# Patient Record
Sex: MALE | Race: WHITE | NOT HISPANIC OR LATINO | Employment: OTHER | URBAN - METROPOLITAN AREA
[De-identification: names, ages, dates, MRNs, and addresses within clinical notes are randomized per-mention and may not be internally consistent; named-entity substitution may affect disease eponyms.]

---

## 2017-01-29 LAB — INR PPP: 2.9

## 2017-01-30 ENCOUNTER — ANTICOAGULATION VISIT (OUTPATIENT)
Dept: CARDIOLOGY | Facility: CLINIC | Age: 67
End: 2017-01-30

## 2017-01-30 NOTE — PATIENT INSTRUCTIONS
LM on VM to continue same dose- recheck in 2 weeks- I did ask that if he is taking differently than what we have documented to please and let me know- LC

## 2017-02-22 ENCOUNTER — TELEPHONE (OUTPATIENT)
Dept: CARDIOLOGY | Facility: CLINIC | Age: 67
End: 2017-02-22

## 2017-02-22 ENCOUNTER — ANTICOAGULATION VISIT (OUTPATIENT)
Dept: CARDIOLOGY | Facility: CLINIC | Age: 67
End: 2017-02-22

## 2017-02-22 LAB — INR PPP: 3.5

## 2017-03-06 LAB — INR PPP: 2.6

## 2017-03-09 ENCOUNTER — ANTICOAGULATION VISIT (OUTPATIENT)
Dept: CARDIOLOGY | Facility: CLINIC | Age: 67
End: 2017-03-09

## 2017-03-09 LAB — INR PPP: 2.6

## 2017-03-16 ENCOUNTER — TRANSCRIBE ORDERS (OUTPATIENT)
Dept: PHYSICAL THERAPY | Facility: CLINIC | Age: 67
End: 2017-03-16

## 2017-03-16 ENCOUNTER — OFFICE VISIT (OUTPATIENT)
Dept: PHYSICAL THERAPY | Facility: CLINIC | Age: 67
End: 2017-03-16

## 2017-03-16 DIAGNOSIS — E06.1 DE QUERVAIN THYROIDITIS: Primary | ICD-10-CM

## 2017-03-16 DIAGNOSIS — M65.4 DE QUERVAIN'S TENOSYNOVITIS, LEFT: Primary | ICD-10-CM

## 2017-03-16 PROCEDURE — L3808 WHFO, RIGID W/O JOINTS: HCPCS | Performed by: PHYSICAL THERAPIST

## 2017-03-17 RX ORDER — WARFARIN SODIUM 3 MG/1
TABLET ORAL
Qty: 60 TABLET | Refills: 1 | Status: SHIPPED | OUTPATIENT
Start: 2017-03-17 | End: 2017-08-08 | Stop reason: SDUPTHER

## 2017-03-17 RX ORDER — WARFARIN SODIUM 1 MG/1
TABLET ORAL
Qty: 60 TABLET | Refills: 1 | Status: SHIPPED | OUTPATIENT
Start: 2017-03-17 | End: 2017-05-12

## 2017-03-19 ENCOUNTER — HOSPITAL ENCOUNTER (EMERGENCY)
Facility: HOSPITAL | Age: 67
Discharge: HOME | End: 2017-03-19
Attending: EMERGENCY MEDICINE | Admitting: EMERGENCY MEDICINE

## 2017-03-19 ENCOUNTER — APPOINTMENT (OUTPATIENT)
Dept: GENERAL RADIOLOGY | Facility: HOSPITAL | Age: 67
End: 2017-03-19

## 2017-03-19 VITALS
OXYGEN SATURATION: 98 % | HEIGHT: 70 IN | DIASTOLIC BLOOD PRESSURE: 90 MMHG | TEMPERATURE: 98 F | RESPIRATION RATE: 16 BRPM | HEART RATE: 88 BPM | SYSTOLIC BLOOD PRESSURE: 125 MMHG | BODY MASS INDEX: 22.9 KG/M2 | WEIGHT: 160 LBS

## 2017-03-19 DIAGNOSIS — H93.13 TINNITUS AURIUM, BILATERAL: ICD-10-CM

## 2017-03-19 DIAGNOSIS — R00.2 PALPITATIONS: Primary | ICD-10-CM

## 2017-03-19 DIAGNOSIS — G47.01 INSOMNIA DUE TO MEDICAL CONDITION: ICD-10-CM

## 2017-03-19 LAB
ALBUMIN SERPL-MCNC: 4.4 G/DL (ref 3.2–4.8)
ALBUMIN/GLOB SERPL: 1.7 G/DL (ref 1.5–2.5)
ALP SERPL-CCNC: 61 U/L (ref 25–100)
ALT SERPL W P-5'-P-CCNC: 33 U/L (ref 7–40)
ANION GAP SERPL CALCULATED.3IONS-SCNC: 1 MMOL/L (ref 3–11)
AST SERPL-CCNC: 32 U/L (ref 0–33)
BASOPHILS # BLD AUTO: 0.03 10*3/MM3 (ref 0–0.2)
BASOPHILS NFR BLD AUTO: 0.4 % (ref 0–1)
BILIRUB SERPL-MCNC: 1.1 MG/DL (ref 0.3–1.2)
BUN BLD-MCNC: 10 MG/DL (ref 9–23)
BUN/CREAT SERPL: 14.3 (ref 7–25)
CALCIUM SPEC-SCNC: 9.6 MG/DL (ref 8.7–10.4)
CHLORIDE SERPL-SCNC: 106 MMOL/L (ref 99–109)
CO2 SERPL-SCNC: 31 MMOL/L (ref 20–31)
CREAT BLD-MCNC: 0.7 MG/DL (ref 0.6–1.3)
DEPRECATED RDW RBC AUTO: 45.4 FL (ref 37–54)
EOSINOPHIL # BLD AUTO: 0.17 10*3/MM3 (ref 0.1–0.3)
EOSINOPHIL NFR BLD AUTO: 2.2 % (ref 0–3)
ERYTHROCYTE [DISTWIDTH] IN BLOOD BY AUTOMATED COUNT: 13 % (ref 11.3–14.5)
GFR SERPL CREATININE-BSD FRML MDRD: 112 ML/MIN/1.73
GLOBULIN UR ELPH-MCNC: 2.6 GM/DL
GLUCOSE BLD-MCNC: 73 MG/DL (ref 70–100)
HCT VFR BLD AUTO: 44.8 % (ref 38.9–50.9)
HGB BLD-MCNC: 15.2 G/DL (ref 13.1–17.5)
HOLD SPECIMEN: NORMAL
HOLD SPECIMEN: NORMAL
IMM GRANULOCYTES # BLD: 0.06 10*3/MM3 (ref 0–0.03)
IMM GRANULOCYTES NFR BLD: 0.8 % (ref 0–0.6)
INR PPP: 3.07
LYMPHOCYTES # BLD AUTO: 2.27 10*3/MM3 (ref 0.6–4.8)
LYMPHOCYTES NFR BLD AUTO: 29.2 % (ref 24–44)
MAGNESIUM SERPL-MCNC: 1.9 MG/DL (ref 1.3–2.7)
MCH RBC QN AUTO: 32.5 PG (ref 27–31)
MCHC RBC AUTO-ENTMCNC: 33.9 G/DL (ref 32–36)
MCV RBC AUTO: 95.9 FL (ref 80–99)
MONOCYTES # BLD AUTO: 0.77 10*3/MM3 (ref 0–1)
MONOCYTES NFR BLD AUTO: 9.9 % (ref 0–12)
NEUTROPHILS # BLD AUTO: 4.47 10*3/MM3 (ref 1.5–8.3)
NEUTROPHILS NFR BLD AUTO: 57.5 % (ref 41–71)
PLATELET # BLD AUTO: 190 10*3/MM3 (ref 150–450)
PMV BLD AUTO: 10.9 FL (ref 6–12)
POTASSIUM BLD-SCNC: 3.8 MMOL/L (ref 3.5–5.5)
PROT SERPL-MCNC: 7 G/DL (ref 5.7–8.2)
PROTHROMBIN TIME: 34.7 SECONDS (ref 9.6–11.5)
RBC # BLD AUTO: 4.67 10*6/MM3 (ref 4.2–5.76)
SODIUM BLD-SCNC: 138 MMOL/L (ref 132–146)
TROPONIN I SERPL-MCNC: <0.006 NG/ML
WBC NRBC COR # BLD: 7.77 10*3/MM3 (ref 3.5–10.8)
WHOLE BLOOD HOLD SPECIMEN: NORMAL
WHOLE BLOOD HOLD SPECIMEN: NORMAL

## 2017-03-19 PROCEDURE — 99284 EMERGENCY DEPT VISIT MOD MDM: CPT

## 2017-03-19 PROCEDURE — 84484 ASSAY OF TROPONIN QUANT: CPT | Performed by: EMERGENCY MEDICINE

## 2017-03-19 PROCEDURE — 71010 HC CHEST PA OR AP: CPT

## 2017-03-19 PROCEDURE — 83735 ASSAY OF MAGNESIUM: CPT | Performed by: EMERGENCY MEDICINE

## 2017-03-19 PROCEDURE — 93005 ELECTROCARDIOGRAM TRACING: CPT

## 2017-03-19 PROCEDURE — 85610 PROTHROMBIN TIME: CPT | Performed by: EMERGENCY MEDICINE

## 2017-03-19 PROCEDURE — 80053 COMPREHEN METABOLIC PANEL: CPT | Performed by: EMERGENCY MEDICINE

## 2017-03-19 PROCEDURE — 85025 COMPLETE CBC W/AUTO DIFF WBC: CPT | Performed by: EMERGENCY MEDICINE

## 2017-03-19 RX ORDER — SODIUM CHLORIDE 0.9 % (FLUSH) 0.9 %
10 SYRINGE (ML) INJECTION AS NEEDED
Status: DISCONTINUED | OUTPATIENT
Start: 2017-03-19 | End: 2017-03-19 | Stop reason: HOSPADM

## 2017-03-19 RX ORDER — PREGABALIN 100 MG/1
200 CAPSULE ORAL 3 TIMES DAILY
COMMUNITY
End: 2017-05-12

## 2017-03-19 RX ORDER — OXYCODONE HYDROCHLORIDE 15 MG/1
15 TABLET, FILM COATED, EXTENDED RELEASE ORAL EVERY 12 HOURS SCHEDULED
COMMUNITY
End: 2017-03-30

## 2017-03-19 RX ORDER — MORPHINE SULFATE 30 MG/1
15 TABLET ORAL 2 TIMES DAILY
COMMUNITY
End: 2017-03-30

## 2017-03-19 RX ORDER — METOPROLOL TARTRATE 50 MG/1
25 TABLET, FILM COATED ORAL 2 TIMES DAILY
COMMUNITY
End: 2017-06-20 | Stop reason: HOSPADM

## 2017-03-19 RX ORDER — LISINOPRIL 10 MG/1
10 TABLET ORAL EVERY MORNING
COMMUNITY
End: 2017-08-25 | Stop reason: SDUPTHER

## 2017-03-19 NOTE — ED PROVIDER NOTES
Subjective   HPI Comments: Doc Turner is a 66 y.o.male who presents to the ED c/o rapid palpitations for the past 2 months. Pt states he has had a constantly increased rapid heart rate with associated ringing in his ears and worsening difficulty sleeping. He has a hx of A-fib, which he takes coumadin for and is followed by Dr. Abad. He notes he has not been evaluated by Dr. Abad in 2 years and has not had an EKG for several years. Upon examination in the ED the pt denies any hearing loss, but notes he has not had a recent hearing test. 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 He reports he has chronic difficulty sleeping, but indicates it has been worsening the past 2 to 3 months and often naps during the day. He denies any hx of sleep apnea. He denies any fever, sore throat, rhinorrhea, diarrhea, urinary sx, blood in his stool or leg swelling.    Additionally, pt has De Quervain's Tenosynovitis in his left forearm and recently had steroid injections. His lower arm is currently in a splint.    Patient is a 66 y.o. male presenting with palpitations.   History provided by:  Patient  Palpitations   Palpitations quality:  Fast  Onset quality:  Unable to specify  Duration:  8 weeks  Timing:  Constant  Progression:  Unchanged  Chronicity:  New  Relieved by:  None tried  Associated symptoms: no lower extremity edema and no shortness of breath    Risk factors: hx of atrial fibrillation    Risk factors: no hypercoagulable state        Review of Systems   Constitutional: Negative for fever.   HENT: Positive for ear pain. Negative for hearing loss, rhinorrhea and sore throat.    Respiratory: Negative for shortness of breath.    Cardiovascular: Positive for palpitations.   Gastrointestinal: Negative for blood in stool and diarrhea.   Genitourinary: Negative for difficulty urinating and dysuria.   Psychiatric/Behavioral: Positive for sleep disturbance.   All other systems reviewed and are  negative.    PHYSICIAN/PAIN SPECIALIST: Kp Hobbs MD.      FORMER CARDIOLOGIST: Juan Wellington MD/Tino Allison MD, [facility].      CARDIOTHORACIC SURGEON: Tony Mcgill MD, [facility].      NEUROSURGEON: Jose Cadena MD.      NEUROLOGIST: Dwayne Rhodes MD.      SURGEON: Thierry Moore MD.      DISCHARGE DIAGNOSES:   1. Chronic valvular heart disease with mitral valve prolapse syndrome:   a) Remote progressive mitral regurgitation with porcine mitral valve  replacement ([facility], 1998).   b) Subsequent serial echocardiographic studies - data deficit - with abnormal  stress test and diagnostic coronary angiography - data deficit (07/2006).   c) Repeat sternotomy with mitral valve replacement with #[address] [employer]  mechanical mitral valve prosthesis with postoperative bleeding requiring  reexploration and associated atrial fibrillation/flutter with sotalol therapy  (11/2006).   d) Remote acceptable combination Doppler echocardiogram (09/2009) with  residual Class I symptoms.   e) Recent tachypalpitations with documented atrial fibrillation and [place]  Cardiovascular Society Class I angina pectoris/New York Heart Association Class  II exertional dyspnea and fatigue syndrome with subsequent admission following  initiation of sotalol therapy and direct current cardioversion with  echocardiogram.   2. Intermittent tachypalpitations with remote apparent acceptable 24-hour  Holter monitor - data deficit.   3. Labile hypertension, probably essential.   4. Chronic degenerative cervical spine disc disease with chronic narcotic use.  5. Chronic insomnia/depression with recent improved clinical course.   6. Remote Lyme disease with recurrence on 3 occasions, treated with periodic  antibiotic therapy and suppressive therapy.   7. Intermittent visual disturbance with hospitalization and neurology  evaluation - data deficit (11/2009).   8. Apparent symptomatic left inguinal hernia.   9. Erectile  dysfunction/Peyronie's disease.   10. Remote apparent complicated inguinal herniorrhaphy (02/2014) with  subsequent development of intermittent recurrent episodic gastric and right  upper quadrant abdominal pain and subsequent small bowel obstruction with  lactic acidosis with abdominal CT scan demonstrating extensive and progressive  right mid anterior abdominal mesenteric rotation/volvulus with emergent surgery  and postoperative wound hematoma/open packing with subsequent closure  (09/2014).   Past Medical History   Diagnosis Date   • Atrial fibrillation    • Chronic neck and back pain    • Hypertension        Allergies   Allergen Reactions   • Penicillins        Past Surgical History   Procedure Laterality Date   • Cardiac valve replacement       twice- mitral   • Back surgery     • Cardioversion     • Hernia repair         History reviewed. No pertinent family history.    Social History     Social History   • Marital status:      Spouse name: N/A   • Number of children: N/A   • Years of education: N/A     Social History Main Topics   • Smoking status: Never Smoker   • Smokeless tobacco: None   • Alcohol use Yes      Comment: 12 pack per week   • Drug use: No   • Sexual activity: Defer     Other Topics Concern   • None     Social History Narrative    Lives with his female            Objective   Physical Exam   Constitutional: He is oriented to person, place, and time. He appears well-developed and well-nourished. No distress.   No distress, TMs nml, reg with occasional irregular beat 1/6 sys murmur at base of heart, splint on left wrist consistent with recent injection  Neurovascularly intact   HENT:   Head: Normocephalic and atraumatic.   Right Ear: Tympanic membrane and external ear normal.   Left Ear: Tympanic membrane and external ear normal.   Nose: Nose normal.   Mouth/Throat: Oropharynx is clear and moist.   Eyes: Conjunctivae are normal. No scleral icterus.   Neck: Normal range of  motion. Neck supple.   Cardiovascular: Normal rate, regular rhythm and intact distal pulses.  Exam reveals no gallop and no friction rub.    Murmur (At base of heart.) heard.   Systolic murmur is present with a grade of 1/6   Regular rhythm with occasional irregular beat.   Pulmonary/Chest: Effort normal and breath sounds normal. No respiratory distress. He has no wheezes. He has no rales.   Abdominal: Soft. There is no tenderness.   Musculoskeletal: Normal range of motion.   Splint on left wrist consistent with injections.   Neurological: He is alert and oriented to person, place, and time.   LUE is neurovascularly intact.   Skin: Skin is warm and dry.   Psychiatric: He has a normal mood and affect. His behavior is normal.   Nursing note and vitals reviewed.      Procedures         ED Course  ED Course        Recent Results (from the past 24 hour(s))   Comprehensive Metabolic Panel    Collection Time: 03/19/17 11:54 AM   Result Value Ref Range    Glucose 73 70 - 100 mg/dL    BUN 10 9 - 23 mg/dL    Creatinine 0.70 0.60 - 1.30 mg/dL    Sodium 138 132 - 146 mmol/L    Potassium 3.8 3.5 - 5.5 mmol/L    Chloride 106 99 - 109 mmol/L    CO2 31.0 20.0 - 31.0 mmol/L    Calcium 9.6 8.7 - 10.4 mg/dL    Total Protein 7.0 5.7 - 8.2 g/dL    Albumin 4.40 3.20 - 4.80 g/dL    ALT (SGPT) 33 7 - 40 U/L    AST (SGOT) 32 0 - 33 U/L    Alkaline Phosphatase 61 25 - 100 U/L    Total Bilirubin 1.1 0.3 - 1.2 mg/dL    eGFR Non African Amer 112 >60 mL/min/1.73    Globulin 2.6 gm/dL    A/G Ratio 1.7 1.5 - 2.5 g/dL    BUN/Creatinine Ratio 14.3 7.0 - 25.0    Anion Gap 1.0 (L) 3.0 - 11.0 mmol/L   Magnesium    Collection Time: 03/19/17 11:54 AM   Result Value Ref Range    Magnesium 1.9 1.3 - 2.7 mg/dL   Light Blue Top    Collection Time: 03/19/17 11:54 AM   Result Value Ref Range    Extra Tube hold for add-on    Green Top (Gel)    Collection Time: 03/19/17 11:54 AM   Result Value Ref Range    Extra Tube Hold for add-ons.    Lavender Top     Collection Time: 03/19/17 11:54 AM   Result Value Ref Range    Extra Tube hold for add-on    Gold Top - SST    Collection Time: 03/19/17 11:54 AM   Result Value Ref Range    Extra Tube Hold for add-ons.    CBC Auto Differential    Collection Time: 03/19/17 11:54 AM   Result Value Ref Range    WBC 7.77 3.50 - 10.80 10*3/mm3    RBC 4.67 4.20 - 5.76 10*6/mm3    Hemoglobin 15.2 13.1 - 17.5 g/dL    Hematocrit 44.8 38.9 - 50.9 %    MCV 95.9 80.0 - 99.0 fL    MCH 32.5 (H) 27.0 - 31.0 pg    MCHC 33.9 32.0 - 36.0 g/dL    RDW 13.0 11.3 - 14.5 %    RDW-SD 45.4 37.0 - 54.0 fl    MPV 10.9 6.0 - 12.0 fL    Platelets 190 150 - 450 10*3/mm3    Neutrophil % 57.5 41.0 - 71.0 %    Lymphocyte % 29.2 24.0 - 44.0 %    Monocyte % 9.9 0.0 - 12.0 %    Eosinophil % 2.2 0.0 - 3.0 %    Basophil % 0.4 0.0 - 1.0 %    Immature Grans % 0.8 (H) 0.0 - 0.6 %    Neutrophils, Absolute 4.47 1.50 - 8.30 10*3/mm3    Lymphocytes, Absolute 2.27 0.60 - 4.80 10*3/mm3    Monocytes, Absolute 0.77 0.00 - 1.00 10*3/mm3    Eosinophils, Absolute 0.17 0.10 - 0.30 10*3/mm3    Basophils, Absolute 0.03 0.00 - 0.20 10*3/mm3    Immature Grans, Absolute 0.06 (H) 0.00 - 0.03 10*3/mm3   Troponin    Collection Time: 03/19/17 11:54 AM   Result Value Ref Range    Troponin I <0.006 <=0.039 ng/mL   Protime-INR    Collection Time: 03/19/17 11:54 AM   Result Value Ref Range    Protime 34.7 (H) 9.6 - 11.5 Seconds    INR 3.07      Note: In addition to lab results from this visit, the labs listed above may include labs taken at another facility or during a different encounter within the last 24 hours. Please correlate lab times with ED admission and discharge times for further clarification of the services performed during this visit.    XR Chest 1 View   Preliminary Result   No acute chest pathology.       DICTATED:     03/19/2017   EDITED:         03/19/2017            Vitals:    03/19/17 1145 03/19/17 1315 03/19/17 1400 03/19/17 1409   BP:   125/90 125/90   BP Location:    Left  "arm   Patient Position:    Sitting   Pulse:  86  88   Resp:    16   Temp:    98 °F (36.7 °C)   TempSrc:       SpO2:  97%  98%   Weight: 160 lb (72.6 kg)      Height: 70\" (177.8 cm)        Medications - No data to display  ECG/EMG Results (last 24 hours)     Procedure Component Value Units Date/Time    ECG 12 Lead [[id number]] Collected:  03/19/17 1145     Updated:  03/19/17 1314                  MDM  Number of Diagnoses or Management Options  Insomnia due to medical condition:   Palpitations:   Tinnitus aurium, bilateral:   Diagnosis management comments:       Reviewed all available studies at the bedside with the patient.  They are reassuring.  Unfortunately he is not kept his follow-up as previously directed and I think right now he is stable but will need early follow-up with our A. fib clinic.  He may benefit from something like a CO monitor to see if he is having frequent arrhythmia is when I rechecked the patient.  Per to be in sinus rhythm that his initial EKG showed a flutter with a slow rate variable block.  He is adequately anticoagulated.    Scars the tenderness goes his neurologic exam is normal here and his ear drums appear normal.  I'll refer him to ENT to follow-up with this and I explained how frustrating this can be treated treat sometimes.    His far as the insomnia goes he sleeping a lot during the day and I talked her about sleep hygiene issues and a trial melatonin I've encouraged him to call our sleep center for follow-up.    All are agreeable with the plan       Amount and/or Complexity of Data Reviewed  Clinical lab tests: reviewed  Tests in the radiology section of CPT®: reviewed  Tests in the medicine section of CPT®: reviewed      EMR Dragon/Transcription disclaimer:   Much of this encounter note is an electronic transcription/translation of spoken language to printed text. The electronic translation of spoken language may permit erroneous, or at times, nonsensical words or phrases to be " inadvertently transcribed; Although I have reviewed the note for such errors, some may still exist.     Final diagnoses:   Palpitations   Tinnitus aurium, bilateral   Insomnia due to medical condition            Terry Gibson  03/19/17 1408       Terry Gibson  03/19/17 1432       Atif Kraus MD  03/19/17 [id number]

## 2017-03-19 NOTE — DISCHARGE INSTRUCTIONS
Try melatonin at night 30 minutes before sleep.  Tried exposure self to a lot of sunlight during the day and avoid napping.    Continue your current dose of Coumadin your INR was 3.07    He may take an extra metoprolol once a day if you have a lot of palpitations

## 2017-03-20 NOTE — PROGRESS NOTES
Doc Turner 1950   Diagnosis/ Surgery: Left De Quervains             Date Of Injury: Chronic    Date Of Surgery:N/A    Hand Dominance: Right   History of Present Condition: ~1 year ago patient started to have pain at the base of the left thumb, eventually he started to not be able to use his left hand and thumb because of the pain  Medical/Vocational History/ Medications: Pt enjoys playing stringed Vascular [employer]     Pain: 1/10    Edema: mild  Sensibility: WNL   Wound Status:N/A  ROM/ Strength: NT    Splinting:  · Patient was measure and fit with a custom fabricated forearm based thumb spica    · Patient was instructed in wearing schedule, precautions and care of the splint during this visit.   · Patient was instructed in proper donning/doffing of splint.   Assessment:  · Patient was fitted and appropriate splint was fabricated this date.  · Patient reported that splint was comfortable and had no complications with the fit of the splint.  · Patient was instructed and patient verbalized understanding of precautions, wear and care of the splint.   · Patient demonstrated independent donning/doffing of splint during treatment today.  Goals:  · Patient was fitted properly with appropriate splint for diagnosis  · Patient was educated on precautions, wear schedule and care of splint  · Patient demonstrated independence with donning/doffing of the splint.  · Splint was provided to Protect Healing Structures, Restrict Mobility, Improve joint alignment.  Plan:  · No additional treatment is required for this patient at this time. The patient is therefore discharged from therapy.  · Patient advised to contact therapist with any additional questions or concerns regarding the fit and function of the splint.  · Patient will be seen for splint issues as needed   · Wear Instructions: Off for hygiene       PT SIGNATURE: Domo Crespo, PT   DATE TREATMENT INITIATED: 3/19/2017    Physician  Signature____________________________________ Date____________

## 2017-03-27 PROBLEM — G47.00 INSOMNIA: Status: ACTIVE | Noted: 2017-03-27

## 2017-03-27 PROBLEM — K40.90 LEFT INGUINAL HERNIA: Status: ACTIVE | Noted: 2017-03-27

## 2017-03-27 PROBLEM — N52.9 ED (ERECTILE DYSFUNCTION): Status: ACTIVE | Noted: 2017-03-27

## 2017-03-27 PROBLEM — I10 HYPERTENSION: Status: ACTIVE | Noted: 2017-03-27

## 2017-03-27 PROBLEM — R00.2 PALPITATIONS: Status: ACTIVE | Noted: 2017-03-27

## 2017-03-27 PROBLEM — I38 VHD (VALVULAR HEART DISEASE): Status: ACTIVE | Noted: 2017-03-27

## 2017-03-27 PROBLEM — M50.30 DDD (DEGENERATIVE DISC DISEASE), CERVICAL: Status: ACTIVE | Noted: 2017-03-27

## 2017-03-30 ENCOUNTER — OFFICE VISIT (OUTPATIENT)
Dept: CARDIOLOGY | Facility: HOSPITAL | Age: 67
End: 2017-03-30

## 2017-03-30 VITALS
OXYGEN SATURATION: 100 % | HEART RATE: 89 BPM | BODY MASS INDEX: 22.9 KG/M2 | TEMPERATURE: 98 F | RESPIRATION RATE: 18 BRPM | WEIGHT: 160 LBS | DIASTOLIC BLOOD PRESSURE: 72 MMHG | HEIGHT: 70 IN | SYSTOLIC BLOOD PRESSURE: 112 MMHG

## 2017-03-30 PROCEDURE — 99215 OFFICE O/P EST HI 40 MIN: CPT | Performed by: NURSE PRACTITIONER

## 2017-03-30 RX ORDER — OXYCODONE HYDROCHLORIDE 15 MG/1
15 TABLET ORAL 3 TIMES DAILY PRN
Refills: 0 | COMMUNITY
Start: 2017-03-10 | End: 2018-02-20 | Stop reason: HOSPADM

## 2017-03-30 RX ORDER — MORPHINE SULFATE 15 MG/1
15 TABLET, FILM COATED, EXTENDED RELEASE ORAL 2 TIMES DAILY
Refills: 0 | COMMUNITY
Start: 2017-03-10 | End: 2019-02-26 | Stop reason: ALTCHOICE

## 2017-04-05 ENCOUNTER — ANTICOAGULATION VISIT (OUTPATIENT)
Dept: CARDIOLOGY | Facility: CLINIC | Age: 67
End: 2017-04-05

## 2017-04-05 NOTE — PATIENT INSTRUCTIONS
Patient was apparently seen in the ER 3/19//17 and had lab work done.  I found this today in his chart today 4/5/17.  Called patient and LM for him to continue on his dose and to recheck 4/20/17.   AH

## 2017-04-10 ENCOUNTER — ANTICOAGULATION VISIT (OUTPATIENT)
Dept: CARDIOLOGY | Facility: CLINIC | Age: 67
End: 2017-04-10

## 2017-04-10 LAB — INR PPP: 2.6

## 2017-04-25 ENCOUNTER — ANTICOAGULATION VISIT (OUTPATIENT)
Dept: CARDIOLOGY | Facility: CLINIC | Age: 67
End: 2017-04-25

## 2017-04-25 LAB — INR PPP: 2.6

## 2017-05-12 ENCOUNTER — OFFICE VISIT (OUTPATIENT)
Dept: CARDIOLOGY | Facility: CLINIC | Age: 67
End: 2017-05-12

## 2017-05-12 VITALS
WEIGHT: 159.2 LBS | HEIGHT: 70 IN | SYSTOLIC BLOOD PRESSURE: 137 MMHG | HEART RATE: 86 BPM | DIASTOLIC BLOOD PRESSURE: 91 MMHG | BODY MASS INDEX: 22.79 KG/M2

## 2017-05-12 DIAGNOSIS — R00.2 PALPITATIONS: ICD-10-CM

## 2017-05-12 DIAGNOSIS — I10 ESSENTIAL HYPERTENSION: ICD-10-CM

## 2017-05-12 DIAGNOSIS — G47.00 INSOMNIA, UNSPECIFIED TYPE: ICD-10-CM

## 2017-05-12 DIAGNOSIS — I48.92 PAROXYSMAL ATRIAL FLUTTER (HCC): ICD-10-CM

## 2017-05-12 DIAGNOSIS — I38 VHD (VALVULAR HEART DISEASE): Primary | ICD-10-CM

## 2017-05-12 PROCEDURE — 99204 OFFICE O/P NEW MOD 45 MIN: CPT | Performed by: INTERNAL MEDICINE

## 2017-05-12 PROCEDURE — 93000 ELECTROCARDIOGRAM COMPLETE: CPT | Performed by: INTERNAL MEDICINE

## 2017-05-12 RX ORDER — SOTALOL HYDROCHLORIDE 80 MG/1
80 TABLET ORAL 2 TIMES DAILY
COMMUNITY
End: 2017-05-15 | Stop reason: SDUPTHER

## 2017-05-15 RX ORDER — SOTALOL HYDROCHLORIDE 80 MG/1
80 TABLET ORAL 2 TIMES DAILY
Qty: 180 TABLET | Refills: 3 | Status: SHIPPED | OUTPATIENT
Start: 2017-05-15 | End: 2017-05-16 | Stop reason: SDUPTHER

## 2017-05-16 ENCOUNTER — ANTICOAGULATION VISIT (OUTPATIENT)
Dept: CARDIOLOGY | Facility: CLINIC | Age: 67
End: 2017-05-16

## 2017-05-16 LAB — INR PPP: 2.8

## 2017-05-16 RX ORDER — SOTALOL HYDROCHLORIDE 80 MG/1
80 TABLET ORAL 2 TIMES DAILY
Qty: 180 TABLET | Refills: 3 | Status: SHIPPED | OUTPATIENT
Start: 2017-05-16 | End: 2018-02-20 | Stop reason: SDUPTHER

## 2017-06-01 ENCOUNTER — ANTICOAGULATION VISIT (OUTPATIENT)
Dept: CARDIOLOGY | Facility: CLINIC | Age: 67
End: 2017-06-01

## 2017-06-01 LAB — INR PPP: 3.7

## 2017-06-01 NOTE — PATIENT INSTRUCTIONS
LM for patient to return to his original dose of 2 mg M,W, F and 4 mg other 5 days.    To rechecks 2 weeks     AH

## 2017-06-14 ENCOUNTER — HOSPITAL ENCOUNTER (OUTPATIENT)
Dept: CARDIOLOGY | Facility: HOSPITAL | Age: 67
Discharge: HOME | End: 2017-06-14
Attending: INTERNAL MEDICINE | Admitting: INTERNAL MEDICINE

## 2017-06-14 VITALS
SYSTOLIC BLOOD PRESSURE: 105 MMHG | DIASTOLIC BLOOD PRESSURE: 73 MMHG | WEIGHT: 159 LBS | HEIGHT: 70 IN | BODY MASS INDEX: 22.76 KG/M2

## 2017-06-14 DIAGNOSIS — I10 ESSENTIAL HYPERTENSION: ICD-10-CM

## 2017-06-14 DIAGNOSIS — I38 VHD (VALVULAR HEART DISEASE): ICD-10-CM

## 2017-06-14 DIAGNOSIS — G47.00 INSOMNIA, UNSPECIFIED TYPE: ICD-10-CM

## 2017-06-14 DIAGNOSIS — R00.2 PALPITATIONS: ICD-10-CM

## 2017-06-14 DIAGNOSIS — I48.92 PAROXYSMAL ATRIAL FLUTTER (HCC): ICD-10-CM

## 2017-06-14 LAB
BH CV ECHO MEAS - AI DEC SLOPE: 152.8 CM/SEC^2
BH CV ECHO MEAS - AI MAX PG: 35 MMHG
BH CV ECHO MEAS - AI MAX VEL: 295.7 CM/SEC
BH CV ECHO MEAS - AI P1/2T: 566.8 MSEC
BH CV ECHO MEAS - AO MAX PG (FULL): 0.89 MMHG
BH CV ECHO MEAS - AO MAX PG: 2.9 MMHG
BH CV ECHO MEAS - AO MEAN PG (FULL): 0.82 MMHG
BH CV ECHO MEAS - AO MEAN PG: 1.7 MMHG
BH CV ECHO MEAS - AO ROOT AREA (BSA CORRECTED): 1.8
BH CV ECHO MEAS - AO ROOT AREA: 9.3 CM^2
BH CV ECHO MEAS - AO ROOT DIAM: 3.4 CM
BH CV ECHO MEAS - AO V2 MAX: 85.2 CM/SEC
BH CV ECHO MEAS - AO V2 MEAN: 62.5 CM/SEC
BH CV ECHO MEAS - AO V2 VTI: 17.7 CM
BH CV ECHO MEAS - AVA(I,A): 2.8 CM^2
BH CV ECHO MEAS - AVA(I,D): 2.8 CM^2
BH CV ECHO MEAS - AVA(V,A): 3 CM^2
BH CV ECHO MEAS - AVA(V,D): 3 CM^2
BH CV ECHO MEAS - BSA(HAYCOCK): 1.9 M^2
BH CV ECHO MEAS - BSA: 1.9 M^2
BH CV ECHO MEAS - BZI_BMI: 22.8 KILOGRAMS/M^2
BH CV ECHO MEAS - BZI_METRIC_HEIGHT: 177.8 CM
BH CV ECHO MEAS - BZI_METRIC_WEIGHT: 72.1 KG
BH CV ECHO MEAS - CONTRAST EF (2CH): 54.8 ML/M^2
BH CV ECHO MEAS - CONTRAST EF 4CH: 62.5 ML/M^2
BH CV ECHO MEAS - EDV(CUBED): 81.3 ML
BH CV ECHO MEAS - EDV(MOD-SP2): 84 ML
BH CV ECHO MEAS - EDV(MOD-SP4): 88 ML
BH CV ECHO MEAS - EDV(TEICH): 84.6 ML
BH CV ECHO MEAS - EF(CUBED): 50.4 %
BH CV ECHO MEAS - EF(MOD-SP2): 54.8 %
BH CV ECHO MEAS - EF(MOD-SP4): 62.5 %
BH CV ECHO MEAS - EF(TEICH): 42.8 %
BH CV ECHO MEAS - ESV(CUBED): 40.3 ML
BH CV ECHO MEAS - ESV(MOD-SP2): 38 ML
BH CV ECHO MEAS - ESV(MOD-SP4): 33 ML
BH CV ECHO MEAS - ESV(TEICH): 48.4 ML
BH CV ECHO MEAS - FS: 20.9 %
BH CV ECHO MEAS - IVS/LVPW: 1.1
BH CV ECHO MEAS - IVSD: 1.1 CM
BH CV ECHO MEAS - LA DIMENSION: 4.4 CM
BH CV ECHO MEAS - LA/AO: 0.89
BH CV ECHO MEAS - LAT PEAK E' VEL: 12.3 CM/SEC
BH CV ECHO MEAS - LV DIASTOLIC VOL/BSA (35-75): 46.5 ML/M^2
BH CV ECHO MEAS - LV MASS(C)D: 155.8 GRAMS
BH CV ECHO MEAS - LV MASS(C)DI: 82.3 GRAMS/M^2
BH CV ECHO MEAS - LV MAX PG: 2 MMHG
BH CV ECHO MEAS - LV MEAN PG: 0.92 MMHG
BH CV ECHO MEAS - LV SYSTOLIC VOL/BSA (12-30): 17.4 ML/M^2
BH CV ECHO MEAS - LV V1 MAX: 71 CM/SEC
BH CV ECHO MEAS - LV V1 MEAN: 43.4 CM/SEC
BH CV ECHO MEAS - LV V1 VTI: 13.5 CM
BH CV ECHO MEAS - LVIDD: 4.3 CM
BH CV ECHO MEAS - LVIDS: 3 CM
BH CV ECHO MEAS - LVLD AP2: 8.4 CM
BH CV ECHO MEAS - LVLD AP4: 7.5 CM
BH CV ECHO MEAS - LVLS AP2: 6.8 CM
BH CV ECHO MEAS - LVLS AP4: 6.9 CM
BH CV ECHO MEAS - LVOT AREA (M): 3.5 CM^2
BH CV ECHO MEAS - LVOT AREA: 3.6 CM^2
BH CV ECHO MEAS - LVOT DIAM: 2.1 CM
BH CV ECHO MEAS - LVPWD: 1.1 CM
BH CV ECHO MEAS - MED PEAK E' VEL: 10.1 CM/SEC
BH CV ECHO MEAS - MV A MAX VEL: 57.8 CM/SEC
BH CV ECHO MEAS - MV DEC SLOPE: 390.9 CM/SEC^2
BH CV ECHO MEAS - MV DEC TIME: 0.31 SEC
BH CV ECHO MEAS - MV E MAX VEL: 123.4 CM/SEC
BH CV ECHO MEAS - MV E/A: 2.1
BH CV ECHO MEAS - MV MAX PG: 9.3 MMHG
BH CV ECHO MEAS - MV MEAN PG: 3.6 MMHG
BH CV ECHO MEAS - MV P1/2T MAX VEL: 152 CM/SEC
BH CV ECHO MEAS - MV P1/2T: 113.9 MSEC
BH CV ECHO MEAS - MV V2 MAX: 152.7 CM/SEC
BH CV ECHO MEAS - MV V2 MEAN: 87.5 CM/SEC
BH CV ECHO MEAS - MV V2 VTI: 42.7 CM
BH CV ECHO MEAS - MVA P1/2T LCG: 1.4 CM^2
BH CV ECHO MEAS - MVA(P1/2T): 1.9 CM^2
BH CV ECHO MEAS - MVA(VTI): 1.1 CM^2
BH CV ECHO MEAS - PA ACC SLOPE: 1484 CM/SEC^2
BH CV ECHO MEAS - PA ACC TIME: 0.06 SEC
BH CV ECHO MEAS - PA PR(ACCEL): 54 MMHG
BH CV ECHO MEAS - RVDD: 3.8 CM
BH CV ECHO MEAS - SI(AO): 86.7 ML/M^2
BH CV ECHO MEAS - SI(CUBED): 21.7 ML/M^2
BH CV ECHO MEAS - SI(LVOT): 25.7 ML/M^2
BH CV ECHO MEAS - SI(MOD-SP2): 24.3 ML/M^2
BH CV ECHO MEAS - SI(MOD-SP4): 29 ML/M^2
BH CV ECHO MEAS - SI(TEICH): 19.1 ML/M^2
BH CV ECHO MEAS - SV(AO): 164.1 ML
BH CV ECHO MEAS - SV(CUBED): 41 ML
BH CV ECHO MEAS - SV(LVOT): 48.6 ML
BH CV ECHO MEAS - SV(MOD-SP2): 46 ML
BH CV ECHO MEAS - SV(MOD-SP4): 55 ML
BH CV ECHO MEAS - SV(TEICH): 36.1 ML
BH CV ECHO MEAS - TAPSE (>1.6): 1.3 CM2
BH CV ECHO MEAS - TR MAX VEL: 169 CM/SEC
BH CV VAS BP LEFT ARM: NORMAL MMHG
BH CV XLRA - RV BASE: 4.4 CM
BH CV XLRA - RV LENGTH: 7.2 CM
BH CV XLRA - RV MID: 4.7 CM
BH CV XLRA - TDI S': 8.8 CM/SEC
LEFT ATRIUM VOLUME INDEX: 40.2 ML/M2
LV EF 2D ECHO EST: 60 %

## 2017-06-14 PROCEDURE — 93306 TTE W/DOPPLER COMPLETE: CPT

## 2017-06-14 PROCEDURE — 93306 TTE W/DOPPLER COMPLETE: CPT | Performed by: INTERNAL MEDICINE

## 2017-06-15 ENCOUNTER — CLINICAL SUPPORT (OUTPATIENT)
Dept: CARDIOLOGY | Facility: CLINIC | Age: 67
End: 2017-06-15

## 2017-06-15 DIAGNOSIS — I48.0 PAROXYSMAL ATRIAL FIBRILLATION (HCC): Primary | ICD-10-CM

## 2017-06-15 PROCEDURE — 93000 ELECTROCARDIOGRAM COMPLETE: CPT | Performed by: INTERNAL MEDICINE

## 2017-06-16 DIAGNOSIS — I48.92 ATRIAL FLUTTER, UNSPECIFIED TYPE (HCC): Primary | ICD-10-CM

## 2017-06-19 ENCOUNTER — PREP FOR SURGERY (OUTPATIENT)
Dept: OTHER | Facility: HOSPITAL | Age: 67
End: 2017-06-19

## 2017-06-19 DIAGNOSIS — I48.0 PAROXYSMAL ATRIAL FIBRILLATION (HCC): Primary | ICD-10-CM

## 2017-06-19 RX ORDER — LIDOCAINE HYDROCHLORIDE 10 MG/ML
0.1 INJECTION, SOLUTION EPIDURAL; INFILTRATION; INTRACAUDAL; PERINEURAL ONCE AS NEEDED
Status: CANCELLED | OUTPATIENT
Start: 2017-06-19

## 2017-06-19 RX ORDER — SODIUM CHLORIDE 0.9 % (FLUSH) 0.9 %
1-10 SYRINGE (ML) INJECTION AS NEEDED
Status: CANCELLED | OUTPATIENT
Start: 2017-06-19

## 2017-06-19 RX ORDER — ONDANSETRON 2 MG/ML
4 INJECTION INTRAMUSCULAR; INTRAVENOUS EVERY 6 HOURS PRN
Status: CANCELLED | OUTPATIENT
Start: 2017-06-19

## 2017-06-20 ENCOUNTER — HOSPITAL ENCOUNTER (OUTPATIENT)
Dept: CARDIOLOGY | Facility: HOSPITAL | Age: 67
Discharge: HOME | End: 2017-06-20
Attending: INTERNAL MEDICINE | Admitting: INTERNAL MEDICINE

## 2017-06-20 VITALS
BODY MASS INDEX: 22.41 KG/M2 | OXYGEN SATURATION: 100 % | DIASTOLIC BLOOD PRESSURE: 71 MMHG | WEIGHT: 156.53 LBS | SYSTOLIC BLOOD PRESSURE: 113 MMHG | TEMPERATURE: 97.4 F | HEART RATE: 64 BPM | RESPIRATION RATE: 18 BRPM | HEIGHT: 70 IN

## 2017-06-20 DIAGNOSIS — I48.0 PAROXYSMAL ATRIAL FIBRILLATION (HCC): ICD-10-CM

## 2017-06-20 DIAGNOSIS — I48.92 ATRIAL FLUTTER, UNSPECIFIED TYPE (HCC): ICD-10-CM

## 2017-06-20 LAB
ANION GAP SERPL CALCULATED.3IONS-SCNC: 4 MMOL/L (ref 3–11)
BUN BLD-MCNC: 8 MG/DL (ref 9–23)
BUN/CREAT SERPL: 13.3 (ref 7–25)
CALCIUM SPEC-SCNC: 9.7 MG/DL (ref 8.7–10.4)
CHLORIDE SERPL-SCNC: 104 MMOL/L (ref 99–109)
CO2 SERPL-SCNC: 26 MMOL/L (ref 20–31)
CREAT BLD-MCNC: 0.6 MG/DL (ref 0.6–1.3)
GFR SERPL CREATININE-BSD FRML MDRD: 134 ML/MIN/1.73
GLUCOSE BLD-MCNC: 91 MG/DL (ref 70–100)
INR PPP: 2.32
POTASSIUM BLD-SCNC: 4.1 MMOL/L (ref 3.5–5.5)
PROTHROMBIN TIME: 25.9 SECONDS (ref 9.6–11.5)
SODIUM BLD-SCNC: 134 MMOL/L (ref 132–146)

## 2017-06-20 PROCEDURE — 92960 CARDIOVERSION ELECTRIC EXT: CPT

## 2017-06-20 PROCEDURE — 92960 CARDIOVERSION ELECTRIC EXT: CPT | Performed by: INTERNAL MEDICINE

## 2017-06-20 PROCEDURE — 93005 ELECTROCARDIOGRAM TRACING: CPT | Performed by: INTERNAL MEDICINE

## 2017-06-20 PROCEDURE — 36415 COLL VENOUS BLD VENIPUNCTURE: CPT

## 2017-06-20 PROCEDURE — 25010000002 MIDAZOLAM PER 1 MG: Performed by: INTERNAL MEDICINE

## 2017-06-20 PROCEDURE — S0260 H&P FOR SURGERY: HCPCS | Performed by: INTERNAL MEDICINE

## 2017-06-20 PROCEDURE — 25010000002 FENTANYL CITRATE (PF) 100 MCG/2ML SOLUTION: Performed by: INTERNAL MEDICINE

## 2017-06-20 PROCEDURE — 80048 BASIC METABOLIC PNL TOTAL CA: CPT | Performed by: NURSE PRACTITIONER

## 2017-06-20 PROCEDURE — 85610 PROTHROMBIN TIME: CPT | Performed by: NURSE PRACTITIONER

## 2017-06-20 PROCEDURE — 93010 ELECTROCARDIOGRAM REPORT: CPT | Performed by: INTERNAL MEDICINE

## 2017-06-20 RX ORDER — MIDAZOLAM HYDROCHLORIDE 1 MG/ML
INJECTION INTRAMUSCULAR; INTRAVENOUS
Status: DISCONTINUED
Start: 2017-06-20 | End: 2017-06-20 | Stop reason: HOSPADM

## 2017-06-20 RX ORDER — MIDAZOLAM HYDROCHLORIDE 1 MG/ML
INJECTION INTRAMUSCULAR; INTRAVENOUS
Status: COMPLETED | OUTPATIENT
Start: 2017-06-20 | End: 2017-06-20

## 2017-06-20 RX ORDER — LIDOCAINE HYDROCHLORIDE 10 MG/ML
0.1 INJECTION, SOLUTION EPIDURAL; INFILTRATION; INTRACAUDAL; PERINEURAL ONCE AS NEEDED
Status: DISCONTINUED | OUTPATIENT
Start: 2017-06-20 | End: 2017-06-20 | Stop reason: HOSPADM

## 2017-06-20 RX ORDER — FENTANYL CITRATE 50 UG/ML
INJECTION, SOLUTION INTRAMUSCULAR; INTRAVENOUS
Status: COMPLETED | OUTPATIENT
Start: 2017-06-20 | End: 2017-06-20

## 2017-06-20 RX ORDER — NALOXONE HYDROCHLORIDE 0.4 MG/ML
INJECTION, SOLUTION INTRAMUSCULAR; INTRAVENOUS; SUBCUTANEOUS
Status: DISCONTINUED
Start: 2017-06-20 | End: 2017-06-20 | Stop reason: WASHOUT

## 2017-06-20 RX ORDER — FLUMAZENIL 0.1 MG/ML
INJECTION INTRAVENOUS
Status: DISCONTINUED
Start: 2017-06-20 | End: 2017-06-20 | Stop reason: WASHOUT

## 2017-06-20 RX ORDER — SODIUM CHLORIDE 0.9 % (FLUSH) 0.9 %
1-10 SYRINGE (ML) INJECTION AS NEEDED
Status: DISCONTINUED | OUTPATIENT
Start: 2017-06-20 | End: 2017-06-20 | Stop reason: HOSPADM

## 2017-06-20 RX ORDER — FENTANYL CITRATE 50 UG/ML
INJECTION, SOLUTION INTRAMUSCULAR; INTRAVENOUS
Status: DISCONTINUED
Start: 2017-06-20 | End: 2017-06-20 | Stop reason: HOSPADM

## 2017-06-20 RX ORDER — ONDANSETRON 2 MG/ML
4 INJECTION INTRAMUSCULAR; INTRAVENOUS EVERY 6 HOURS PRN
Status: DISCONTINUED | OUTPATIENT
Start: 2017-06-20 | End: 2017-06-20 | Stop reason: HOSPADM

## 2017-06-20 RX ADMIN — FENTANYL CITRATE 25 MCG: 50 INJECTION, SOLUTION INTRAMUSCULAR; INTRAVENOUS at 11:02

## 2017-06-20 RX ADMIN — MIDAZOLAM HYDROCHLORIDE 2 MG: 1 INJECTION, SOLUTION INTRAMUSCULAR; INTRAVENOUS at 10:59

## 2017-06-20 RX ADMIN — METHOHEXITAL SODIUM 10 MG: 500 INJECTION, POWDER, LYOPHILIZED, FOR SOLUTION INTRAMUSCULAR; INTRAVENOUS; RECTAL at 11:05

## 2017-06-20 RX ADMIN — FENTANYL CITRATE 50 MCG: 50 INJECTION, SOLUTION INTRAMUSCULAR; INTRAVENOUS at 10:59

## 2017-06-20 RX ADMIN — METHOHEXITAL SODIUM 10 MG: 500 INJECTION, POWDER, LYOPHILIZED, FOR SOLUTION INTRAMUSCULAR; INTRAVENOUS; RECTAL at 11:04

## 2017-06-20 NOTE — H&P
Doc Turner  [id number]  1950   LOS: 0 days   Patient Care Team:    PHYSICIAN: Kp Hobbs MD  FORMER CARDIOLOGISTS: Juan Wellington MD/Tino Allison MD  CARDIOTHORACIC SURGEON: Sylwia sanchez MD/Tony Mcgill MD, [facility]  NEUROSURGEON: Jose Cadena MD  NEUROLOGIST: Dwayne Rhodes MD  GENERAL SURGEONS: Thierry Moore MD/Piero Ball MD    Doc Turner is a 66 y.o.  white male from [place], [place], a retired     Chief Complaint:  Persistent atrial flutter    Problem List:  1. Chronic valvular heart disease with mitral valve prolapse syndrome/atrial flutter   A. Remote progressive mitral regurgitation with porcine mitral valve replacement, [facility], 1998   B. Subsequent serial echocardiographic evaluations-data deficit, with abnormal stress test and diagnostic coronary angiography, data deficit, July 2006   C. Repeat sternotomy with mitral valve replacement with #[address] [employer] prosthesis with postop bleeding requiring reexploration and associated atrial fib/flutter with sotalol therapy, November 2006   D. Acceptable combination Doppler echocardiogram, September 2009, with residual class I symptoms   E. Tachypalpitations with documented atrial fibrillation, 1/12/15 LAUREEN/ECV-successful   F. LAUREEN 1/12/15; LVEF 0.55-0.60, mild concentric LVH observed, postsurgical hypokinesis of the interventricular septum observed consistent with valve replacement, LA and RV mild to moderately dilated, RVSP mildly reduced, mild AR, mechanical mitral valve appears well seated with normal function, mild TR   G. B[facility] 3/19/17 with tachycardia, atrial flutter, anticoagulated with Coumadin   H. Residual Class I symptoms, ECG demonstrating atrial flutter 5/12/17   I. ECV 6/20/17  2. Intermittent tachypalpitations with apparent acceptable 24-hour Holter monitor-data deficit  3. Labile hypertension, probably essential  4. Chronic degenerative cervical spine disc disease  with chronic narcotic use  5. Chronic insomnia/depression with recent improved clinical course  6. Remote Lyme disease with recurrence on 3 occasions, treated with periodic antibiotic therapy  7. Intermittent visual disturbance with hospitalization in neurology evaluation-data deficit, November 2009  8. Apparent symptomatic left inguinal hernia  9. Erectile dysfunction/Peyronie's disease  10. Apparent complicated inguinalhernioohaphy, February 2014 with subsequent development of intermittent recurrent episode gastric and right upper quadrant pain with subsequent small bowel obstruction and lactic acidosis with abdominal CT scan demonstrating extensive and progressive mid right anterior abdominal mesenteric rotation/volvulus emergent surgery and postop wound hematoma/(with subsequent closure, September 2014  11. Surgical history:   A. Mitral valve replacement ×2   B. Volvulus   C. Back surgery   D. Hernia repair   E. Cardioversion x 2       Allergies   Allergen Reactions   • Penicillins Angioedema     Prescriptions Prior to Admission   Medication Sig Dispense Refill Last Dose   • lisinopril (PRINIVIL,ZESTRIL) 10 MG tablet Take 10 mg by mouth Every Morning.   6/20/2017   • LYRICA 200 MG capsule Take 200 mg by mouth 3 (Three) Times a Day.  0 6/20/2017   • Morphine (MS CONTIN) 15 MG 12 hr tablet Take 15 mg by mouth 2 (Two) Times a Day.  0 6/20/2017   • oxyCODONE (ROXICODONE) 15 MG immediate release tablet Take 15 mg by mouth 3 (Three) Times a Day As Needed. For breakthrough  0 6/19/2017   • sotalol (BETAPACE) 80 MG tablet Take 1 tablet by mouth 2 (Two) Times a Day. 180 tablet 3 6/20/2017   • warfarin (COUMADIN) 3 MG tablet Take one tablet daily in addition to 1 mg tablet as directed. (Patient taking differently: Take 3 mg by mouth Every Night.) 60 tablet 1 6/19/2017   • metoprolol tartrate (LOPRESSOR) 50 MG tablet Take 25 mg by mouth 2 (Two) Times a Day.   Taking          History of Present Illness:    This 66-year-old  white male presents in clinic 17 with complaints of palpitations and insomnia. He previously was our patient but has not been seen in followup for over 2 years. He had an episode in 2017 when he came to the ER because he was having some palpitations and difficulty sleeping because he could feel that his heart was irregular. After coming to the ED, he was in and out of atrial flutter. Previous to this, he was taking metoprolol. He states that he had an  prescription of sotalol at home and stopped taking the metoprolol, and started taking 40 mg twice a day of the sotalol. He still has his palpitations and insomnia, but he does not feel the palpitations are at a rapid rate. When he takes his pulse, he notes that it is generally around 87 bpm. He denies any chest pain, shortness of breath, fatigue, nausea, vomiting, presyncope, syncope, or edema. He had a cardioversion in  for atrial flutter which was successful at that time. He is anticoagulated with Coumadin due to his mechanical mitral valve. He has been experiencing these palpitations and insomnia for the past 3 months. He denies any MI, DM, CVA, TIA, DVT, PE, thyroid problems, kidney problems, or abdominal pain. He does have a left inguinal hernia which he needs to have repaired. He has chronic valvular disease with a porcine mitral valve in  at [facility], and a repeat mechanical mitral valve replacement in 2006.  When he was seen in clinic 17, it was discovered that the patient discontinued the use of his metoprolol on his own.  He was taking sotalol 40 mg twice a day and ECG demonstrated atrial flutter in clinic with 3-1 AV conduction.  His sotalol was then increased to 80 mg twice a day.  He continues to have atrial flutter 17 and is here for a cardioversion.  He currently denies chest pain, shortness of breath, presyncope, syncope.  He has palpitations occasionally if he is  fatigued.       Cardiac risk factors: advanced age (older than 55 for men, 65 for women), hypertension and male gender.    Social History     Social History   • Marital status:      Spouse name: N/A   • Number of children: N/A   • Years of education: N/A     Occupational History   • Not on file.     Social History Main Topics   • Smoking status: Never Smoker   • Smokeless tobacco: Former User     Types: Chew     Quit date: 3/30/2012   • Alcohol use Yes      Comment: 12 pack of beer per week   • Drug use: No   • Sexual activity: Defer     Other Topics Concern   • Not on file     Social History Narrative    Patient consumes 1 cup coffee daily.     Patient lives with his female .     Family History   Problem Relation Age of Onset   • Ovarian cancer Mother    • Autoimmune disease Mother    • Stroke Father    • Kidney failure Father    • No Known Problems Sister    • Parkinsonism Brother    • No Known Problems Maternal Grandmother    • No Known Problems Maternal Grandfather    • Stomach cancer Paternal Grandmother    • Heart disease Paternal Grandfather    • Heart failure Paternal Grandfather    • Breast cancer Sister    • No Known Problems Brother        Review of Systems  Pertinent items are noted in HPI and problem list.     Objective:       Physical Exam  bp 122/81, HR 79, RR16, 97% O2 on RA    General Appearance:  Alert, cooperative, no distress, appears stated age   Head:  Normocephalic, without obvious abnormality, atraumatic   Eyes:  PERRL, conjunctiva/corneas clear, EOM's intact, fundi benign, both eyes   Throat: Lips, mucosa, and tongue normal; teeth and gums normal   Neck: Supple, symmetrical, trachea midline, no adenopathy, thyroid: not enlarged, symmetric, no tenderness/mass/nodules, no carotid bruit or JVD   Lungs:   Clear to auscultation bilaterally, respirations unlabored   Heart:  Irregular rate and rhythm, S1, S2 normal, grade 2/6 murmur, rub or gallop   Abdomen:   Soft, non-tender, no  masses, no organomegaly   Extremities: No edema   Pulses: 2+ and symmetric   Skin: Skin color, texture, turgor normal, no rashes or lesions   Neurologic: Normal       Cardiographics:    · EK17; Atrial flutter with 4:1 AV conduction, Nonspecific ST abnormality, Abnormal ECG, 59 bpm    Imaging:    · Chest x-ray:3/19/17; No acute chest pathology    Lab Review; BMP and PT/INR pending 17                          Assessment:    Patient with chronic valvular disease with mechanical mitral valve, anticoagulated with Coumadin, and paroxysmal atrial flutter for the past 3 months, with sotalol increased to 80 mg twice a day in May 2017.  He continues to be in atrial flutter today.  We will perform a cardioversion.  Procedure, risks, complications discussed with patient and he is agreeable to proceed.  If unsuccessful, we will consider an EP consult for atrial flutter ablation. Labs pending.           Plan:   1. ECV  2. ECG in 5 days  3. Continue current cardiac medications after ECV; lisinopril 10 mg daily, sotalol 80 mg twice a day, Coumadin 3 mg daily with follow up PT/INR checks at PT/INR clinic  4.  Follow-up with Dr. Abad in 3 months    Scribed for Willian Abad MD by JF Cutler. 2017  10:07 AM     IWillian MD, [facility], personally performed the services described in this documentation as scribed by the above named individual in my presence, and it is both accurate and complete.

## 2017-06-22 ENCOUNTER — ANTICOAGULATION VISIT (OUTPATIENT)
Dept: CARDIOLOGY | Facility: CLINIC | Age: 67
End: 2017-06-22

## 2017-06-22 LAB — INR PPP: 2.8

## 2017-06-23 ENCOUNTER — TELEPHONE (OUTPATIENT)
Dept: CARDIOLOGY | Facility: CLINIC | Age: 67
End: 2017-06-23

## 2017-06-23 NOTE — TELEPHONE ENCOUNTER
Left patient a message stating it is fairly common to experience headaches and muscle aches after a cardioversion. Instructed him to call back to further discuss.

## 2017-07-12 ENCOUNTER — TELEPHONE (OUTPATIENT)
Dept: CARDIOLOGY | Facility: CLINIC | Age: 67
End: 2017-07-12

## 2017-07-25 ENCOUNTER — ANTICOAGULATION VISIT (OUTPATIENT)
Dept: CARDIOLOGY | Facility: CLINIC | Age: 67
End: 2017-07-25

## 2017-07-25 LAB — INR PPP: 2.5

## 2017-08-08 ENCOUNTER — ANTICOAGULATION VISIT (OUTPATIENT)
Dept: CARDIOLOGY | Facility: CLINIC | Age: 67
End: 2017-08-08

## 2017-08-08 LAB — INR PPP: 2.4

## 2017-08-09 RX ORDER — WARFARIN SODIUM 3 MG/1
TABLET ORAL
Qty: 60 TABLET | Refills: 1 | Status: SHIPPED | OUTPATIENT
Start: 2017-08-09 | End: 2017-12-18 | Stop reason: SDUPTHER

## 2017-08-25 ENCOUNTER — ANTICOAGULATION VISIT (OUTPATIENT)
Dept: CARDIOLOGY | Facility: CLINIC | Age: 67
End: 2017-08-25

## 2017-08-25 LAB — INR PPP: 3.3

## 2017-08-25 RX ORDER — LISINOPRIL 10 MG/1
10 TABLET ORAL EVERY MORNING
Qty: 90 TABLET | Refills: 1 | Status: SHIPPED | OUTPATIENT
Start: 2017-08-25 | End: 2018-02-20 | Stop reason: SDUPTHER

## 2017-09-03 LAB — INR PPP: 2.8

## 2017-09-12 ENCOUNTER — ANTICOAGULATION VISIT (OUTPATIENT)
Dept: PHARMACY | Facility: HOSPITAL | Age: 67
End: 2017-09-12

## 2017-09-12 NOTE — PROGRESS NOTES
**Despite several attempts to call patient- (refer to contact) was unable to reach Mr. Turner to discuss his results from 9/11. Patient has since completed a new result on 9/28. Will close this encounter and pursue him per his new INR result. Therefore, no interview was completed for this encounter.     Anticoagulation Clinic - Remote Progress Note  ALERE HOME MONITOR  Frequency of Monitoring (PST, ONLY): Two weeks    Indication: Kelly, VHD  Referring Provider: Jenniffer  Initial Warfarin Start Date:   Goal INR: 2.5-3.5  Current Drug Interactions:   CHADS-VASc:   Bleed Risk: [HASBLED; HIGH/MODERATE/LOW + REASON; H/O BLEED]    Diet: [GLV INTAKE]  Alcohol:   Tobacco:   OTC Pain Medication:    INR History:  Date 8/25 9/11          Total Weekly Dose 24mg 24mg          INR 3.3 2.8          Notes              Phone Interview:  Tablet Strength: pt has 3mg tablets  Current Maintenance Dose: 2 mg on Mon, Fri; 4 mg all other days    Patient Contact Info: *Preferred* [phone]; [phone]; Sister: [phone]    Plan:  1. INR was therapeutic on 9/3 (2.8).      Leydi Pedroza, PharmD  9/12/2017  10:11 AM      IFrances, PharmD, have reviewed the note in full and agree with the assessment and plan.  09/29/17  8:51 AM

## 2017-09-15 NOTE — PROGRESS NOTES
LVM @ 9/15 1215; on [phone] (number that was listed in previous AH encounters)   Also called [phone] and [phone], unable to LVM on either of those

## 2017-09-28 ENCOUNTER — TELEPHONE (OUTPATIENT)
Dept: PHARMACY | Facility: HOSPITAL | Age: 67
End: 2017-09-28

## 2017-09-29 ENCOUNTER — ANTICOAGULATION VISIT (OUTPATIENT)
Dept: PHARMACY | Facility: HOSPITAL | Age: 67
End: 2017-09-29

## 2017-09-29 LAB — INR PPP: 2.9

## 2017-10-03 ENCOUNTER — TELEPHONE (OUTPATIENT)
Dept: PHARMACY | Facility: HOSPITAL | Age: 67
End: 2017-10-03

## 2017-10-09 NOTE — PROGRESS NOTES
PROGRESS NOTE ADDENDUM  Patient Findings      Negatives Signs/symptoms of thrombosis, Signs/symptoms of bleeding, Laboratory test error suspected, Change in health, Change in alcohol use, Change in activity, Upcoming invasive procedure, Emergency department visit, Upcoming dental procedure, Missed doses, Extra doses, Change in medications, Change in diet/appetite, Hospital admission, Bruising, Other complaints     Comments Mr Turner slipped on the bank fishing this past weekend, but he denies bleeding or bruising complications as a result.      See Anticoag Tracker -- pt's dose is quite different than that previously recorded (3mg alternating with 3.5mg daily).  Repeat INR Friday 10/13.       ICarmen, [facility], have reviewed the note in full and agree with the assessment and plan.  10/09/17  4:43 PM

## 2017-10-19 LAB — INR PPP: 3.1

## 2017-10-30 RX ORDER — WARFARIN SODIUM 1 MG/1
TABLET ORAL
Qty: 30 TABLET | Refills: 2 | Status: SHIPPED | OUTPATIENT
Start: 2017-10-30 | End: 2017-12-18 | Stop reason: SDUPTHER

## 2017-11-01 ENCOUNTER — TELEPHONE (OUTPATIENT)
Dept: PHARMACY | Facility: HOSPITAL | Age: 67
End: 2017-11-01

## 2017-11-05 LAB — INR PPP: 3.5

## 2017-11-06 ENCOUNTER — ANTICOAGULATION VISIT (OUTPATIENT)
Dept: PHARMACY | Facility: HOSPITAL | Age: 67
End: 2017-11-06

## 2017-11-06 NOTE — PROGRESS NOTES
Anticoagulation Clinic - Remote Progress Note  ALERE HOME MONITOR  Frequency of Monitoring (PST, ONLY): Two weeks    Indication: A.fib, VHD  Referring Provider: Jenniffer  Initial Warfarin Start Date:   Goal INR: 2.5-3.5  Current Drug Interactions:   CHADS-VASc:   Bleed Risk: [HASBLED; HIGH/MODERATE/LOW + REASON; H/O BLEED]    Diet: [GLV INTAKE]  Alcohol:   Tobacco:   OTC Pain Medication:    INR History:  Date 8/25 9/11 9/29 10/19 11/5       Total Weekly Dose 24mg 24mg 24mg 22.75mg? 22.75mg?       INR 3.3 2.8 2.9 3.1 3.5       Notes    LVM LVM         Phone Interview:  Tablet Strength: pt has 3mg tablets  Current Maintenance Dose: according to previous notes patient was taking 3mg alternating with 3.5mg daily  Patient Findings      Comments Patient has been extremely difficult to get in touch with. Patient has reported INR both WNL. LVM for patient to please call us back to determine appropriate management that he is comfortable with- whether it is manage by exception. If patient agrees to this, he will need to understand importance of when we call and return our phone calls. LVM on [phone].     See Anticoag Tracker -- pt's dose is quite different than that previously recorded (3mg alternating with 3.5mg daily).  Repeat INR Friday 10/13.     Patient Contact Info: *Preferred* [phone]- no VM; [phone]- disconnected; Sister: [phone]    Plan:  1. INR was therapeutic given results that were both called in on 11/5. GIven patient has been extremely difficult to get in touch with will instruct him to continue his current warfarin regimen. Emphasized importance of calling us back on voicemail.   2. Recheck INR in two weeks.  3. LVM with patient on [phone]- asking him to call back to determine future possibility of managing INR by exception, however, need a new number to ensure that we can contact patient if he falls out of range to determine appropriate steps.

## 2017-12-11 ENCOUNTER — ANTICOAGULATION VISIT (OUTPATIENT)
Dept: PHARMACY | Facility: HOSPITAL | Age: 67
End: 2017-12-11

## 2017-12-11 LAB — INR PPP: 2.7

## 2017-12-11 NOTE — PROGRESS NOTES
12/11 0902 - Unable to LVM to home or mobile phone.  Sister's phone is disconnected.    Anticoagulation Clinic - Remote Progress Note  ALERE HOME MONITOR  Frequency of Monitoring (PST, ONLY): Two weeks    Indication: [email or web address], VHD  Referring Provider: Jenniffer  Initial Warfarin Start Date:   Goal INR: 2.5-3.5  Current Drug Interactions:   CHADS-VASc:   Bleed Risk: [HASBLED; HIGH/MODERATE/LOW + REASON; H/O BLEED]    Diet: [GLV INTAKE]  Alcohol:   Tobacco:   OTC Pain Medication:    INR History:  Date 8/25 9/11 9/29 10/19 11/5 12/2 12/11 12/26    Total Weekly Dose 24mg 24mg 24mg 22.75mg? 22.75mg? ? 22.5 mg     INR 3.3 2.8 2.9 3.1 3.5 2.7 2.7     Notes    LVM LVM         Phone Interview:  Tablet Strength: pt has 3mg tablets  Current Maintenance Dose: Verified, patient takes 3mg alternating with 3.5mg daily  Patient Findings      Positives Change in medications, Other complaints     Negatives Signs/symptoms of thrombosis, Signs/symptoms of bleeding, Laboratory test error suspected, Change in health, Change in alcohol use, Change in activity, Upcoming invasive procedure, Emergency department visit, Upcoming dental procedure, Missed doses, Extra doses, Change in diet/appetite, Hospital admission, Bruising     Comments Patient has been extremely difficult to get in touch with. Got hold of patient today, he will set up voicemail box on his new cell phone. Went through home medications and updated changes in the medication section.      See Anticoag Tracker -- pt's dose is quite different than that previously recorded (3mg alternating with 3.5mg daily).  Patient Contact Info: *Preferred* [phone] (cell) will set up VM; [phone]; Sister - Jasmyne: [phone] disconnected    Plan:  1. INR therapeutic today at 2.7. Confirmed with patient home dose is 3.5mg alternating with 3mg daily. Instructed Mr. Turner to continue this home dose for now.  2. Repeat INR in 2 weeks. If INR remains therapeutic, will NOT call back next time  and please continue the same home dose. Will only call back if INR out of range. Pt agrees with plan.  3. Pt. Requests refill on 3mg tablets. Verified patient prefers to  prescription at [address] in  [place], [place].  4. Verbal information provided to Mr. Turenr over the phone. He express understanding with teach back and has no questions at this time.    Ilsa Washington, PharmD Candidate 2018  12/15/2017    INadiya, [facility], have reviewed the note in full and agree with the assessment and plan.  12/15/17  10:04 AM

## 2017-12-15 RX ORDER — GABAPENTIN 300 MG/1
600 CAPSULE ORAL 3 TIMES DAILY
COMMUNITY
End: 2020-05-29

## 2017-12-15 RX ORDER — OXYCODONE AND ACETAMINOPHEN 10; 325 MG/1; MG/1
1 TABLET ORAL EVERY 8 HOURS PRN
COMMUNITY
End: 2018-02-20 | Stop reason: SDUPTHER

## 2017-12-18 RX ORDER — WARFARIN SODIUM 2 MG/1
2 TABLET ORAL DAILY
Qty: 60 TABLET | Refills: 11 | Status: SHIPPED | OUTPATIENT
Start: 2017-12-18 | End: 2017-12-18 | Stop reason: SDUPTHER

## 2017-12-18 RX ORDER — WARFARIN SODIUM 3 MG/1
TABLET ORAL
Qty: 60 TABLET | Refills: 1 | Status: SHIPPED | OUTPATIENT
Start: 2017-12-18 | End: 2018-02-20 | Stop reason: SDUPTHER

## 2017-12-18 RX ORDER — WARFARIN SODIUM 1 MG/1
TABLET ORAL
Qty: 30 TABLET | Refills: 2 | Status: SHIPPED | OUTPATIENT
Start: 2017-12-18 | End: 2018-02-20 | Stop reason: SDUPTHER

## 2018-01-04 ENCOUNTER — ANTICOAGULATION VISIT (OUTPATIENT)
Dept: PHARMACY | Facility: HOSPITAL | Age: 68
End: 2018-01-04

## 2018-01-04 LAB — INR PPP: 2.6

## 2018-01-04 NOTE — PROGRESS NOTES
Anticoagulation Clinic - Remote Progress Note  ALERE HOME MONITOR  Frequency of Monitoring (PST, ONLY): Two weeks    Indication: A.fib, VHD  Referring Provider: Jenniffer  Initial Warfarin Start Date:   Goal INR: 2.5-3.5  Current Drug Interactions:   CHADS-VASc:   Bleed Risk: [HASBLED; HIGH/MODERATE/LOW + REASON; H/O BLEED]    Diet: [GLV INTAKE]  Alcohol:   Tobacco:   OTC Pain Medication:    INR History:  Date 8/25 9/11 9/29 10/19 11/5 12/2 12/11 1/4    Total Weekly Dose 24mg 24mg 24mg 22.75mg? 22.75mg? ? 22.5 mg 22.5  mg 22.5 mg   INR 3.3 2.8 2.9 3.1 3.5 2.7 2.7 2.6    Notes    LVM LVM   call      Phone Interview:  Tablet Strength: pt has 3mg tablets  Current Maintenance Dose: Verified, patient takes 3mg alternating with 3.5mg dailySee Anticoag Tracker -- pt's dose is quite different than that previously recorded (3mg alternating with 3.5mg daily).  Patient Contact Info: *Preferred* [phone] (cell) will set up ; [phone]; Sister - Jasmyne: [phone] disconnected    Plan:  1. INR therapeutic today at 2.6. Confirmed with patient home dose is 3.5mg alternating with 3mg daily. Instructed Mr. Turner to continue this home dose for now.  2. Repeat INR in 2 weeks. If INR remains therapeutic, will NOT call back next time and please continue the same home dose. Will only call back if INR out of range. Pt agrees with plan.  3. Doc Turner understands importance of calling [facility] Anticoagulation clinic if he notices any s/sx of bleeding, abnormal bruising, changes in medications or medication doses (Rx, OTC, herbal), any upcoming procedures are scheduled, or any other changes, questions, or concerns regarding anticoagulation therapy arise. he voices understanding of this and confirms he has the [facility] Anticoagulation clinic's contact information.    Leydi Avila  01/04/18  8:04 AM     I, Nadiya Alanis, [facility], have reviewed the note in full and agree with the assessment and plan.  01/04/18  9:24 AM

## 2018-01-30 ENCOUNTER — ANTICOAGULATION VISIT (OUTPATIENT)
Dept: PHARMACY | Facility: HOSPITAL | Age: 68
End: 2018-01-30

## 2018-01-30 LAB — INR PPP: 2.9

## 2018-01-30 NOTE — PROGRESS NOTES
Anticoagulation Clinic - Remote Progress Note  ALERE HOME MONITOR  Frequency of Monitoring (PST, ONLY): Two weeks    Indication: A.fib, VHD  Referring Provider: Jenniffer  Initial Warfarin Start Date:   Goal INR: 2.5-3.5  Current Drug Interactions:   CHADS-VASc:   Bleed Risk: [HASBLED; HIGH/MODERATE/LOW + REASON; H/O BLEED]    Diet: [GLV INTAKE]  Alcohol:   Tobacco:   OTC Pain Medication:    INR History:  Date 8/25 9/11 9/29 10/19 11/5 12/2 12/11 1/4 1/30   Total Weekly Dose 24mg 24mg 24mg 22.75mg? 22.75mg? ? 22.5 mg 22.5  mg 22.5 mg   INR 3.3 2.8 2.9 3.1 3.5 2.7 2.7 2.6 2.9   Notes    LVM LVM   call No call     Phone Interview:  Tablet Strength: pt has 3mg tablets  Current Maintenance Dose: Verified, patient takes 3mg alternating with 3.5mg daily; See Anticoag Tracker -- pt's dose is quite different than that previously recorded (3mg alternating with 3.5mg daily).    Patient Contact Info: *Preferred* [phone] (cell) will set up ; [phone]; Sister - Jasmyne: [phone] disconnected    Plan:  1. INR therapeutic today at 2.9. Confirmed with patient home dose is 3.5mg alternating with 3mg daily. Instructed Mr. Turner to continue this home dose for now.  2. Repeat INR in 2 weeks. If INR remains therapeutic, will NOT call back next time and please continue the same home dose. Will only call back if INR out of range. Pt agrees with plan.  3. Doc Turner understands importance of calling [facility] Anticoagulation clinic if he notices any s/sx of bleeding, abnormal bruising, changes in medications or medication doses (Rx, OTC, herbal), any upcoming procedures are scheduled, or any other changes, questions, or concerns regarding anticoagulation therapy arise. he voices understanding of this and confirms he has the [facility] Anticoagulation clinic's contact information.      Olegario Macdonald RPH  1/30/2018  10:42 AM

## 2018-02-19 ENCOUNTER — ANTICOAGULATION VISIT (OUTPATIENT)
Dept: PHARMACY | Facility: HOSPITAL | Age: 68
End: 2018-02-19

## 2018-02-19 LAB — INR PPP: 2.5

## 2018-02-19 NOTE — PROGRESS NOTES
Anticoagulation Clinic - Remote Progress Note  ALERE HOME MONITOR  Frequency of Monitoring (PST, ONLY): Two weeks    Indication: A.fib, VHD  Referring Provider: Jenniffer  Initial Warfarin Start Date:   Goal INR: 2.5-3.5  Current Drug Interactions:   CHADS-VASc:   Bleed Risk: [HASBLED; HIGH/MODERATE/LOW + REASON; H/O BLEED]    Diet: [GLV INTAKE]  Alcohol:   Tobacco:   OTC Pain Medication:    INR History:  Date 8/25 9/11 9/29 10/19 11/5 12/2 12/11 1/4 1/30 2/19   Total Weekly Dose 24mg 24mg 24mg 22.75mg? 22.75mg? ? 22.5 mg 22.5  mg 22.5 mg 22.5  mg   INR 3.3 2.8 2.9 3.1 3.5 2.7 2.7 2.6 2.9 2.5   Notes    LVM LVM   call No call No call     Phone Interview:  Tablet Strength: pt has 3mg tablets  Current Maintenance Dose: Verified, patient takes 3mg alternating with 3.5mg daily; See Anticoag Tracker -- pt's dose is quite different than that previously recorded (3mg alternating with 3.5mg daily).    Patient Contact Info: *Preferred* [phone] (cell) will set up ; [phone]; Sister - Jasmyne: [phone] disconnected    Plan:  1. INR therapeutic today at 2.5. Patient will continue home dose of 3.5mg alternating with 3mg daily.   2. Repeat INR in 2 weeks. Will only call back if INR out of range.   3. Doc Turner understands importance of calling [facility] Anticoagulation clinic if he notices any s/sx of bleeding, abnormal bruising, changes in medications or medication doses (Rx, OTC, herbal), any upcoming procedures are scheduled, or any other changes, questions, or concerns regarding anticoagulation therapy arise. he voices understanding of this and confirms he has the [facility] Anticoagulation clinic's contact information.    IOlegario, [facility], have reviewed the note in full and agree with the assessment and plan.  02/20/18  7:57 AM

## 2018-02-20 ENCOUNTER — OFFICE VISIT (OUTPATIENT)
Dept: CARDIOLOGY | Facility: CLINIC | Age: 68
End: 2018-02-20

## 2018-02-20 VITALS
SYSTOLIC BLOOD PRESSURE: 113 MMHG | HEIGHT: 70 IN | BODY MASS INDEX: 22.62 KG/M2 | WEIGHT: 158 LBS | DIASTOLIC BLOOD PRESSURE: 61 MMHG | HEART RATE: 72 BPM

## 2018-02-20 DIAGNOSIS — I38 VHD (VALVULAR HEART DISEASE): ICD-10-CM

## 2018-02-20 DIAGNOSIS — I10 ESSENTIAL HYPERTENSION: ICD-10-CM

## 2018-02-20 DIAGNOSIS — I48.92 PAROXYSMAL ATRIAL FLUTTER (HCC): Primary | ICD-10-CM

## 2018-02-20 PROCEDURE — 99214 OFFICE O/P EST MOD 30 MIN: CPT | Performed by: INTERNAL MEDICINE

## 2018-02-20 PROCEDURE — 93000 ELECTROCARDIOGRAM COMPLETE: CPT | Performed by: INTERNAL MEDICINE

## 2018-02-20 RX ORDER — WARFARIN SODIUM 1 MG/1
TABLET ORAL
Qty: 30 TABLET | Refills: 2 | Status: SHIPPED | OUTPATIENT
Start: 2018-02-20 | End: 2019-02-26 | Stop reason: SDUPTHER

## 2018-02-20 RX ORDER — LISINOPRIL 10 MG/1
10 TABLET ORAL EVERY MORNING
Qty: 90 TABLET | Refills: 4 | Status: SHIPPED | OUTPATIENT
Start: 2018-02-20 | End: 2019-03-04 | Stop reason: SDUPTHER

## 2018-02-20 RX ORDER — WARFARIN SODIUM 3 MG/1
3 TABLET ORAL
Qty: 60 TABLET | Refills: 1 | Status: SHIPPED | OUTPATIENT
Start: 2018-02-20 | End: 2018-07-24 | Stop reason: SDUPTHER

## 2018-02-20 RX ORDER — OXYCODONE HYDROCHLORIDE 5 MG/1
5 CAPSULE ORAL 3 TIMES DAILY
COMMUNITY
End: 2019-02-26 | Stop reason: ALTCHOICE

## 2018-02-20 RX ORDER — SOTALOL HYDROCHLORIDE 80 MG/1
80 TABLET ORAL 2 TIMES DAILY
Qty: 180 TABLET | Refills: 4 | Status: SHIPPED | OUTPATIENT
Start: 2018-02-20 | End: 2019-04-25 | Stop reason: SDUPTHER

## 2018-02-20 NOTE — PROGRESS NOTES
Subjective:     Encounter Date:02/20/2018    Patient ID: Doc Turner is a 67 y.o.  white male from [place], [place], a retired .     PHYSICIAN: Kp Hobbs MD  FORMER CARDIOLOGISTS: Juan Wellington MD/Tino Allison MD  CARDIOTHORACIC SURGEON: Sylwia Gramajo MD/Tony Mcgill MD, [facility]  NEUROSURGEON: Jose Cadnea MD  NEUROLOGIST: Dwayne Rhodes MD  GENERAL SURGEONS: Thierry Moore MD/Piero Ball MD .    Chief Complaint:   Chief Complaint   Patient presents with   • Dizziness   • valvular heart disease     Problem List:  1. Chronic valvular heart disease with mitral valve prolapse syndrome/atrial flutter:  A. Remote progressive mitral regurgitation with porcine mitral valve replacement, [facility], 1998  B. Subsequent serial echocardiographic evaluations-data deficit, with abnormal stress test and diagnostic coronary angiography, data deficit, July 2006  C. Repeat sternotomy with mitral valve replacement with #31 [employer] prosthesis with postop bleeding requiring reexploration and associated atrial fib/flutter with sotalol therapy, November 2006  D. Acceptable combination Doppler echocardiogram, September 2009, with residual class I symptoms  E. Tachypalpitations with documented atrial fibrillation, 1/12/15 LAUREEN/ECV-successful  F. LAUREEN 1/12/15; LVEF 0.55-0.60, mild concentric LVH observed, postsurgical hypokinesis of the interventricular septum observed consistent with valve replacement, LA and RV mild to moderately dilated, RVSP mildly reduced, mild AR, mechanical mitral valve appears well seated with normal function, mild TR  G. BHL 3/19/17 with tachycardia, atrial flutter, anticoagulated with Coumadin  H. Echocardiogram 6/14/17: LVEF 0.60, RV moderately dilated, LA mild to moderately dilated, mildly reduced right ventricular systolic function noted, mild AR, TR, no pericardial effusion, no pulmonary hypertension, mechanical MVR appears  nominal LV function and leaflet motion without discernible associated thrombus last mass   I. External cardioversion 6/20/17 with successful with a 100 J shock.    J. Residual Class I symptoms  2. Intermittent tachypalpitations with apparent acceptable 24-hour Holter monitor-data deficit  3. Labile hypertension, probably essential  4. Chronic degenerative cervical spine disc disease with chronic narcotic use  5. Chronic insomnia/depression with recent improved clinical course  6. Remote Lyme disease with recurrence on 3 occasions, treated with periodic antibiotic therapy  7. Intermittent visual disturbance with hospitalization in neurology evaluation-data deficit, November 2009  8. Apparent symptomatic left inguinal hernia  9. Erectile dysfunction/Peyronie's disease  10. Apparent complicated inguinal hernioohaphy, February 2014, with subsequent development of intermittent recurrent episode gastric and right upper quadrant pain with subsequent small bowel obstruction and lactic acidosis with abdominal CT scan demonstrating extensive and progressive mid right anterior abdominal mesenteric rotation/volvulus emergent surgery and postop wound hematoma/(with subsequent closure, September 2014  11. Surgical history:  A. Mitral valve replacement ×2  B. Volvulus  C. Back surgery  D. Hernia repair  E. Cardioversion    Allergies   Allergen Reactions   • Penicillins Angioedema         Current Outpatient Prescriptions:   •  gabapentin (NEURONTIN) 300 MG capsule, Take 300 mg by mouth 3 (Three) Times a Day., Disp: , Rfl:   •  lisinopril (PRINIVIL,ZESTRIL) 10 MG tablet, Take 1 tablet by mouth Every Morning., Disp: 90 tablet, Rfl: 1  •  Morphine (MS CONTIN) 15 MG 12 hr tablet, Take 15 mg by mouth 2 (Two) Times a Day., Disp: , Rfl: 0  •  oxyCODONE (OXY-IR) 5 MG capsule, Take 5 mg by mouth 3 (Three) Times a Day., Disp: , Rfl:   •  sotalol (BETAPACE) 80 MG tablet, Take 1 tablet by mouth 2 (Two) Times a Day., Disp: 180 tablet, Rfl: 3  •   warfarin (COUMADIN) 1 MG tablet, Take 1/2 pill as directed, Disp: 30 tablet, Rfl: 2  •  warfarin (COUMADIN) 3 MG tablet, TAKE 1 TABLET BY MOUTH ONCE DAILY IN ADDITION TO 1 MG TABLET AS DIRECTED, Disp: 60 tablet, Rfl: 1    HISTORY OF PRESENT ILLNESS:  Patient is here after an 8 month hiatus.  In June 2018 he had an external cardioversion.  He is currently on sotalol and is anticoagulated with Coumadin.  His last INR was 2.7; he has home monitoring of his INRs through [facility] with goal INR of 2.5-3.5.  He has not been as active during the winter months but has no difficulty with chest pressure or shortness of breath when he is doing activities around his home.  He denies any palpitations and has had no other instances of fluttering in his chest since his cardioversion in June 2017.  He has not had any recent laboratory testing.  Patient otherwise denies chest pain, shortness of breath, PND, edema, palpitations, syncope or presyncope at this time on limited activity.  He did not receive influenza immunization in the fall.      Review of Systems   Constitution: Positive for malaise/fatigue.   HENT: Positive for tinnitus.    Hematologic/Lymphatic: Bruises/bleeds easily.   Skin: Positive for dry skin.   Musculoskeletal: Positive for back pain, neck pain and stiffness.      Obtained and otherwise negative except as outlined in problem list and HPI.      ECG 12 Lead  Date/Time: 2/20/2018 11:03 AM  Performed by: WES SIDHU  Authorized by: WES SIDHU   Comparison: compared with previous ECG from 6/28/2017  Similar to previous ECG  Rhythm: sinus rhythm  Rhythm comments: Sinus rhythm with first-degree AV block, otherwise normal ECG, 69 bpm,  ms, QRS 96 ms,  ms  Clinical impression: abnormal ECG               Objective:       Vitals:    02/20/18 1045 02/20/18 1049   BP: 130/79 113/61   BP Location: Right arm Right arm   Patient Position: Sitting Standing   Pulse: 71  "72   Weight: 71.7 kg (158 lb)    Height: 177.8 cm (70\")      Body mass index is 22.67 kg/(m^2).  Last weight May 2017 was 159 pounds    Physical Exam   Constitutional: He is oriented to person, place, and time. He appears well-developed and well-nourished.   HENT:   Mouth/Throat: Oropharynx is clear and moist. He does not have dentures. No oral lesions. Normal dentition. No dental abscesses, uvula swelling, lacerations or dental caries.   Neck: No JVD present. Carotid bruit is not present. No thyromegaly present.   Cardiovascular: Regular rhythm, S1 normal and S2 normal.  Exam reveals no gallop, no S3 and no friction rub.    Murmur heard.   Medium-pitched early systolic murmur is present with a grade of 2/6  at the apex  Pulses:       Dorsalis pedis pulses are 1+ on the right side, and 1+ on the left side.        Posterior tibial pulses are 1+ on the right side, and 1+ on the left side.   Pulmonary/Chest: Effort normal and breath sounds normal. He has no wheezes. He has no rhonchi. He has no rales.   Abdominal: Soft. He exhibits no mass. There is no hepatosplenomegaly. There is no tenderness. There is no guarding.   Bowel sounds audible x4   Musculoskeletal: Normal range of motion. He exhibits no edema.   Lymphadenopathy:     He has no cervical adenopathy.   Neurological: He is alert and oriented to person, place, and time.   Skin: Skin is warm, dry and intact. No rash noted.   Vitals reviewed.        Lab Review:   Lab Results   Component Value Date    GLUCOSE 91 06/20/2017    BUN 8 (L) 06/20/2017    CREATININE 0.60 06/20/2017    EGFRIFNONA 134 06/20/2017    BCR 13.3 06/20/2017    CO2 26.0 06/20/2017    CALCIUM 9.7 06/20/2017    ALBUMIN 4.40 03/19/2017    LABIL2 1.7 03/19/2017    AST 32 03/19/2017    ALT 33 03/19/2017       Lab Results   Component Value Date    WBC 7.77 03/19/2017    HGB 15.2 03/19/2017    HCT 44.8 03/19/2017    MCV 95.9 03/19/2017     03/19/2017       Lab Results   Component Value Date    " HGBA1C 5.0 01/12/2015       Lab Results   Component Value Date    TSH 0.557 01/12/2015       Lab Results   Component Value Date    TRIG 76 09/10/2014       Lab Results   Component Value Date    BNP 37 01/12/2015           Assessment:   Overall continued acceptable course with no interim cardiopulmonary complaints with good functional status. We will defer additional diagnostic or therapeutic intervention from a cardiac perspective at this time.  His last echocardiogram in June 2017 was acceptable.  We will repeat his echocardiogram in 2019 to assess his mechanical MVR and heart structure/function. QTc acceptable (439ms) on ECG today and in NSR.  If he has recurrence of atrial fibrillation, would consider an EP consult to discuss possible Tikosyn initiation.     Diagnosis Plan   1. Paroxysmal atrial flutter  Stable, ECG acceptable   2. Essential hypertension  Controlled   3. VHD (valvular heart disease)  Stable          Plan:         1. Patient to continue current medications and close follow up with the above providers.  2. Tentative cardiology follow up in August 2018 with EKG and subsequent consideration of laboratory studies if not performed in the interim, or patient may return sooner PRN.    Scribed for Willian Abad MD by JF Briseno. 2/20/2018  10:52 AM    I, Willian Abad MD, [facility], personally performed the services described in this documentation as scribed by the above named individual in my presence, and it is both accurate and complete. At 11:15 AM on 02/20/2018

## 2018-03-07 ENCOUNTER — ANTICOAGULATION VISIT (OUTPATIENT)
Dept: PHARMACY | Facility: HOSPITAL | Age: 68
End: 2018-03-07

## 2018-03-07 DIAGNOSIS — Z95.2 HX OF MITRAL VALVE REPLACEMENT WITH MECHANICAL VALVE: ICD-10-CM

## 2018-03-07 DIAGNOSIS — I48.92 PAROXYSMAL ATRIAL FLUTTER (HCC): ICD-10-CM

## 2018-03-07 LAB — INR PPP: 3.3

## 2018-03-07 NOTE — PROGRESS NOTES
Anticoagulation Clinic - Remote Progress Note  ALERE HOME MONITOR  Frequency of Monitorin weeks    Indication: A.fib, VHD  Referring Provider: Jenniffer  Initial Warfarin Start Date:   Goal INR: 2.5-3.5  Current Drug Interactions:   CHADS-VASc: 2 (age, HTN)  Diet:   Alcohol:   Tobacco:   OTC Pain Medication:    INR History:  Date 8/25 9/11 9/29 10/19 11/5 12/2 12/11 1/4 1/30 2/19 3/7   Total Weekly Dose 24mg 24mg 24mg 22.75mg? 22.75mg? ? 22.5 mg 22.5  mg 22.5 mg 22.5  mg 22.5mg   INR 3.3 2.8 2.9 3.1 3.5 2.7 2.7 2.6 2.9 2.5 3.3   Notes    LVM LVM   call No call No call LVM     Phone Interview:  Tablet Strength: pt has 3mg tablets  Current Maintenance Dose: 3mg alternating with 3.5mg daily  Patient Contact Info: *Preferred* [phone] (cell) will set up ; [phone]; Sister - Jasmyne: [phone] disconnected    Plan:  1. INR is therapeutic today at 3.3. Left voice message instructing patient to continue warfarin 3.5mg alternating with 3mg daily.   2. Repeat INR in 2 weeks.   3. Doc Turner understands importance of calling [facility] if he notices any s/sx of bleeding, abnormal bruising, changes in medications or medication doses (Rx, OTC, herbal), any upcoming procedures are scheduled, or any other changes, questions, or concerns regarding anticoagulation therapy arise. he voices understanding of this and confirms he has the [facility]'s contact information.    Leydi Avila CPhT  3/7/2018  8:48 AM    I, Carmen Murillo, [facility], have reviewed the note in full and agree with the assessment and plan.  18  12:57 PM

## 2018-04-05 LAB — INR PPP: 3.7

## 2018-04-13 ENCOUNTER — TELEPHONE (OUTPATIENT)
Dept: PHARMACY | Facility: HOSPITAL | Age: 68
End: 2018-04-13

## 2018-04-18 ENCOUNTER — ANTICOAGULATION VISIT (OUTPATIENT)
Dept: PHARMACY | Facility: HOSPITAL | Age: 68
End: 2018-04-18

## 2018-04-18 DIAGNOSIS — Z95.2 HX OF MITRAL VALVE REPLACEMENT WITH MECHANICAL VALVE: ICD-10-CM

## 2018-04-18 DIAGNOSIS — I48.92 PAROXYSMAL ATRIAL FLUTTER (HCC): ICD-10-CM

## 2018-04-18 LAB — INR PPP: 2.6

## 2018-04-18 NOTE — PROGRESS NOTES
Anticoagulation Clinic - Remote Progress Note  ALERE HOME MONITOR  Frequency of Monitorin weeks    Indication: A.fib, VHD  Referring Provider: Jenniffer  Initial Warfarin Start Date:   Goal INR: 2.5-3.5  Current Drug Interactions:   CHADS-VASc: 2 (age, HTN)  Diet:   Alcohol:   Tobacco:   OTC Pain Medication:    INR History:  Date 8/25 9/11 9/29 10/19 11/5 12/2 12/11 1/4 1/30 2/19 3/7 4/5 4/18   Total Weekly Dose 24mg 24mg 24mg 22.75mg? 22.75mg? ? 22.5 mg 22.5  mg 22.5 mg 22.5  mg 22.5mg 22.5mg 22.5mg   INR 3.3 2.8 2.9 3.1 3.5 2.7 2.7 2.6 2.9 2.5 3.3 3.7 2.6   Notes    LVM LVM   call No call No call LVM       Phone Interview:  Tablet Strength: pt has 3mg tablets  Current Maintenance Dose: 3mg alternating with 3.5mg daily  Patient Contact Info: *Preferred* [phone] (cell) will set up ; [phone]; Sister - Jasmyne: [phone] disconnected    Plan:  1. INR is therapeutic as of  at 2.6. Unable to reach patient. Would recommend to continue warfarin 3.5mg alternating with 3mg daily.   2. Repeat INR in 2 weeks.   3. Doc Turner understands importance of calling [facility] if he notices any s/sx of bleeding, abnormal bruising, changes in medications or medication doses (Rx, OTC, herbal), any upcoming procedures are scheduled, or any other changes, questions, or concerns regarding anticoagulation therapy arise. he voices understanding of this and confirms he has the [facility]'s contact information.    Olegario Macdonald [facility]  2018  11:22 AM

## 2018-05-14 ENCOUNTER — TELEPHONE (OUTPATIENT)
Dept: PHARMACY | Facility: HOSPITAL | Age: 68
End: 2018-05-14

## 2018-05-20 LAB — INR PPP: 2.8

## 2018-05-21 ENCOUNTER — ANTICOAGULATION VISIT (OUTPATIENT)
Dept: PHARMACY | Facility: HOSPITAL | Age: 68
End: 2018-05-21

## 2018-05-21 DIAGNOSIS — Z95.2 HX OF MITRAL VALVE REPLACEMENT WITH MECHANICAL VALVE: ICD-10-CM

## 2018-05-21 DIAGNOSIS — I48.92 PAROXYSMAL ATRIAL FLUTTER (HCC): ICD-10-CM

## 2018-05-21 NOTE — PROGRESS NOTES
Anticoagulation Clinic - Remote Progress Note  ALERE HOME MONITOR  Frequency of Monitorin weeks    Indication: A.fib, VHD  Referring Provider: Jenniffer  Initial Warfarin Start Date:   Goal INR: 2.5-3.5  Current Drug Interactions:   CHADS-VASc: 2 (age, HTN)  Diet:   Alcohol:   Tobacco:   OTC Pain Medication:    INR History:  Date 8/25 9/11 9/29 10/19 11/5 12/2 12/11 1/4 1/30 2/19 3/7 4/5 4/18   Total Weekly Dose 24mg 24mg 24mg 22.75mg? 22.75mg? ? 22.5 mg 22.5  mg 22.5 mg 22.5  mg 22.5mg 22.5mg 22.5mg   INR 3.3 2.8 2.9 3.1 3.5 2.7 2.7 2.6 2.9 2.5 3.3 3.7 2.6   Notes    LVM LVM   call No call No call LVM       Date       Total Weekly Dose 23mg      INR 2.8      Notes             Phone Interview:  Tablet Strength: pt has 3mg tablets  Current Maintenance Dose: 3mg alternating with 3.5mg daily  Patient Contact Info: *Preferred* [phone] (cell) will set up ; [phone]; Sister - Jasmyne: [phone] disconnected    Plan:  1. INR was therapeutic on  at 2.8. Unable to reach patient. Would recommend to continue warfarin 3.5mg alternating with 3mg daily.   2. Repeat INR in 2 weeks. ()  3. Doc Turner understands importance of calling [facility] clinic if he notices any s/sx of bleeding, abnormal bruising, changes in medications or medication doses (Rx, OTC, herbal), any upcoming procedures are scheduled, or any other changes, questions, or concerns regarding anticoagulation therapy arise. he voices understanding of this and confirms he has the [facility]'s contact information.    Tried to call this morning , unable to leave voice messages, will try again later    Teresita Redmond, Pharmacy Technician  2018  8:16 AM

## 2018-06-05 ENCOUNTER — ANTICOAGULATION VISIT (OUTPATIENT)
Dept: PHARMACY | Facility: HOSPITAL | Age: 68
End: 2018-06-05

## 2018-06-05 DIAGNOSIS — I48.92 PAROXYSMAL ATRIAL FLUTTER (HCC): ICD-10-CM

## 2018-06-05 DIAGNOSIS — Z95.2 HX OF MITRAL VALVE REPLACEMENT WITH MECHANICAL VALVE: ICD-10-CM

## 2018-06-05 LAB — INR PPP: 2.9

## 2018-06-05 NOTE — PROGRESS NOTES
Anticoagulation Clinic - Remote Progress Note  ALERE HOME MONITOR  Frequency of Monitorin weeks    Indication: A.fib, VHD  Referring Provider: Jenniffer  Initial Warfarin Start Date:   Goal INR: 2.5-3.5  Current Drug Interactions:   CHADS-VASc: 2 (age, HTN)  Diet:   Alcohol:   Tobacco:   OTC Pain Medication:    INR History:  Date 8/25 9/11 9/29 10/19 11/5 12/2 12/11 1/4 1/30 2/19 3/7 4/5 4/18   Total Weekly Dose 24mg 24mg 24mg 22.75mg? 22.75mg? ? 22.5 mg 22.5  mg 22.5 mg 22.5  mg 22.5mg 22.5mg 22.5mg   INR 3.3 2.8 2.9 3.1 3.5 2.7 2.7 2.6 2.9 2.5 3.3 3.7 2.6   Notes    LVM LVM   call No call No call LVM       Date      Total Weekly Dose 23mg 22.5mg     INR 2.8 2.9     Notes             Phone Interview:  Tablet Strength: pt has 3mg tablets  Current Maintenance Dose: 3mg alternating with 3.5mg daily  Patient Contact Info: *Preferred* [phone] (cell) will set up ; [phone]; Sister - Jasmyne: [phone] disconnected    Plan:  1. INR was therapeutic today () at 2.9. Unable to reach patient. Left voice message instructing Mr. Turner to continue warfarin 3.5mg alternating with 3mg daily.   2. Repeat INR in two weeks. ()  3. Doc Turner understands the importance of calling the [facility] if he notices any s/sx of bleeding, stroke, or abnormal bruising, if any changes are made to his medications or medication doses (Rx, OTC, herbal), or if any upcoming procedures are scheduled. He likewise will let us know if any other changes, questions, or concerns arise regarding anticoagulation therapy. Doc Turner voices understanding of this information and confirms that he has the [facility]'s contact information.    Teresita Redmond, Pharmacy Technician  2018  8:07 AM    I, Lauren Milligan, [facility], have reviewed the note in full and agree with the assessment and plan.  18  4:44 PM

## 2018-06-27 LAB — INR PPP: 2.4

## 2018-06-28 ENCOUNTER — ANTICOAGULATION VISIT (OUTPATIENT)
Dept: PHARMACY | Facility: HOSPITAL | Age: 68
End: 2018-06-28

## 2018-06-28 DIAGNOSIS — I48.92 PAROXYSMAL ATRIAL FLUTTER (HCC): ICD-10-CM

## 2018-06-28 DIAGNOSIS — Z95.2 HX OF MITRAL VALVE REPLACEMENT WITH MECHANICAL VALVE: ICD-10-CM

## 2018-07-24 ENCOUNTER — ANTICOAGULATION VISIT (OUTPATIENT)
Dept: PHARMACY | Facility: HOSPITAL | Age: 68
End: 2018-07-24

## 2018-07-24 DIAGNOSIS — I48.92 PAROXYSMAL ATRIAL FLUTTER (HCC): ICD-10-CM

## 2018-07-24 DIAGNOSIS — Z95.2 HX OF MITRAL VALVE REPLACEMENT WITH MECHANICAL VALVE: ICD-10-CM

## 2018-07-24 LAB
INR PPP: 2.4
INR PPP: 2.4
INR PPP: 2.9

## 2018-07-24 RX ORDER — WARFARIN SODIUM 3 MG/1
3 TABLET ORAL
Qty: 60 TABLET | Refills: 1 | Status: SHIPPED | OUTPATIENT
Start: 2018-07-24 | End: 2018-12-14 | Stop reason: SDUPTHER

## 2018-07-24 NOTE — PROGRESS NOTES
Anticoagulation Clinic - Remote Progress Note  ALERE HOME MONITOR  Frequency of Monitorin weeks    Indication: A.fib, VHD  Referring Provider: Jenniffer  Initial Warfarin Start Date:   Goal INR: 2.5-3.5  Current Drug Interactions:   CHADS-VASc: 2 (age, HTN)  Diet:   Alcohol:   Tobacco:   OTC Pain Medication:    INR History:  Date 8/25 9/11 9/29 10/19 11/5 12/2 12/11 1/4 1/30 2/19 3/7 4/5 4/18   Total Weekly Dose 24mg 24mg 24mg 22.75mg? 22.75mg? ? 22.5 mg 22.5  mg 22.5 mg 22.5  mg 22.5mg 22.5mg 22.5mg   INR 3.3 2.8 2.9 3.1 3.5 2.7 2.7 2.6 2.9 2.5 3.3 3.7 2.6   Notes    LVM LVM   call No call No call LVM       Date    Total Weekly Dose 23mg 22.5mg  23mg   INR 2.8 2.9  2.4 2.9   Notes             Phone Interview:  Tablet Strength: pt has 3mg tablets  Current Maintenance Dose: 3mg alternating with 3.5mg daily  Patient Contact Info: *Preferred* [phone] (cell) will set up ; [phone]; Sister - Jasmyne: [phone] disconnected    Patient Findings:   Positives:   Extra doses, Change in diet/appetite   Negatives:   Signs/symptoms of thrombosis, Signs/symptoms of bleeding, Laboratory test error suspected, Change in health, Change in alcohol use, Change in activity, Upcoming invasive procedure, Emergency department visit, Upcoming dental procedure, Missed doses, Change in medications, Hospital admission, Bruising, Other complaints   Comments:   Mr. Turner reports that he recently started drinking ensure/boost, about once a day. He tested his INR last week and noticed it was a little low, which he attributes to the increased Vitamin K from the boost. When he noticed it was low, he took a 3.5mg dose when he was supposed to take a 3mg. He plans to continue the ensure and  reports no other changes since our last encounter.      Plan:  1. INR was therapeutic today at 2.9. Instructed Mr. Johnny to increase dose to warfarin 3.5mg daily except 3mg on Mon.   2. Repeat INR in one week ().  3. Verbal  instructions provided over the phone. Mr. Turner expressed understanding with teach back and has no questions at this time.  4. Mr. Turner requested refills of Warfarin 3 mg be sent to [facility] in [facility] ([phone]). Separate encounter opened.     Update 7/24- received updated INR of 2.9 after talking to Mr. Turner today about his 2.4 INR from 7/19 and increasing his dose. Unable to reach Mr. Turner or [facility] to discuss new results.     Cherie Alanis, PharmD  Pharmacy Resident  7/24/2018  9:59 AM

## 2018-07-31 LAB — INR PPP: 3.3

## 2018-08-06 ENCOUNTER — ANTICOAGULATION VISIT (OUTPATIENT)
Dept: PHARMACY | Facility: HOSPITAL | Age: 68
End: 2018-08-06

## 2018-08-06 DIAGNOSIS — Z95.2 HX OF MITRAL VALVE REPLACEMENT WITH MECHANICAL VALVE: ICD-10-CM

## 2018-08-06 DIAGNOSIS — I48.92 PAROXYSMAL ATRIAL FLUTTER (HCC): ICD-10-CM

## 2018-08-06 NOTE — PROGRESS NOTES
Anticoagulation Clinic - Remote Progress Note  ALERE HOME MONITOR  Frequency of Monitorin weeks    Indication: A.fib, VHD  Referring Provider: Jenniffer  Initial Warfarin Start Date:   Goal INR: 2.5-3.5  Current Drug Interactions:   CHADS-VASc: 2 (age, HTN)  Diet:   Alcohol:   Tobacco:   OTC Pain Medication:    INR History:  Date 8/25 9/11 9/29 10/19 11/5 12/2 12/11 1/4 1/30 2/19 3/7 4/5 4/18   Total Weekly Dose 24mg 24mg 24mg 22.75mg? 22.75mg? ? 22.5 mg 22.5  mg 22.5 mg 22.5  mg 22.5mg 22.5mg 22.5mg   INR 3.3 2.8 2.9 3.1 3.5 2.7 2.7 2.6 2.9 2.5 3.3 3.7 2.6   Notes    LVM LVM   call No call No call LVM       Date    Total Weekly Dose 23mg 22.5mg  23mg 24mg   INR 2.8 2.9  2.4 2.9 3.3   Notes     rec'vd      Phone Interview:  Tablet Strength: pt has 3mg tablets  Current Maintenance Dose: 3mg alternating with 3.5mg daily  Patient Contact Info: *Preferred* [phone] (cell) will set up VM; [phone]; Sister - Jasmyne: [phone] disconnected    Plan:  1. INR was therapeutic on   at 3.3, patient did not report results to [facility] until .  Have attempted to call multiple times.  Unable to LVM as not set up apparently.       Planned to instruct Mr. Turner to continue warfarin 3.5mg daily except 3mg on Mon.   2. Repeat INR .  3. Verbal instructions provided over the phone. Mr. Turner expressed understanding with teach back and has no questions at this time.    Lauren Milligan, PharmD  2018  8:58 AM

## 2018-09-09 LAB — INR PPP: 3.4

## 2018-09-10 ENCOUNTER — ANTICOAGULATION VISIT (OUTPATIENT)
Dept: PHARMACY | Facility: HOSPITAL | Age: 68
End: 2018-09-10

## 2018-09-10 DIAGNOSIS — Z95.2 HX OF MITRAL VALVE REPLACEMENT WITH MECHANICAL VALVE: ICD-10-CM

## 2018-09-10 DIAGNOSIS — I48.92 PAROXYSMAL ATRIAL FLUTTER (HCC): ICD-10-CM

## 2018-09-10 NOTE — PROGRESS NOTES
Anticoagulation Clinic - Remote Progress Note  ALERE HOME MONITOR  Frequency of Monitorin weeks    Indication: A.fib, VHD  Referring Provider: Jenniffer  Initial Warfarin Start Date:   Goal INR: 2.5-3.5  Current Drug Interactions:   CHADS-VASc: 2 (age, HTN)  Diet:   Alcohol:   Tobacco:   OTC Pain Medication:    INR History:  Date 8/25 9/11 9/29 10/19 11/5 12/2 12/11 1/4 1/30 2/19 3/7 4/5 4/18   Total Weekly Dose 24mg 24mg 24mg 22.75mg? 22.75mg? ? 22.5 mg 22.5  mg 22.5 mg 22.5  mg 22.5mg 22.5mg 22.5mg   INR 3.3 2.8 2.9 3.1 3.5 2.7 2.7 2.6 2.9 2.5 3.3 3.7 2.6   Notes    LVM LVM   call No call No call LVM       Date      Total Weekly Dose 23mg 22.5mg  23mg 24mg 24mg     INR 2.8 2.9  2.4 2.9 3.3 3.4     Notes     rec'vd         Phone Interview:  Tablet Strength: pt has 3mg tablets  Current Maintenance Dose: 3mg alternating with 3.5mg daily  Patient Contact Info: *Preferred* [phone] (cell) will set up ; [phone]; Sister - Jasmyne: [phone] disconnected        Plan:  1. INR was therapeutic on   at 3.4.  Have attempted to call multiple times.  Unable to LVM as not set up apparently. Planned to instruct Mr. Turner to continue warfarin 3.5mg daily except 3mg on Mon.   2. Repeat INR in two weeks, .  3. Verbal instructions provided over the phone. Mr. Turner expressed understanding with teach back and has no questions at this time.    Unable to LVM on either number - will try again this afternoon    Teresita Redmond CPhT  9/10/2018  11:07 AM

## 2018-09-27 ENCOUNTER — OFFICE VISIT (OUTPATIENT)
Dept: PHYSICAL THERAPY | Facility: CLINIC | Age: 68
End: 2018-09-27

## 2018-09-27 ENCOUNTER — TRANSCRIBE ORDERS (OUTPATIENT)
Dept: PHYSICAL THERAPY | Facility: CLINIC | Age: 68
End: 2018-09-27

## 2018-09-27 DIAGNOSIS — M65.4 DE QUERVAIN'S TENOSYNOVITIS, LEFT: Primary | ICD-10-CM

## 2018-09-27 DIAGNOSIS — M79.642 PAIN OF LEFT HAND: ICD-10-CM

## 2018-09-27 PROCEDURE — L3808 WHFO, RIGID W/O JOINTS: HCPCS | Performed by: PHYSICAL THERAPIST

## 2018-10-01 NOTE — PROGRESS NOTES
Doc PANCHO Turner 1950   Diagnosis/ Surgery: left DeQuervain's              Date Of Injury: chronic    Date Of Surgery:injection today    Hand Dominance: right  History of Present Condition: pt has had three injections in the left wrist due to pain from DeQuervain's. The shots have seemed to be less effective over time.   Medical/Vocational History/ Medications: previous history or pain and injections for left DeQuervain's    Pain: 0/10 up to 6/10    Edema: mild  Sensibility: WNL   Wound Status:NA  ROM/ Strength/Test: NT    Splinting:  · Patient was measure and fit with a custom fabricated forearm based thumb spica   · Patient was instructed in wearing schedule, precautions and care of the splint during this visit.   · Patient was instructed in proper donning/doffing of splint.   Assessment:  · Patient was fitted and appropriate splint was fabricated this date.  · Patient reported that splint was comfortable and had no complications with the fit of the splint.  · Patient was instructed and patient verbalized understanding of precautions, wear and care of the splint.   · Patient demonstrated independent donning/doffing of splint during treatment today.  Goals:  · Patient was fitted properly with appropriate splint for diagnosis  · Patient was educated on precautions, wear schedule and care of splint  · Patient demonstrated independence with donning/doffing of the splint.  · Splint was provided to Protect Healing Structures, Restrict Mobility, Improve joint alignment.  Plan:  · No additional treatment is required for this patient at this time. The patient is therefore discharged from therapy.  · Patient advised to contact therapist with any additional questions or concerns regarding the fit and function of the splint.  · Patient will be seen for splint issues as needed   · Wear Instructions: Off for hygiene       PT SIGNATURE: Dmoo Crespo, PT   DATE TREATMENT INITIATED: 10/1/2018    Physician  Signature____________________________________ Date____________

## 2018-10-08 LAB — INR PPP: 3

## 2018-10-15 ENCOUNTER — ANTICOAGULATION VISIT (OUTPATIENT)
Dept: PHARMACY | Facility: HOSPITAL | Age: 68
End: 2018-10-15

## 2018-10-15 DIAGNOSIS — I48.92 PAROXYSMAL ATRIAL FLUTTER (HCC): ICD-10-CM

## 2018-10-15 DIAGNOSIS — Z95.2 HX OF MITRAL VALVE REPLACEMENT WITH MECHANICAL VALVE: ICD-10-CM

## 2018-10-15 NOTE — PROGRESS NOTES
Anticoagulation Clinic - Remote Progress Note  ALERE HOME MONITOR  Frequency of Monitorin weeks    Indication: A.fib, VHD  Referring Provider: Jenniffer  Initial Warfarin Start Date:   Goal INR: 2.5-3.5  Current Drug Interactions:   CHADS-VASc: 2 (age, HTN)  Diet:   Alcohol:   Tobacco:   OTC Pain Medication:    INR History:  Date 8/25 9/11 9/29 10/19 11/5 12/2 12/11 1/4 1/30 2/19 3/7 4/5 4/18   Total Weekly Dose 24mg 24mg 24mg 22.75mg? 22.75mg? ? 22.5 mg 22.5  mg 22.5 mg 22.5  mg 22.5mg 22.5mg 22.5mg   INR 3.3 2.8 2.9 3.1 3.5 2.7 2.7 2.6 2.9 2.5 3.3 3.7 2.6   Notes    LVM LVM   call no call no call LVM       Date 5/20 6/5  6/27 7/24 7/31 9/9 10/8         Total Weekly Dose 23mg 22.5mg  23mg 24mg 24mg unable to verify         INR 2.8 2.9  2.4 2.9 3.3 3.4 3.0         Notes     rec'vd   rec'vd 10/13;  mult calls           Phone Interview:  Tablet Strength: pt has 3mg tablets  Current Maintenance Dose: 3mg alternating with 3.5mg daily  Patient Contact Info: *Preferred* [phone] (cell) will set up VM; [phone]    Plan:  1. INR was therapeutic on 10/8 at 3.0. Planned to instruct Mr. Turner to continue warfarin 3.5mg daily except 3mg on Mon.   2. Planned to request repeat INR in 1 week, 10/22 (this will be 2 weeks from last INR test).  3. Of note, on 9/10 hT attempted to call multiple times and unable to LVM as not set up apparently.    Called multiple times 10/15, 10/16, 10/18 10/19.unable to LVM on either number.   10/19 LVM on wife's phone asking to call back to anticoagulation clinic.    Eugenie Hill, PharmD  Pharmacy Resident  10/15/2018  11:35 AM     This has been a reoccurring problem -- we are unable to reach Mr. Turner by phone. Will plan to send him a letter, and contact Dr. Abad about future follow up plans.   Ann Cowden Mayer, Camila  10/24/2018  10:57 AM

## 2018-10-24 ENCOUNTER — TELEPHONE (OUTPATIENT)
Dept: PHARMACY | Facility: HOSPITAL | Age: 68
End: 2018-10-24

## 2018-10-24 NOTE — TELEPHONE ENCOUNTER
Please draft a letter to Mr. Turner requesting a call back. We have made multiple attempts to contact him but have been unable to reach him. This has been a reoccurring problem. We will be unable to continue to manage him remotely if we cannot reliably reach him.   (may consider requesting an email address).

## 2018-11-08 ENCOUNTER — ANTICOAGULATION VISIT (OUTPATIENT)
Dept: PHARMACY | Facility: HOSPITAL | Age: 68
End: 2018-11-08

## 2018-11-08 DIAGNOSIS — I48.92 PAROXYSMAL ATRIAL FLUTTER (HCC): ICD-10-CM

## 2018-11-08 DIAGNOSIS — Z95.2 HX OF MITRAL VALVE REPLACEMENT WITH MECHANICAL VALVE: ICD-10-CM

## 2018-11-08 LAB — INR PPP: 2.7

## 2018-11-08 NOTE — PROGRESS NOTES
Anticoagulation Clinic - Remote Progress Note  AL[facility] HOME MONITOR  Frequency of Monitorin weeks    Indication: A.fib, VHD  Referring Provider: Jenniffer  Initial Warfarin Start Date:   Goal INR: 2.5-3.5  Current Drug Interactions:   CHADS-VASc: 2 (age, HTN)    Diet:   Alcohol:   Tobacco:   OTC Pain Medication:    INR History:  Date 8/25 9/11 9/29 10/19 11/5 12/2 12/11 1/4/18 1/30 2/19 3/7 4/5 4/18   Total  Weekly Dose 24mg 24mg 24mg 22.75mg? 22.75mg? ? 22.5 mg 22.5  mg 22.5 mg 22.5  mg 22.5mg 22.5mg 22.5mg   INR 3.3 2.8 2.9 3.1 3.5 2.7 2.7 2.6 2.9 2.5 3.3 3.7 2.6   Notes    LVM LVM   call no call no call LVM       Date 5/20 6/5  6/27 7/24 7/31 9/9 10/8 11/8       Total Weekly Dose 23mg 22.5mg  23mg 24mg 24mg unable to verify        INR 2.8 2.9  2.4 2.9 3.3 3.4 3.0 2.7       Notes     rec'vd   rec'vd 10/13;  mult calls          Phone Interview:  Tablet Strength: 3mg and 1mg tablets  Patient Contact Info: *Preferred* [phone] (cell) will set up ; [phone]  Lab Contact Info: [facility]    Unable to LVM @1535, LVM on wife cell, @1538  unable to LVM @1214, LVM wife cell @1218  unable to LVM @0844, LVM wife cell @0846    Plan:   1. INR is therapeutic today at 2.7. Planned to instruct Mr. Turner to continue warfarin 3.5mg daily except 3mg on Monday.   2. Planned to request repeat INR in 2 weeks,   3.     Olegario Fuentes CPhT  2018  3:33 PM

## 2018-11-12 ENCOUNTER — TELEPHONE (OUTPATIENT)
Dept: PHARMACY | Facility: HOSPITAL | Age: 68
End: 2018-11-12

## 2018-11-12 NOTE — TELEPHONE ENCOUNTER
Dr. Abad,    The [facility] has made multiple unsuccessful attempts to reach Mr. Turner -- he has no voicemail set up, and he will not return our calls. We sent him a certified letter last week requesting updated contact information and advising of the importance of regular follow up regarding his labwork. Per [employer] records, he has received the letter, but he has not contacted us, and we are again unable to reach him.     Mr. Turner has an Alere home monitor to self-test his INR. His results have fortunately been therapeutic, but we cannot continue to manage him and be responsible for his therapy if we cannot reach him.     Please advise of any recommendations at this time. We will otherwise have no choice but to discharge him from the Anticoagulation Clinic.     Thank you.

## 2018-11-14 NOTE — TELEPHONE ENCOUNTER
Per KTS he said he could talk to him if he comes back to see us.  He did not show up for his last appointment and has no appointments scheduled for the future.

## 2018-11-19 NOTE — PROGRESS NOTES
Despite multiple attempts, we have been unable to reach Mr. Turner.  Sent a certified letter to remind patient of the importance of regular follow up, and request alternative contact information.  Because of this, we have also been unable to verify that Mr. Turner is using unaffected Roche test strips following the FDA recall.     Ann Cowden Mayer, ToriD  11/19/2018  8:35 AM

## 2018-12-14 RX ORDER — WARFARIN SODIUM 3 MG/1
TABLET ORAL
Qty: 60 TABLET | Refills: 0 | Status: SHIPPED | OUTPATIENT
Start: 2018-12-14 | End: 2019-02-13 | Stop reason: SDUPTHER

## 2018-12-15 LAB — INR PPP: 2.6

## 2018-12-17 ENCOUNTER — ANTICOAGULATION VISIT (OUTPATIENT)
Dept: PHARMACY | Facility: HOSPITAL | Age: 68
End: 2018-12-17

## 2018-12-17 DIAGNOSIS — Z95.2 HX OF MITRAL VALVE REPLACEMENT WITH MECHANICAL VALVE: ICD-10-CM

## 2018-12-17 DIAGNOSIS — I48.92 PAROXYSMAL ATRIAL FLUTTER (HCC): ICD-10-CM

## 2018-12-17 NOTE — PROGRESS NOTES
Anticoagulation Clinic - Remote Progress Note  ALERE HOME MONITOR  Frequency of Monitorin weeks    Indication: A.fib, VHD  Referring Provider: Jenniffer  Initial Warfarin Start Date:   Goal INR: 2.5-3.5  Current Drug Interactions:   CHADS-VASc: 2 (age, HTN)    Diet:   Alcohol:   Tobacco:   OTC Pain Medication:    INR History:  Date 8/25 9/11 9/29 10/19 11/5 12/2 12/11 1/4/18 1/30 2/19 3/7 4/5 4/18   Total  Weekly Dose 24mg 24mg 24mg 22.75mg? 22.75mg? ? 22.5 mg 22.5  mg 22.5 mg 22.5  mg 22.5mg 22.5mg 22.5mg   INR 3.3 2.8 2.9 3.1 3.5 2.7 2.7 2.6 2.9 2.5 3.3 3.7 2.6   Notes    LVM LVM   call no call no call LVM       Date 5/20 6/5  6/27 7/24 7/31 9/9 10/8 11/8 12/14      Total Weekly Dose 23mg 22.5mg  23mg 24mg 24mg unable to verify        INR 2.8 2.9  2.4 2.9 3.3 3.4 3.0 2.7 2.6      Notes     rec'vd   rec'vd 10/13;  mult calls          Phone Interview:  Tablet Strength: 3mg and 1mg tablets  Patient Contact Info: *Preferred* [phone] (cell) will set up ; [phone]  Lab Contact Info: [facility]    Unable to LVM @0948, unable to LVM  @0894    Plan:   1. INR is therapeutic today at 2.6. Planned to instruct Mr. Turner to continue warfarin 3.5mg daily except 3mg on Monday.   2. Planned to request repeat INR in 2 weeks,     Thanks,  Cherie Alanis, PharmD  Pharmacy Resident  2018  9:47 AM

## 2019-01-10 ENCOUNTER — TELEPHONE (OUTPATIENT)
Dept: PHARMACY | Facility: HOSPITAL | Age: 69
End: 2019-01-10

## 2019-01-10 NOTE — TELEPHONE ENCOUNTER
Called both phone numbers listed on patients profile. Unable to LVM on either phone. He is overdue for INR and has been non-compliant with follow up. He has not returned phone calls.

## 2019-01-15 NOTE — TELEPHONE ENCOUNTER
Unable to LVM on all numbers listed re:overdue PT/INR self test    Per Frances Pedroza, PharmD, will resend 'refill refusal letter', but not send it certified. Refill refusal letter send 12/20/18 via [employer] certified mail with return receipt was sent back to us as 'Unclaimed' ([employer] tracking reference [id number])

## 2019-02-07 ENCOUNTER — TELEPHONE (OUTPATIENT)
Dept: PHARMACY | Facility: HOSPITAL | Age: 69
End: 2019-02-07

## 2019-02-14 RX ORDER — WARFARIN SODIUM 3 MG/1
TABLET ORAL
Qty: 60 TABLET | Refills: 3 | Status: SHIPPED | OUTPATIENT
Start: 2019-02-14 | End: 2019-05-20 | Stop reason: SDUPTHER

## 2019-02-15 ENCOUNTER — ANTICOAGULATION VISIT (OUTPATIENT)
Dept: PHARMACY | Facility: HOSPITAL | Age: 69
End: 2019-02-15

## 2019-02-15 DIAGNOSIS — Z95.2 HX OF MITRAL VALVE REPLACEMENT WITH MECHANICAL VALVE: ICD-10-CM

## 2019-02-15 DIAGNOSIS — I48.92 PAROXYSMAL ATRIAL FLUTTER (HCC): ICD-10-CM

## 2019-02-15 LAB — INR PPP: 3.8

## 2019-02-15 NOTE — PROGRESS NOTES
Anticoagulation Clinic - Remote Progress Note  ALERE HOME MONITOR  Frequency of Monitorin weeks    Indication: A.fib, VHD  Referring Provider: Jenniffer  Initial Warfarin Start Date:   Goal INR: 2.5-3.5  Current Drug Interactions:   CHADS-VASc: 2 (age, HTN)    Diet:   Alcohol:   Tobacco:   OTC Pain Medication:    INR History:  Date 8/25 9/11 9/29 10/19 11/5 12/2 12/11 1/4/18 1/30 2/19 3/7 4/5 4/18   Total  Weekly Dose 24mg 24mg 24mg 22.75mg? 22.75mg? ? 22.5 mg 22.5  mg 22.5 mg 22.5  mg 22.5mg 22.5mg 22.5mg   INR 3.3 2.8 2.9 3.1 3.5 2.7 2.7 2.6 2.9 2.5 3.3 3.7 2.6   Notes    LVM LVM   call no call no call LVM       Date 5/20 6/5  6/27 7/24 7/31 9/9 10/8 11/8 12/14 2/20     Total Weekly Dose 23mg 22.5mg  23mg 24mg 24mg unable to verify unable to verify unable to verify unable to verify     INR 2.8 2.9  2.4 2.9 3.3 3.4 3.0 2.7 2.6 3.8     Notes     rec'vd   rec'vd 10/13;  mult calls          Phone Interview:  Tablet Strength: 3mg and 1mg tablets  Patient Contact Info: *Preferred* [phone] (cell) will set up ; [phone]  Lab Contact Info: [facility]      No answer 2/15;   Plan:   1. INR is supratherapeutic from  at 3.8. Have not been able to get in touch with Mr Turner since 2018. Will send letter to ask best method of communication between us and the patient.  2. Will include in letter to repeat his INR.

## 2019-02-21 NOTE — TELEPHONE ENCOUNTER
We are still unable to reach Mr. Turner in any capacity. Recommend withholding warfarin refills until he contacts us. Will call pharmacy to request that they add a note to encourage patient to contact us / CANCEL refills on this rx / NO ADDITIONAL REFILLS until contact is made.

## 2019-02-22 ENCOUNTER — TELEPHONE (OUTPATIENT)
Dept: CARDIOLOGY | Facility: CLINIC | Age: 69
End: 2019-02-22

## 2019-02-22 NOTE — TELEPHONE ENCOUNTER
Received notes from [facility]in [facility] in reference to trying to communicate with Mr. Turner.  They have been unsuccessful numerous times and recommend not refilling his Coumadin until communication is obtained and Coumadin Levels obtained.     Today I attempted several different telephone numbers and left message on Spouse's cell phone with no response.      Refill request at this time has been denied.

## 2019-02-25 ENCOUNTER — ANTICOAGULATION VISIT (OUTPATIENT)
Dept: PHARMACY | Facility: HOSPITAL | Age: 69
End: 2019-02-25

## 2019-02-25 DIAGNOSIS — I48.92 PAROXYSMAL ATRIAL FLUTTER (HCC): ICD-10-CM

## 2019-02-25 DIAGNOSIS — Z95.2 HX OF MITRAL VALVE REPLACEMENT WITH MECHANICAL VALVE: ICD-10-CM

## 2019-02-25 LAB — INR PPP: 3.4

## 2019-02-25 NOTE — PROGRESS NOTES
Anticoagulation Clinic - Remote Progress Note  ALERE HOME MONITOR  Frequency of Monitorin weeks    Indication: A.fib, VHD  Referring Provider: Jenniffer  Initial Warfarin Start Date:   Goal INR: 2.5-3.5  Current Drug Interactions:   CHADS-VASc: 2 (age, HTN)    Diet:   Alcohol:   Tobacco:   OTC Pain Medication:    INR History:  Date 8/25 9/11 9/29 10/19 11/5 12/2 12/11 1/4/18 1/30 2/19 3/7 4/5 4/18   Total  Weekly Dose 24mg 24mg 24mg 22.75mg? 22.75mg? ? 22.5 mg 22.5  mg 22.5 mg 22.5  mg 22.5mg 22.5mg 22.5mg   INR 3.3 2.8 2.9 3.1 3.5 2.7 2.7 2.6 2.9 2.5 3.3 3.7 2.6   Notes    LVM LVM   call no call no call LVM       Date 5/20 6/5  6/27 7/24 7/31 9/9 10/8 11/8 12/14 2/20 2/25   Total Weekly Dose 23mg 22.5mg  23mg 24mg 24mg unable to verify unable to verify unable to verify unable to verify unable to verify   INR 2.8 2.9  2.4 2.9 3.3 3.4 3.0 2.7 2.6 3.8 3.4   Notes     rec'vd   rec'vd 10/13;  mult calls         Phone Interview:  Tablet Strength: 3mg and 1mg tablets  Patient Contact Info: *Preferred* [phone] (cell) will set up ; [phone]  Lab Contact Info: [employer]    Plan:   Unable to reach pt 2/15; ;    INR is therapeutic. Have not been able to get in touch with Mr. Turner since 2018.   Have sent a letter to request a better method of communication with pt.  Have sent a letter explaining that we will be unable to provide further warfarin refills until he contacts us.     Ann Cowden Mayer, PharmD  2019  9:15 AM

## 2019-02-26 RX ORDER — WARFARIN SODIUM 1 MG/1
TABLET ORAL
Qty: 15 TABLET | Refills: 0 | Status: SHIPPED | OUTPATIENT
Start: 2019-02-26 | End: 2019-05-20 | Stop reason: SDUPTHER

## 2019-02-26 RX ORDER — AMITRIPTYLINE HYDROCHLORIDE 25 MG/1
25 TABLET, FILM COATED ORAL NIGHTLY PRN
COMMUNITY
End: 2020-05-29

## 2019-02-26 RX ORDER — OXYCODONE AND ACETAMINOPHEN TABLETS 10; 300 MG/1; MG/1
1 TABLET ORAL EVERY 8 HOURS PRN
COMMUNITY
End: 2023-01-11

## 2019-02-26 NOTE — PROGRESS NOTES
Anticoagulation Clinic - Remote Progress Note  ALERE HOME MONITOR  Frequency of Monitorin weeks     Indication: Kelly, VHD  Referring Provider: Jenniffer  Initial Warfarin Start Date:    Goal INR: 2.5-3.5  Current Drug Interactions: ensure (once daily); amitriptyline (PRN)  CHADS-VASc: 2 (age, HTN)     Diet: avoids typically   Alcohol: 12 pack of beer/week  Tobacco: None  OTC Pain Medication: None     INR History:  Date 8/25 9/11 9/29 10/19 11/5 12/2 12/11 1/4/18 1/30 2/19 3/7 4/5 4/18   Total  Weekly Dose 24mg 24mg 24mg 22.75mg? 22.75mg? ? 22.5 mg 22.5  mg 22.5 mg 22.5  mg 22.5mg 22.5mg 22.5mg   INR 3.3 2.8 2.9 3.1 3.5 2.7 2.7 2.6 2.9 2.5 3.3 3.7 2.6   Notes       LVM LVM     call no call no call LVM          Date 5/20 6/5  6/27 7/24 7/31 9/9 10/8 11/8 12/14 2/20 2/25   Total Weekly Dose 23mg 22.5mg   23mg 24mg 24mg unable to verify unable to verify unable to verify unable to verify 23mg   INR 2.8 2.9  2.4 2.9 3.3 3.4 3.0 2.7 2.6 3.8 3.4   Notes         rec'vd    rec'vd 10/13;  mult calls              Phone Interview:  Tablet Strength: 3mg and 1mg tablets  Patient Contact Info:[phone] (cell) will set up ; [phone]  Preferred *[phone]* anytime after noon   Lab Contact Info: [place]     Patient Findings   Negatives:  Signs/symptoms of thrombosis, Signs/symptoms of bleeding, Laboratory test error suspected, Change in health, Change in alcohol use, Change in activity, Upcoming invasive procedure, Emergency department visit, Upcoming dental procedure, Missed doses, Extra doses, Change in medications, Change in diet/appetite, Hospital admission, Bruising, Other complaints   Comments:  Mr. Turner called the clinic for the first time since 2018. He apologized for his absence but plans to be more compliant. He drinks one ensure daily. He was aware of the vit K content of ensure drinks. He also was aware of affects of herbal supplements and vitamins over the counter and interactions with  warfarin. Updated his chart to show use of Amitriptyline.*     *Summary Tricyclic Antidepressants may enhance the anticoagulant effect of Vitamin K Antagonists. Severity Moderate Reliability Rating Good      Plan:   1. INR is therapeutic on Sunday. Was not able to get in contact with Mr. Turner since September of 2018. He apologized for his absence and would like to become more compliant. He was very pleasant and appreciative of our services. Please refer to patient findings above. He reports that he has been alternating 3 and 3.5mg daily.  2. Planned to repeat INR in 2 weeks on 3/11/19 with Home monitor.   3. Needs refills for 1mg of warfarin. Will call in 15 tablets given history of non-compliance and 15 tablets should last 30 days with how he is taking them.  4. Reviewed medications and updated in Epic.    Frances Pedroza, PharmD  02/26/2019  1:22 PM

## 2019-03-04 RX ORDER — LISINOPRIL 10 MG/1
10 TABLET ORAL EVERY MORNING
Qty: 90 TABLET | Refills: 0 | Status: SHIPPED | OUTPATIENT
Start: 2019-03-04 | End: 2019-04-25 | Stop reason: SDUPTHER

## 2019-03-04 RX ORDER — LISINOPRIL 10 MG/1
10 TABLET ORAL EVERY MORNING
Qty: 90 TABLET | Refills: 1 | OUTPATIENT
Start: 2019-03-04

## 2019-03-11 ENCOUNTER — ANTICOAGULATION VISIT (OUTPATIENT)
Dept: PHARMACY | Facility: HOSPITAL | Age: 69
End: 2019-03-11

## 2019-03-11 DIAGNOSIS — Z95.2 HX OF MITRAL VALVE REPLACEMENT WITH MECHANICAL VALVE: ICD-10-CM

## 2019-03-11 DIAGNOSIS — I48.92 PAROXYSMAL ATRIAL FLUTTER (HCC): ICD-10-CM

## 2019-03-11 LAB — INR PPP: 3.6

## 2019-03-11 NOTE — PROGRESS NOTES
Anticoagulation Clinic - Remote Progress Note  ALERE HOME MONITOR  Frequency of Monitorin weeks     Indication: Kelly, VHD  Referring Provider: Jenniffer  Initial Warfarin Start Date:    Goal INR: 2.5-3.5  Current Drug Interactions: ensure (once daily); amitriptyline (PRN)  CHADS-VASc: 2 (age, HTN)     Diet: avoids typically   Alcohol: 12 pack of beer/week  Tobacco: None  OTC Pain Medication: None     INR History:  Date 8/25 9/11 9/29 10/19 11/5 12/2 12/11 1/4/18 1/30 2/19 3/7 4/5 4/18   Total  Weekly Dose 24mg 24mg 24mg 22.75mg? 22.75mg? ? 22.5 mg 22.5  mg 22.5 mg 22.5  mg 22.5mg 22.5mg 22.5mg   INR 3.3 2.8 2.9 3.1 3.5 2.7 2.7 2.6 2.9 2.5 3.3 3.7 2.6   Notes       LVM LVM     call no call no call LVM          Date 5/20 6/5  6/27 7/24 7/31 9/9 10/8 11/8 12/14 2/20 2/25   Total Weekly Dose 23mg 22.5mg   23mg 24mg 24mg unable to verify unable to verify unable to verify unable to verify 23mg   INR 2.8 2.9  2.4 2.9 3.3 3.4 3.0 2.7 2.6 3.8 3.4   Notes         rec'vd    rec'vd 10/13;  mult calls              Date 3/11             Total Weekly Dose 23mg             INR 3.6             Notes                Phone Interview:  Tablet Strength: 3mg and 1mg tablets  Patient Contact Info:[phone] (cell) will set up ; [phone]  Preferred *[phone]* anytime after noon  Lab Contact Info: [place]     Patient Findings   Positives:  Change in diet/appetite   Negatives:  Signs/symptoms of thrombosis, Signs/symptoms of bleeding, Laboratory test error suspected, Change in health, Change in alcohol use, Change in activity, Upcoming invasive procedure, Emergency department visit, Upcoming dental procedure, Missed doses, Extra doses, Change in medications, Hospital admission, Bruising, Other complaints   Comments:  Mr. Turner reports that he has not been drinking as much ensure recently but plans to return to his previous intake (~4 per week). He is still taking amitriptyline but only about 3 times a week.  Reiterated the importance of letting us know when he starts or stops any medications and INR follow-up.      Plan:   1. INR is slightly supratherapeutic today at 3.6. Mr. Turner reports that he has been alternating 3 and 3.5mg daily. Instructed him to continue this regimen and he will drink an ensure this evening. He plans to resume his normal intake.   2. Planned to repeat INR in 2 weeks on 3/25/19 with Home monitor.  3. Verbal information provided over the phone.  expressed understanding with teach back and has no questions at this time.    Of note, pt provided a new phone number to reach him. *[phone]*    Cherie Alanis, PharmD  Pharmacy Resident  3/11/2019  4:32 PM

## 2019-03-25 ENCOUNTER — ANTICOAGULATION VISIT (OUTPATIENT)
Dept: PHARMACY | Facility: HOSPITAL | Age: 69
End: 2019-03-25

## 2019-03-25 DIAGNOSIS — I48.92 PAROXYSMAL ATRIAL FLUTTER (HCC): ICD-10-CM

## 2019-03-25 DIAGNOSIS — Z95.2 HX OF MITRAL VALVE REPLACEMENT WITH MECHANICAL VALVE: ICD-10-CM

## 2019-03-25 LAB — INR PPP: 3

## 2019-03-25 NOTE — PROGRESS NOTES
Anticoagulation Clinic - Remote Progress Note  ALERE HOME MONITOR  Frequency of Monitorin weeks     Indication: [email or web address], VHD  Referring Provider: Jenniffer  Initial Warfarin Start Date:    Goal INR: 2.5-3.5  Current Drug Interactions: ensure (once daily); amitriptyline (PRN)  CHADS-VASc: 2 (age, HTN)     Diet: avoids typically   Alcohol: 12 pack of beer/week  Tobacco: None  OTC Pain Medication: None     INR History:  Date 8/25 9/11 9/29 10/19 11/5 12/2 12/11 1/4/18 1/30 2/19 3/7 4/5 4/18   Total  Weekly Dose 24mg 24mg 24mg 22.75mg? 22.75mg? ? 22.5 mg 22.5  mg 22.5 mg 22.5  mg 22.5mg 22.5mg 22.5mg   INR 3.3 2.8 2.9 3.1 3.5 2.7 2.7 2.6 2.9 2.5 3.3 3.7 2.6   Notes       LVM LVM     call no call no call LVM          Date 5/20 6/5  6/27 7/24 7/31 9/9 10/8 11/8 12/14 2/20 2/25   Total Weekly Dose 23mg 22.5mg   23mg 24mg 24mg unable to verify unable to verify unable to verify unable to verify 23mg   INR 2.8 2.9  2.4 2.9 3.3 3.4 3.0 2.7 2.6 3.8 3.4   Notes         rec'vd    rec'vd 10/13;  mult calls              Date 3/11 3/25            Total Weekly Dose 23mg 23mg            INR 3.6 3.0            Notes                Phone Interview:  Tablet Strength: 3mg and 1mg tablets  Patient Contact Info:[phone] (cell) will set up ; [phone]  Preferred *[phone]* anytime after noon  Lab Contact Info: [place]     Patient Findings   Negatives:  Signs/symptoms of thrombosis, Signs/symptoms of bleeding, Laboratory test error suspected, Change in health, Change in alcohol use, Change in activity, Upcoming invasive procedure, Emergency department visit, Upcoming dental procedure, Missed doses, Extra doses, Change in medications, Change in diet/appetite, Hospital admission, Bruising, Other complaints   Comments:  No changes since last encounter. Patient confirms he is still drinking an Ensure drink every day.     Plan:   1. INR is therapeutic today at 3.0. Mr. Turner reports that he has been alternating 3 and  3.5mg daily. Instructed him to continue this regimen.    2. Planned to repeat INR in 2 weeks on 4/8/19 with Home monitor.  3. Verbal information provided over the phone.  expressed understanding with teach back and has no questions at this time.      Varun Clark, PharmD Candidate  3/25/2019  12:23 PM      IOlegario, [facility], have reviewed the note in full and agree with the assessment and plan.  03/25/19  1:12 PM

## 2019-04-09 ENCOUNTER — ANTICOAGULATION VISIT (OUTPATIENT)
Dept: PHARMACY | Facility: HOSPITAL | Age: 69
End: 2019-04-09

## 2019-04-09 DIAGNOSIS — I48.92 PAROXYSMAL ATRIAL FLUTTER (HCC): ICD-10-CM

## 2019-04-09 DIAGNOSIS — Z95.2 HX OF MITRAL VALVE REPLACEMENT WITH MECHANICAL VALVE: ICD-10-CM

## 2019-04-09 LAB — INR PPP: 3.2

## 2019-04-09 NOTE — PROGRESS NOTES
Anticoagulation Clinic - Remote Progress Note  [facility] HOME MONITOR  Frequency of Monitorin weeks     Indication: Kelly, VHD  Referring Provider: Jenniffer  Initial Warfarin Start Date:    Goal INR: 2.5-3.5  Current Drug Interactions: ensure (once daily); amitriptyline (PRN)  CHADS-VASc: 2 (age, HTN)     Diet: avoids typically   Alcohol: 12 pack of beer/week  Tobacco: None  OTC Pain Medication: None     INR History:  Date 8/25 9/11 9/29 10/19 11/5 12/2 12/11 1/4/18 1/30 2/19 3/7 4/5 4/18   Total  Weekly Dose 24mg 24mg 24mg 22.75mg? 22.75mg? ? 22.5 mg 22.5  mg 22.5 mg 22.5  mg 22.5mg 22.5mg 22.5mg   INR 3.3 2.8 2.9 3.1 3.5 2.7 2.7 2.6 2.9 2.5 3.3 3.7 2.6   Notes       LVM LVM     call no call no call LVM          Date 5/20 6/5  6/27 7/24 7/31 9/9 10/8 11/8 12/14 2/20 2/25   Total Weekly Dose 23mg 22.5mg   23mg 24mg 24mg unable to verify unable to verify unable to verify unable to verify 23mg   INR 2.8 2.9  2.4 2.9 3.3 3.4 3.0 2.7 2.6 3.8 3.4   Notes         rec'vd    rec'vd 10/13;  mult calls              Date 3/11 3/25 4/9            Total Weekly Dose 23mg 23mg 22.5mg            INR 3.6 3.0 3.2            Notes                 Phone Interview:  Tablet Strength: 3mg and 1mg tablets  Patient Contact Info:[phone] (cell) will set up ; [phone]  Preferred *[phone]* anytime after noon  Lab Contact Info: [employer]    Patient Findings  Negatives:  Signs/symptoms of thrombosis, Signs/symptoms of bleeding, Laboratory test error suspected, Change in health, Change in alcohol use, Change in activity, Upcoming invasive procedure, Emergency department visit, Upcoming dental procedure, Missed doses, Extra doses, Change in medications, Change in diet/appetite, Hospital admission, Bruising, Other complaints       Plan:   1. INR is therapeutic today at 3.2. Mr. Turner reports that he has been alternating 3mg and 3.5mg daily. Instructed him to continue this regimen.  2. Repeat INR in 2 weeks.  3. Verbal  information provided over the phone.  expressed understanding with teach back and has no questions at this time.    Last Morgan, PharmD, BCPS  4/9/2019  4:23 PM

## 2019-04-22 NOTE — PROGRESS NOTES
Subjective:     Encounter Date:04/25/2019      Patient ID: Doc Turner is a 68 y.o.   white male from [place], [place], a retired .     PHYSICIAN: Kp Hobbs MD  FORMER CARDIOLOGISTS: Juan Wellington MD/Tino Allison MD  CARDIOTHORACIC SURGEON: Sylwia Gramajo MD/Tony Mcgill MD, [facility]  NEUROSURGEON: Jose Cadena MD  NEUROLOGIST: Dwayne Rhodes MD  GENERAL SURGEONS: Thierry Moore MD/Piero Ball MD ..    Chief Complaint:   Chief Complaint   Patient presents with   • Atrial Fibrillation   • Valvular heart disease   • Fatigue     Problem List:  1. Chronic valvular heart disease with mitral valve prolapse syndrome/atrial flutter:  A. Remote progressive mitral regurgitation with porcine mitral valve replacement, [facility], 1998  B. Subsequent serial echocardiographic evaluations-data deficit, with abnormal stress test and diagnostic coronary angiography, data deficit, July 2006  C. Repeat sternotomy with mitral valve replacement with #31 [employer] prosthesis with postop bleeding requiring reexploration and associated atrial fib/flutter with sotalol therapy, November 2006  D. Acceptable combination Doppler echocardiogram, September 2009, with residual class I symptoms  E. Tachypalpitations with documented atrial fibrillation, 1/12/15 LAUREEN/ECV-successful  F. LAUREEN 1/12/15; LVEF 0.55-0.60, mild concentric LVH observed, postsurgical hypokinesis of the interventricular septum observed consistent with valve replacement, LA and RV mild to moderately dilated, RVSP mildly reduced, mild AR, mechanical mitral valve appears well seated with normal function, mild TR  G. BHL 3/19/17 with tachycardia, atrial flutter, anticoagulated with Coumadin  H. Echocardiogram 6/14/17: LVEF 0.60, RV moderately dilated, LA mild to moderately dilated, mildly reduced right ventricular systolic function noted, mild AR, TR, no pericardial effusion, no pulmonary hypertension,  mechanical MVR appears nominal LV function and leaflet motion without discernible associated thrombus last mass   I. External cardioversion 6/20/17 with successful with a 100 J shock.    J. NSR April 2019 with acceptable QTc  K. Residual Class I symptoms  2. Intermittent tachypalpitations with apparent acceptable 24-hour Holter monitor-data deficit  3. Labile hypertension, probably essential  4. Chronic degenerative cervical spine disc disease with chronic narcotic use  5. Chronic insomnia/depression with recent improved clinical course  6. Remote Lyme disease with recurrence on 3 occasions, treated with periodic antibiotic therapy  7. Intermittent visual disturbance with hospitalization in neurology evaluation-data deficit, November 2009  8. Apparent symptomatic left inguinal hernia  9. Erectile dysfunction/Peyronie's disease  10. Apparent complicated inguinal hernioohaphy, February 2014, with subsequent development of intermittent recurrent episode gastric and right upper quadrant pain with subsequent small bowel obstruction and lactic acidosis with abdominal CT scan demonstrating extensive and progressive mid right anterior abdominal mesenteric rotation/volvulus emergent surgery and postop wound hematoma/(with subsequent closure, September 2014  11. Surgical history:  A. Mitral valve replacement ×2  B. Volvulus  C. Back surgery  D. Hernia repair  E. Cardioversion  Allergies   Allergen Reactions   • Penicillins Angioedema         Current Outpatient Medications:   •  amitriptyline (ELAVIL) 25 MG tablet, Take 25 mg by mouth At Night As Needed for Sleep., Disp: , Rfl:   •  gabapentin (NEURONTIN) 300 MG capsule, Take 600 mg by mouth 3 (Three) Times a Day., Disp: , Rfl:   •  lisinopril (PRINIVIL,ZESTRIL) 10 MG tablet, Take 1 tablet by mouth Every Morning., Disp: 90 tablet, Rfl: 0  •  oxyCODONE-acetaminophen (PERCOCET) 5-325 MG per tablet, Take 1 tablet by mouth Every 12 (Twelve) Hours As Needed., Disp: , Rfl:   •   sotalol (BETAPACE) 80 MG tablet, Take 1 tablet by mouth 2 (Two) Times a Day., Disp: 180 tablet, Rfl: 4  •  warfarin (COUMADIN) 1 MG tablet, Take 0.5 pill every other day or as directed by the anticoagulation clinic., Disp: 15 tablet, Rfl: 0  •  warfarin (COUMADIN) 3 MG tablet, take 1 tablet by mouth once daily, Disp: 60 tablet, Rfl: 3    HISTORY OF PRESENT ILLNESS:  Patient is here after a 14-month hiatus. He denies any chest pain, SOB, edema, palpitations, presyncope, or syncope.  He does not feel that he has went back into atrial fibrillation since he was cardioverted a couple years ago.  His last INR was 3.4.  He has not had recent laboratory studies but is scheduled to see his family physician 5/23/2019 and he will obtain laboratory values at that time and have them sent to us for review.  He does some walking, runs errands, and does his daily activities without any problems.  He enjoys fishing and shooting targets with his bow and arrow.  He bruises easily from being on Coumadin, but otherwise has no complaints.  He has had no new diagnoses, hospitalizations, or surgeries since he was last seen.  He saw Dr. Ball regarding an inguinal hernia but was advised to not have surgery at that time.  He is careful when he is twisting and doing any lifting.      Review of Systems   Constitution: Positive for malaise/fatigue.   HENT: Positive for tinnitus.    Hematologic/Lymphatic: Bruises/bleeds easily.   Musculoskeletal: Positive for back pain, neck pain and stiffness.      Obtained and otherwise negative except as outlined in problem list and HPI.      ECG 12 Lead  Date/Time: 4/25/2019 1:46 PM  Performed by: Tabby Meléndez APRN  Authorized by: Tabby Meléndez APRN   Rhythm comments: Sinus rhythm with first-degree AV block, voltage criteria for LVH, inferior infarct, age undetermined, abnormal ECG, 74 bpm, QRS 92 ms,  ms,  ms, no significant change from last ECG                 Objective:      "  Vitals:    04/25/19 1327   BP: 120/78   BP Location: Right arm   Patient Position: Sitting   Pulse: 74   Weight: 68.3 kg (150 lb 9.6 oz)   Height: 177.8 cm (70\")     Body mass index is 21.61 kg/m².  Last weight February 2018 was 158 pounds  Physical Exam   Constitutional: He is oriented to person, place, and time. He appears well-developed and well-nourished.   Neck: No JVD present. Carotid bruit is not present. No thyromegaly present.   Cardiovascular: Regular rhythm, S1 normal and S2 normal. Exam reveals no gallop, no S3 and no friction rub.   Murmur heard.  High-pitched blowing holosystolic murmur is present with a grade of 2/6 at the upper left sternal border, lower left sternal border and apex.  Pulses:       Dorsalis pedis pulses are 1+ on the right side, and 1+ on the left side.        Posterior tibial pulses are 1+ on the right side, and 1+ on the left side.   Mechanical valve clicks   Pulmonary/Chest: Effort normal and breath sounds normal. He has no wheezes. He has no rhonchi. He has no rales.   Abdominal: Soft. He exhibits no mass. There is no hepatosplenomegaly. There is no tenderness. There is no guarding.   Bowel sounds audible x4   Musculoskeletal: Normal range of motion. He exhibits no edema.   Lymphadenopathy:     He has no cervical adenopathy.   Neurological: He is alert and oriented to person, place, and time.   Skin: Skin is warm, dry and intact. No rash noted.   Vitals reviewed.        Lab Review:   Lab Results   Component Value Date    GLUCOSE 91 06/20/2017    BUN 8 (L) 06/20/2017    CREATININE 0.60 06/20/2017    EGFRIFNONA 134 06/20/2017    BCR 13.3 06/20/2017    CO2 26.0 06/20/2017    CALCIUM 9.7 06/20/2017    ALBUMIN 4.40 03/19/2017    AST 32 03/19/2017    ALT 33 03/19/2017       Lab Results   Component Value Date    WBC 7.77 03/19/2017    HGB 15.2 03/19/2017    HCT 44.8 03/19/2017    MCV 95.9 03/19/2017     03/19/2017       Lab Results   Component Value Date    HGBA1C 5.0 " 01/12/2015       Lab Results   Component Value Date    TSH 0.557 01/12/2015       Lab Results   Component Value Date    TRIG 76 09/10/2014       Lab Results   Component Value Date    BNP 37 01/12/2015         Assessment:     Overall continued acceptable course with no interim cardiopulmonary complaints with acceptable functional status. I will order an echocardiogram to compare to his 2017 study in view of his MVR x 2. He is NSR on sotalol therapy with QTc 450 ms.  The patient is scheduled to have routine laboratory testing next month with his physician and will request to have these results faxed to our office for review.       Diagnosis Plan   1. Paroxysmal atrial flutter (CMS/HCC)   Normal sinus rhythm on ECG in office today, continue sotalol   2. Essential hypertension   Controlled, continue cardiac medications   3. VHD (valvular heart disease)   Echocardiogram   4. Hx of mitral valve replacement with mechanical valve [Z95.2]   Echocardiogram          Plan:         1. Patient to continue current medications and close follow up with the above providers.  2. Tentative cardiology follow up in 6 months or patient may return sooner PRN.  3. Echocardiogram    Electronically signed by JF Briseno, 04/25/19, 1:48 PM.

## 2019-04-25 ENCOUNTER — OFFICE VISIT (OUTPATIENT)
Dept: CARDIOLOGY | Facility: CLINIC | Age: 69
End: 2019-04-25

## 2019-04-25 VITALS
SYSTOLIC BLOOD PRESSURE: 120 MMHG | BODY MASS INDEX: 21.56 KG/M2 | WEIGHT: 150.6 LBS | HEIGHT: 70 IN | DIASTOLIC BLOOD PRESSURE: 78 MMHG | HEART RATE: 74 BPM

## 2019-04-25 DIAGNOSIS — I38 VHD (VALVULAR HEART DISEASE): ICD-10-CM

## 2019-04-25 DIAGNOSIS — I48.92 PAROXYSMAL ATRIAL FLUTTER (HCC): Primary | ICD-10-CM

## 2019-04-25 DIAGNOSIS — I10 ESSENTIAL HYPERTENSION: ICD-10-CM

## 2019-04-25 DIAGNOSIS — Z95.2 HX OF MITRAL VALVE REPLACEMENT WITH MECHANICAL VALVE: ICD-10-CM

## 2019-04-25 PROCEDURE — 93000 ELECTROCARDIOGRAM COMPLETE: CPT | Performed by: NURSE PRACTITIONER

## 2019-04-25 PROCEDURE — 99214 OFFICE O/P EST MOD 30 MIN: CPT | Performed by: NURSE PRACTITIONER

## 2019-04-25 RX ORDER — SOTALOL HYDROCHLORIDE 80 MG/1
80 TABLET ORAL 2 TIMES DAILY
Qty: 180 TABLET | Refills: 4 | Status: SHIPPED | OUTPATIENT
Start: 2019-04-25 | End: 2020-05-14

## 2019-04-25 RX ORDER — LISINOPRIL 10 MG/1
10 TABLET ORAL EVERY MORNING
Qty: 90 TABLET | Refills: 3 | Status: SHIPPED | OUTPATIENT
Start: 2019-04-25 | End: 2020-05-14 | Stop reason: SDUPTHER

## 2019-04-30 ENCOUNTER — ANTICOAGULATION VISIT (OUTPATIENT)
Dept: PHARMACY | Facility: HOSPITAL | Age: 69
End: 2019-04-30

## 2019-04-30 DIAGNOSIS — Z95.2 HX OF MITRAL VALVE REPLACEMENT WITH MECHANICAL VALVE: ICD-10-CM

## 2019-04-30 DIAGNOSIS — I48.92 PAROXYSMAL ATRIAL FLUTTER (HCC): ICD-10-CM

## 2019-04-30 LAB — INR PPP: 3.3

## 2019-05-03 NOTE — PROGRESS NOTES
Patient Findings     Negatives:  Signs/symptoms of thrombosis, Signs/symptoms of bleeding, Laboratory test error suspected, Change in health, Change in alcohol use, Change in activity, Upcoming invasive procedure, Emergency department visit, Upcoming dental procedure, Missed doses, Extra doses, Change in medications, Change in diet/appetite, Hospital admission, Bruising, Other complaints   Comments:  He has had some issues with deer ticks recently and is traveling to [facility] quite a bit for family member   He stated that he is consistent with his intake of ensure/ boost and his intake of foods containing Vitamin K       Note appears that Patient was spoken to on 5/1. I have provided the patient findings above.

## 2019-05-15 ENCOUNTER — ANTICOAGULATION VISIT (OUTPATIENT)
Dept: PHARMACY | Facility: HOSPITAL | Age: 69
End: 2019-05-15

## 2019-05-15 DIAGNOSIS — I48.92 PAROXYSMAL ATRIAL FLUTTER (HCC): ICD-10-CM

## 2019-05-15 DIAGNOSIS — Z95.2 HX OF MITRAL VALVE REPLACEMENT WITH MECHANICAL VALVE: ICD-10-CM

## 2019-05-15 LAB — INR PPP: 2.5

## 2019-05-15 NOTE — PROGRESS NOTES
Anticoagulation Clinic - Remote Progress Note  [facility]  Frequency of Monitorin weeks     Indication: Kelly, VHD  Referring Provider: Jenniffer  Initial Warfarin Start Date:    Goal INR: 2.5-3.5  Current Drug Interactions: ensure (once daily); amitriptyline (PRN)  CHADS-VASc: 2 (age, HTN)     Diet: avoids typically   Alcohol: 12 pack of beer/week  Tobacco: None  OTC Pain Medication: None     INR History:  Date 8/25 9/11 9/29 10/19 11/5 12/2 12/11 1/4/18 1/30 2/19 3/7 4/5 4/18   Total  Weekly Dose 24mg 24mg 24mg 22.75mg? 22.75mg? ? 22.5 mg 22.5  mg 22.5 mg 22.5  mg 22.5mg 22.5mg 22.5mg   INR 3.3 2.8 2.9 3.1 3.5 2.7 2.7 2.6 2.9 2.5 3.3 3.7 2.6   Notes       LVM LVM     call no call no call LVM          Date 5/20 6/5  6/27 7/24 7/31 9/9 10/8 11/8 12/14 2/20 2/25   Total Weekly Dose 23mg 22.5mg   23mg 24mg 24mg unable to verify unable to verify unable to verify unable to verify 23mg   INR 2.8 2.9  2.4 2.9 3.3 3.4 3.0 2.7 2.6 3.8 3.4   Notes         rec'vd    rec'vd 10/13;  mult calls              Date 3/11 3/25 4/9  5/1 5/15          Total Weekly Dose 23mg 23mg 22.5mg  23 mg 22.5mg          INR 3.6 3.0 3.2  3.3 2.5          Notes                 Phone Interview:  Tablet Strength: 3mg and 1mg tablets  Patient Contact Info:[phone] (cell) will set up ; [phone]  Preferred *[phone]* anytime after noon  Lab Contact Info: [facility]    Patient Findings   Positives:  Change in diet/appetite   Negatives:  Signs/symptoms of thrombosis, Signs/symptoms of bleeding, Laboratory test error suspected, Change in health, Change in alcohol use, Change in activity, Upcoming invasive procedure, Emergency department visit, Upcoming dental procedure, Missed doses, Extra doses, Change in medications, Hospital admission, Bruising, Other complaints   Comments:  Mr. Turner reports eating more greens than usual here recently (2 days last week in large amounts). He voiced understanding of consistency and  stated he would eat less this week. He also thinks he will schedule a dental appointment soon and will notify us if there is any procedure that needs done with that.     Plan:   1. INR is therapeutic today at 2.5 (LLN). Instructed Mr. Turner to continue alternating warfarin 3mg and 3.5mg every other day.  2. Repeat INR in two weeks on 5/29.  3. Verbal information provided over the phone.  expressed understanding with teach back and has no questions at this time.  4. Patient request refills on his 3mg tablet     Srini Whalen, Pharmacy Intern  5/15/2019  2:47 PM     Frances MATHEWS, PharmD, have reviewed the note in full and agree with the assessment and plan.  05/15/19  4:44 PM

## 2019-05-20 RX ORDER — WARFARIN SODIUM 1 MG/1
TABLET ORAL
Qty: 15 TABLET | Refills: 0 | Status: SHIPPED | OUTPATIENT
Start: 2019-05-20 | End: 2019-07-12 | Stop reason: SDUPTHER

## 2019-05-20 RX ORDER — WARFARIN SODIUM 3 MG/1
TABLET ORAL
Qty: 30 TABLET | Refills: 0 | Status: SHIPPED | OUTPATIENT
Start: 2019-05-20 | End: 2019-06-20 | Stop reason: SDUPTHER

## 2019-05-20 NOTE — TELEPHONE ENCOUNTER
30 day supply of both warfarin 1mg and 3mg tablets sent electronically to [facility] in [place], [place] as requested.    Last Morgan, PharmD, BCPS  5/20/2019  4:53 PM

## 2019-05-24 ENCOUNTER — APPOINTMENT (OUTPATIENT)
Dept: CARDIOLOGY | Facility: HOSPITAL | Age: 69
End: 2019-05-24

## 2019-06-06 ENCOUNTER — TELEPHONE (OUTPATIENT)
Dept: PHARMACY | Facility: HOSPITAL | Age: 69
End: 2019-06-06

## 2019-06-11 ENCOUNTER — ANTICOAGULATION VISIT (OUTPATIENT)
Dept: PHARMACY | Facility: HOSPITAL | Age: 69
End: 2019-06-11

## 2019-06-11 DIAGNOSIS — Z95.2 HX OF MITRAL VALVE REPLACEMENT WITH MECHANICAL VALVE: ICD-10-CM

## 2019-06-11 DIAGNOSIS — I48.92 PAROXYSMAL ATRIAL FLUTTER (HCC): ICD-10-CM

## 2019-06-11 NOTE — PROGRESS NOTES
Anticoagulation Clinic - Remote Progress Note  [facility]  Frequency of Monitorin weeks     Indication: Kelly, VHD  Referring Provider: Jenniffer  Initial Warfarin Start Date:    Goal INR: 2.5-3.5  Current Drug Interactions: ensure (once daily); amitriptyline (PRN)  CHADS-VASc: 2 (age, HTN)     Diet: avoids typically   Alcohol: 12 pack of beer/week  Tobacco: None  OTC Pain Medication: None     INR History:  Date 8/25 9/11 9/29 10/19 11/5 12/2 12/11 1/4/18 1/30 2/19 3/7 4/5 4/18   Total  Weekly Dose 24mg 24mg 24mg 22.75mg? 22.75mg? ? 22.5 mg 22.5  mg 22.5 mg 22.5  mg 22.5mg 22.5mg 22.5mg   INR 3.3 2.8 2.9 3.1 3.5 2.7 2.7 2.6 2.9 2.5 3.3 3.7 2.6   Notes       LVM LVM     call no call no call LVM          Date 5/20 6/5  6/27 7/24 7/31 9/9 10/8 11/8 12/14 2/20 2/25   Total Weekly Dose 23mg 22.5mg   23mg 24mg 24mg unable to verify unable to verify unable to verify unable to verify 23mg   INR 2.8 2.9  2.4 2.9 3.3 3.4 3.0 2.7 2.6 3.8 3.4   Notes         rec'vd    rec'vd 10/13;  mult calls              Date 3/11 3/25 4/9  5/1 5/15 6/11         Total Weekly Dose 23mg 23mg 22.5mg  23 mg 22.5mg 23mg         INR 3.6 3.0 3.2  3.3 2.5 3.1         Notes                 Phone Interview:  Tablet Strength: 3mg and 1mg tablets  Patient Contact Info:[phone] (cell) will set up ; [phone]    Preferred *[phone]* anytime after noon  Lab Contact Info: [facility]    Patient Findings   Negatives:  Signs/symptoms of thrombosis, Signs/symptoms of bleeding, Laboratory test error suspected, Change in health, Change in alcohol use, Change in activity, Upcoming invasive procedure, Emergency department visit, Upcoming dental procedure, Missed doses, Extra doses, Change in medications, Change in diet/appetite, Hospital admission, Bruising, Other complaints   Comments:  Mr. Turner stated that he still needs to schedule a dental checkup. When he does this he mentioned that he would like to check his INR the day before just he  can tell the dentist the result. If he needs a tooth extracted he will notify us first and ensure the dentist knows that he is taking warfarin.       Plan:   1. INR was therapeutic on 6/11 at 3.1. Patient reached on 6/13, Instructed Mr. Turner to continue alternating warfarin 3mg and 3.5mg every other day.  2. Repeat INR in two weeks on 6/25.  3. Verbal information provided over the phone.  expressed understanding with teach back and has no questions at this time.      Srini Whalen, Pharmacy Intern  6/11/2019  4:35 PM     I, Olegario Macdonald, [facility], have reviewed the note in full and agree with the assessment and plan.  06/14/19  8:28 AM

## 2019-06-13 LAB — INR PPP: 3.1

## 2019-06-21 RX ORDER — WARFARIN SODIUM 3 MG/1
TABLET ORAL
Qty: 90 TABLET | Refills: 1 | Status: SHIPPED | OUTPATIENT
Start: 2019-06-21 | End: 2020-01-02

## 2019-06-27 ENCOUNTER — ANTICOAGULATION VISIT (OUTPATIENT)
Dept: PHARMACY | Facility: HOSPITAL | Age: 69
End: 2019-06-27

## 2019-06-27 DIAGNOSIS — I48.92 PAROXYSMAL ATRIAL FLUTTER (HCC): ICD-10-CM

## 2019-06-27 DIAGNOSIS — Z95.2 HX OF MITRAL VALVE REPLACEMENT WITH MECHANICAL VALVE: ICD-10-CM

## 2019-06-27 LAB — INR PPP: 3.6

## 2019-07-12 ENCOUNTER — ANTICOAGULATION VISIT (OUTPATIENT)
Dept: PHARMACY | Facility: HOSPITAL | Age: 69
End: 2019-07-12

## 2019-07-12 DIAGNOSIS — Z95.2 HX OF MITRAL VALVE REPLACEMENT WITH MECHANICAL VALVE: ICD-10-CM

## 2019-07-12 DIAGNOSIS — I48.92 PAROXYSMAL ATRIAL FLUTTER (HCC): ICD-10-CM

## 2019-07-12 LAB — INR PPP: 2.9

## 2019-07-12 RX ORDER — WARFARIN SODIUM 1 MG/1
TABLET ORAL
Qty: 30 TABLET | Refills: 1 | Status: SHIPPED | OUTPATIENT
Start: 2019-07-12 | End: 2020-01-02

## 2019-07-12 NOTE — TELEPHONE ENCOUNTER
Warfarin 1mg tablet refills sent electronically to patient's pharmacy as requested.    Last Morgan, PharmD, BCPS  7/12/2019  4:43 PM

## 2019-07-12 NOTE — PROGRESS NOTES
Anticoagulation Clinic - Remote Progress Note  [facility]  Frequency of Monitorin weeks     Indication: Kelly, VHD  Referring Provider: Jenniffer  Initial Warfarin Start Date:    Goal INR: 2.5-3.5  Current Drug Interactions: ensure (once daily); amitriptyline (PRN)  CHADS-VASc: 2 (age, HTN)     Diet: avoids typically 19  Alcohol: 12 pack of beer/week  Tobacco: None  OTC Pain Medication: None     INR History:  Date 8/25 9/11 9/29 10/19 11/5 12/2 12/11 1/4/18 1/30 2/19 3/7 4/5 4/18   Total  Weekly Dose 24mg 24mg 24mg 22.75mg? 22.75mg? ? 22.5 mg 22.5  mg 22.5 mg 22.5  mg 22.5mg 22.5mg 22.5mg   INR 3.3 2.8 2.9 3.1 3.5 2.7 2.7 2.6 2.9 2.5 3.3 3.7 2.6   Notes       LVM LVM     call no call no call LVM          Date 5/20 6/5  6/27 7/24 7/31 9/9 10/8 11/8 12/14 2/20 2/25   Total Weekly Dose 23mg 22.5mg   23mg 24mg 24mg unable to verify unable to verify unable to verify unable to verify 23mg   INR 2.8 2.9  2.4 2.9 3.3 3.4 3.0 2.7 2.6 3.8 3.4   Notes         rec'vd    rec'vd 10/13;  mult calls              Date 3/11 3/25 4/9  5/1 5/15 6/11 6/27 7/10       Total Weekly Dose 23mg 23mg 22.5mg  23 mg 22.5mg 23mg  22.5mg       INR 3.6 3.0 3.2  3.3 2.5 3.1 3.6 2.9       Notes        reported          Phone Interview:  Tablet Strength: 3mg and 1mg tablets  Patient Contact Info:[phone] (cell) will set up ; [phone]    Preferred *[phone]* anytime after noon  Other numbers on his profile:   [phone] (Home)  [phone] (Mobile) *Preferred*  Lab Contact Info: [place]    Patient Findings:  Negatives:  Signs/symptoms of thrombosis, Signs/symptoms of bleeding, Laboratory test error suspected, Change in health, Change in alcohol use, Change in activity, Upcoming invasive procedure, Emergency department visit, Upcoming dental procedure, Missed doses, Extra doses, Change in medications, Change in diet/appetite, Hospital admission, Bruising, Other complaints   Comments:  Says while playing with his  cats he has a couple of scratches or puncture marks but denies any excessive bleeding. Denies any other changes since last encounter.      Plan:   1. INR was back WNL on Wednesday but wasn't reported until today. Instructed Mr. Turner to continue alternating warfarin 3mg and 3.5mg every other day.  2. Repeat INR in two weeks.   3. Verbal information provided over the phone. Mr. Turner expressed understanding with teach back and has no questions at this time.  4. Patient requests a refill of warfarin 1mg tablets be called into [facility] in [place], [place]. Separate refill encounter created.    Teresita Redmond CPhT  7/12/2019  4:17 PM     I, Last Morgan, [facility], have reviewed the note in full and agree with the assessment and plan.  07/12/19  4:37 PM

## 2019-07-25 LAB — INR PPP: 2.3

## 2019-07-26 ENCOUNTER — ANTICOAGULATION VISIT (OUTPATIENT)
Dept: PHARMACY | Facility: HOSPITAL | Age: 69
End: 2019-07-26

## 2019-07-26 DIAGNOSIS — Z95.2 HX OF MITRAL VALVE REPLACEMENT WITH MECHANICAL VALVE: ICD-10-CM

## 2019-07-26 DIAGNOSIS — I48.92 PAROXYSMAL ATRIAL FLUTTER (HCC): ICD-10-CM

## 2019-08-01 ENCOUNTER — TELEPHONE (OUTPATIENT)
Dept: PHARMACY | Facility: HOSPITAL | Age: 69
End: 2019-08-01

## 2019-08-09 ENCOUNTER — ANTICOAGULATION VISIT (OUTPATIENT)
Dept: PHARMACY | Facility: HOSPITAL | Age: 69
End: 2019-08-09

## 2019-08-09 DIAGNOSIS — Z95.2 HX OF MITRAL VALVE REPLACEMENT WITH MECHANICAL VALVE: ICD-10-CM

## 2019-08-09 DIAGNOSIS — I48.92 PAROXYSMAL ATRIAL FLUTTER (HCC): ICD-10-CM

## 2019-08-09 LAB — INR PPP: 2.9

## 2019-08-09 NOTE — PROGRESS NOTES
Anticoagulation Clinic - Remote Progress Note  [facility]  Frequency of Monitorin weeks     Indication: Kelly, MACHOD  Referring Provider: Jenniffer  Initial Warfarin Start Date:    Goal INR: 2.5-3.5  Current Drug Interactions: ensure (once daily); amitriptyline (PRN)  CHADS-VASc: 2 (age, HTN)     Diet: avoids typically 19  Alcohol: 12 pack of beer/week  Tobacco: None  OTC Pain Medication: None     INR History:  Date 8/25 9/11 9/29 10/19 11/5 12/2 12/11 1/4/18 1/30 2/19 3/7 4/5 4/18   Total  Weekly Dose 24mg 24mg 24mg 22.75mg? 22.75mg? ? 22.5 mg 22.5  mg 22.5 mg 22.5  mg 22.5mg 22.5mg 22.5mg   INR 3.3 2.8 2.9 3.1 3.5 2.7 2.7 2.6 2.9 2.5 3.3 3.7 2.6   Notes       LVM LVM     call no call no call LVM          Date 5/20 6/5  6/27 7/24 7/31 9/9 10/8 11/8 12/14 2/20 2/25   Total Weekly Dose 23mg 22.5mg   23mg 24mg 24mg unable to verify unable to verify unable to verify unable to verify 23mg   INR 2.8 2.9  2.4 2.9 3.3 3.4 3.0 2.7 2.6 3.8 3.4   Notes         rec'vd    rec'vd 10/13;  mult calls              Date 3/11 3/25 4/9  5/1 5/15 6/11 6/27 7/10 7/26 8/9     Total Weekly Dose 23mg 23mg 22.5mg  23 mg 22.5mg 23mg  22.5mg 22.5 mg 22.5mg     INR 3.6 3.0 3.2  3.3 2.5 3.1 3.6 2.9 2.3 2.9     Notes        reported          Phone Interview:  Tablet Strength: 3mg and 1mg tablets  Patient Contact Info:[phone] (cell)  (disconnected ) will set up ; [phone]    Preferred *[phone]* anytime after noon  Other numbers on his profile:   [phone] (Home)  [phone] (Mobile) *Preferred*  Lab Contact Info: [place]    Patient Findings:  Positives:  Other complaints   Negatives:  Signs/symptoms of thrombosis, Signs/symptoms of bleeding, Laboratory test error suspected, Change in health, Change in alcohol use, Change in activity, Upcoming invasive procedure, Emergency department visit, Upcoming dental procedure, Missed doses, Extra doses, Change in medications, Change in diet/appetite, Hospital  admission, Bruising   Comments:  Patient was very apologetic for not being in touch with us at last encounter. He states the evening of last INR check when his reading was low (2.3) he took a 3.5mg that evening instead of the directed 3mg- then returned to his normal dose thereafter. He states he will need to make an upcoming dental appointment but will call the clinic once this is scheduled since he will have to be on antibiotics for a cleaning. He denies any abnormal bruising or bleeding.     Plan:   1. INR is therapeutic today. Instructed patient to continue alternating warfarin 3mg with 3.5mg, repeating every two days.  2. Repeat in two weeks.  3. Verbal information provided over the phone. Patient RBV dosing instructions, expresses understanding by teach back, and has no further questions at this time.    Teresita Redmond CPhT  08/09/19   4:11 PM    [facility], have reviewed the note in full and agree with the assessment and plan.  08/09/19  4:41 PM

## 2019-09-11 ENCOUNTER — ANTICOAGULATION VISIT (OUTPATIENT)
Dept: PHARMACY | Facility: HOSPITAL | Age: 69
End: 2019-09-11

## 2019-09-11 DIAGNOSIS — I48.92 PAROXYSMAL ATRIAL FLUTTER (HCC): ICD-10-CM

## 2019-09-11 DIAGNOSIS — Z95.2 HX OF MITRAL VALVE REPLACEMENT WITH MECHANICAL VALVE: ICD-10-CM

## 2019-09-11 LAB — INR PPP: 2.9

## 2019-09-11 NOTE — PROGRESS NOTES
Anticoagulation Clinic - Remote Progress Note  [facility]  Frequency of Monitorin weeks     Indication: Kelly, MACHOD  Referring Provider: Jenniffer  Initial Warfarin Start Date:    Goal INR: 2.5-3.5  Current Drug Interactions: ensure (once daily); amitriptyline (PRN)  CHADS-VASc: 2 (age, HTN)     Diet: avoids typically 19  Alcohol: 12 pack of beer/week  Tobacco: None  OTC Pain Medication: None     INR History:  Date 8/25 9/11 9/29 10/19 11/5 12/2 12/11 1/4/18 1/30 2/19 3/7 4/5 4/18   Total  Weekly Dose 24mg 24mg 24mg 22.75mg? 22.75mg? ? 22.5 mg 22.5  mg 22.5 mg 22.5  mg 22.5mg 22.5mg 22.5mg   INR 3.3 2.8 2.9 3.1 3.5 2.7 2.7 2.6 2.9 2.5 3.3 3.7 2.6   Notes       LVM LVM     call no call no call LVM          Date 5/20 6/5  6/27 7/24 7/31 9/9 10/8 11/8 12/14 2/20 2/25   Total Weekly Dose 23mg 22.5mg   23mg 24mg 24mg unable to verify unable to verify unable to verify unable to verify 23mg   INR 2.8 2.9  2.4 2.9 3.3 3.4 3.0 2.7 2.6 3.8 3.4   Notes         rec'vd    rec'vd 10/13;  mult calls              Date 3/11 3/25 4/9  5/1 5/15 6/11 6/27 7/10 7/26 8/9 9/11    Total Weekly Dose 23mg 23mg 22.5mg  23 mg 22.5mg 23mg  22.5mg 22.5 mg 22.5mg 23mg    INR 3.6 3.0 3.2  3.3 2.5 3.1 3.6 2.9 2.3 2.9 2.9    Notes        reported          Phone Interview:  Tablet Strength: 3mg and 1mg tablets  Patient Contact Info:[phone] (cell)  (disconnected ) will set up ; [phone]    Preferred *[phone]* anytime after noon  Other numbers on his profile:   [phone] (Home)   - brother in law, requested that we do NOT call this number  Lab Contact Info: [place]      Negatives:  Signs/symptoms of thrombosis, Signs/symptoms of bleeding, Laboratory test error suspected, Change in health, Change in alcohol use, Change in activity, Upcoming invasive procedure, Emergency department visit, Upcoming dental procedure, Missed doses, Extra doses, Change in medications, Change in diet/appetite, Hospital admission,  Bruising, Other complaints     Plan:   1. INR is therapeutic today. Instructed patient to continue alternating warfarin 3mg with 3.5mg, repeating every two days.  2. Repeat in two weeks. 9/25  3. Verbal information provided over the phone. Patient RBV dosing instructions, expresses understanding by teach back, and has no further questions at this time.      Kait Wills, PharmD  Pharmacy Resident   9/16/2019  4:18 PM

## 2019-10-10 ENCOUNTER — TELEPHONE (OUTPATIENT)
Dept: PHARMACY | Facility: HOSPITAL | Age: 69
End: 2019-10-10

## 2019-10-15 ENCOUNTER — ANTICOAGULATION VISIT (OUTPATIENT)
Dept: PHARMACY | Facility: HOSPITAL | Age: 69
End: 2019-10-15

## 2019-10-15 DIAGNOSIS — Z95.2 HX OF MITRAL VALVE REPLACEMENT WITH MECHANICAL VALVE: ICD-10-CM

## 2019-10-15 DIAGNOSIS — I48.92 PAROXYSMAL ATRIAL FLUTTER (HCC): ICD-10-CM

## 2019-10-15 LAB
INR PPP: 2.8
INR PPP: 3.3

## 2019-10-15 NOTE — PROGRESS NOTES
Anticoagulation Clinic - Remote Progress Note  [facility]  Frequency of Monitorin weeks     Indication: Kelly, MACHOD  Referring Provider: Jenniffer  Initial Warfarin Start Date:    Goal INR: 2.5-3.5  Current Drug Interactions: ensure (once daily); amitriptyline (PRN)  CHADS-VASc: 2 (age, HTN)     Diet: avoids typically 19  Alcohol: 12 pack of beer/week  Tobacco: None  OTC Pain Medication: None     INR History:  Date 8/25 9/11 9/29 10/19 11/5 12/2 12/11 1/4/18 1/30 2/19 3/7 4/5 4/18   Total  Weekly Dose 24mg 24mg 24mg 22.75mg? 22.75mg? ? 22.5 mg 22.5  mg 22.5 mg 22.5  mg 22.5mg 22.5mg 22.5mg   INR 3.3 2.8 2.9 3.1 3.5 2.7 2.7 2.6 2.9 2.5 3.3 3.7 2.6   Notes       LVM LVM     call no call no call LVM          Date 5/20 6/5  6/27 7/24 7/31 9/9 10/8 11/8 12/14 2/20 2/25   Total Weekly Dose 23mg 22.5mg   23mg 24mg 24mg unable to verify unable to verify unable to verify unable to verify 23mg   INR 2.8 2.9  2.4 2.9 3.3 3.4 3.0 2.7 2.6 3.8 3.4   Notes         rec'vd    rec'vd 10/13;  mult calls              Date 3/11 3/25 4/9  5/1 5/15 6/11 6/27 7/10 7/26 8/9 9/11  10/3  10/15   Total Weekly Dose 23mg 23mg 22.5mg  23 mg 22.5mg 23mg  22.5mg 22.5 mg 22.5mg 23mg  22.5 mg  23 mg   INR 3.6 3.0 3.2  3.3 2.5 3.1 3.6 2.9 2.3 2.9 2.9  2.8  3.3   Notes        reported           Phone Interview:  Tablet Strength: 3mg and 1mg tablets  Patient Contact Info:[phone] (cell)  (disconnected ) will set up ; [phone]    Preferred *[phone]* anytime after noon  Other numbers on his profile:   [phone] (Home)   - brother in law, requested that we do NOT call this number  Lab Contact Info: [place]    Patient Findings   Positives:  Change in health, Change in medications, Other complaints   Negatives:  Signs/symptoms of thrombosis, Signs/symptoms of bleeding, Laboratory test error suspected, Change in alcohol use, Change in activity, Upcoming invasive procedure, Emergency department visit, Upcoming dental  procedure, Missed doses, Extra doses, Change in diet/appetite, Hospital admission, Bruising   Comments:  He was due to recheck his INR on 9/25.  He had not reported his INR from 10/3 until today.     He has had the flu and been very ill but is feeling better now.   He been on cephalexin and has about three days left.  He thinks he started on the 10/5.  Discussed DDI, may result in an increased risk of bleeding   Encouraged him to contact the clinic with any changes.  He stated that he was not aware of DDI issues; however, he will keep in mind for the future.     Plan:   1. INR is therapeutic today at 3.3.  He also reported INR from 10/3, which was also therapeutic at 2.8.   Instructed patient to continue alternating warfarin 3mg with 3.5mg, repeating every two days.  He will resume his usual intake of GLV.  2. Repeat on Monday, 10/21  3. Verbal information provided over the phone. Patient RBV dosing instructions, expresses understanding by teach back, and has no further questions at this time.    Lauren Milligan, PharmD  10/15/2019  3:52 PM

## 2019-10-28 ENCOUNTER — ANTICOAGULATION VISIT (OUTPATIENT)
Dept: PHARMACY | Facility: HOSPITAL | Age: 69
End: 2019-10-28

## 2019-10-28 DIAGNOSIS — I48.92 PAROXYSMAL ATRIAL FLUTTER (HCC): ICD-10-CM

## 2019-10-28 DIAGNOSIS — Z95.2 HX OF MITRAL VALVE REPLACEMENT WITH MECHANICAL VALVE: ICD-10-CM

## 2019-10-28 LAB — INR PPP: 3.2

## 2019-10-28 RX ORDER — CEFDINIR 300 MG/1
300 CAPSULE ORAL 2 TIMES DAILY
COMMUNITY
Start: 2019-10-22 | End: 2019-10-31

## 2019-10-28 NOTE — PROGRESS NOTES
Anticoagulation Clinic - Remote Progress Note  [facility]  Frequency of Monitorin weeks     Indication: Kelly, VHD  Referring Provider: Jenniffer Madrid 19)  Initial Warfarin Start Date:    Goal INR: 2.5-3.5  Current Drug Interactions: ensure (once daily); amitriptyline (PRN)  CHADS-VASc: 2 (age, HTN)     Diet: avoids typically 10/28/19  Alcohol: 12 pack of beer/week  Tobacco: None  OTC Pain Medication: None     INR History:  Date 8/25 9/11 9/29 10/19 11/5 12/2 12/11 1/4/18 1/30 2/19 3/7 4/5 4/18   Total  Weekly Dose 24mg 24mg 24mg 22.75 mg? 22.75 mg? ? 22.5 mg 22.5  mg 22.5 mg 22.5  mg 22.5mg 22.5mg 22.5mg   INR 3.3 2.8 2.9 3.1 3.5 2.7 2.7 2.6 2.9 2.5 3.3 3.7 2.6   Notes       LVM LVM     call   LVM          Date 5/20 6/5  6/27 7/24 7/31 9/9 10/8 11/8 12/14 2/20 2/25   Total Weekly Dose 23mg 22.5mg   23mg 24mg 24mg unable to verify unable to verify unable to verify unable to verify 23mg   INR 2.8 2.9  2.4 2.9 3.3 3.4 3.0 2.7 2.6 3.8 3.4   Notes         rec'vd    rec'vd 10/13;  mult calls              Date 3/11 3/25 4/9  5/1 5/15 6/11 6/27 7/10 7/26 8/9 9/11  10/3  10/15   Total Weekly Dose 23mg 23mg 22.5mg  23 mg 22.5mg 23mg  22.5mg 22.5 mg 22.5mg 23mg  22.5 mg  23 mg   INR 3.6 3.0 3.2  3.3 2.5 3.1 3.6 2.9 2.3 2.9 2.9  2.8  3.3   Notes        reported           Date 10/24         Total Weekly Dose 22.5mg         INR 3.2         Notes reported 10/25; sick; cefdinir           Phone Interview:  Tablet Strength: 3mg and 1mg tablets  Patient Contact Info:[phone] (cell)  (disconnected ) will set up ; [phone]    Preferred *[phone]* anytime after noon  Other numbers on his profile:   [phone] (Home)   - brother in law, requested that we do NOT call this number  Lab Contact Info: [place]    Patient Findings:  Positives:  Change in health, Change in medications   Negatives:  Signs/symptoms of thrombosis, Signs/symptoms of bleeding, Laboratory test error suspected, Change in  "alcohol use, Change in activity, Upcoming invasive procedure, Emergency department visit, Upcoming dental procedure, Missed doses, Extra doses, Change in diet/appetite, Hospital admission, Bruising, Other complaints   Comments:  Patient states he had the flu and then developed a secondary \"lung infection\". He has been presribed Cefdinir starting 10/22 1BID x10. He has also been taking Mucinex. He complains that he has now developed diarrhea and has reported to his doctor but is waiting for a call back. He is aware if any medication changes are made that he needs to contact the clinic as he may need to test his INR sooner. He states his appetite is still normal at this time but still avoiding GLV.     Plan:   1. INR was therapeutic on 10/24. Instructed patient to continue alternating warfarin 3mg with 3.5mg, repeating every two days.   2. Repeat INR this Thursday, 10/31.   3. Verbal information provided over the phone. Patient RBV dosing instructions, expresses understanding by teach back, and has no further questions at this time.    Teresita Redmond CPhT  10/28/2019  8:04 AM    Encourage Mr. Turner to call us as soon as possible with any medication changes in the future. Cefdinir + diarrhea may contribute to an elevated INR.   Added cefdinir to his med list (ras. given PCN allergy).   ICarmen, [facility], have reviewed the note in full and agree with the assessment and plan.  10/28/19  2:49 PM  "

## 2019-10-31 ENCOUNTER — ANTICOAGULATION VISIT (OUTPATIENT)
Dept: PHARMACY | Facility: HOSPITAL | Age: 69
End: 2019-10-31

## 2019-10-31 DIAGNOSIS — I48.92 PAROXYSMAL ATRIAL FLUTTER (HCC): ICD-10-CM

## 2019-10-31 DIAGNOSIS — Z95.2 HX OF MITRAL VALVE REPLACEMENT WITH MECHANICAL VALVE: ICD-10-CM

## 2019-10-31 LAB — INR PPP: 2.7

## 2019-10-31 NOTE — PROGRESS NOTES
Anticoagulation Clinic - Remote Progress Note  [facility]  Frequency of Monitorin-4x a month    Indication: A.fib, VHD  Referring Provider: Jenniffer Madrid 19)  Initial Warfarin Start Date:    Goal INR: 2.5-3.5  Current Drug Interactions: ensure (once daily); amitriptyline (PRN)  CHADS-VASc: 2 (age, HTN)     Diet: avoids typically 10/28/19  Alcohol: 12 pack of beer/week  Tobacco: None  OTC Pain Medication: None     INR History:  Date 8/25 9/11 9/29 10/19 11/5 12/2 12/11 1/4/18 1/30 2/19   Total Weekly Dose 24mg 24mg 24mg 22.75 mg? 22.75 mg? ? 22.5mg 22.5mg 22.5mg 22.5mg   INR 3.3 2.8 2.9 3.1 3.5 2.7 2.7 2.6 2.9 2.5   Notes    LVM LVM   call       Date 3/7 4/5 4/18 5/20 6/5 6/27 7/24 7/31 9/9 10/8   Total Weekly Dose 22.5mg 22.5mg 22.5mg 23mg 22.5mg  23mg 24mg 24mg unable   to verify   INR 3.3 3.7 2.6 2.8 2.9 2.4 2.9 3.3 3.4 3.0   Notes LVM       rec'vd   rec'vd 10/13;  mult calls     Date 11/8 12/14 2/20/19 2/25 3/11 3/25 4/9 5/1 5/15 6/11   Total Weekly Dose unable to verify unable to verify unable to verify 23mg 23mg 23mg 22.5mg 23mg 22.5mg 23mg   INR 2.7 2.6 3.8 3.4 3.6 3.0 3.2 3.3 2.5 3.1   Notes               Date 6/27 7/10 7/26 8/9 9/11 10/3 10/15 10/24 10/31    Total Weekly Dose  22.5mg 22.5mg 22.5mg 23mg 22.5mg 23mg 22.5mg 23mg    INR 3.6 2.9 2.3 2.9 2.9 2.8 3.3 3.2 2.7    Notes  reported       reported 10/25; sick; cefdinir stop cefdinir 10/29        Phone Interview:  Tablet Strength: 3mg and 1mg tablets  Patient Contact Info:[phone] (cell)  (disconnected ) will set up ; [phone]    Preferred *[phone]* anytime after noon  Other numbers on his profile:   [phone] (Home)   - brother in law, requested that we do NOT call this number  Lab Contact Info: [place]    Patient Findings    Positives:  Change in medications   Negatives:  Signs/symptoms of thrombosis, Signs/symptoms of bleeding, Laboratory test error suspected, Change in health, Change in alcohol use,  Change in activity, Upcoming invasive procedure, Emergency department visit, Upcoming dental procedure, Missed doses, Extra doses, Change in diet/appetite, Hospital admission, Bruising, Other complaints   Comments:  Patient reports he self stopped cefdinir 10/29 due to diarrhea side effect. Otherwise denies all findings.     Plan:   1. INR was therapeutic early this morning at 2.7. Instructed patient to continue alternating warfarin 3mg with 3.5mg, repeating every two days.   2. Repeat INR in 1 week, 11/7  3. Verbal information provided over the phone. Patient RBV dosing instructions, expresses understanding by teach back, and has no further questions at this time.    Olegario Fuentes, [place]  10/31/2019  8:10 AM    I, Lauren Milligan, PharmD, have reviewed the note in full and agree with the assessment and plan.  10/31/19  4:36 PM

## 2019-11-08 ENCOUNTER — ANTICOAGULATION VISIT (OUTPATIENT)
Dept: PHARMACY | Facility: HOSPITAL | Age: 69
End: 2019-11-08

## 2019-11-08 DIAGNOSIS — I48.92 PAROXYSMAL ATRIAL FLUTTER (HCC): ICD-10-CM

## 2019-11-08 DIAGNOSIS — Z95.2 HX OF MITRAL VALVE REPLACEMENT WITH MECHANICAL VALVE: ICD-10-CM

## 2019-11-08 LAB — INR PPP: 2.3

## 2019-11-15 ENCOUNTER — ANTICOAGULATION VISIT (OUTPATIENT)
Dept: PHARMACY | Facility: HOSPITAL | Age: 69
End: 2019-11-15

## 2019-11-15 DIAGNOSIS — Z95.2 HX OF MITRAL VALVE REPLACEMENT WITH MECHANICAL VALVE: ICD-10-CM

## 2019-11-15 DIAGNOSIS — I48.92 PAROXYSMAL ATRIAL FLUTTER (HCC): ICD-10-CM

## 2019-11-15 LAB — INR PPP: 2.8

## 2019-11-15 NOTE — PROGRESS NOTES
Anticoagulation Clinic - Remote Progress Note  [facility]  Frequency of Monitorin-4x a month    Indication: A.fib, VHD  Referring Provider: Jenniffer Madrid 19)  Initial Warfarin Start Date:    Goal INR: 2.5-3.5  Current Drug Interactions: ensure (once daily); amitriptyline (PRN)  CHADS-VASc: 2 (age, HTN)     Diet: avoids typically 19  Alcohol: 12 pack of beer/week  Tobacco: None  OTC Pain Medication: None     INR History:  Date 8/25 9/11 9/29 10/19 11/5 12/2 12/11 1/4/18 1/30 2/19   Total Weekly Dose 24mg 24mg 24mg 22.75 mg? 22.75 mg? ? 22.5mg 22.5mg 22.5mg 22.5mg   INR 3.3 2.8 2.9 3.1 3.5 2.7 2.7 2.6 2.9 2.5   Notes    LVM LVM   call       Date 3/7 4/5 4/18 5/20 6/5 6/27 7/24 7/31 9/9 10/8   Total Weekly Dose 22.5mg 22.5mg 22.5mg 23mg 22.5mg  23mg 24mg 24mg unable   to verify   INR 3.3 3.7 2.6 2.8 2.9 2.4 2.9 3.3 3.4 3.0   Notes LVM       rec'vd   rec'vd 10/13;  mult calls     Date 11/8 12/14 2/20/19 2/25 3/11 3/25 4/9 5/1 5/15 6/11   Total Weekly Dose unable to verify unable to verify unable to verify 23mg 23mg 23mg 22.5mg 23mg 22.5mg 23mg   INR 2.7 2.6 3.8 3.4 3.6 3.0 3.2 3.3 2.5 3.1   Notes               Date 6/27 7/10 7/26 8/9 9/11 10/3 10/15 10/24 10/31 11/8   Total Weekly Dose  22.5mg 22.5mg 22.5mg 23mg 22.5mg 23mg 22.5mg 23mg 23 mg   INR 3.6 2.9 2.3 2.9 2.9 2.8 3.3 3.2 2.7 2.3   Notes  reported       reported 10/25; sick; cefdinir stop cefdinir 10/29      Date 11/15            Total Weekly Dose 23.5mg            INR 2.8            Notes               Phone Interview:  Tablet Strength: 3mg and 1mg tablets  Patient Contact Info:[phone] (cell)  (disconnected ) will set up ; [phone]    Preferred *[phone]* anytime after noon  Other numbers on his profile:   [phone] (Home)   - brother in law, requested that we do NOT call this number  Lab Contact Info: [place]    Patient Findings:  Negatives:  Signs/symptoms of thrombosis, Signs/symptoms of bleeding,  Laboratory test error suspected, Change in health, Change in alcohol use, Change in activity, Upcoming invasive procedure, Emergency department visit, Upcoming dental procedure, Missed doses, Extra doses, Change in medications, Change in diet/appetite, Hospital admission, Bruising, Other complaints   Comments:  Mr Turner requests resuming 14 day testing if he comes back therapeutic at next encounter.     Plan:   1. INR is back WNL today. Instructed Mr. Turner to resume alternating warfarin 3mg and 3.5mg every other day until recheck.  2. Repeat INR in one week on 11/22.  3. Verbal information provided over the phone. Patient RBV dosing instructions, expresses understanding by teach back, and has no further questions at this time.    Teresita Redmond CPhT  11/15/2019  3:11 PM      I, Deya [facility], have reviewed the note in full and agree with the assessment and plan.  11/18/19  3:00 PM

## 2019-11-22 ENCOUNTER — ANTICOAGULATION VISIT (OUTPATIENT)
Dept: PHARMACY | Facility: HOSPITAL | Age: 69
End: 2019-11-22

## 2019-11-22 DIAGNOSIS — I48.92 PAROXYSMAL ATRIAL FLUTTER (HCC): ICD-10-CM

## 2019-11-22 DIAGNOSIS — Z95.2 HX OF MITRAL VALVE REPLACEMENT WITH MECHANICAL VALVE: ICD-10-CM

## 2019-11-22 LAB — INR PPP: 2.7

## 2019-11-22 NOTE — PROGRESS NOTES
Anticoagulation Clinic - Remote Progress Note  [facility]  Frequency of Monitorin-4x a month    Indication: A.fib, VHD  Referring Provider: Jenniffer Madrid 19)  Initial Warfarin Start Date:    Goal INR: 2.5-3.5  Current Drug Interactions: ensure (once daily); amitriptyline (PRN)  CHADS-VASc: 2 (age, HTN)     Diet: avoids typically 19  Alcohol: 12 pack of beer/week  Tobacco: None  OTC Pain Medication: None     INR History:  Date 8/25 9/11 9/29 10/19 11/5 12/2 12/11 1/4/18 1/30 2/19   Total Weekly Dose 24mg 24mg 24mg 22.75 mg? 22.75 mg? ? 22.5mg 22.5mg 22.5mg 22.5mg   INR 3.3 2.8 2.9 3.1 3.5 2.7 2.7 2.6 2.9 2.5   Notes    LVM LVM   call       Date 3/7 4/5 4/18 5/20 6/5 6/27 7/24 7/31 9/9 10/8   Total Weekly Dose 22.5mg 22.5mg 22.5mg 23mg 22.5mg  23mg 24mg 24mg unable   to verify   INR 3.3 3.7 2.6 2.8 2.9 2.4 2.9 3.3 3.4 3.0   Notes LVM       rec'vd   rec'vd 10/13;  mult calls     Date 11/8 12/14 2/20/19 2/25 3/11 3/25 4/9 5/1 5/15 6/11   Total Weekly Dose unable to verify unable to verify unable to verify 23mg 23mg 23mg 22.5mg 23mg 22.5mg 23mg   INR 2.7 2.6 3.8 3.4 3.6 3.0 3.2 3.3 2.5 3.1   Notes               Date 6/27 7/10 7/26 8/9 9/11 10/3 10/15 10/24 10/31 11/8   Total Weekly Dose  22.5mg 22.5mg 22.5mg 23mg 22.5mg 23mg 22.5mg 23mg 23 mg   INR 3.6 2.9 2.3 2.9 2.9 2.8 3.3 3.2 2.7 2.3   Notes  reported       reported 10/25; sick; cefdinir stop cefdinir 10/29      Date 11/15 11/22           Total Weekly Dose 23.5mg 23mg           INR 2.8 2.7           Notes               Phone Interview:  Tablet Strength: 3mg and 1mg tablets  Patient Contact Info:[phone] (cell)  (disconnected ) will set up ; [phone]    Preferred *[phone]* anytime after noon  Other numbers on his profile:   [phone] (Home)   - brother in law, requested that we do NOT call this number  Lab Contact Info: [place]    Patient Findings:  Negatives:  Signs/symptoms of thrombosis, Signs/symptoms of  bleeding, Laboratory test error suspected, Change in health, Change in alcohol use, Change in activity, Upcoming invasive procedure, Emergency department visit, Upcoming dental procedure, Missed doses, Extra doses, Change in medications, Change in diet/appetite, Hospital admission, Bruising, Other complaints   Comments:  Of note, he may need to have some dental work done in the future. Nothing scheduled yet. He is aware he will need to call the clinic with any upcoming dental procedures.     Plan:   1. INR is therapeutic again today. Instructed Mr. Turner to continue alternating warfarin 3mg and 3.5mg every other day until recheck.  2. Repeat INR in two weeks.  3. Verbal information provided over the phone. Patient RBV dosing instructions, expresses understanding by teach back, and has no further questions at this time.    Teresita Redmond CPhT  11/22/2019  4:36 PM     IDeya [facility], have reviewed the note in full and agree with the assessment and plan.  11/26/19  4:04 PM

## 2019-12-11 ENCOUNTER — ANTICOAGULATION VISIT (OUTPATIENT)
Dept: PHARMACY | Facility: HOSPITAL | Age: 69
End: 2019-12-11

## 2019-12-11 DIAGNOSIS — I48.92 PAROXYSMAL ATRIAL FLUTTER (HCC): ICD-10-CM

## 2019-12-11 DIAGNOSIS — Z95.2 HX OF MITRAL VALVE REPLACEMENT WITH MECHANICAL VALVE: ICD-10-CM

## 2019-12-11 LAB — INR PPP: 3.2

## 2019-12-11 NOTE — PROGRESS NOTES
Anticoagulation Clinic - Remote Progress Note  [employer] [employer]  Frequency of Monitorin-4x a month    Indication: A.fib, VHD  Referring Provider: Jenniffer Madrid 19)  Initial Warfarin Start Date:    Goal INR: 2.5-3.5  Current Drug Interactions: ensure (once daily); amitriptyline (PRN)  CHADS-VASc: 2 (age, HTN)     Diet: avoids typically 19  Alcohol: 12 pack of beer/week  Tobacco: None  OTC Pain Medication: None     INR History:  Date 8/25 9/11 9/29 10/19 11/5 12/2 12/11 1/4/18 1/30 2/19   Total Weekly Dose 24mg 24mg 24mg 22.75 mg? 22.75 mg? ? 22.5mg 22.5mg 22.5mg 22.5mg   INR 3.3 2.8 2.9 3.1 3.5 2.7 2.7 2.6 2.9 2.5   Notes    LVM LVM   call       Date 3/7 4/5 4/18 5/20 6/5 6/27 7/24 7/31 9/9 10/8   Total Weekly Dose 22.5mg 22.5mg 22.5mg 23mg 22.5mg  23mg 24mg 24mg unable   to verify   INR 3.3 3.7 2.6 2.8 2.9 2.4 2.9 3.3 3.4 3.0   Notes LVM       rec'vd   rec'vd 10/13;  mult calls     Date 11/8 12/14 2/20/19 2/25 3/11 3/25 4/9 5/1 5/15 6/11   Total Weekly Dose unable to verify unable to verify unable to verify 23mg 23mg 23mg 22.5mg 23mg 22.5mg 23mg   INR 2.7 2.6 3.8 3.4 3.6 3.0 3.2 3.3 2.5 3.1   Notes               Date 6/27 7/10 7/26 8/9 9/11 10/3 10/15 10/24 10/31 11/8   Total Weekly Dose  22.5mg 22.5mg 22.5mg 23mg 22.5mg 23mg 22.5mg 23mg 23 mg   INR 3.6 2.9 2.3 2.9 2.9 2.8 3.3 3.2 2.7 2.3   Notes  reported       reported 10/25; sick; cefdinir stop cefdinir 10/29      Date 11/15 11/22 12/7          Total Weekly Dose 23.5mg 23mg 22.5mg          INR 2.8 2.7 3.2          Notes   Reported             Phone Interview:  Tablet Strength: 3mg and 1mg tablets  Patient Contact Info:[phone] (cell)  (disconnected ) will set up VM; [phone]    Preferred *[phone]* anytime after noon  Other numbers on his profile:   [phone] (Home)   - brother in law, requested that we do NOT call this number  Lab Contact Info: [place]    Unable to LVM  @ 1610, unable to LVM  @ 1541,  unable to LVM 12/17 @ 1519    Plan:   1. INR was therapeutic on 12/7 but not reported until today, 12/11. Clinic is unable to get in contact with patient despite multiple attempts. Would recommend Mr. Turner continue alternating warfarin 3mg and 3.5mg every other day until recheck.  2. Would prefer repeat INR in two weeks from last test date.  3. Unable to get in contact with patient regarding most recent INR. Will send communications letter at this time.    Teresita Redmond CPhT  12/11/2019  4:08 PM     IJosephine [facility], have reviewed the note in full and agree with the assessment and plan.  12/17/19  4:53 PM

## 2019-12-18 ENCOUNTER — ANTICOAGULATION VISIT (OUTPATIENT)
Dept: PHARMACY | Facility: HOSPITAL | Age: 69
End: 2019-12-18

## 2019-12-18 DIAGNOSIS — I48.92 PAROXYSMAL ATRIAL FLUTTER (HCC): ICD-10-CM

## 2019-12-18 DIAGNOSIS — Z95.2 HX OF MITRAL VALVE REPLACEMENT WITH MECHANICAL VALVE: ICD-10-CM

## 2019-12-18 LAB — INR PPP: 2.5

## 2019-12-18 NOTE — PROGRESS NOTES
Anticoagulation Clinic - Remote Progress Note  [employer]  Frequency of Monitorin-4x a month    Indication: A.fib, VHD  Referring Provider: Jenniffer Madrid 19)  Initial Warfarin Start Date:    Goal INR: 2.5-3.5  Current Drug Interactions: ensure (once daily); amitriptyline (PRN)  CHADS-VASc: 2 (age, HTN)     Diet: avoids typically 19  Alcohol: 12 pack of beer/week  Tobacco: None  OTC Pain Medication: None     INR History:  Date 8/25 9/11 9/29 10/19 11/5 12/2 12/11 1/4/18 1/30 2/19   Total Weekly Dose 24mg 24mg 24mg 22.75 mg? 22.75 mg? ? 22.5mg 22.5mg 22.5mg 22.5mg   INR 3.3 2.8 2.9 3.1 3.5 2.7 2.7 2.6 2.9 2.5   Notes    LVM LVM   call       Date 3/7 4/5 4/18 5/20 6/5 6/27 7/24 7/31 9/9 10/8   Total Weekly Dose 22.5mg 22.5mg 22.5mg 23mg 22.5mg  23mg 24mg 24mg unable   to verify   INR 3.3 3.7 2.6 2.8 2.9 2.4 2.9 3.3 3.4 3.0   Notes LVM       rec'vd   rec'vd 10/13;  mult calls     Date 11/8 12/14 2/20/19 2/25 3/11 3/25 4/9 5/1 5/15 6/11   Total Weekly Dose unable to verify unable to verify unable to verify 23mg 23mg 23mg 22.5mg 23mg 22.5mg 23mg   INR 2.7 2.6 3.8 3.4 3.6 3.0 3.2 3.3 2.5 3.1   Notes               Date 6/27 7/10 7/26 8/9 9/11 10/3 10/15 10/24 10/31 11/8   Total Weekly Dose  22.5mg 22.5mg 22.5mg 23mg 22.5mg 23mg 22.5mg 23mg 23 mg   INR 3.6 2.9 2.3 2.9 2.9 2.8 3.3 3.2 2.7 2.3   Notes  reported       reported 10/25; sick; cefdinir stop cefdinir 10/29      Date 11/15 11/22 12/7  12/18  2020        Total Weekly Dose 23.5mg 23mg 22.5mg  23 mg         INR 2.8 2.7 3.2  2.5         Notes   Reported             Phone Interview:  Tablet Strength: 3mg and 1mg tablets  Patient Contact Info:[phone] (cell)  (disconnected ) will set up VM; [phone]    Preferred *[phone]* anytime after noon  Other numbers on his profile:   [phone] (Home)   - brother in law, requested that we do NOT call this number  Lab Contact Info: [place]    Patient Findings   Positives:   Signs/symptoms of bleeding   Negatives:  Signs/symptoms of thrombosis, Laboratory test error suspected, Change in health, Change in alcohol use, Change in activity, Upcoming invasive procedure, Emergency department visit, Upcoming dental procedure, Missed doses, Extra doses, Change in medications, Change in diet/appetite, Hospital admission, Bruising, Other complaints   Comments:  He stated that he has been bleeding more than usual from his cat scratches.   He stated that his diet has been pretty consistent   Otherwise, above findings negative     Plan:   1. INR is therapeutic today at 2.5. Instructed Mr. Turner to continue alternating warfarin 3mg and 3.5mg every other day until recheck.  2. Repeat INR on 1/2/2020 to ensure wnl  3. Verbal information provided over the phone. Patient RBV dosing instructions, expresses understanding by teach back, and has no further questions at this time.    Lauren Milligan, PharmD  12/18/2019  4:16 PM

## 2019-12-27 ENCOUNTER — ANTICOAGULATION VISIT (OUTPATIENT)
Dept: PHARMACY | Facility: HOSPITAL | Age: 69
End: 2019-12-27

## 2019-12-27 DIAGNOSIS — Z95.2 HX OF MITRAL VALVE REPLACEMENT WITH MECHANICAL VALVE: ICD-10-CM

## 2019-12-27 DIAGNOSIS — I48.92 PAROXYSMAL ATRIAL FLUTTER (HCC): ICD-10-CM

## 2019-12-27 LAB — INR PPP: 2.5

## 2019-12-27 NOTE — PROGRESS NOTES
Anticoagulation Clinic - Remote Progress Note  [employer]  Frequency of Monitorin-4x a month    Indication: A.fib, VHD  Referring Provider: Jenniffer Madrid 19)  Initial Warfarin Start Date:    Goal INR: 2.5-3.5  Current Drug Interactions: ensure (once daily); amitriptyline (PRN)  CHADS-VASc: 2 (age, HTN)     Diet: avoids typically 19  Alcohol: 12 pack of beer/week  Tobacco: None  OTC Pain Medication: None     INR History:  Date 8/25 9/11 9/29 10/19 11/5 12/2 12/11 1/4/18 1/30 2/19   Total Weekly Dose 24mg 24mg 24mg 22.75 mg? 22.75 mg? ? 22.5mg 22.5mg 22.5mg 22.5mg   INR 3.3 2.8 2.9 3.1 3.5 2.7 2.7 2.6 2.9 2.5   Notes    LVM LVM   call       Date 3/7 4/5 4/18 5/20 6/5 6/27 7/24 7/31 9/9 10/8   Total Weekly Dose 22.5mg 22.5mg 22.5mg 23mg 22.5mg  23mg 24mg 24mg unable   to verify   INR 3.3 3.7 2.6 2.8 2.9 2.4 2.9 3.3 3.4 3.0   Notes LVM       rec'vd   rec'vd 10/13;  mult calls     Date 11/8 12/14 2/20/19 2/25 3/11 3/25 4/9 5/1 5/15 6/11   Total Weekly Dose unable to verify unable to verify unable to verify 23mg 23mg 23mg 22.5mg 23mg 22.5mg 23mg   INR 2.7 2.6 3.8 3.4 3.6 3.0 3.2 3.3 2.5 3.1   Notes               Date 6/27 7/10 7/26 8/9 9/11 10/3 10/15 10/24 10/31 11/8   Total Weekly Dose  22.5mg 22.5mg 22.5mg 23mg 22.5mg 23mg 22.5mg 23mg 23 mg   INR 3.6 2.9 2.3 2.9 2.9 2.8 3.3 3.2 2.7 2.3   Notes  reported       reported 10/25; sick; cefdinir stop cefdinir 10/29      Date 11/15 11/22 12/7  12/18  12/27        Total Weekly Dose 23.5mg 23mg 22.5mg  23 mg 22.5mg        INR 2.8 2.7 3.2  2.5 2.5        Notes   Reported   Inc GLV          Phone Interview:  Tablet Strength: 3mg and 1mg tablets  Patient Contact Info:[phone] (cell)  (disconnected ) will set up VM; [phone]    Preferred *[phone]* anytime after noon  Other numbers on his profile:   [phone] (Home)   - brother in law, requested that we do NOT call this number  Lab Contact Info: [employer]    Patient  Findings:  Positives:  Change in diet/appetite   Negatives:  Signs/symptoms of thrombosis, Signs/symptoms of bleeding, Laboratory test error suspected, Change in health, Change in alcohol use, Change in activity, Upcoming invasive procedure, Emergency department visit, Upcoming dental procedure, Missed doses, Extra doses, Change in medications, Hospital admission, Bruising, Other complaints   Comments:  Mr. Turner reports he ate some brussel sprouts and broccoli over the holidays. He usually avoids GLV so this is an increase for him. He plans on resuming his regular diet and he denies any other changes.     Plan:   1. INR is therapeutic today at 2.5. Instructed Mr. Turner to continue alternating warfarin 3mg and 3.5mg every other day until recheck.  2. Repeat INR on 1/2/2020 to ensure wnl  3. Verbal information provided over the phone. Patient RBV dosing instructions, expresses understanding by teach back, and has no further questions at this time.    Teresita Redmond CPhT  12/27/2019  4:13 PM       IDeya [facility], have reviewed the note in full and agree with the assessment and plan.  12/27/19  4:23 PM

## 2020-01-02 RX ORDER — WARFARIN SODIUM 3 MG/1
TABLET ORAL
Qty: 90 TABLET | Refills: 1 | Status: SHIPPED | OUTPATIENT
Start: 2020-01-02 | End: 2020-06-29

## 2020-01-02 RX ORDER — WARFARIN SODIUM 1 MG/1
TABLET ORAL
Qty: 30 TABLET | Refills: 1 | Status: SHIPPED | OUTPATIENT
Start: 2020-01-02 | End: 2020-07-27

## 2020-01-15 ENCOUNTER — ANTICOAGULATION VISIT (OUTPATIENT)
Dept: PHARMACY | Facility: HOSPITAL | Age: 70
End: 2020-01-15

## 2020-01-15 DIAGNOSIS — Z95.2 HX OF MITRAL VALVE REPLACEMENT WITH MECHANICAL VALVE: ICD-10-CM

## 2020-01-15 DIAGNOSIS — I48.92 PAROXYSMAL ATRIAL FLUTTER (HCC): ICD-10-CM

## 2020-01-15 LAB — INR PPP: 3.1

## 2020-01-15 NOTE — PROGRESS NOTES
Anticoagulation Clinic - Remote Progress Note  [facility]  Frequency of Monitorin-4x a month    Indication: A.fib, VHD  Referring Provider: Jenniffer Madrid 19)  Initial Warfarin Start Date:    Goal INR: 2.5-3.5  Current Drug Interactions: ensure (once daily); amitriptyline (PRN)  CHADS-VASc: 2 (age, HTN)     Diet: 1x per week will eat iceberg lettuce 19  Alcohol: 12 pack of beer/week  Tobacco: None  OTC Pain Medication: None     INR History:  Date 8/25 9/11 9/29 10/19 11/5 12/2 12/11 1/4/18 1/30 2/19   Total Weekly Dose 24mg 24mg 24mg 22.75 mg? 22.75 mg? ? 22.5mg 22.5mg 22.5mg 22.5mg   INR 3.3 2.8 2.9 3.1 3.5 2.7 2.7 2.6 2.9 2.5   Notes    LVM LVM   call       Date 3/7 4/5 4/18 5/20 6/5 6/27 7/24 7/31 9/9 10/8   Total Weekly Dose 22.5mg 22.5mg 22.5mg 23mg 22.5mg  23mg 24mg 24mg unable   to verify   INR 3.3 3.7 2.6 2.8 2.9 2.4 2.9 3.3 3.4 3.0   Notes LVM       rec'vd   rec'vd 10/13;  mult calls     Date 11/8 12/14 2/20/19 2/25 3/11 3/25 4/9 5/1 5/15 6/11   Total Weekly Dose unable to verify unable to verify unable to verify 23mg 23mg 23mg 22.5mg 23mg 22.5mg 23mg   INR 2.7 2.6 3.8 3.4 3.6 3.0 3.2 3.3 2.5 3.1   Notes               Date 6/27 7/10 7/26 8/9 9/11 10/3 10/15 10/24 10/31 11/8   Total Weekly Dose  22.5mg 22.5mg 22.5mg 23mg 22.5mg 23mg 22.5mg 23mg 23 mg   INR 3.6 2.9 2.3 2.9 2.9 2.8 3.3 3.2 2.7 2.3   Notes  reported       reported 10/25; sick; cefdinir stop cefdinir 10/29      Date 11/15 11/22 12/7  12/18  12/27 1/15/20       Total Weekly Dose 23.5mg 23mg 22.5mg  23 mg 22.5mg 23mg       INR 2.8 2.7 3.2  2.5 2.5 3.1       Notes   Reported   Inc GLV          Phone Interview:  Tablet Strength: 3mg and 1mg tablets  Patient Contact Info:[phone] (cell)  (disconnected ) will set up VM; [phone]    Preferred *[phone]* anytime after noon  Other numbers on his profile:   [phone] (Home)   - brother in law, requested that we do NOT call this number  Lab Contact  Info: [place]    Patient Findings     Negatives:  Signs/symptoms of thrombosis, Signs/symptoms of bleeding, Laboratory test error suspected, Change in health, Change in alcohol use, Change in activity, Upcoming invasive procedure, Emergency department visit, Upcoming dental procedure, Missed doses, Extra doses, Change in medications, Change in diet/appetite, Hospital admission, Bruising, Other complaints   Comments:  All findings negative, going to see pain doctor next week.  Confirmed dosing regimen and has not missed any doses.  Reports he eats one side salad (iceberg lettuce) about once per week.      Plan:   1. INR is therapeutic today at 3.1. Instructed Mr. Turner to continue alternating warfarin 3mg and 3.5mg every other day until recheck.  2. Repeat INR in two weeks.  3. Verbal information provided over the phone. Patient RBV dosing instructions, expresses understanding by teach back, and has no further questions at this time.    Estella Smith, PharmD  Pharmacy Resident  1/16/2020  2:45 PM

## 2020-01-30 ENCOUNTER — ANTICOAGULATION VISIT (OUTPATIENT)
Dept: PHARMACY | Facility: HOSPITAL | Age: 70
End: 2020-01-30

## 2020-01-30 DIAGNOSIS — Z95.2 HX OF MITRAL VALVE REPLACEMENT WITH MECHANICAL VALVE: ICD-10-CM

## 2020-01-30 DIAGNOSIS — I48.92 PAROXYSMAL ATRIAL FLUTTER (HCC): ICD-10-CM

## 2020-01-30 LAB — INR PPP: 2.7

## 2020-02-19 ENCOUNTER — ANTICOAGULATION VISIT (OUTPATIENT)
Dept: PHARMACY | Facility: HOSPITAL | Age: 70
End: 2020-02-19

## 2020-02-19 DIAGNOSIS — Z95.2 HX OF MITRAL VALVE REPLACEMENT WITH MECHANICAL VALVE: ICD-10-CM

## 2020-02-19 DIAGNOSIS — I48.92 PAROXYSMAL ATRIAL FLUTTER (HCC): ICD-10-CM

## 2020-02-19 LAB — INR PPP: 2.4

## 2020-02-19 NOTE — PROGRESS NOTES
Anticoagulation Clinic - Remote Progress Note  [facility]  Frequency of Monitorin-4x a month    Indication: A.fib, VHD  Referring Provider: Jenniffer Madrid 19)  Initial Warfarin Start Date:    Goal INR: 2.5-3.5  Current Drug Interactions: ensure (once daily); amitriptyline (PRN)  CHADS-VASc: 2 (age, HTN)     Diet: 1x per week will eat iceberg lettuce 19, ensure 3-4x/week  Alcohol: 12 pack of beer/week  Tobacco: None  OTC Pain Medication: None     INR History:  Date 8/25 9/11 9/29 10/19 11/5 12/2 12/11 1/4/18 1/30 2/19   Total Weekly Dose 24mg 24mg 24mg 22.75 mg? 22.75 mg? ? 22.5mg 22.5mg 22.5mg 22.5mg   INR 3.3 2.8 2.9 3.1 3.5 2.7 2.7 2.6 2.9 2.5   Notes    LVM LVM   call       Date 3/7 4/5 4/18 5/20 6/5 6/27 7/24 7/31 9/9 10/8   Total Weekly Dose 22.5mg 22.5mg 22.5mg 23mg 22.5mg  23mg 24mg 24mg unable   to verify   INR 3.3 3.7 2.6 2.8 2.9 2.4 2.9 3.3 3.4 3.0   Notes LVM       rec'vd   rec'vd 10/13;  mult calls     Date 11/8 12/14 2/20/19 2/25 3/11 3/25 4/9 5/1 5/15 6/11   Total Weekly Dose unable to verify unable to verify unable to verify 23mg 23mg 23mg 22.5mg 23mg 22.5mg 23mg   INR 2.7 2.6 3.8 3.4 3.6 3.0 3.2 3.3 2.5 3.1   Notes               Date 6/27 7/10 7/26 8/9 9/11 10/3 10/15 10/24 10/31 11/8   Total Weekly Dose  22.5mg 22.5mg 22.5mg 23mg 22.5mg 23mg 22.5mg 23mg 23mg   INR 3.6 2.9 2.3 2.9 2.9 2.8 3.3 3.2 2.7 2.3   Notes  reported       reported 10/25; sick; cefdinir stop cefdinir 10/29      Date 11/15 11/22 12/7 12/18 12/27 1/15/20 1/30 2/18     Total Weekly Dose 23.5mg 23mg 22.5mg 23mg 22.5mg 23mg ??? 23mg      INR 2.8 2.7 3.2  2.5 2.5 3.1 2.7 2.4     Notes   Reported   Inc GLV  Unable to reach pt MVI       Phone Interview:  Tablet Strength: 3mg and 1mg tablets  Patient Contact Info:[phone] (cell)  (disconnected ) will set up ; [phone]    Preferred *[phone]* anytime after noon  Other numbers on his profile:   [phone] (Home)   - brother in law,  "requested that we do NOT call this number  Lab Contact Info: [place]    Patient Findings  Positives:  Extra doses, Change in medications, Change in diet/appetite   Negatives:  Signs/symptoms of thrombosis, Signs/symptoms of bleeding, Laboratory test error suspected, Change in health, Change in alcohol use, Change in activity, Upcoming invasive procedure, Emergency department visit, Upcoming dental procedure, Missed doses, Hospital admission, Bruising, Other complaints   Comments:  Mr. Turner reports that he started a MVI ~1 month ago(50% RDV). He forgot to check it for VitK content because he is used to men's vitamins not having iron or vitamin k. He saw \"no Iron\" on the label and assumed it also didn't have VitK. He has discontinued the MVI at this time and does not plan to resume. He will find a new MVI that does not have VitK because he does not want to change his regimen. He checked his INR last night and boosted his own dose when it resulted low. He also reports that he has decreased his intake of Ensure to 3-4x/week rather than everyday.     Plan:   1. INR is slightly subtherapeutic today at 2.4. Instructed Mr. Turner to continue alternating warfarin 3mg and 3.5mg every other day until recheck.  2. Repeat INR on 3/3/20.  3. Verbal information provided over the phone. Patient RBV dosing instructions, expresses understanding by teach back, and has no further questions at this time.  4. Doc Turner understands the importance of calling the [facility] if he notices any s/sx of bleeding, stroke, or abnormal bruising, if any changes are made to his medications or medication doses (Rx, OTC, herbal), or if any upcoming procedures are scheduled. Doc Turner will likewise let us know if any other changes, questions, or concerns arise regarding anticoagulation therapy. he understands the importance of seeking medical attention immediately if he experiences any falls, vehicle accidents, or abnormal bleeding " or bruising. Doc PANCHO Turner voiced understanding of this information and confirms that he has the [facility]'s contact information. Otherwise, we will plan to contact the patient once monthly or if his INR is out of range.      Lynn Mullins, Pharmacy Intern   2/19/2020  8:10 AM     I, Olegario Macdonald, [facility], have reviewed the note in full and agree with the assessment and plan.  02/20/20  8:04 AM

## 2020-03-05 ENCOUNTER — TELEPHONE (OUTPATIENT)
Dept: PHARMACY | Facility: HOSPITAL | Age: 70
End: 2020-03-05

## 2020-03-05 ENCOUNTER — ANTICOAGULATION VISIT (OUTPATIENT)
Dept: PHARMACY | Facility: HOSPITAL | Age: 70
End: 2020-03-05

## 2020-03-05 DIAGNOSIS — I48.92 PAROXYSMAL ATRIAL FLUTTER (HCC): ICD-10-CM

## 2020-03-05 DIAGNOSIS — Z95.2 HX OF MITRAL VALVE REPLACEMENT WITH MECHANICAL VALVE: ICD-10-CM

## 2020-03-05 LAB — INR PPP: 2.8

## 2020-03-05 NOTE — PROGRESS NOTES
Anticoagulation Clinic - Remote Progress Note  [facility]  Frequency of Monitorin-4x a month    Indication: A.fib, VHD  Referring Provider: Jenniffer Madrid 19)  Initial Warfarin Start Date:    Goal INR: 2.5-3.5  Current Drug Interactions: ensure (once daily); amitriptyline (PRN)  CHADS-VASc: 2 (age, HTN)     Diet: iceberg lettuce 1x/week, ensure 3-4x/week(20)  Alcohol: 12 pack of beer/week  Tobacco: None  OTC Pain Medication: None     INR History:  Date 8/25 9/11 9/29 10/19 11/5 12/2 12/11 1/4/18 1/30 2/19   Total Weekly Dose 24mg 24mg 24mg 22.75 mg? 22.75 mg? ? 22.5mg 22.5mg 22.5mg 22.5mg   INR 3.3 2.8 2.9 3.1 3.5 2.7 2.7 2.6 2.9 2.5   Notes    LVM LVM   call       Date 3/7 4/5 4/18 5/20 6/5 6/27 7/24 7/31 9/9 10/8   Total Weekly Dose 22.5mg 22.5mg 22.5mg 23mg 22.5mg  23mg 24mg 24mg unable   to verify   INR 3.3 3.7 2.6 2.8 2.9 2.4 2.9 3.3 3.4 3.0   Notes LVM       rec'vd   rec'vd 10/13;  mult calls     Date 11/8 12/14 2/20/19 2/25 3/11 3/25 4/9 5/1 5/15 6/11   Total Weekly Dose unable to verify unable to verify unable to verify 23mg 23mg 23mg 22.5mg 23mg 22.5mg 23mg   INR 2.7 2.6 3.8 3.4 3.6 3.0 3.2 3.3 2.5 3.1   Notes               Date 6/27 7/10 7/26 8/9 9/11 10/3 10/15 10/24 10/31 11/8   Total Weekly Dose  22.5mg 22.5mg 22.5mg 23mg 22.5mg 23mg 22.5mg 23mg 23mg   INR 3.6 2.9 2.3 2.9 2.9 2.8 3.3 3.2 2.7 2.3   Notes  reported       reported 10/25; sick; cefdinir stop cefdinir 10/29      Date 11/15 11/22 12/7 12/18 12/27 1/15/20 1/30 2/18 3/5    Total Weekly Dose 23.5mg 23mg 22.5mg 23mg 22.5mg 23mg ??? 23mg  23mg    INR 2.8 2.7 3.2  2.5 2.5 3.1 2.7 2.4 2.8    Notes   Reported   Inc GLV  Unable to reach pt MVI; decr Ensure       Phone Interview:  Tablet Strength: 3mg and 1mg tablets  Patient Contact Info:  Preferred *[phone]* anytime after noon  Other numbers on his profile:   [phone] (Home)   - brother in law, requested that we do NOT call this number  Lab Contact Info:  [place]    Patient Findings  No call this encounter.     Plan:   1. INR is back therapeutic today at 2.8. Instructed Mr. Turner to continue alternating warfarin 3mg and 3.5mg every other day until recheck.  2. Repeat INR in two weeks on 3/19.  3. Verbal information provided over the phone. Patient RBV dosing instructions, expresses understanding by teach back, and has no further questions at this time.  4. Doc Turner understands the importance of calling the [facility] if he notices any s/sx of bleeding, stroke, or abnormal bruising, if any changes are made to his medications or medication doses (Rx, OTC, herbal), or if any upcoming procedures are scheduled. Doc Turner will likewise let us know if any other changes, questions, or concerns arise regarding anticoagulation therapy. he understands the importance of seeking medical attention immediately if he experiences any falls, vehicle accidents, or abnormal bleeding or bruising. Doc Turner voiced understanding of this information and confirms that he has the [facility]'s contact information. Otherwise, we will plan to contact the patient once monthly or if his INR is out of range.      Lynn Mullins, Pharmacy Intern   3/5/2020  4:03 PM     ILauren, PharmD, have reviewed the note in full and agree with the assessment and plan.  03/05/20  4:43 PM

## 2020-03-18 ENCOUNTER — ANTICOAGULATION VISIT (OUTPATIENT)
Dept: PHARMACY | Facility: HOSPITAL | Age: 70
End: 2020-03-18

## 2020-03-18 DIAGNOSIS — I48.92 PAROXYSMAL ATRIAL FLUTTER (HCC): ICD-10-CM

## 2020-03-18 DIAGNOSIS — Z95.2 HX OF MITRAL VALVE REPLACEMENT WITH MECHANICAL VALVE: ICD-10-CM

## 2020-03-18 LAB — INR PPP: 3

## 2020-03-18 NOTE — PROGRESS NOTES
Anticoagulation Clinic - Remote Progress Note  [facility]  Frequency of Monitorin-4x a month    Indication: A.fib, VHD  Referring Provider: Jenniffer Madrid 19)  Initial Warfarin Start Date:    Goal INR: 2.5-3.5  Current Drug Interactions: ensure (once daily); amitriptyline (PRN)  CHADS-VASc: 2 (age, HTN)     Diet: iceberg lettuce 1x/week, ensure 3-4x/week(20)  Alcohol: 12 pack of beer/week  Tobacco: None  OTC Pain Medication: None     INR History:  Date 8/25 9/11 9/29 10/19 11/5 12/2 12/11 1/4/18 1/30 2/19   Total Weekly Dose 24mg 24mg 24mg 22.75 mg? 22.75 mg? ? 22.5mg 22.5mg 22.5mg 22.5mg   INR 3.3 2.8 2.9 3.1 3.5 2.7 2.7 2.6 2.9 2.5   Notes    LVM LVM   call       Date 3/7 4/5 4/18 5/20 6/5 6/27 7/24 7/31 9/9 10/8   Total Weekly Dose 22.5mg 22.5mg 22.5mg 23mg 22.5mg  23mg 24mg 24mg unable   to verify   INR 3.3 3.7 2.6 2.8 2.9 2.4 2.9 3.3 3.4 3.0   Notes LVM       rec'vd   rec'vd 10/13;  mult calls     Date 11/8 12/14 2/20/19 2/25 3/11 3/25 4/9 5/1 5/15 6/11   Total Weekly Dose unable to verify unable to verify unable to verify 23mg 23mg 23mg 22.5mg 23mg 22.5mg 23mg   INR 2.7 2.6 3.8 3.4 3.6 3.0 3.2 3.3 2.5 3.1   Notes               Date 6/27 7/10 7/26 8/9 9/11 10/3 10/15 10/24 10/31 11/8   Total Weekly Dose  22.5mg 22.5mg 22.5mg 23mg 22.5mg 23mg 22.5mg 23mg 23mg   INR 3.6 2.9 2.3 2.9 2.9 2.8 3.3 3.2 2.7 2.3   Notes  reported       reported 10/25; sick; cefdinir stop cefdinir 10/29      Date 11/15 11/22 12/7 12/18 12/27 1/15/20 1/30 2/18 3/5 3/17   Total Weekly Dose 23.5mg 23mg 22.5mg 23mg 22.5mg 23mg ??? 23mg  23mg 22.5mg   INR 2.8 2.7 3.2  2.5 2.5 3.1 2.7 2.4 2.8 3.0   Notes   Reported   Inc GLV  Unable to reach pt MVI; decr Ensure  call     Phone Interview:  Tablet Strength: 3mg and 1mg tablets  Patient Contact Info:  Preferred *[phone]* anytime after noon  Other numbers on his profile:   [phone] (Home)   - brother in law, requested that we do NOT call this number  Lab  Contact Info: [place]    Patient Findings  Negatives:  Signs/symptoms of thrombosis, Signs/symptoms of bleeding, Laboratory test error suspected, Change in health, Change in alcohol use, Change in activity, Upcoming invasive procedure, Emergency department visit, Upcoming dental procedure, Missed doses, Extra doses, Change in medications, Change in diet/appetite, Hospital admission, Bruising, Other complaints       Plan:   1. INR is therapeutic today at 3.0. Instructed Mr. Turner to continue alternating warfarin 3mg and 3.5mg every other day until recheck.  2. Repeat INR in two weeks on 4/1.  3. Verbal information provided over the phone. Patient RBV dosing instructions, expresses understanding by teach back, and has no further questions at this time.  4. Doc Turner understands the importance of calling the [facility] if he notices any s/sx of bleeding, stroke, or abnormal bruising, if any changes are made to his medications or medication doses (Rx, OTC, herbal), or if any upcoming procedures are scheduled. Doc Turner will likewise let us know if any other changes, questions, or concerns arise regarding anticoagulation therapy. he understands the importance of seeking medical attention immediately if he experiences any falls, vehicle accidents, or abnormal bleeding or bruising. Doc Turner voiced understanding of this information and confirms that he has the [facility]'s contact information. Otherwise, we will plan to contact the patient once monthly or if his INR is out of range.      Josephine Pineda, PharmD.  03/18/20   15:53

## 2020-04-11 LAB — INR PPP: 3.3

## 2020-04-13 ENCOUNTER — ANTICOAGULATION VISIT (OUTPATIENT)
Dept: PHARMACY | Facility: HOSPITAL | Age: 70
End: 2020-04-13

## 2020-04-13 DIAGNOSIS — I48.92 PAROXYSMAL ATRIAL FLUTTER (HCC): ICD-10-CM

## 2020-04-13 DIAGNOSIS — Z95.2 HX OF MITRAL VALVE REPLACEMENT WITH MECHANICAL VALVE: ICD-10-CM

## 2020-04-13 NOTE — PROGRESS NOTES
Anticoagulation Clinic - Remote Progress Note  [facility]  Frequency of Monitorin-4x a month    Indication: A.fib, VHD  Referring Provider: Jenniffer Madrid 19)  Initial Warfarin Start Date:    Goal INR: 2.5-3.5  Current Drug Interactions: ensure (once daily); amitriptyline (PRN)  CHADS-VASc: 2 (age, HTN)     Diet: iceberg lettuce 1x/week, ensure 3-4x/week(20)  Alcohol: 12 pack of beer/week  Tobacco: None  OTC Pain Medication: None     INR History:  Date 8/25 9/11 9/29 10/19 11/5 12/2 12/11 1/4/18 1/30 2/19   Total Weekly Dose 24mg 24mg 24mg 22.75 mg? 22.75 mg? ? 22.5mg 22.5mg 22.5mg 22.5mg   INR 3.3 2.8 2.9 3.1 3.5 2.7 2.7 2.6 2.9 2.5   Notes    LVM LVM   call       Date 3/7 4/5 4/18 5/20 6/5 6/27 7/24 7/31 9/9 10/8   Total Weekly Dose 22.5mg 22.5mg 22.5mg 23mg 22.5mg  23mg 24mg 24mg unable   to verify   INR 3.3 3.7 2.6 2.8 2.9 2.4 2.9 3.3 3.4 3.0   Notes LVM       rec'vd   rec'vd 10/13;  mult calls     Date 11/8 12/14 2/20/19 2/25 3/11 3/25 4/9 5/1 5/15 6/11   Total Weekly Dose unable to verify unable to verify unable to verify 23mg 23mg 23mg 22.5mg 23mg 22.5mg 23mg   INR 2.7 2.6 3.8 3.4 3.6 3.0 3.2 3.3 2.5 3.1   Notes               Date 6/27 7/10 7/26 8/9 9/11 10/3 10/15 10/24 10/31 11/8   Total Weekly Dose  22.5mg 22.5mg 22.5mg 23mg 22.5mg 23mg 22.5mg 23mg 23mg   INR 3.6 2.9 2.3 2.9 2.9 2.8 3.3 3.2 2.7 2.3   Notes  reported       reported 10/25; sick; cefdinir stop cefdinir 10/29      Date 11/15 11/22 12/7 12/18 12/27 1/15/20 1/30 2/18 3/5 3/17   Total Weekly Dose 23.5mg 23mg 22.5mg 23mg 22.5mg 23mg ??? 23mg  23mg 22.5mg   INR 2.8 2.7 3.2  2.5 2.5 3.1 2.7 2.4 2.8 3.0   Notes   Reported   Inc GLV  Unable to reach pt MVI; decr Ensure  call     Date          Total Weekly Dose 22.5mg         INR 3.3         Notes recv'd            Phone Interview:  Tablet Strength: 3mg and 1mg tablets  Patient Contact Info:  Preferred *[phone]* anytime after noon  Other numbers on his  profile:   [phone] (Home)   - brother in law, requested that we do NOT call this number  Lab Contact Info: [employer]  **will call once monthly or if INR is out of range**    Plan:   1. INR is therapeutic today. Instructed Mr. Turner to continue alternating warfarin 3mg and 3.5mg every other day until recheck.  2. Repeat INR in this Friday. (two weeks from last testing date)  3. Doc Turner understands the importance of calling the [facility] if he notices any s/sx of bleeding, stroke, or abnormal bruising, if any changes are made to his medications or medication doses (Rx, OTC, herbal), or if any upcoming procedures are scheduled. Doc Turner will likewise let us know if any other changes, questions, or concerns arise regarding anticoagulation therapy. he understands the importance of seeking medical attention immediately if he experiences any falls, vehicle accidents, or abnormal bleeding or bruising. Doc Turner voiced understanding of this information and confirms that he has the [facility]'s contact information. Otherwise, we will plan to contact the patient once monthly or if his INR is out of range.    Teresita Redmond, [place]  04/13/20   14:25    I, Frances Pedroza, PharmD, have reviewed the note in full and agree with the assessment and plan.  04/13/20  14:40

## 2020-04-24 ENCOUNTER — ANTICOAGULATION VISIT (OUTPATIENT)
Dept: PHARMACY | Facility: HOSPITAL | Age: 70
End: 2020-04-24

## 2020-04-24 DIAGNOSIS — I48.92 PAROXYSMAL ATRIAL FLUTTER (HCC): ICD-10-CM

## 2020-04-24 DIAGNOSIS — Z95.2 HX OF MITRAL VALVE REPLACEMENT WITH MECHANICAL VALVE: ICD-10-CM

## 2020-04-24 LAB — INR PPP: 2.9

## 2020-04-24 NOTE — PROGRESS NOTES
Anticoagulation Clinic - Remote Progress Note  [facility]  Frequency of Monitorin-4x a month    Indication: A.fib, VHD  Referring Provider: Jenniffer Madrid 19)  Initial Warfarin Start Date:    Goal INR: 2.5-3.5  Current Drug Interactions: ensure (once daily); amitriptyline (PRN)  CHADS-VASc: 2 (age, HTN)     Diet: iceberg lettuce 1x/week, ensure 3-4x/week(20)  Alcohol: 12 pack of beer/week  Tobacco: None  OTC Pain Medication: None     INR History:  Date 8/25 9/11 9/29 10/19 11/5 12/2 12/11 1/4/18 1/30 2/19   Total Weekly Dose 24mg 24mg 24mg 22.75 mg? 22.75 mg? ? 22.5mg 22.5mg 22.5mg 22.5mg   INR 3.3 2.8 2.9 3.1 3.5 2.7 2.7 2.6 2.9 2.5   Notes    LVM LVM   call       Date 3/7 4/5 4/18 5/20 6/5 6/27 7/24 7/31 9/9 10/8   Total Weekly Dose 22.5mg 22.5mg 22.5mg 23mg 22.5mg  23mg 24mg 24mg unable   to verify   INR 3.3 3.7 2.6 2.8 2.9 2.4 2.9 3.3 3.4 3.0   Notes LVM       rec'vd   rec'vd 10/13;  mult calls     Date 11/8 12/14 2/20/19 2/25 3/11 3/25 4/9 5/1 5/15 6/11   Total Weekly Dose unable to verify unable to verify unable to verify 23mg 23mg 23mg 22.5mg 23mg 22.5mg 23mg   INR 2.7 2.6 3.8 3.4 3.6 3.0 3.2 3.3 2.5 3.1   Notes               Date 6/27 7/10 7/26 8/9 9/11 10/3 10/15 10/24 10/31 11/8   Total Weekly Dose  22.5mg 22.5mg 22.5mg 23mg 22.5mg 23mg 22.5mg 23mg 23mg   INR 3.6 2.9 2.3 2.9 2.9 2.8 3.3 3.2 2.7 2.3   Notes  reported       reported 10/25; sick; cefdinir stop cefdinir 10/29      Date 11/15 11/22 12/7 12/18 12/27 1/15/20 1/30 2/18 3/5 3/17   Total Weekly Dose 23.5mg 23mg 22.5mg 23mg 22.5mg 23mg ??? 23mg  23mg 22.5mg   INR 2.8 2.7 3.2  2.5 2.5 3.1 2.7 2.4 2.8 3.0   Notes   Reported   Inc GLV  Unable to reach pt MVI; decr Ensure  call     Date         Total Weekly Dose 22.5mg 23mg        INR 3.3 2.9        Notes recv'd  rec'vd ; call          Phone Interview:  Tablet Strength: 3mg and 1mg tablets  Patient Contact Info:  Preferred *[phone]* anytime after  noon  Other numbers on his profile:   [phone] (Home)   - brother in law, requested that we do NOT call this number  Lab Contact Info: [place]  **will call once monthly or if INR is out of range**    UNABLE TO GET IN CONTACT WITH THE PATIENT. PLEASE DISREGARD THE FOLLOWING PLAN UNTIL ABLE TO GET IN CONTACT WITH PATIENT / PATIENT REPRESENTATIVE.    Unable to LVM 04/24/20 at 13:49; 4/27 @ 1344; 4/28 @ 1444; 4/29@ 1334      Plan:   1. Unable to contact patient regarding most recent INR on 4/24/20. Unable to LVM as well. Will send communications letter at this time to home address on file.     Teresita Redmond, YOUNG  04/24/20   08:37    Need better line of communication.  IFrances, PharmD, have reviewed the note in full and agree with the assessment and plan.  04/29/20  15:05

## 2020-05-04 ENCOUNTER — ANTICOAGULATION VISIT (OUTPATIENT)
Dept: PHARMACY | Facility: HOSPITAL | Age: 70
End: 2020-05-04

## 2020-05-04 DIAGNOSIS — I48.92 PAROXYSMAL ATRIAL FLUTTER (HCC): ICD-10-CM

## 2020-05-04 DIAGNOSIS — Z95.2 HX OF MITRAL VALVE REPLACEMENT WITH MECHANICAL VALVE: ICD-10-CM

## 2020-05-04 LAB — INR PPP: 2.6

## 2020-05-04 NOTE — PROGRESS NOTES
Anticoagulation Clinic - Remote Progress Note  [facility]  Frequency of Monitorin-4x a month    Indication: A.fib, VHD  Referring Provider: Jenniffer Madrid 19)  Initial Warfarin Start Date:    Goal INR: 2.5-3.5  Current Drug Interactions: ensure (once daily); amitriptyline (PRN)  CHADS-VASc: 2 (age, HTN)     Diet: iceberg lettuce 1x/week, ensure 3-4x/week(20)  Alcohol: 12 pack of beer/week  Tobacco: None  OTC Pain Medication: None     INR History:  Date 8/25 9/11 9/29 10/19 11/5 12/2 12/11 1/4/18 1/30 2/19   Total Weekly Dose 24mg 24mg 24mg 22.75 mg? 22.75 mg? ? 22.5mg 22.5mg 22.5mg 22.5mg   INR 3.3 2.8 2.9 3.1 3.5 2.7 2.7 2.6 2.9 2.5   Notes    LVM LVM   call       Date 3/7 4/5 4/18 5/20 6/5 6/27 7/24 7/31 9/9 10/8   Total Weekly Dose 22.5mg 22.5mg 22.5mg 23mg 22.5mg  23mg 24mg 24mg unable   to verify   INR 3.3 3.7 2.6 2.8 2.9 2.4 2.9 3.3 3.4 3.0   Notes LVM       rec'vd   rec'vd 10/13;  mult calls     Date 11/8 12/14 2/20/19 2/25 3/11 3/25 4/9 5/1 5/15 6/11   Total Weekly Dose unable to verify unable to verify unable to verify 23mg 23mg 23mg 22.5mg 23mg 22.5mg 23mg   INR 2.7 2.6 3.8 3.4 3.6 3.0 3.2 3.3 2.5 3.1   Notes               Date 6/27 7/10 7/26 8/9 9/11 10/3 10/15 10/24 10/31 11/8   Total Weekly Dose  22.5mg 22.5mg 22.5mg 23mg 22.5mg 23mg 22.5mg 23mg 23mg   INR 3.6 2.9 2.3 2.9 2.9 2.8 3.3 3.2 2.7 2.3   Notes  reported       reported 10/25; sick; cefdinir stop cefdinir 10/29      Date 11/15 11/22 12/7 12/18 12/27 1/15/20 1/30 2/18 3/5 3/17   Total Weekly Dose 23.5mg 23mg 22.5mg 23mg 22.5mg 23mg ??? 23mg  23mg 22.5mg   INR 2.8 2.7 3.2  2.5 2.5 3.1 2.7 2.4 2.8 3.0   Notes   Reported   Inc GLV  Unable to reach pt MVI; decr Ensure  call     Date        Total Weekly Dose 22.5mg 23mg 23mg       INR 3.3 2.9 2.6       Notes recv'd  rec'vd ; call          Phone Interview:  Tablet Strength: 3mg and 1mg tablets  Patient Contact Info:  Preferred *[phone]*  anytime after noon  Other numbers on his profile:   [phone] (Home)   - brother in law, requested that we do NOT call this number  Lab Contact Info: [place]  **will call once monthly or if INR is out of range**    Unable to LVM 5/4 @ 1551; unable to LVM 5/5 @ 1522  5/11/2020: He apologized for not being available. He confirmed he has been alternating warfarin 3.5mg with 3mg every other day. He reports no changes. Mr. Turner will repeat INR check in two weeks, 5/18.    Plan:   1. INR is therapeutic again today. Instructed Mr. Turner to continue maintenance dose of alternating warfarin 3.5mg and 3mg every other day.  2. Repeat INR in two weeks on 5/18.  3. Verbal information provided over the phone. Patient RBV dosing instructions, expresses understanding by teach back, and has no further questions at this time.    Teresita Redmond, [place]  05/04/20   15:49    I, Frances Pedroza, PharmD, have reviewed the note in full and agree with the assessment and plan.  05/11/20  16:15

## 2020-05-06 ENCOUNTER — TELEPHONE (OUTPATIENT)
Dept: PHARMACY | Facility: HOSPITAL | Age: 70
End: 2020-05-06

## 2020-05-06 NOTE — TELEPHONE ENCOUNTER
Have been unable to get in contact with Mr. Turner the past two anticoagulation encounters regarding his INR readings. Communication letter was sent on 4/29/20. Patient called in another INR on 5/4/20 and have still been unsuccessful in contacting him.     Will send a second communications letter via certified mail attempting to establish a better line of contact with Mr. Turner.     Tracking # [id number]

## 2020-05-08 NOTE — TELEPHONE ENCOUNTER
Mr. Turner called back re: communications letter/inability to get in contact.    He apologized for not being available. He confirmed he has been alternating warfarin 3.5mg with 3mg every other day. He reports no changes. Mr. Turner will repeat INR check in two weeks, 5/18.   Regular rate and rhythm, Heart sounds S1 S2 present, no murmurs, rubs or gallops

## 2020-05-14 RX ORDER — LISINOPRIL 10 MG/1
10 TABLET ORAL EVERY MORNING
Qty: 90 TABLET | Refills: 0 | Status: SHIPPED | OUTPATIENT
Start: 2020-05-14 | End: 2020-07-27 | Stop reason: SDUPTHER

## 2020-05-14 RX ORDER — SOTALOL HYDROCHLORIDE 80 MG/1
80 TABLET ORAL 2 TIMES DAILY
Qty: 60 TABLET | Refills: 0 | Status: SHIPPED | OUTPATIENT
Start: 2020-05-14 | End: 2020-06-11

## 2020-05-19 LAB — INR PPP: 2.8

## 2020-05-20 ENCOUNTER — ANTICOAGULATION VISIT (OUTPATIENT)
Dept: PHARMACY | Facility: HOSPITAL | Age: 70
End: 2020-05-20

## 2020-05-20 DIAGNOSIS — Z95.2 HX OF MITRAL VALVE REPLACEMENT WITH MECHANICAL VALVE: ICD-10-CM

## 2020-05-20 DIAGNOSIS — I48.92 PAROXYSMAL ATRIAL FLUTTER (HCC): ICD-10-CM

## 2020-05-20 NOTE — PROGRESS NOTES
Anticoagulation Clinic - Remote Progress Note  [facility]  Frequency of Monitorin-4x a month    Indication: A.fib, VHD  Referring Provider: Jenniffer Madrid 19)  Initial Warfarin Start Date:    Goal INR: 2.5-3.5  Current Drug Interactions: ensure (once daily); amitriptyline (PRN)  CHADS-VASc: 2 (age, HTN)     Diet: iceberg lettuce 1x/week, ensure 3-4x/week(20)  Alcohol: 12 pack of beer/week  Tobacco: None  OTC Pain Medication: None     INR History:  Date 8/25 9/11 9/29 10/19 11/5 12/2 12/11 1/4/18 1/30 2/19   Total Weekly Dose 24mg 24mg 24mg 22.75 mg? 22.75 mg? ? 22.5mg 22.5mg 22.5mg 22.5mg   INR 3.3 2.8 2.9 3.1 3.5 2.7 2.7 2.6 2.9 2.5   Notes    LVM LVM   call       Date 3/7 4/5 4/18 5/20 6/5 6/27 7/24 7/31 9/9 10/8   Total Weekly Dose 22.5mg 22.5mg 22.5mg 23mg 22.5mg  23mg 24mg 24mg unable   to verify   INR 3.3 3.7 2.6 2.8 2.9 2.4 2.9 3.3 3.4 3.0   Notes LVM       rec'vd   rec'vd 10/13;  mult calls     Date 11/8 12/14 2/20/19 2/25 3/11 3/25 4/9 5/1 5/15 6/11   Total Weekly Dose unable to verify unable to verify unable to verify 23mg 23mg 23mg 22.5mg 23mg 22.5mg 23mg   INR 2.7 2.6 3.8 3.4 3.6 3.0 3.2 3.3 2.5 3.1   Notes               Date 6/27 7/10 7/26 8/9 9/11 10/3 10/15 10/24 10/31 11/8   Total Weekly Dose  22.5mg 22.5mg 22.5mg 23mg 22.5mg 23mg 22.5mg 23mg 23mg   INR 3.6 2.9 2.3 2.9 2.9 2.8 3.3 3.2 2.7 2.3   Notes  reported       reported 10/25; sick; cefdinir stop cefdinir 10/29      Date 11/15 11/22 12/7 12/18 12/27 1/15/20 1/30 2/18 3/5 3/17   Total Weekly Dose 23.5mg 23mg 22.5mg 23mg 22.5mg 23mg ??? 23mg  23mg 22.5mg   INR 2.8 2.7 3.2  2.5 2.5 3.1 2.7 2.4 2.8 3.0   Notes   Reported   Inc GLV  Unable to reach pt MVI; decr Ensure  call     Date       Total Weekly Dose 22.5mg 23mg 23mg 23mg      INR 3.3 2.9 2.6 2.8      Notes recv'd  rec'vd ; call  recv'd          Phone Interview:  Tablet Strength: 3mg and 1mg tablets  Patient Contact  Info:  Preferred *[phone]* anytime after noon  Other numbers on his profile:   [phone] (Home)   - brother in law, requested that we do NOT call this number  Lab Contact Info: [place]  **will call once monthly or if INR is out of range**    UNABLE TO GET IN CONTACT WITH THE PATIENT. PLEASE DISREGARD THE FOLLOWING PLAN UNTIL ABLE TO GET IN CONTACT WITH PATIENT/ PATIENT REPRESENTATIVE.  -Called 5/20  @1025, unable to LVM due to mailbox not being set up.    Plan:   1. INR was therapeutic on 5/19. Unable to contact patient despite daily attempts. Will close episode and send communications letter.

## 2020-05-28 ENCOUNTER — TELEPHONE (OUTPATIENT)
Dept: PHARMACY | Facility: HOSPITAL | Age: 70
End: 2020-05-28

## 2020-05-29 ENCOUNTER — OFFICE VISIT (OUTPATIENT)
Dept: CARDIOLOGY | Facility: CLINIC | Age: 70
End: 2020-05-29

## 2020-05-29 VITALS
HEART RATE: 74 BPM | SYSTOLIC BLOOD PRESSURE: 118 MMHG | OXYGEN SATURATION: 98 % | HEIGHT: 70 IN | TEMPERATURE: 97.8 F | BODY MASS INDEX: 21.53 KG/M2 | DIASTOLIC BLOOD PRESSURE: 70 MMHG | WEIGHT: 150.4 LBS

## 2020-05-29 DIAGNOSIS — I10 ESSENTIAL HYPERTENSION: Primary | ICD-10-CM

## 2020-05-29 DIAGNOSIS — I38 VHD (VALVULAR HEART DISEASE): ICD-10-CM

## 2020-05-29 DIAGNOSIS — R00.2 PALPITATIONS: ICD-10-CM

## 2020-05-29 DIAGNOSIS — Z95.2 HX OF MITRAL VALVE REPLACEMENT WITH MECHANICAL VALVE: ICD-10-CM

## 2020-05-29 DIAGNOSIS — E78.5 HYPERLIPIDEMIA LDL GOAL <100: ICD-10-CM

## 2020-05-29 DIAGNOSIS — I48.92 PAROXYSMAL ATRIAL FLUTTER (HCC): ICD-10-CM

## 2020-05-29 PROCEDURE — 99214 OFFICE O/P EST MOD 30 MIN: CPT | Performed by: INTERNAL MEDICINE

## 2020-05-29 PROCEDURE — 93000 ELECTROCARDIOGRAM COMPLETE: CPT | Performed by: INTERNAL MEDICINE

## 2020-05-29 RX ORDER — CLINDAMYCIN HYDROCHLORIDE 150 MG/1
150 CAPSULE ORAL 2 TIMES DAILY
COMMUNITY
Start: 2020-05-28 | End: 2021-09-23

## 2020-05-29 RX ORDER — NORTRIPTYLINE HYDROCHLORIDE 25 MG/1
25 CAPSULE ORAL NIGHTLY
COMMUNITY
Start: 2020-05-26

## 2020-05-29 NOTE — PROGRESS NOTES
Subjective:     Encounter Date:05/29/2020    Patient ID: Doc Turner is a 70 y.o.  white male from [place], [place], a retired .     PHYSICIAN: Kp Hobbs MD  FORMER CARDIOLOGISTS: Juan Wellington MD/Tino Allison MD  CARDIOTHORACIC SURGEON: Sylwia Gramajo MD/Tony Mcgill MD, [facility]  NEUROSURGEON: Jose Cadena MD  NEUROLOGIST: Dwayne Rhodes MD  GENERAL SURGEONS: Thierry Moore MD/Piero Ball MD    Chief Complaint:   Chief Complaint   Patient presents with   • PAF     Problem List:  1. Chronic valvular heart disease with mitral valve prolapse syndrome/atrial flutter:  a. Remote progressive mitral regurgitation with porcine mitral valve replacement, [facility], 1998  b. Subsequent serial echocardiographic evaluations-data deficit, with abnormal stress test and diagnostic coronary angiography, data deficit, July 2006  c. Repeat sternotomy with mitral valve replacement with #31 [employer] prosthesis with postop bleeding requiring reexploration and associated atrial fib/flutter with sotalol therapy, November 2006  d. Acceptable combination Doppler echocardiogram, September 2009, with residual class I symptoms  e. Tachypalpitations with documented atrial fibrillation, 1/12/15 LAUREEN/ECV-successful  f. LAUREEN, 1/12/2015; LVEF 0.55-0.60, mild concentric LVH observed, postsurgical hypokinesis of the interventricular septum observed consistent with valve replacement, LA and RV mild to moderately dilated, RVSP mildly reduced, mild AR, mechanical mitral valve appears well seated with normal function, mild TR  g. [facility], 3/19/2017, with tachycardia, atrial flutter, anticoagulated with Coumadin  h. Echocardiogram, 6/14/2017: LVEF 0.60, RV moderately dilated, LA mild to moderately dilated, mildly reduced right ventricular systolic function noted, mild AR, TR, no pericardial effusion, no pulmonary hypertension, mechanical MVR appears nominal LV function and  leaflet motion without discernible associated thrombus last mass   i. External cardioversion, 6/20/2017, successful with a 100 J shock.    j. NSR April 2019 with acceptable QTc  k. Residual Class I symptoms, May 2020  2. Intermittent tachypalpitations with apparent acceptable 24-hour Holter monitor-data deficit  3. Labile hypertension, probably essential  4. Chronic degenerative cervical spine disc disease with chronic narcotic use  5. Chronic insomnia/depression with recent improved clinical course  6. Remote Lyme disease with recurrence on 3 occasions, treated with periodic antibiotic therapy  7. Intermittent visual disturbance with hospitalization in neurology evaluation-data deficit, November 2009  8. Apparent symptomatic left inguinal hernia  9. Erectile dysfunction/Peyronie's disease  10. Apparent complicated inguinal herniorrhaphy, February 2014, with subsequent development of intermittent recurrent episode gastric and right upper quadrant pain with subsequent small bowel obstruction and lactic acidosis with abdominal CT scan demonstrating extensive and progressive mid right anterior abdominal mesenteric rotation/volvulus emergent surgery and postop wound hematoma/(with subsequent closure, September 2014  11. DeQuervain's tenosynovitis, left arm, currently wearing brace, spring 2020  12. Surgical history:  a. Mitral valve replacement ×2  b. Volvulus  c. Back surgery  d. Hernia repair  e. Cardioversion    Allergies   Allergen Reactions   • Penicillins Angioedema       Current Outpatient Medications:   •  clindamycin (CLEOCIN) 150 MG capsule, 150 mg 2 (Two) Times a Day. Finished up 5/29/2020, Disp: , Rfl:   •  lisinopril (PRINIVIL,ZESTRIL) 10 MG tablet, Take 1 tablet by mouth Every Morning., Disp: 90 tablet, Rfl: 0  •  nortriptyline (PAMELOR) 25 MG capsule, 25 mg Every Night., Disp: , Rfl:   •  oxyCODONE-acetaminophen (PERCOCET) 5-325 MG per tablet, Take 1 tablet by mouth Every 12 (Twelve) Hours As Needed.,  "Disp: , Rfl:   •  sotalol (BETAPACE) 80 MG tablet, Take 1 tablet by mouth 2 (Two) Times a Day. Need appointment for further refills. Please call office to schedule appt, Disp: 60 tablet, Rfl: 0  •  warfarin (COUMADIN) 1 MG tablet, take 1/2 tablet by mouth every other day OR AS DIRECTED BY THE ANTICOAGULATION CLINIC, Disp: 30 tablet, Rfl: 1  •  warfarin (COUMADIN) 3 MG tablet, TAKE 1 TABLET BY MOUTH ONCE A DAY AS DIRECTED, Disp: 90 tablet, Rfl: 1    History of Present Illness: Patient returns for followup after a 13-month hiatus; he was last seen by JF Cutler on 04/25/2019 and was to return in 6 months.  An echocardiogram was ordered at that time, but none has been performed as of today's date.  He says he has been \"doing okay\" since we last saw him in the office.  He is up and about on a daily basis and has no chest pain, tightness, or pressure with his activities.  He has not had labs drawn \"in such a long time.\"  He uses nortriptyline to help him sleep, and it seems to help.  Sometimes, his sleep is broken, but then he naps during the day.  He has not noticed any swelling in his lower extremities.  He has had no interim ER visits, hospitalizations, serious illnesses, or surgeries.  Patient otherwise denies chest pain, shortness of breath, PND, edema, palpitations, syncope or presyncope at this time.    ROS   Obtained and negative except as outlined in problem list and HPI.      ECG 12 Lead  Date/Time: 5/29/2020 2:40 PM  Performed by: Willian Abad MD  Authorized by: Willian Abad MD   Comparison: compared with previous ECG from 4/25/2019  Similar to previous ECG  Rhythm: sinus rhythm  BPM: 73  Conduction: 1st degree AV block    Clinical impression: normal ECG  Comments:  ms  QTc 431 ms   ms               Objective:       Vitals:    05/29/20 1422 05/29/20 1427   BP: 122/70 118/70   BP Location: Left arm Left arm   Patient Position: Sitting Standing   Pulse: 72 74   Temp: 97.8 °F (36.6 " "°C)    SpO2: 98% 98%   Weight: 68.2 kg (150 lb 6.4 oz)    Height: 177.8 cm (70\")      Body mass index is 21.58 kg/m².   Last weight:  150 lbs.    Physical Exam   Constitutional: He is oriented to person, place, and time. He appears well-developed and well-nourished.   Neck: No JVD present. Carotid bruit is not present. No thyromegaly present.   Cardiovascular: Regular rhythm, S1 normal and S2 normal. Exam reveals no gallop, no S3 and no friction rub.   Murmur heard.   Medium-pitched systolic murmur is present with a grade of 2/6 at the apex. Normal prosthetic valve clicks  Pulses:       Carotid pulses are 1+ on the right side, and 1+ on the left side.       Radial pulses are 1+ on the right side, and 1+ on the left side.        Femoral pulses are 1+ on the right side, and 1+ on the left side.       Popliteal pulses are 1+ on the right side, and 1+ on the left side.        Dorsalis pedis pulses are 1+ on the right side, and 1+ on the left side.        Posterior tibial pulses are 1+ on the right side, and 1+ on the left side.   Pulmonary/Chest: Effort normal. He has decreased breath sounds. He has no wheezes. He has no rhonchi. He has no rales.   Abdominal: Soft. He exhibits no mass. There is no hepatosplenomegaly. There is no tenderness. There is no guarding.   Bowel sounds audible x4   Musculoskeletal: Normal range of motion. He exhibits no edema.   Lymphadenopathy:     He has no cervical adenopathy.   Neurological: He is alert and oriented to person, place, and time.   Skin: Skin is warm, dry and intact. No rash noted.   Vitals reviewed.        Lab Review:   01/22/2020:  · GGT - 112  · CBC - hematocrit 46.8%, hemoglobin 15.5, WBC 9.2, platelets 257, acceptable indices and differential  · CMP - normal electrolytes, serum creatinine 0.7, BUN 13, glucose 80 mg/dL, serum calcium 9.6, albumin 4.7, alk phos 69, ALT 39, AST 37    Lab Results   Component Value Date    GLUCOSE 91 06/20/2017    BUN 8 (L) 06/20/2017    " CREATININE 0.60 06/20/2017    EGFRIFNONA 134 06/20/2017    BCR 13.3 06/20/2017    CO2 26.0 06/20/2017    CALCIUM 9.7 06/20/2017    ALBUMIN 4.40 03/19/2017    AST 32 03/19/2017    ALT 33 03/19/2017       Lab Results   Component Value Date    WBC 7.77 03/19/2017    HGB 15.2 03/19/2017    HCT 44.8 03/19/2017    MCV 95.9 03/19/2017     03/19/2017       Lab Results   Component Value Date    HGBA1C 5.0 01/12/2015       Lab Results   Component Value Date    TSH 0.557 01/12/2015     Lab Results   Component Value Date    TRIG 76 09/10/2014           Assessment:       Overall continued acceptable course with no new interim cardiopulmonary complaints with good functional status. We will defer additional diagnostic or therapeutic intervention from a cardiac perspective at this time.     Diagnosis Plan   1. Essential hypertension  CBC & Differential    Comprehensive Metabolic Panel    Lipid Panel    TSH    Magnesium   2. Hyperlipidemia LDL goal <100  CBC & Differential    Comprehensive Metabolic Panel    Lipid Panel    TSH    Magnesium   3. VHD (valvular heart disease)  Stable examination and cinical course; defer echocardiogram at this time   4. Palpitations  No recurrent symptoms; defer additional studies   5. Paroxysmal atrial flutter (CMS/HCC)  Acceptable EKG, continue sotalol   6. Hx of mitral valve replacement with mechanical valve [Z95.2]  No recurrent angina pectoris or CHF on current activity schedule; continue current treatment            Plan:         1. Patient to continue current medications and close follow up with the above providers.  2. Lab slips for the following studies are provided patient today:   A. CMP   B. CBC   C. FLP   D. TSH   E. Magnesium level  3. Tentative cardiology follow up in May 2021, or patient may return sooner PRN.    Transcribed by Betty Pang for Dr. Willian Abad at 2:39 PM on 05/29/2020    IWillian MD, [facility], personally performed the services described in this  documentation as scribed by the above named individual in my presence, and it is both accurate and complete. At 2:45 PM on 05/29/2020

## 2020-06-03 ENCOUNTER — RESULTS ENCOUNTER (OUTPATIENT)
Dept: CARDIOLOGY | Facility: CLINIC | Age: 70
End: 2020-06-03

## 2020-06-03 ENCOUNTER — ANTICOAGULATION VISIT (OUTPATIENT)
Dept: PHARMACY | Facility: HOSPITAL | Age: 70
End: 2020-06-03

## 2020-06-03 DIAGNOSIS — I48.92 PAROXYSMAL ATRIAL FLUTTER (HCC): ICD-10-CM

## 2020-06-03 DIAGNOSIS — E78.5 HYPERLIPIDEMIA LDL GOAL <100: ICD-10-CM

## 2020-06-03 DIAGNOSIS — I10 ESSENTIAL HYPERTENSION: ICD-10-CM

## 2020-06-03 DIAGNOSIS — Z95.2 HX OF MITRAL VALVE REPLACEMENT WITH MECHANICAL VALVE: ICD-10-CM

## 2020-06-03 LAB — INR PPP: 2.9

## 2020-06-03 NOTE — PROGRESS NOTES
Anticoagulation Clinic - Remote Progress Note  [facility]  Frequency of Monitorin-4x a month    Indication: A.fib, VHD  Referring Provider: Jenniffer Madrid 19)  Initial Warfarin Start Date:    Goal INR: 2.5-3.5  Current Drug Interactions: ensure (once daily); amitriptyline (PRN)  CHADS-VASc: 2 (age, HTN)     Diet: iceberg lettuce 1x/week, ensure 3-4x/week(20)  Alcohol: 12 pack of beer/week  Tobacco: None  OTC Pain Medication: None     INR History:  Date 8/25 9/11 9/29 10/19 11/5 12/2 12/11 1/4/18 1/30 2/19   Total Weekly Dose 24mg 24mg 24mg 22.75 mg? 22.75 mg? ? 22.5mg 22.5mg 22.5mg 22.5mg   INR 3.3 2.8 2.9 3.1 3.5 2.7 2.7 2.6 2.9 2.5   Notes    LVM LVM   call       Date 3/7 4/5 4/18 5/20 6/5 6/27 7/24 7/31 9/9 10/8   Total Weekly Dose 22.5mg 22.5mg 22.5mg 23mg 22.5mg  23mg 24mg 24mg unable   to verify   INR 3.3 3.7 2.6 2.8 2.9 2.4 2.9 3.3 3.4 3.0   Notes LVM       rec'vd   rec'vd 10/13;  mult calls     Date 11/8 12/14 2/20/19 2/25 3/11 3/25 4/9 5/1 5/15 6/11   Total Weekly Dose unable to verify unable to verify unable to verify 23mg 23mg 23mg 22.5mg 23mg 22.5mg 23mg   INR 2.7 2.6 3.8 3.4 3.6 3.0 3.2 3.3 2.5 3.1   Notes               Date 6/27 7/10 7/26 8/9 9/11 10/3 10/15 10/24 10/31 11/8   Total Weekly Dose  22.5mg 22.5mg 22.5mg 23mg 22.5mg 23mg 22.5mg 23mg 23mg   INR 3.6 2.9 2.3 2.9 2.9 2.8 3.3 3.2 2.7 2.3   Notes  reported       reported 10/25; sick; cefdinir stop cefdinir 10/29      Date 11/15 11/22 12/7 12/18 12/27 1/15/20 1/30 2/18 3/5 3/17   Total Weekly Dose 23.5mg 23mg 22.5mg 23mg 22.5mg 23mg ??? 23mg  23mg 22.5mg   INR 2.8 2.7 3.2  2.5 2.5 3.1 2.7 2.4 2.8 3.0   Notes   Reported   Inc GLV  Unable to reach pt MVI; decr Ensure  call     Date      Total Weekly Dose 22.5mg 23mg 23mg 23mg? 23mg     INR 3.3 2.9 2.6 2.8 2.9     Notes recv'd  rec'vd ; call call recv'd  ; call recv'd 6/3; call       Phone Interview:  Tablet Strength: 3mg and 1mg  tablets  Patient Contact Info:  Preferred *[phone]* anytime after noon  Other numbers on his profile:   [phone] (Home)   - brother in law, requested that we do NOT call this number  Lab Contact Info: [place]  **will call once monthly or if INR is out of range**    UNABLE TO GET IN CONTACT WITH THE PATIENT. PLEASE DISREGARD THE FOLLOWING PLAN UNTIL ABLE TO GET IN CONTACT WITH PATIENT/ PATIENT REPRESENTATIVE.  -Called 6/3 @ 1440; Called 6/5 @ 1242; 6/8 @ 1420    Plan:   1. INR was therapeutic on 6/2. Unable to contact patient despite daily attempts. Sending communications letter at this time.

## 2020-06-08 ENCOUNTER — TELEPHONE (OUTPATIENT)
Dept: PHARMACY | Facility: HOSPITAL | Age: 70
End: 2020-06-08

## 2020-06-08 ENCOUNTER — ANTICOAGULATION VISIT (OUTPATIENT)
Dept: PHARMACY | Facility: HOSPITAL | Age: 70
End: 2020-06-08

## 2020-06-08 DIAGNOSIS — Z95.2 HX OF MITRAL VALVE REPLACEMENT WITH MECHANICAL VALVE: ICD-10-CM

## 2020-06-08 DIAGNOSIS — I48.92 PAROXYSMAL ATRIAL FLUTTER (HCC): ICD-10-CM

## 2020-06-08 NOTE — TELEPHONE ENCOUNTER
Sending certified communications letter at this time.     Article #     Mr. Turner called today 6/8 regarding latest INR results on 6/2/20. Our clinic had been unable to reach the patient since 4/13/20. Mr. Turner is reportedly having oral surgery 6/9/20. He is contacting the surgery office in regards to his warfarin management and will update our clinic with instructions. I reinforced that Mr. Turner is to inform our clinic if he has surgery scheduled so we can assist in warfarin management.    Joseph Johnson PharmD  Pharmacy Resident   6/8/2020  16:04

## 2020-06-11 RX ORDER — SOTALOL HYDROCHLORIDE 80 MG/1
80 TABLET ORAL 2 TIMES DAILY
Qty: 180 TABLET | Refills: 1 | Status: SHIPPED | OUTPATIENT
Start: 2020-06-11 | End: 2020-12-07

## 2020-06-18 ENCOUNTER — TELEPHONE (OUTPATIENT)
Dept: CARDIOLOGY | Facility: CLINIC | Age: 70
End: 2020-06-18

## 2020-06-18 NOTE — TELEPHONE ENCOUNTER
Pt called, LVM regarding having tooth extraction and needing advice on what he needs to do.     Called pt back, no answer. Unable to leave voicemail.

## 2020-06-23 ENCOUNTER — TELEPHONE (OUTPATIENT)
Dept: PHARMACY | Facility: HOSPITAL | Age: 70
End: 2020-06-23

## 2020-06-24 ENCOUNTER — ANTICOAGULATION VISIT (OUTPATIENT)
Dept: PHARMACY | Facility: HOSPITAL | Age: 70
End: 2020-06-24

## 2020-06-24 ENCOUNTER — TELEPHONE (OUTPATIENT)
Dept: PHARMACY | Facility: HOSPITAL | Age: 70
End: 2020-06-24

## 2020-06-24 DIAGNOSIS — I48.92 PAROXYSMAL ATRIAL FLUTTER (HCC): ICD-10-CM

## 2020-06-24 DIAGNOSIS — Z95.2 HX OF MITRAL VALVE REPLACEMENT WITH MECHANICAL VALVE: ICD-10-CM

## 2020-06-24 DIAGNOSIS — Z95.2 HX OF MITRAL VALVE REPLACEMENT WITH MECHANICAL VALVE: Primary | ICD-10-CM

## 2020-06-24 LAB — INR PPP: 2.49

## 2020-06-24 NOTE — TELEPHONE ENCOUNTER
Mr Turner called today, discussed inability to reach patient. Reports his home phone number [phone]. [place]. In the future, will use email correspondence:  [email or web address]      He has c/o of tooth abcess since early June (have not been able to contact patient since early May). He has a dental extraction/procedure  for tomorrow. His HM is missing the chip that matches his strips and requests a standing order placed to check INR. Confirmed with MARY will receive results same day.    Lab: [facility] in [facility] phone: [phone]  Fax: [phone]    Dentist: Dr Bashir Olson at [facility] in [facility] ([phone]) Per Latoya would prefer INR 2-2.5 (patient goal 2.5-3.5)

## 2020-06-24 NOTE — PROGRESS NOTES
Anticoagulation Clinic - Remote Progress Note  [facility]  Frequency of Monitorin-4x a month    Indication: Hx of mitral valve replacement with mechanical valve Kelly VHLOTUS  Referring Provider: Jenniffer Madrid 19)  Initial Warfarin Start Date:    Goal INR: 2.5-3.5  Current Drug Interactions: ensure (once daily); amitriptyline (PRN)  CHADS-VASc: 2 (age, HTN)     Diet: iceberg lettuce 1x/week, ensure 3-4x/week(20)  Alcohol: 12 pack of beer/week  Tobacco: None  OTC Pain Medication: None     INR History:  Date 8/25 9/11 9/29 10/19 11/5 12/2 12/11 1/4/18 1/30 2/19   Total Weekly Dose 24mg 24mg 24mg 22.75 mg? 22.75 mg? ? 22.5mg 22.5mg 22.5mg 22.5mg   INR 3.3 2.8 2.9 3.1 3.5 2.7 2.7 2.6 2.9 2.5   Notes    LVM LVM   call       Date 3/7 4/5 4/18 5/20 6/5 6/27 7/24 7/31 9/9 10/8   Total Weekly Dose 22.5mg 22.5mg 22.5mg 23mg 22.5mg  23mg 24mg 24mg unable   to verify   INR 3.3 3.7 2.6 2.8 2.9 2.4 2.9 3.3 3.4 3.0   Notes LVM       rec'vd   rec'vd 10/13;  mult calls     Date 11/8 12/14 2/20/19 2/25 3/11 3/25 4/9 5/1 5/15 6/11   Total Weekly Dose unable to verify unable to verify unable to verify 23mg 23mg 23mg 22.5mg 23mg 22.5mg 23mg   INR 2.7 2.6 3.8 3.4 3.6 3.0 3.2 3.3 2.5 3.1   Notes               Date 6/27 7/10 7/26 8/9 9/11 10/3 10/15 10/24 10/31 11/8   Total Weekly Dose  22.5mg 22.5mg 22.5mg 23mg 22.5mg 23mg 22.5mg 23mg 23mg   INR 3.6 2.9 2.3 2.9 2.9 2.8 3.3 3.2 2.7 2.3   Notes  reported       reported 10/25; sick; cefdinir stop cefdinir 10/29      Date 11/15 11/22 12/7 12/18 12/27 1/15/20 1/30 2/18 3/5 3/17   Total Weekly Dose 23.5mg 23mg 22.5mg 23mg 22.5mg 23mg ??? 23mg  23mg 22.5mg   INR 2.8 2.7 3.2  2.5 2.5 3.1 2.7 2.4 2.8 3.0   Notes   Reported   Inc GLV  Unable to reach pt MVI; decr Ensure  call     Date     Total Weekly Dose 22.5mg 23mg 23mg 23mg? 23mg 22.75mg avg    INR 3.3 2.9 2.6 2.8 2.9 2.49    Notes recv'd  rec'vd ; call call recv'd  ; call  farhad'eliza 6/3; call       Phone Interview:  Tablet Strength: 3mg and 1mg tablets  Patient Contact Info:  Preferred *[phone]* anytime after noon  Other numbers on his profile:   [phone] (Home)    - brother in law, requested that we do NOT call this number  Lab Contact Info: [place]  **will call once monthly or if INR is out of range**  [email or web address] PLEASE USE EMAIL TO COMMUNICATE    Positives:  Upcoming dental procedure   Negatives:  Signs/symptoms of thrombosis, Signs/symptoms of bleeding, Laboratory test error suspected, Change in health, Change in alcohol use, Change in activity, Upcoming invasive procedure, Emergency department visit, Missed doses, Extra doses, Change in medications, Change in diet/appetite, Hospital admission, Bruising, Other complaints   Comments:  Mr Turner called today, discussed inability to reach patient. Reports his home phone number [phone]. [place]'t always work. In the future, will use email correspondence:  [email or web address]     He has c/o of tooth abcess since early June (have not been able to contact patient since early May). He has a dental extraction/procedure  for tomorrow. His HM is missing the chip that matches his strips and requests a standing order placed to check INR. Confirmed with MARY will receive results same day.       Lab: [facility] in [facility] phone: [phone]   Fax: [phone]       Dentist: Dr Bashir Olson at [facility] in [facility] ([phone]) Per Latoya would prefer INR 2-2.5 (patient goal 2.5-3.5)    Verbal INR from lab, relayed results to Latoya with Dr Olson         Plan:   1. INR was WNL, however at the LLN. Considering procedure tomorrow and due for 3.5mg tonight, will have Mr Turner take 3mg tonight instead then tomorrow resume 3.5mg and 3mg alternating daily.  2. Repeat INR in 1 week to ensure WNL. Sent email with dosing directions 6/24  3.  3. Doc Turner  understands the importance of calling the [facility] if he notices any s/sx of bleeding, stroke, or abnormal bruising, if any changes are made to his medications or medication doses (Rx, OTC, herbal), or if any upcoming procedures are scheduled. Doc Turner will likewise let us know if any other changes, questions, or concerns arise regarding anticoagulation therapy. he understands the importance of seeking medical attention immediately if he experiences any falls, vehicle accidents, or abnormal bleeding or bruising. Doc Turner voiced understanding of this information and confirms that he has the [facility]'s contact information. Otherwise, we will plan to contact the patient once monthly or if his INR is out of range. Will contact via email for now     Tori FultonD.  06/24/20   13:49

## 2020-06-29 RX ORDER — WARFARIN SODIUM 3 MG/1
TABLET ORAL
Qty: 90 TABLET | Refills: 1 | Status: SHIPPED | OUTPATIENT
Start: 2020-06-29 | End: 2021-01-05 | Stop reason: SDUPTHER

## 2020-07-22 LAB — INR PPP: 2.4

## 2020-07-24 LAB
INR PPP: 0.01
INR PPP: 3.1

## 2020-07-27 RX ORDER — WARFARIN SODIUM 1 MG/1
TABLET ORAL
Qty: 30 TABLET | Refills: 1 | Status: SHIPPED | OUTPATIENT
Start: 2020-07-27 | End: 2021-01-05 | Stop reason: SDUPTHER

## 2020-07-27 RX ORDER — LISINOPRIL 10 MG/1
10 TABLET ORAL EVERY MORNING
Qty: 90 TABLET | Refills: 3 | Status: SHIPPED | OUTPATIENT
Start: 2020-07-27 | End: 2021-04-27 | Stop reason: SDUPTHER

## 2020-07-30 ENCOUNTER — TELEPHONE (OUTPATIENT)
Dept: PHARMACY | Facility: HOSPITAL | Age: 70
End: 2020-07-30

## 2020-07-30 NOTE — TELEPHONE ENCOUNTER
I think you need to email him about checking INR. [place] so he doesn't have great service and email listed as preference for communitcation

## 2020-07-31 ENCOUNTER — ANTICOAGULATION VISIT (OUTPATIENT)
Dept: PHARMACY | Facility: HOSPITAL | Age: 70
End: 2020-07-31

## 2020-07-31 DIAGNOSIS — Z95.2 HX OF MITRAL VALVE REPLACEMENT WITH MECHANICAL VALVE: ICD-10-CM

## 2020-07-31 DIAGNOSIS — I48.92 PAROXYSMAL ATRIAL FLUTTER (HCC): ICD-10-CM

## 2020-07-31 LAB — INR PPP: 3.1

## 2020-07-31 NOTE — PROGRESS NOTES
Anticoagulation Clinic - Remote Progress Note  [facility]  Frequency of Monitorin-4x a month    Indication: Hx of mitral valve replacement with mechanical valve Kelly VHLOTUS  Referring Provider: Jenniffer Madrid 19)  Initial Warfarin Start Date:    Goal INR: 2.5-3.5  Current Drug Interactions: ensure (once daily); amitriptyline (PRN)  CHADS-VASc: 2 (age, HTN)     Diet: iceberg lettuce 1x/week, ensure 3-4x/week(20)  Alcohol: 12 pack of beer/week  Tobacco: None  OTC Pain Medication: None     INR History:  Date 8/25 9/11 9/29 10/19 11/5 12/2 12/11 1/4/18 1/30 2/19   Total Weekly Dose 24mg 24mg 24mg 22.75 mg? 22.75 mg? ? 22.5mg 22.5mg 22.5mg 22.5mg   INR 3.3 2.8 2.9 3.1 3.5 2.7 2.7 2.6 2.9 2.5   Notes    LVM LVM   call       Date 3/7 4/5 4/18 5/20 6/5 6/27 7/24 7/31 9/9 10/8   Total Weekly Dose 22.5mg 22.5mg 22.5mg 23mg 22.5mg  23mg 24mg 24mg unable   to verify   INR 3.3 3.7 2.6 2.8 2.9 2.4 2.9 3.3 3.4 3.0   Notes LVM       rec'vd   rec'vd 10/13;  mult calls     Date 11/8 12/14 2/20/19 2/25 3/11 3/25 4/9 5/1 5/15 6/11   Total Weekly Dose unable to verify unable to verify unable to verify 23mg 23mg 23mg 22.5mg 23mg 22.5mg 23mg   INR 2.7 2.6 3.8 3.4 3.6 3.0 3.2 3.3 2.5 3.1   Notes               Date 6/27 7/10 7/26 8/9 9/11 10/3 10/15 10/24 10/31 11/8   Total Weekly Dose  22.5mg 22.5mg 22.5mg 23mg 22.5mg 23mg 22.5mg 23mg 23mg   INR 3.6 2.9 2.3 2.9 2.9 2.8 3.3 3.2 2.7 2.3   Notes  reported       reported 10/25; sick; cefdinir stop cefdinir 10/29      Date 11/15 11/22 12/7 12/18 12/27 1/15/20 1/30 2/18 3/5 3/17   Total Weekly Dose 23.5mg 23mg 22.5mg 23mg 22.5mg 23mg ??? 23mg  23mg 22.5mg   INR 2.8 2.7 3.2  2.5 2.5 3.1 2.7 2.4 2.8 3.0   Notes   Reported   Inc GLV  Unable to reach pt MVI; decr Ensure  call     Date     Total Weekly Dose 22.5mg 23mg 23mg 23mg? 23mg 22.75mg avg    INR 3.3 2.9 2.6 2.8 2.9 2.49    Notes recv'd  rec'vd ; call call recv'd  ; call  recv'd 6/3; call       Date 7/22 7/31           Total Weekly Dose 22.75mg 23mg           INR 2.4 3.1           Notes recv'd 7/31 rec'vd 7/31             Phone Interview:  Tablet Strength: 3mg and 1mg tablets  Patient Contact Info:  Preferred *[phone]* anytime after noon  Other numbers on his profile:   [phone] (Home)    - brother in law, requested that we do NOT call this number  Lab Contact Info: [facility]  Lab: [facility] phone: [phone] , Fax: [phone]   **will call once monthly or if INR is out of range**  [email or web address] PLEASE USE EMAIL TO COMMUNICATE      Negatives:  Signs/symptoms of thrombosis, Signs/symptoms of bleeding, Laboratory test error suspected, Change in health, Change in alcohol use, Change in activity, Upcoming invasive procedure, Emergency department visit, Upcoming dental procedure, Missed doses, Extra doses, Change in medications, Change in diet/appetite, Hospital admission, Bruising, Other complaints   Comments:  Mr. Turner tested 1x on 7/22 and forgot to send it in. Sent it in on 7/31 along with his result from 7/31. Patient's INR was slightly SUBtherapeutic on 7/22 but went up at todays check. Uncertain if any changes were made as patient didn't leave any comments in [facility] and relies on email for communication. Will ask in email about such changes, if any.       Email sent: Hi Mr. Turner, thank you for sending in your 2 INR results from 7/22 and today. It looks like your INR did fall on 7/22 and was slightly lower than goal, but it came back up today. Have you made any changes, dose adjustments, or missed any doses? If not, please continue alternating warfarin 3.5mg and 3mg and test again in 2 weeks. Let us know if you have any questions or concerns. We close at 4:30pm today, but will be back at 8am on Monday. Have a great weekend.  Thank you,   Concepcion Hawley, Pharmacy Intern             Plan:   1. INR was WNL. Continue taking Warfarin 3.5mg and  3mg alternating daily.  2. Repeat INR in 2 weeks to ensure WNL. Sent email with dosing directions 7/31  3.  3. Doc Turner understands the importance of calling the [facility] if he notices any s/sx of bleeding, stroke, or abnormal bruising, if any changes are made to his medications or medication doses (Rx, OTC, herbal), or if any upcoming procedures are scheduled. Doc Turner will likewise let us know if any other changes, questions, or concerns arise regarding anticoagulation therapy. he understands the importance of seeking medical attention immediately if he experiences any falls, vehicle accidents, or abnormal bleeding or bruising. Doc Turner voiced understanding of this information and confirms that he has the [facility]'s contact information. Otherwise, we will plan to contact the patient once monthly or if his INR is out of range. Will contact via email for now     Concepcion Hawley, Pharmacy Intern   07/31/20   15:19    I, Atif Mallory, [facility], have reviewed the note in full and agree with the assessment and plan.  07/31/20  15:48

## 2020-08-26 ENCOUNTER — TELEPHONE (OUTPATIENT)
Dept: PHARMACY | Facility: HOSPITAL | Age: 70
End: 2020-08-26

## 2020-09-03 ENCOUNTER — ANTICOAGULATION VISIT (OUTPATIENT)
Dept: PHARMACY | Facility: HOSPITAL | Age: 70
End: 2020-09-03

## 2020-09-03 DIAGNOSIS — Z95.2 HX OF MITRAL VALVE REPLACEMENT WITH MECHANICAL VALVE: ICD-10-CM

## 2020-09-03 DIAGNOSIS — I48.92 PAROXYSMAL ATRIAL FLUTTER (HCC): ICD-10-CM

## 2020-09-03 LAB
INR PPP: 2.5
INR PPP: 3.4

## 2020-09-03 NOTE — PROGRESS NOTES
Anticoagulation Clinic - Remote Progress Note  [facility]  Frequency of Monitorin-4x a month    Indication: Hx of mitral valve replacement with mechanical valve Kelly VHLOTUS  Referring Provider: Jenniffer Madrid 19)  Initial Warfarin Start Date:    Goal INR: 2.5-3.5  Current Drug Interactions: ensure (once daily); amitriptyline (PRN)  CHADS-VASc: 2 (age, HTN)     Diet: iceberg lettuce 1x/week, ensure 3-4x/week(20)  Alcohol: 12 pack of beer/week  Tobacco: None  OTC Pain Medication: None     INR History:  Date 8/25 9/11 9/29 10/19 11/5 12/2 12/11 1/4/18 1/30 2/19   Total Weekly Dose 24mg 24mg 24mg 22.75 mg? 22.75 mg? ? 22.5mg 22.5mg 22.5mg 22.5mg   INR 3.3 2.8 2.9 3.1 3.5 2.7 2.7 2.6 2.9 2.5   Notes    LVM LVM   call       Date 3/7 4/5 4/18 5/20 6/5 6/27 7/24 7/31 9/9 10/8   Total Weekly Dose 22.5mg 22.5mg 22.5mg 23mg 22.5mg  23mg 24mg 24mg unable   to verify   INR 3.3 3.7 2.6 2.8 2.9 2.4 2.9 3.3 3.4 3.0   Notes LVM       rec'vd   rec'vd 10/13;  mult calls     Date 11/8 12/14 2/20/19 2/25 3/11 3/25 4/9 5/1 5/15 6/11   Total Weekly Dose unable to verify unable to verify unable to verify 23mg 23mg 23mg 22.5mg 23mg 22.5mg 23mg   INR 2.7 2.6 3.8 3.4 3.6 3.0 3.2 3.3 2.5 3.1   Notes               Date 6/27 7/10 7/26 8/9 9/11 10/3 10/15 10/24 10/31 11/8   Total Weekly Dose  22.5mg 22.5mg 22.5mg 23mg 22.5mg 23mg 22.5mg 23mg 23mg   INR 3.6 2.9 2.3 2.9 2.9 2.8 3.3 3.2 2.7 2.3   Notes  reported       reported 10/25; sick; cefdinir stop cefdinir 10/29      Date 11/15 11/22 12/7 12/18 12/27 1/15/20 1/30 2/18 3/5 3/17   Total Weekly Dose 23.5mg 23mg 22.5mg 23mg 22.5mg 23mg ??? 23mg  23mg 22.5mg   INR 2.8 2.7 3.2  2.5 2.5 3.1 2.7 2.4 2.8 3.0   Notes   Reported   Inc GLV  Unable to reach pt MVI; decr Ensure  call     Date     Total Weekly Dose 22.5mg 23mg 23mg 23mg? 23mg 22.75mg avg    INR 3.3 2.9 2.6 2.8 2.9 2.49    Notes recv'd  rec'vd ; call call recv'd  ; call  "recv'd 6/3; call       Date 7/22 7/31 8/26 9/3         Total Weekly Dose 22.75mg 23mg 22.75mg 23.5mg         INR 2.4 3.1 2.5 3.4         Notes recv'd 7/31 rec'vd 7/31 rec 9/3 Self-adj  CoQ10           Phone Interview:  Tablet Strength: 3mg and 1mg tablets  Patient Contact Info:  Preferred *[phone]* anytime after noon  Other numbers on his profile:   [phone] (Home)    - brother in law, requested that we do NOT call this number  Lab Contact Info: [facility]  Lab: [facility] in [facility] phone: [phone] , Fax: [phone]   **will call once monthly or if INR is out of range**  [email or web address] PLEASE USE EMAIL TO COMMUNICATE      Patient Findings:  Positives:  Change in medications   Negatives:  Signs/symptoms of thrombosis, Signs/symptoms of bleeding, Laboratory test error suspected, Change in health, Change in alcohol use, Change in activity, Upcoming invasive procedure, Emergency department visit, Upcoming dental procedure, Missed doses, Extra doses, Change in diet/appetite, Hospital admission, Bruising, Other complaints   Comments:  Mr. Turner tested 1x on 8/26 with INR 2.5, and tested 1x 9/3 with INR 3.4. Both results were sent in today 9/3. No comments were left in [facility].     Received a email from Mr. Turner today 9/3:   \"After last week's result, I decided, instead of alternating each day between 3.0 and 3.5, I'd try 3.0 one day and 3.5 for two, consecutive days.\" Dosing already updated in the tracker. Mr. Turner also started taking CoQ 10 for a while.     Email: Hi Mr. Turner, thank you for sending in your 2 INR results from 8/26 and 9/3. Both of the results are within your goal range. Thank you for letting us know the adjusted dosing. There is a small change that CoQ 10 may diminish the anticoagulant effect of warfarin, however, your INR readings look good so you can continue taking it. We will keep watching any INR change in the future.  Please continue your adjusted dosing with " "alternating warfarin 3mg one day and 3.5mg for two consecutive days. And test again in 1 weeks. Let us know if you have any questions or concerns. Our office hour is Monday to Friday from 8am to 4:30pm. We will be closed next Monday, 9/8, for Labor day.   Thank you!         Plan:   1. INR was WNL. After consult Josephine Pineda, Camila, let  to continue alternating  taking Warfarin 3mg for one day and 3.5mg for two consecutive day (average 23.3mg weekly, 2.6% increase).  2. Repeat INR in 1 weeks to ensure WNL. Sent email with dosing directions   3. Doc Turner understands the importance of calling the [facility] if he notices any s/sx of bleeding, stroke, or abnormal bruising, if any changes are made to his medications or medication doses (Rx, OTC, herbal), or if any upcoming procedures are scheduled. Doc Turner will likewise let us know if any other changes, questions, or concerns arise regarding anticoagulation therapy. he understands the importance of seeking medical attention immediately if he experiences any falls, vehicle accidents, or abnormal bleeding or bruising. Doc Turner voiced understanding of this information and confirms that he has the [facility]'s contact information. Otherwise, we will plan to contact the patient once monthly or if his INR is out of range. Will contact via email for now     Paris Quick, Pharmacy Intern   09/03/20   15:12      In response email patient verified he has been taking 3.5,3.5,3 since 8/27. Additionally \"I haven't made any dietary changes except the CoQ10 supplement. 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   \"  I, Josephine Pineda, PharmD, have reviewed the note in full and agree with the assessment and plan.  09/04/20  15:07    "

## 2020-09-11 ENCOUNTER — ANTICOAGULATION VISIT (OUTPATIENT)
Dept: PHARMACY | Facility: HOSPITAL | Age: 70
End: 2020-09-11

## 2020-09-11 DIAGNOSIS — Z95.2 HX OF MITRAL VALVE REPLACEMENT WITH MECHANICAL VALVE: ICD-10-CM

## 2020-09-11 DIAGNOSIS — I48.92 PAROXYSMAL ATRIAL FLUTTER (HCC): ICD-10-CM

## 2020-09-11 LAB — INR PPP: 2.8

## 2020-09-11 NOTE — PROGRESS NOTES
Anticoagulation Clinic - Remote Progress Note  [facility]  Frequency of Monitorin-4x a month    Indication: Hx of mitral valve replacement with mechanical valve Kelly VHLOTUS  Referring Provider: Jenniffer Madrid 19)  Initial Warfarin Start Date:    Goal INR: 2.5-3.5  Current Drug Interactions: ensure (once daily); amitriptyline (PRN)  CHADS-VASc: 2 (age, HTN)     Diet: iceberg lettuce 1x/week, ensure 3-4x/week(20)  Alcohol: 12 pack of beer/week  Tobacco: None  OTC Pain Medication: None     INR History:  Date 8/25 9/11 9/29 10/19 11/5 12/2 12/11 1/4/18 1/30 2/19   Total Weekly Dose 24mg 24mg 24mg 22.75 mg? 22.75 mg? ? 22.5mg 22.5mg 22.5mg 22.5mg   INR 3.3 2.8 2.9 3.1 3.5 2.7 2.7 2.6 2.9 2.5   Notes    LVM LVM   call       Date 3/7 4/5 4/18 5/20 6/5 6/27 7/24 7/31 9/9 10/8   Total Weekly Dose 22.5mg 22.5mg 22.5mg 23mg 22.5mg  23mg 24mg 24mg unable   to verify   INR 3.3 3.7 2.6 2.8 2.9 2.4 2.9 3.3 3.4 3.0   Notes LVM       rec'vd   rec'vd 10/13;  mult calls     Date 11/8 12/14 2/20/19 2/25 3/11 3/25 4/9 5/1 5/15 6/11   Total Weekly Dose unable to verify unable to verify unable to verify 23mg 23mg 23mg 22.5mg 23mg 22.5mg 23mg   INR 2.7 2.6 3.8 3.4 3.6 3.0 3.2 3.3 2.5 3.1   Notes               Date 6/27 7/10 7/26 8/9 9/11 10/3 10/15 10/24 10/31 11/8   Total Weekly Dose  22.5mg 22.5mg 22.5mg 23mg 22.5mg 23mg 22.5mg 23mg 23mg   INR 3.6 2.9 2.3 2.9 2.9 2.8 3.3 3.2 2.7 2.3   Notes  reported       reported 10/25; sick; cefdinir stop cefdinir 10/29      Date 11/15 11/22 12/7 12/18 12/27 1/15/20 1/30 2/18 3/5 3/17   Total Weekly Dose 23.5mg 23mg 22.5mg 23mg 22.5mg 23mg ??? 23mg  23mg 22.5mg   INR 2.8 2.7 3.2  2.5 2.5 3.1 2.7 2.4 2.8 3.0   Notes   Reported   Inc GLV  Unable to reach pt MVI; decr Ensure  call     Date     Total Weekly Dose 22.5mg 23mg 23mg 23mg? 23mg 22.75mg avg    INR 3.3 2.9 2.6 2.8 2.9 2.49    Notes recv'd  rec'vd ; call call recv'd  ; call  recv'd 6/3; call       Date 7/22 7/31 8/26 9/3 9/11        Total Weekly Dose 22.75mg 23mg 22.75mg 23.5mg 23 mg        INR 2.4 3.1 2.5 3.4 2.8        Notes recv'd 7/31 rec'vd 7/31 rec 9/3 Self-adj  CoQ10; email           Phone Interview:  Tablet Strength: 3mg and 1mg tablets  Patient Contact Info:  Preferred *[phone]* anytime after noon  Other numbers on his profile:   [phone] (Home)    - brother in law, requested that we do NOT call this number  Lab Contact Info: [facility]  Lab: [facility] in [facility] phone: [phone] , Fax: [phone]   **will call once monthly or if INR is out of range**  [email or web address] PLEASE USE EMAIL TO COMMUNICATE    Plan:   1. INR is therapeutic again today. Mr. Turner to continue warfarin 3 mg, 3.5 mg, 3.5 mg repeating every three days until recheck.   2. Repeat INR in two weeks.  3. Doc Turner understands the importance of calling the [facility] if he notices any s/sx of bleeding, stroke, or abnormal bruising, if any changes are made to his medications or medication doses (Rx, OTC, herbal), or if any upcoming procedures are scheduled. Doc Turner will likewise let us know if any other changes, questions, or concerns arise regarding anticoagulation therapy. he understands the importance of seeking medical attention immediately if he experiences any falls, vehicle accidents, or abnormal bleeding or bruising. Doc Turner voiced understanding of this information and confirms that he has the [facility]'s contact information. Otherwise, we will plan to contact the patient once monthly or if his INR is out of range. Will contact via email for now     Teresita Redmond CPhT  9/11/2020  08:13    I, Olegario Macdonald, PharmD, have reviewed the note in full and agree with the assessment and plan.  09/11/20  09:36

## 2020-10-02 ENCOUNTER — TELEPHONE (OUTPATIENT)
Dept: PHARMACY | Facility: HOSPITAL | Age: 70
End: 2020-10-02

## 2020-10-06 NOTE — TELEPHONE ENCOUNTER
[email or web address] reminding Mr. Turner he is overdue to check his INR.    Second email sent for reminder.

## 2020-10-08 NOTE — TELEPHONE ENCOUNTER
Mr. Turner emailed back saying he will check his INR today.    Will await results. If Mr. Turner does not check INR by the end of the day tomorrow (10/9) he will be due for 1st letter (two weeks overdue).

## 2020-10-09 ENCOUNTER — ANTICOAGULATION VISIT (OUTPATIENT)
Dept: PHARMACY | Facility: HOSPITAL | Age: 70
End: 2020-10-09

## 2020-10-09 DIAGNOSIS — Z95.2 HX OF MITRAL VALVE REPLACEMENT WITH MECHANICAL VALVE: ICD-10-CM

## 2020-10-09 DIAGNOSIS — I48.92 PAROXYSMAL ATRIAL FLUTTER (HCC): ICD-10-CM

## 2020-10-09 LAB — INR PPP: 3.4

## 2020-10-09 NOTE — PROGRESS NOTES
Anticoagulation Clinic - Remote Progress Note  [facility]  Frequency of Monitorin-4x a month    Indication: Hx of mitral valve replacement with mechanical valve Kelly VHLOTUS  Referring Provider: Jenniffer Madrid 19)  Initial Warfarin Start Date:    Goal INR: 2.5-3.5  Current Drug Interactions: ensure (once daily); amitriptyline (PRN)  CHADS-VASc: 2 (age, HTN)     Diet: iceberg lettuce 1x/week, ensure 3-4x/week(20)  Alcohol: 12 pack of beer/week  Tobacco: None  OTC Pain Medication: None     INR History:  Date 8/25 9/11 9/29 10/19 11/5 12/2 12/11 1/4/18 1/30 2/19   Total Weekly Dose 24mg 24mg 24mg 22.75 mg? 22.75 mg? ? 22.5mg 22.5mg 22.5mg 22.5mg   INR 3.3 2.8 2.9 3.1 3.5 2.7 2.7 2.6 2.9 2.5   Notes    LVM LVM   call       Date 3/7 4/5 4/18 5/20 6/5 6/27 7/24 7/31 9/9 10/8   Total Weekly Dose 22.5mg 22.5mg 22.5mg 23mg 22.5mg  23mg 24mg 24mg unable   to verify   INR 3.3 3.7 2.6 2.8 2.9 2.4 2.9 3.3 3.4 3.0   Notes LVM       rec'vd   rec'vd 10/13;  mult calls     Date 11/8 12/14 2/20/19 2/25 3/11 3/25 4/9 5/1 5/15 6/11   Total Weekly Dose unable to verify unable to verify unable to verify 23mg 23mg 23mg 22.5mg 23mg 22.5mg 23mg   INR 2.7 2.6 3.8 3.4 3.6 3.0 3.2 3.3 2.5 3.1   Notes               Date 6/27 7/10 7/26 8/9 9/11 10/3 10/15 10/24 10/31 11/8   Total Weekly Dose  22.5mg 22.5mg 22.5mg 23mg 22.5mg 23mg 22.5mg 23mg 23mg   INR 3.6 2.9 2.3 2.9 2.9 2.8 3.3 3.2 2.7 2.3   Notes  reported       reported 10/25; sick; cefdinir stop cefdinir 10/29      Date 11/15 11/22 12/7 12/18 12/27 1/15/20 1/30 2/18 3/5 3/17   Total Weekly Dose 23.5mg 23mg 22.5mg 23mg 22.5mg 23mg ??? 23mg  23mg 22.5mg   INR 2.8 2.7 3.2  2.5 2.5 3.1 2.7 2.4 2.8 3.0   Notes   Reported   Inc GLV  Unable to reach pt MVI; decr Ensure  call     Date     Total Weekly Dose 22.5mg 23mg 23mg 23mg? 23mg 22.75mg avg    INR 3.3 2.9 2.6 2.8 2.9 2.49    Notes recv'd  rec'vd ; call call recv'd  ; call  "recv'd 6/3; call       Date 7/22 7/31 8/26 9/3 9/11 10/8 10/13      Total Weekly Dose 22.75mg 23mg 22.75mg 23.5mg 23 mg 23.5 mg  avg 23.5mg avg      INR 2.4 3.1 2.5 3.4 2.8 3.4 3.3      Notes recv'd 7/31 rec'vd 7/31 rec 9/3 Self-adj  CoQ10; email  email email        Phone Interview:  Tablet Strength: 3mg and 1mg tablets  Patient Contact Info:  Preferred *[phone]* anytime after noon  Other numbers on his profile:   [phone] (Home)    - brother in law, requested that we do NOT call this number  Lab Contact Info: [place]  Lab: [facility] in [facility] phone: [phone] , Fax: [phone]   **will call once monthly or if INR is out of range**  [email or web address] PLEASE USE EMAIL TO COMMUNICATE    Patient Findings:  Positives:  Bruising   Negatives:  Signs/symptoms of thrombosis, Signs/symptoms of bleeding, Laboratory test error suspected, Change in health, Change in alcohol use, Change in activity, Upcoming invasive procedure, Emergency department visit, Upcoming dental procedure, Missed doses, Extra doses, Change in medications, Change in diet/appetite, Hospital admission, Other complaints   Comments:  Email response from Mr. Turner 10/9:     My diet remains the same and I have no surgeries scheduled, thank goodness. I seemed to have had a few more  \"old man-related\" bruises  but nothing alarming. I regularly get little superficial puncture wounds from playing with a couple of housecats who've taken up residence here. I've tried to determine whether there is any relationship between how long it takes the little circular discolorations to fade and my PT/INR but I haven't had much luck with the hypothesis! Thanks for the reminder and please let me know when to next do testing.     Emailed a second time to confirm dosing regimen. Response from Mr. Turner on 10/14:     Hi, Ms. Redmond:.                                                                                                       I am on the same " "dose. I actually checked my INR last night since I'd noticed what seemed to be more little discolorations on my arm than usual. I showed 3.3 so I guess it's just the usual old man \"splotchies.\" I need to call [facility]. I'll retest Oct. 28 unless I hear from you. Thanks, Doc     Plan:   1. INR was therapeutic again on 10/9. Unable to contact Mr. Turner until 10/9 and 10/14. Mr. Turner also rechecked on 10/13 and was therapeutic. Mr. Turner to continue warfarin 3 mg, 3.5 mg, 3.5 mg repeating every three days until recheck.   2. Repeat INR in two weeks.  3. Email with calendar and dosing instructions sent to Mr. Turner. He has no further questions at this time.  3. Doc DELEON Johnny understands the importance of calling the [facility] if he notices any s/sx of bleeding, stroke, or abnormal bruising, if any changes are made to his medications or medication doses (Rx, OTC, herbal), or if any upcoming procedures are scheduled. Doc DELEON Johnny will likewise let us know if any other changes, questions, or concerns arise regarding anticoagulation therapy. he understands the importance of seeking medical attention immediately if he experiences any falls, vehicle accidents, or abnormal bleeding or bruising. Doc DELEON Johnny voiced understanding of this information and confirms that he has the [facility]'s contact information. Otherwise, we will plan to contact the patient once monthly or if his INR is out of range. Will contact via email for now     Teresita Redmond CPhT  10/14/2020  15:09 EDT    3.3 was not reported to [facility].   I, Frances Pedroza, PharmD, have reviewed the note in full and agree with the assessment and plan.  10/15/20  10:17 EDT    "

## 2020-10-14 LAB — INR PPP: 3.3

## 2020-11-24 ENCOUNTER — ANTICOAGULATION VISIT (OUTPATIENT)
Dept: PHARMACY | Facility: HOSPITAL | Age: 70
End: 2020-11-24

## 2020-11-24 DIAGNOSIS — Z95.2 HX OF MITRAL VALVE REPLACEMENT WITH MECHANICAL VALVE: ICD-10-CM

## 2020-11-24 DIAGNOSIS — I48.92 PAROXYSMAL ATRIAL FLUTTER (HCC): ICD-10-CM

## 2020-11-25 LAB — INR PPP: 3.7

## 2020-12-03 LAB — INR PPP: 3.1 (ref 2.5–3.5)

## 2020-12-07 RX ORDER — SOTALOL HYDROCHLORIDE 80 MG/1
TABLET ORAL
Qty: 180 TABLET | Refills: 1 | Status: SHIPPED | OUTPATIENT
Start: 2020-12-07 | End: 2021-05-27

## 2020-12-18 ENCOUNTER — TELEPHONE (OUTPATIENT)
Dept: PHARMACY | Facility: HOSPITAL | Age: 70
End: 2020-12-18

## 2020-12-22 ENCOUNTER — ANTICOAGULATION VISIT (OUTPATIENT)
Dept: PHARMACY | Facility: HOSPITAL | Age: 70
End: 2020-12-22

## 2020-12-22 DIAGNOSIS — I48.92 PAROXYSMAL ATRIAL FLUTTER (HCC): ICD-10-CM

## 2020-12-22 DIAGNOSIS — Z95.2 HX OF MITRAL VALVE REPLACEMENT WITH MECHANICAL VALVE: ICD-10-CM

## 2020-12-22 LAB — INR PPP: 2.7

## 2020-12-22 NOTE — PROGRESS NOTES
Anticoagulation Clinic - Remote Progress Note  [facility]  Frequency of Monitorin-4x a month    Indication: Hx of mitral valve replacement with mechanical valve Kelly VHLOTUS  Referring Provider: Jenniffer Madrid 19)  Initial Warfarin Start Date:    Goal INR: 2.5-3.5  Current Drug Interactions: ensure (once daily); amitriptyline (PRN)  CHADS-VASc: 2 (age, HTN)     Diet: iceberg lettuce 1x/week, ensure 3-4x/week(20)  Alcohol: 12 pack of beer/week  Tobacco: None  OTC Pain Medication: None     INR History:  Date 8/25 9/11 9/29 10/19 11/5 12/2 12/11 1/4/18 1/30 2/19   Total Weekly Dose 24mg 24mg 24mg 22.75 mg? 22.75 mg? ? 22.5mg 22.5mg 22.5mg 22.5mg   INR 3.3 2.8 2.9 3.1 3.5 2.7 2.7 2.6 2.9 2.5   Notes    LVM LVM   call       Date 3/7 4/5 4/18 5/20 6/5 6/27 7/24 7/31 9/9 10/8   Total Weekly Dose 22.5mg 22.5mg 22.5mg 23mg 22.5mg  23mg 24mg 24mg unable   to verify   INR 3.3 3.7 2.6 2.8 2.9 2.4 2.9 3.3 3.4 3.0   Notes LVM       rec'vd   rec'vd 10/13;  mult calls     Date 11/8 12/14 2/20/19 2/25 3/11 3/25 4/9 5/1 5/15 6/11   Total Weekly Dose unable to verify unable to verify unable to verify 23mg 23mg 23mg 22.5mg 23mg 22.5mg 23mg   INR 2.7 2.6 3.8 3.4 3.6 3.0 3.2 3.3 2.5 3.1   Notes               Date 6/27 7/10 7/26 8/9 9/11 10/3 10/15 10/24 10/31 11/8   Total Weekly Dose  22.5mg 22.5mg 22.5mg 23mg 22.5mg 23mg 22.5mg 23mg 23mg   INR 3.6 2.9 2.3 2.9 2.9 2.8 3.3 3.2 2.7 2.3   Notes  reported       reported 10/25; sick; cefdinir stop cefdinir 10/29      Date 11/15 11/22 12/7 12/18 12/27 1/15/20 1/30 2/18 3/5 3/17   Total Weekly Dose 23.5mg 23mg 22.5mg 23mg 22.5mg 23mg ??? 23mg  23mg 22.5mg   INR 2.8 2.7 3.2  2.5 2.5 3.1 2.7 2.4 2.8 3.0   Notes   Reported   Inc GLV  Unable to reach pt MVI; decr Ensure  call     Date    Total Weekly Dose 22.5mg 23mg 23mg 23mg? 23mg 22.75mg avg   INR 3.3 2.9 2.6 2.8 2.9 2.49   Notes recv'd  rec'vd ; call call recv'd  ; call  recv'd 6/3; call      Date 7/22 7/31 8/26 9/3 9/11 10/8 10/13 11/24 12/3 12/22   Total Weekly Dose 22.75mg 23mg 22.75mg 23.5mg 23 mg 23.5 mg  avg 23.5mg avg 23.5 mg avg 23.5 mg 22.75  mg avg   INR 2.4 3.1 2.5 3.4 2.8 3.4 3.3 3.7 3.1 2.7   Notes recv'd 7/31 rec'vd 7/31 rec 9/3 Self-adj  CoQ10; email  email email Email; less Ensure Self-adjust dose dec      Phone Interview:  Tablet Strength: 3mg and 1mg tablets  Patient Contact Info:  Preferred *[phone]* anytime after noon  Other numbers on his profile:   [phone] (Home)    - brother in law, requested that we do NOT call this number  Lab Contact Info: [place]  Lab: [facility] phone: [phone] , Fax: [phone]   **will call once monthly or if INR is out of range**  [email or web address] PLEASE USE EMAIL TO COMMUNICATE    Patient Findings  Negatives:  Signs/symptoms of thrombosis, Signs/symptoms of bleeding, Laboratory test error suspected, Change in health, Change in alcohol use, Change in activity, Upcoming invasive procedure, Emergency department visit, Upcoming dental procedure, Missed doses, Extra doses, Change in medications, Change in diet/appetite, Hospital admission, Bruising, Other complaints   Comments:  Hope you enjoyed the holiday as well. For the past few weeks I've been alternating 3.0 and 3.5 mg. (3.0 one night and 3.5 the next). Except for the holiday sugar overload my diet has remained consistent with my normal fare. I've had some bruising on right hand and wrist but I think that resulted from wrestling my big, slow-witted tomcat in a pet carrier for a trip to the [employer]. I lost track of time so I guess I'm about a week till I test again. Please let me know and thanks.                  Doc     Plan:   1. INR is therapeutic today at 2.7. Instructed Mr. Turner to continue the new lower dose of 3mg and 3.5mg on alternating days until re-check.   2. Re-check INR in 2 weeks, 1/5/21  3. Email with calendar and dosing  instructions sent to Mr. Turner. He has no further questions at this time.  4. Doc Turner understands the importance of calling the [facility] if he notices any s/sx of bleeding, stroke, or abnormal bruising, if any changes are made to his medications or medication doses (Rx, OTC, herbal), or if any upcoming procedures are scheduled. Doc Turner will likewise let us know if any other changes, questions, or concerns arise regarding anticoagulation therapy. he understands the importance of seeking medical attention immediately if he experiences any falls, vehicle accidents, or abnormal bleeding or bruising. Doc Turner voiced understanding of this information and confirms that he has the [facility]'s contact information. Otherwise, we will plan to contact the patient once monthly or if his INR is out of range. Will contact via email for now     Olegario Fuentes CPhT  12/22/2020  08:54 Frances MENDEZ, PharmD, have reviewed the note in full and agree with the assessment and plan.  12/28/20  16:46 EST

## 2021-01-05 ENCOUNTER — ANTICOAGULATION VISIT (OUTPATIENT)
Dept: PHARMACY | Facility: HOSPITAL | Age: 71
End: 2021-01-05

## 2021-01-05 DIAGNOSIS — I48.92 PAROXYSMAL ATRIAL FLUTTER (HCC): ICD-10-CM

## 2021-01-05 DIAGNOSIS — Z95.2 HX OF MITRAL VALVE REPLACEMENT WITH MECHANICAL VALVE: ICD-10-CM

## 2021-01-05 LAB — INR PPP: 2.6

## 2021-01-05 RX ORDER — WARFARIN SODIUM 3 MG/1
TABLET ORAL
Qty: 90 TABLET | Refills: 1 | Status: SHIPPED | OUTPATIENT
Start: 2021-01-05 | End: 2021-01-05 | Stop reason: SDUPTHER

## 2021-01-05 RX ORDER — WARFARIN SODIUM 1 MG/1
TABLET ORAL
Qty: 30 TABLET | Refills: 1 | Status: SHIPPED | OUTPATIENT
Start: 2021-01-05 | End: 2021-04-27 | Stop reason: SDUPTHER

## 2021-01-05 RX ORDER — WARFARIN SODIUM 3 MG/1
TABLET ORAL
Qty: 90 TABLET | Refills: 1 | Status: SHIPPED | OUTPATIENT
Start: 2021-01-05 | End: 2021-10-29 | Stop reason: SDUPTHER

## 2021-01-05 RX ORDER — WARFARIN SODIUM 1 MG/1
TABLET ORAL
Qty: 30 TABLET | Refills: 1 | Status: SHIPPED | OUTPATIENT
Start: 2021-01-05 | End: 2021-01-05 | Stop reason: SDUPTHER

## 2021-01-05 NOTE — PROGRESS NOTES
Anticoagulation Clinic - Remote Progress Note  [facility]  Frequency of Monitorin-4x a month    Indication: Hx of mitral valve replacement with mechanical valve Kelly VHD  Referring Provider: Jenniffer Madrid 19)  Initial Warfarin Start Date:    Goal INR: 2.5-3.5  Current Drug Interactions: ensure (once daily); amitriptyline (PRN)  CHADS-VASc: 2 (age, HTN)     Diet: iceberg lettuce 1x/week, ensure 3-4x/week(20)  Alcohol: 12 pack of beer/week  Tobacco: None  OTC Pain Medication: None     INR History:  Date 8/25 9/11 9/29 10/19 11/5 12/2 12/11 1/4/18 1/30 2/19 3/7 4/5   Total Weekly Dose 24mg 24mg 24mg 22.75  mg? 22.75  mg? ? 22.5mg 22.5mg 22.5mg 22.5mg 22.5mg 22.5mg   INR 3.3 2.8 2.9 3.1 3.5 2.7 2.7 2.6 2.9 2.5 3.3 3.7   Notes    LVM LVM   call   LVM      Date 4/18 5/20 6/5 6/27 7/24 7/31 9/9 10/8 11/8 12/14 2/20/19 2/25   Total Weekly Dose 22.5mg 23mg 22.5mg  23mg 24mg 24mg unable   to verify unable   to verify unable   to verify unable   to verify 23mg   INR 2.6 2.8 2.9 2.4 2.9 3.3 3.4 3.0 2.7 2.6 3.8 3.4   Notes      rec'vd   recv'd 10/13;  mult calls         Date 3/11 3/25 4/9 5/1 5/15 6/11 6/27 7/10 7/26 8/9 9/11 10/3   Total Weekly Dose 23mg 23mg 22.5mg 23mg 22.5mg 23mg  22.5mg 22.5mg 22.5mg 23mg 22.5mg   INR 3.6 3.0 3.2 3.3 2.5 3.1 3.6 2.9 2.3 2.9 2.9 2.8   Notes        reported          Date 10/15 10/24 10/31 11/8 11/15 11/22 12/7 12/18 12/27 1/15/20 1/30 2/18   Total Weekly Dose 23mg 22.5mg 23mg 23mg 23.5mg 23mg 22.5mg 23mg 22.5mg 23mg ??? 23mg    INR 3.3 3.2 2.7 2.3 2.8 2.7 3.2  2.5 2.5 3.1 2.7 2.4   Notes  reported 10/25; sick; cefdinir stop cefdinir 10/29    Reported   Inc GLV  Unable to reach pt MVI; decr Ensure     Date 3/5 3/17 4/4 4/23 5/4 5/19 6/2 6/24 7/22 7/31 8/26 9/3   Total Weekly Dose 23mg 22.5mg 22.5mg 23mg 23mg 23mg? 23mg 22.75  mg avg 22.75  mg 23mg 22.75  mg 23.5mg   INR 2.8 3.0 3.3 2.9 2.6 2.8 2.9 2.49 2.4 3.1 2.5 3.4   Notes  call recv'd  rec'vd ;  call call recv'd  5/20; call recv'd 6/3; call  recv'd 7/31 recv'd 7/31 recv'd 9/3 Self-adj  CoQ10; email     Date 9/11 10/8 10/13 11/24 12/3 12/22 1/4/21        Total Weekly Dose 23mg 23.5mg  avg 23.5mg   avg 23.5mg  avg 23.5mg 22.75  mg avg 22.75  mg avg        INR 2.8 3.4 3.3 3.7 3.1 2.7 2.6        Notes  email email Email; less Ensure Self-adjust dose dec  recv'd 1/5; call          Phone Interview:  Tablet Strength: 3mg and 1mg tablets  Patient Contact Info:  Preferred *[phone]* anytime after noon  Other numbers on his profile:   [phone] (Home)    - brother in law, requested that we do NOT call this number  Lab Contact Info: [place]  Lab: [facility] in [facility] phone: [phone] , Fax: [phone]   **will call once monthly or if INR is out of range**  [email or web address] PLEASE USE EMAIL TO COMMUNICATE      Patient Findings  Negatives:  Signs/symptoms of thrombosis, Signs/symptoms of bleeding, Laboratory test error suspected, Change in health, Change in alcohol use, Change in activity, Upcoming invasive procedure, Emergency department visit, Upcoming dental procedure, Missed doses, Extra doses, Change in medications, Change in diet/appetite, Hospital admission, Bruising, Other complaints     Plan:   1. INR was therapeutic yesterday at 2.6. Patient called results into clinic today, 1/5. Instructed Mr. Turner to continue warfarin 3mg and 3.5mg on alternating days until re-check.   2. Re-check INR in 2 weeks, 1/18  3. Verbal information provided over the phone. Doc Turner RBV dosing instructions, expresses understanding by teach back, and has no further questions at this time.  4. Doc Turner understands the importance of calling the [facility] if he notices any s/sx of bleeding, stroke, or abnormal bruising, if any changes are made to his medications or medication doses (Rx, OTC, herbal), or if any upcoming procedures are scheduled. Doc Turner will likewise let us know  if any other changes, questions, or concerns arise regarding anticoagulation therapy. he understands the importance of seeking medical attention immediately if he experiences any falls, vehicle accidents, or abnormal bleeding or bruising. Doc Turner voiced understanding of this information and confirms that he has the [facility]'s contact information. Otherwise, we will plan to contact the patient once monthly or if his INR is out of range. Will contact via email for now   5. Patient requests refills on his warfarin 1mg and 3mg tablets to be called into [facility] in [place], [place]    Olegario Fuentes, YOUNG  1/5/2021  12:21 EST     REID, Atif Mallory, PharmD, have reviewed the note in full and agree with the assessment and plan.  01/05/21  16:25 EST  Refills sent

## 2021-01-21 ENCOUNTER — TELEPHONE (OUTPATIENT)
Dept: PHARMACY | Facility: HOSPITAL | Age: 71
End: 2021-01-21

## 2021-01-21 NOTE — TELEPHONE ENCOUNTER
Email sent to [email or web address] reminding Mr. Turner he is overdue to check his INR.

## 2021-01-29 NOTE — TELEPHONE ENCOUNTER
Third email sent to Penelope Johnny at [email or web address] reminding him to check his INR.    He will be due for first letter on 2/1/21.

## 2021-02-08 ENCOUNTER — ANTICOAGULATION VISIT (OUTPATIENT)
Dept: PHARMACY | Facility: HOSPITAL | Age: 71
End: 2021-02-08

## 2021-02-08 DIAGNOSIS — I48.92 PAROXYSMAL ATRIAL FLUTTER (HCC): ICD-10-CM

## 2021-02-08 DIAGNOSIS — Z95.2 HX OF MITRAL VALVE REPLACEMENT WITH MECHANICAL VALVE: ICD-10-CM

## 2021-02-08 LAB
INR PPP: 3.2
INR PPP: 3.7

## 2021-02-08 NOTE — PROGRESS NOTES
Anticoagulation Clinic - Remote Progress Note  [facility]  Frequency of Monitorin-4x a month    Indication: Hx of mitral valve replacement with mechanical valve Kelly VHD  Referring Provider: Jenniffer Madrid 19)  Initial Warfarin Start Date:    Goal INR: 2.5-3.5  Current Drug Interactions: ensure (once daily); amitriptyline (PRN)  CHADS-VASc: 2 (age, HTN)     Diet: iceberg lettuce 1x/week, ensure 3-4x/week(20)  Alcohol: 12 pack of beer/week  Tobacco: None  OTC Pain Medication: None     INR History:  Date 8/25 9/11 9/29 10/19 11/5 12/2 12/11 1/4/18 1/30 2/19 3/7 4/5   Total Weekly Dose 24mg 24mg 24mg 22.75  mg? 22.75  mg? ? 22.5mg 22.5mg 22.5mg 22.5mg 22.5mg 22.5mg   INR 3.3 2.8 2.9 3.1 3.5 2.7 2.7 2.6 2.9 2.5 3.3 3.7   Notes    LVM LVM   call   LVM      Date 4/18 5/20 6/5 6/27 7/24 7/31 9/9 10/8 11/8 12/14 2/20/19 2/25   Total Weekly Dose 22.5mg 23mg 22.5mg  23mg 24mg 24mg unable   to verify unable   to verify unable   to verify unable   to verify 23mg   INR 2.6 2.8 2.9 2.4 2.9 3.3 3.4 3.0 2.7 2.6 3.8 3.4   Notes      rec'vd   recv'd 10/13;  mult calls         Date 3/11 3/25 4/9 5/1 5/15 6/11 6/27 7/10 7/26 8/9 9/11 10/3   Total Weekly Dose 23mg 23mg 22.5mg 23mg 22.5mg 23mg  22.5mg 22.5mg 22.5mg 23mg 22.5mg   INR 3.6 3.0 3.2 3.3 2.5 3.1 3.6 2.9 2.3 2.9 2.9 2.8   Notes        reported          Date 10/15 10/24 10/31 11/8 11/15 11/22 12/7 12/18 12/27 1/15/20 1/30 2/18   Total Weekly Dose 23mg 22.5mg 23mg 23mg 23.5mg 23mg 22.5mg 23mg 22.5mg 23mg ??? 23mg    INR 3.3 3.2 2.7 2.3 2.8 2.7 3.2  2.5 2.5 3.1 2.7 2.4   Notes  reported 10/25; sick; cefdinir stop cefdinir 10/29    Reported   Inc GLV  Unable to reach pt MVI; decr Ensure     Date 3/5 3/17 4/4 4/23 5/4 5/19 6/2 6/24 7/22 7/31 8/26 9/3   Total Weekly Dose 23mg 22.5mg 22.5mg 23mg 23mg 23mg? 23mg 22.75  mg avg 22.75  mg 23mg 22.75  mg 23.5mg   INR 2.8 3.0 3.3 2.9 2.6 2.8 2.9 2.49 2.4 3.1 2.5 3.4   Notes  call recv'd  rec'vd ;  call call recv'd  5/20; call recv'd 6/3; call  recv'd 7/31 recv'd 7/31 recv'd 9/3 Self-adj  CoQ10; email     Date 9/11 10/8 10/13 11/24 12/3 12/22 1/4/21  2/6  2/8      Total Weekly Dose 23mg 23.5mg  avg 23.5mg   avg 23.5mg  avg 23.5mg 22.75  mg avg 22.75  mg avg  22.5 mg 19 mg      INR 2.8 3.4 3.3 3.7 3.1 2.7 2.6  3.7  3.2      Notes  email email Email; less Ensure Self-adjust dose dec  recv'd 1/5; call  Self heldx1        Phone Interview:  Tablet Strength: 3mg and 1mg tablets  Patient Contact Info:  Preferred *[phone]* anytime after noon  Other numbers on his profile:   [phone] (Home)    - brother in law, requested that we do NOT call this number  Lab Contact Info: [place]  Lab: [facility] phone: [phone] , Fax: [phone]   **will call once monthly or if INR is out of range**  [email or web address] PLEASE USE EMAIL TO COMMUNICATE    Patient Findings    Positives:  Change in health, Missed doses, Change in medications   Negatives:  Signs/symptoms of thrombosis, Signs/symptoms of bleeding, Laboratory test error suspected, Change in alcohol use, Change in activity, Upcoming invasive procedure, Emergency department visit, Upcoming dental procedure, Extra doses, Change in diet/appetite, Hospital admission, Bruising, Other complaints   Comments:  Mr. Turner responded via e-mail on 2/8 at 1026 pm:   I'm just getting over a long bout with Covid 19. Supplements were recommended, including zinc, a Vitamin D supplement and 800 mgs. E. I only in the last three days have checked my PT/INR. Two days ago I tested at 3.7. I skipped yesterday and wound up within my limit today. I tried to maintain a one day 3.5 mg, next two days 3.0 mg. because I was aware of the potential problems with Vitamin E.    I may not have adhered to my warfarin schedule, however. I didn't need to be hospitalized, thank the melara that be, but it was like being in an extended bad dream. The past week I've just slept,  pretty much. Feeling noticeably better now.  (potential DDI: Concurrent use of WARFARIN and VITAMIN E may result in an increased risk of bleeding).      Responded to email encouraging him to avoid self-adjustment of doses if possible and try to check INR when clinic is open.     Plan:     1. INR is slightly supra therapeutic today at 3.7 (goal 2.5 - 3.5). Patient called results into clinic today, 1/5. Instructed Mr. Turner to resume warfarin 3mg and 3.5mg on alternating days until re-check. (via e-mail)  2. Re-check INR in 1 week, on 2/17  3. Information provided via e-mail. Doc Turner RBV dosing instructions, expresses understanding by teach back, and has no further questions at this time.  4. Doc DELEON Johnny understands the importance of calling the [facility] if he notices any s/sx of bleeding, stroke, or abnormal bruising, if any changes are made to his medications or medication doses (Rx, OTC, herbal), or if any upcoming procedures are scheduled. Doc DELEON Johnny will likewise let us know if any other changes, questions, or concerns arise regarding anticoagulation therapy. he understands the importance of seeking medical attention immediately if he experiences any falls, vehicle accidents, or abnormal bleeding or bruising. Doc Turner voiced understanding of this information and confirms that he has the [facility]'s contact information. Otherwise, we will plan to contact the patient once monthly or if his INR is out of range. Will contact via email for now       Lauren Milligan PharmD  2/8/2021  16:33 EST

## 2021-02-19 ENCOUNTER — ANTICOAGULATION VISIT (OUTPATIENT)
Dept: PHARMACY | Facility: HOSPITAL | Age: 71
End: 2021-02-19

## 2021-02-19 DIAGNOSIS — I48.92 PAROXYSMAL ATRIAL FLUTTER (HCC): ICD-10-CM

## 2021-02-19 DIAGNOSIS — Z95.2 HX OF MITRAL VALVE REPLACEMENT WITH MECHANICAL VALVE: ICD-10-CM

## 2021-02-19 LAB — INR PPP: 2.8

## 2021-02-19 NOTE — PROGRESS NOTES
Anticoagulation Clinic - Remote Progress Note  [facility]  Frequency of Monitorin-4x a month    Indication: Hx of mitral valve replacement with mechanical valve Kelly VHD  Referring Provider: Jenniffer Madrid 19)  Initial Warfarin Start Date:    Goal INR: 2.5-3.5  Current Drug Interactions: ensure (once daily); amitriptyline (PRN)  CHADS-VASc: 2 (age, HTN)     Diet: iceberg lettuce 1x/week, ensure 3-4x/week, V8 juice (20)  Alcohol: 12 pack of beer/week  Tobacco: None  OTC Pain Medication: None     INR History:  Date 8/25 9/11 9/29 10/19 11/5 12/2 12/11 1/4/18 1/30 2/19 3/7 4/5   Total Weekly Dose 24mg 24mg 24mg 22.75  mg? 22.75  mg? ? 22.5mg 22.5mg 22.5mg 22.5mg 22.5mg 22.5mg   INR 3.3 2.8 2.9 3.1 3.5 2.7 2.7 2.6 2.9 2.5 3.3 3.7   Notes    LVM LVM   call   LVM      Date 4/18 5/20 6/5 6/27 7/24 7/31 9/9 10/8 11/8 12/14 2/20/19 2/25   Total Weekly Dose 22.5mg 23mg 22.5mg  23mg 24mg 24mg unable   to verify unable   to verify unable   to verify unable   to verify 23mg   INR 2.6 2.8 2.9 2.4 2.9 3.3 3.4 3.0 2.7 2.6 3.8 3.4   Notes      rec'vd   recv'd 10/13;  mult calls         Date 3/11 3/25 4/9 5/1 5/15 6/11 6/27 7/10 7/26 8/9 9/11 10/3   Total Weekly Dose 23mg 23mg 22.5mg 23mg 22.5mg 23mg  22.5mg 22.5mg 22.5mg 23mg 22.5mg   INR 3.6 3.0 3.2 3.3 2.5 3.1 3.6 2.9 2.3 2.9 2.9 2.8   Notes        reported          Date 10/15 10/24 10/31 11/8 11/15 11/22 12/7 12/18 12/27 1/15/20 1/30 2/18   Total Weekly Dose 23mg 22.5mg 23mg 23mg 23.5mg 23mg 22.5mg 23mg 22.5mg 23mg ??? 23mg    INR 3.3 3.2 2.7 2.3 2.8 2.7 3.2  2.5 2.5 3.1 2.7 2.4   Notes  reported 10/25; sick; cefdinir stop cefdinir 10/29    Reported   Inc GLV  Unable to reach pt MVI; decr Ensure     Date 3/5 3/17 4/4 4/23 5/4 5/19 6/2 6/24 7/22 7/31 8/26 9/3   Total Weekly Dose 23mg 22.5mg 22.5mg 23mg 23mg 23mg? 23mg 22.75  mg avg 22.75  mg 23mg 22.75  mg 23.5mg   INR 2.8 3.0 3.3 2.9 2.6 2.8 2.9 2.49 2.4 3.1 2.5 3.4   Notes  call recv'd   "rec'vd 4/24; call call recv'd  5/20; call recv'd 6/3; call  recv'd 7/31 recv'd 7/31 recv'd 9/3 Self-adj  CoQ10; email     Date 9/11 10/8 10/13 11/24 12/3 12/22 1/4/21  2/6  2/8 2/19     Total Weekly Dose 23mg 23.5mg  avg 23.5mg   avg 23.5mg  avg 23.5mg 22.75  mg avg 22.75  mg avg  22.5 mg 19 mg 22.75 mg avg     INR 2.8 3.4 3.3 3.7 3.1 2.7 2.6  3.7  3.2 2.8     Notes  email email Email; less Ensure Self-adjust dose dec  recv'd 1/5; call  Self held x1        Phone Interview:  Tablet Strength: 3mg and 1mg tablets  Patient Contact Info:  Preferred *[phone]* anytime after noon  Other numbers on his profile:   [phone] (Home)    - brother in law, requested that we do NOT call this number  Lab Contact Info: [employer]  Lab: [facility] phone: [phone] , Fax: [phone]   **will call once monthly or if INR is out of range**  [email or web address] PLEASE USE EMAIL TO COMMUNICATE    Patient Findings:  Positives:  Change in medications   Negatives:  Signs/symptoms of thrombosis, Signs/symptoms of bleeding, Laboratory test error suspected, Change in health, Change in alcohol use, Change in activity, Upcoming invasive procedure, Emergency department visit, Upcoming dental procedure, Missed doses, Extra doses, Change in diet/appetite, Hospital admission, Bruising, Other complaints   Comments:  Stopped vitamin E supplement. See email response from Mr. Turner below (2/23):     Ms Redmond, I'm currently alternating 3.0 and 3.5 mg day to day. The only green veggies in my diet normally consist of the occasional garden salad and one or two cans of V8 juice a week. I haven't taken the E supplement in a couple of weeks now and nothing is different as far as bruising or bleeding aside from the \"old man\" bruises I've come to expect! My prescriptions remain the same. Please let me know when to test next.       Thanks, Doc     Plan:   1. INR was therapeutic on 2/19 at 2.8 (goal 2.5-3.5). Unable to receive " response from patient via email until 2/23. Instructed Mr. Turner to continue maintenance dose of warfarin 3 mg and 3.5 mg alternating every 2 days until recheck. (22.75 mg/weekly avg)  2. Re-check INR in two weeks.  3. Information provided via e-mail. Doc Turner RBV dosing instructions, expresses understanding by teach back, and has no further questions at this time.  4. Doc Turner understands the importance of calling the [facility] if he notices any s/sx of bleeding, stroke, or abnormal bruising, if any changes are made to his medications or medication doses (Rx, OTC, herbal), or if any upcoming procedures are scheduled. Doc Turner will likewise let us know if any other changes, questions, or concerns arise regarding anticoagulation therapy. he understands the importance of seeking medical attention immediately if he experiences any falls, vehicle accidents, or abnormal bleeding or bruising. Doc Turner voiced understanding of this information and confirms that he has the [facility]'s contact information. Otherwise, we will plan to contact the patient once monthly or if his INR is out of range. Will contact via email for now     Teresita Redmond, [facility]  2/19/2021  15:00 EST      Frances MATHEWS, PharmD, have reviewed the note in full and agree with the assessment and plan.  02/23/21  16:55 EST

## 2021-03-10 ENCOUNTER — ANTICOAGULATION VISIT (OUTPATIENT)
Dept: PHARMACY | Facility: HOSPITAL | Age: 71
End: 2021-03-10

## 2021-03-10 DIAGNOSIS — Z95.2 HX OF MITRAL VALVE REPLACEMENT WITH MECHANICAL VALVE: ICD-10-CM

## 2021-03-10 DIAGNOSIS — I48.92 PAROXYSMAL ATRIAL FLUTTER (HCC): ICD-10-CM

## 2021-03-10 LAB — INR PPP: 3.2

## 2021-03-10 NOTE — PROGRESS NOTES
Anticoagulation Clinic - Remote Progress Note  [facility]  Frequency of Monitorin-4x a month    Indication: Hx of mitral valve replacement with mechanical valve Kelly VHD  Referring Provider: Jenniffer Madrid 19)  Initial Warfarin Start Date:    Goal INR: 2.5-3.5  Current Drug Interactions: ensure (once daily); amitriptyline (PRN)  CHADS-VASc: 2 (age, HTN)     Diet: iceberg lettuce 1x/week, ensure 3-4x/week, V8 juice (20)  Alcohol: 12 pack of beer/week  Tobacco: None  OTC Pain Medication: None     INR History:  Date 8/25 9/11 9/29 10/19 11/5 12/2 12/11 1/4/18 1/30 2/19 3/7 4/5   Total Weekly Dose 24mg 24mg 24mg 22.75  mg? 22.75  mg? ? 22.5mg 22.5mg 22.5mg 22.5mg 22.5mg 22.5mg   INR 3.3 2.8 2.9 3.1 3.5 2.7 2.7 2.6 2.9 2.5 3.3 3.7   Notes    LVM LVM   call   LVM      Date 4/18 5/20 6/5 6/27 7/24 7/31 9/9 10/8 11/8 12/14 2/20/19 2/25   Total Weekly Dose 22.5mg 23mg 22.5mg  23mg 24mg 24mg unable   to verify unable   to verify unable   to verify unable   to verify 23mg   INR 2.6 2.8 2.9 2.4 2.9 3.3 3.4 3.0 2.7 2.6 3.8 3.4   Notes      rec'vd   recv'd 10/13;  mult calls         Date 3/11 3/25 4/9 5/1 5/15 6/11 6/27 7/10 7/26 8/9 9/11 10/3   Total Weekly Dose 23mg 23mg 22.5mg 23mg 22.5mg 23mg  22.5mg 22.5mg 22.5mg 23mg 22.5mg   INR 3.6 3.0 3.2 3.3 2.5 3.1 3.6 2.9 2.3 2.9 2.9 2.8   Notes        reported          Date 10/15 10/24 10/31 11/8 11/15 11/22 12/7 12/18 12/27 1/15/20 1/30 2/18   Total Weekly Dose 23mg 22.5mg 23mg 23mg 23.5mg 23mg 22.5mg 23mg 22.5mg 23mg ??? 23mg    INR 3.3 3.2 2.7 2.3 2.8 2.7 3.2  2.5 2.5 3.1 2.7 2.4   Notes  reported 10/25; sick; cefdinir stop cefdinir 10/29    Reported   Inc GLV  Unable to reach pt MVI; decr Ensure     Date 3/5 3/17 4/4 4/23 5/4 5/19 6/2 6/24 7/22 7/31 8/26 9/3   Total Weekly Dose 23mg 22.5mg 22.5mg 23mg 23mg 23mg? 23mg 22.75  mg avg 22.75  mg 23mg 22.75  mg 23.5mg   INR 2.8 3.0 3.3 2.9 2.6 2.8 2.9 2.49 2.4 3.1 2.5 3.4   Notes  call recv'd   rec'vd 4/24; call call recv'd  5/20; call recv'd 6/3; call  recv'd 7/31 recv'd 7/31 recv'd 9/3 Self-adj  CoQ10; email     Date 9/11 10/8 10/13 11/24 12/3 12/22 1/4/21  2/6  2/8 2/19 3/9    Total Weekly Dose 23mg 23.5mg  avg 23.5mg   avg 23.5mg  avg 23.5mg 22.75  mg avg 22.75  mg avg  22.5 mg 19 mg 22.75 mg avg 22.75 mg  avg    INR 2.8 3.4 3.3 3.7 3.1 2.7 2.6  3.7  3.2 2.8 3.2    Notes  email email Email; less Ensure Self-adjust dose dec  recv'd 1/5; call email Email; Self held x1 email email      Phone Interview:  Tablet Strength: 3mg and 1mg tablets  Patient Contact Info:  Preferred *[phone]* anytime after noon  Other numbers on his profile:   [phone] (Home)    - brother in law, requested that we do NOT call this number  Lab Contact Info: [place]  Lab: [facility] phone: [phone] , Fax: [phone]   **will call once monthly or if INR is out of range**  [email or web address] PLEASE USE EMAIL TO COMMUNICATE    Plan:   1. INR was therapeutic yesterday at 3.2 (goal 2.5-3.5). Instructed Mr. Turner via email to continue maintenance dose of warfarin 3 mg and 3.5 mg alternating every 2 days until recheck. (22.75 mg/weekly avg)  2. Repeat INR in two weeks. May resume once monthly emails beginning today.  3. Email sent reminding Mr. Turner to continue current dosing and to continue to check every two weeks.  4. Doc Turner understands the importance of calling the [facility] if he notices any s/sx of bleeding, stroke, or abnormal bruising, if any changes are made to his medications or medication doses (Rx, OTC, herbal), or if any upcoming procedures are scheduled. Doc Turner will likewise let us know if any other changes, questions, or concerns arise regarding anticoagulation therapy. he understands the importance of seeking medical attention immediately if he experiences any falls, vehicle accidents, or abnormal bleeding or bruising. Doc Turner voiced understanding of  this information and confirms that he has the [facility]'s contact information. Otherwise, we will plan to contact the patient once monthly or if his INR is out of range. Will contact via email for now     Teresita Redmond CPhT  3/10/2021  08:07 Frances MENDEZ, PharmD, have reviewed the note in full and agree with the assessment and plan.  03/10/21  10:12 EST

## 2021-04-15 ENCOUNTER — ANTICOAGULATION VISIT (OUTPATIENT)
Dept: PHARMACY | Facility: HOSPITAL | Age: 71
End: 2021-04-15

## 2021-04-15 DIAGNOSIS — I48.92 PAROXYSMAL ATRIAL FLUTTER (HCC): Primary | ICD-10-CM

## 2021-04-15 DIAGNOSIS — Z95.2 HX OF MITRAL VALVE REPLACEMENT WITH MECHANICAL VALVE: ICD-10-CM

## 2021-04-15 LAB — INR PPP: 2.6

## 2021-04-15 NOTE — PROGRESS NOTES
Anticoagulation Clinic - Remote Progress Note  [facility]  Frequency of Monitorin-4x a month    Indication: Hx of mitral valve replacement with mechanical valve Kelly VHD  Referring Provider: Jenniffer Madrid 19)  Initial Warfarin Start Date:    Goal INR: 2.5-3.5  Current Drug Interactions: ensure (once daily); amitriptyline (PRN)  CHADS-VASc: 2 (age, HTN)     Diet: iceberg lettuce 1x/week, ensure 3-4x/week, V8 juice (20)  Alcohol: 12 pack of beer/week  Tobacco: None  OTC Pain Medication: None     INR History:  Date 8/25 9/11 9/29 10/19 11/5 12/2 12/11 1/4/18 1/30 2/19 3/7 4/5   Total Weekly Dose 24mg 24mg 24mg 22.75  mg? 22.75  mg? ? 22.5mg 22.5mg 22.5mg 22.5mg 22.5mg 22.5mg   INR 3.3 2.8 2.9 3.1 3.5 2.7 2.7 2.6 2.9 2.5 3.3 3.7   Notes    LVM LVM   call   LVM      Date 4/18 5/20 6/5 6/27 7/24 7/31 9/9 10/8 11/8 12/14 2/20/19 2/25   Total Weekly Dose 22.5mg 23mg 22.5mg  23mg 24mg 24mg unable   to verify unable   to verify unable   to verify unable   to verify 23mg   INR 2.6 2.8 2.9 2.4 2.9 3.3 3.4 3.0 2.7 2.6 3.8 3.4   Notes      rec'vd   recv'd 10/13;  mult calls         Date 3/11 3/25 4/9 5/1 5/15 6/11 6/27 7/10 7/26 8/9 9/11 10/3   Total Weekly Dose 23mg 23mg 22.5mg 23mg 22.5mg 23mg  22.5mg 22.5mg 22.5mg 23mg 22.5mg   INR 3.6 3.0 3.2 3.3 2.5 3.1 3.6 2.9 2.3 2.9 2.9 2.8   Notes        reported          Date 10/15 10/24 10/31 11/8 11/15 11/22 12/7 12/18 12/27 1/15/20 1/30 2/18   Total Weekly Dose 23mg 22.5mg 23mg 23mg 23.5mg 23mg 22.5mg 23mg 22.5mg 23mg ??? 23mg    INR 3.3 3.2 2.7 2.3 2.8 2.7 3.2  2.5 2.5 3.1 2.7 2.4   Notes  reported 10/25; sick; cefdinir stop cefdinir 10/29    Reported   Inc GLV  Unable to reach pt MVI; decr Ensure     Date 3/5 3/17 4/4 4/23 5/4 5/19 6/2 6/24 7/22 7/31 8/26 9/3   Total Weekly Dose 23mg 22.5mg 22.5mg 23mg 23mg 23mg? 23mg 22.75  mg avg 22.75  mg 23mg 22.75  mg 23.5mg   INR 2.8 3.0 3.3 2.9 2.6 2.8 2.9 2.49 2.4 3.1 2.5 3.4   Notes  call recv'd   rec'vd 4/24; call call recv'd  5/20; call recv'd 6/3; call  recv'd 7/31 recv'd 7/31 recv'd 9/3 Self-adj  CoQ10; email     Date 9/11 10/8 10/13 11/24 12/3 12/22 1/4/21 2/6 2/8 2/19 3/9 4/15   Total Weekly Dose 23mg 23.5mg  avg 23.5mg   avg 23.5mg  avg 23.5mg 22.75  mg avg 22.75  mg avg 22.5mg 19mg 22.75  mg avg 22.75   mg avg 22.75   mg avg   INR 2.8 3.4 3.3 3.7 3.1 2.7 2.6 3.7 3.2 2.8 3.2 2.6   Notes  email email Email; less Ensure Self-adjust dose dec  recv'd 1/5; call email Email; Self held x1 email email      Date               Total WeeklyDose               INR               Notes                   Phone Interview:  Tablet Strength: 3mg and 1mg tablets  Patient Contact Info:  Preferred *[phone]* anytime after noon  Other numbers on his profile:   [phone] (Home)    - brother in law, requested that we do NOT call this number  Lab Contact Info: [place]  Lab: [facility] in [facility] phone: [phone] , Fax: [phone]   **will call once monthly or if INR is out of range**  [email or web address] PLEASE USE EMAIL TO COMMUNICATE    Patient Findings  Negatives:  Signs/symptoms of thrombosis, Signs/symptoms of bleeding, Laboratory test error suspected, Change in health, Change in alcohol use, Change in activity, Upcoming invasive procedure, Emergency department visit, Upcoming dental procedure, Missed doses, Extra doses, Change in medications, Change in diet/appetite, Hospital admission, Bruising, Other complaints   Comments:  I've continued to alternate 3 mg and 3.5 mg day to day. I embarrassed to admit it but I have no more than one serving of green vegetables per week. I do try to down one of those little cans of V-8 juice about three times a week. I have been taking 100 mg a day of COq10 for some time (at least a year) along with 500 mg. of l-carnitine. I know the COq10 can work against blood thinners but my intake has been consistent. The carnitine doesn't appear to affect INR, as far as I can  find out in the literature. I continue to take the supplements you mentioned. And, I have an appointment with my dentist next Monday. Do you think maybe I should test Friday morning? I figure you could make any suggestions as to adjustments with the dental appointment in mind.                 Thanks,. Doc       Have requested patient let us know if procedure is scheduled. Otherwise if routine cleaning/check-up he will continue with current dosing.     Plan:   1. INR is therapeutic today at 2.6 (goal 2.5-3.5). Instructed Mr. Turner via email to continue maintenance dose of warfarin 3mg and 3.5mg alternating every 2 days until recheck. (22.75mg/weekly avg)  2. Repeat INR in two weeks. May resume once monthly emails beginning today  3. Doc Turner understands the importance of calling the [facility] if he notices any s/sx of bleeding, stroke, or abnormal bruising, if any changes are made to his medications or medication doses (Rx, OTC, herbal), or if any upcoming procedures are scheduled. Doc Turner will likewise let us know if any other changes, questions, or concerns arise regarding anticoagulation therapy. he understands the importance of seeking medical attention immediately if he experiences any falls, vehicle accidents, or abnormal bleeding or bruising. Doc Turner voiced understanding of this information and confirms that he has the [facility]'s contact information. Otherwise, we will plan to contact the patient once monthly or if his INR is out of range. Will contact via email for now     Olegario Fuentes CPhT  4/15/2021  16:12 EDT      IJosephine, PharmD, have reviewed the note in full and agree with the assessment and plan.  04/20/21  16:17 EDT

## 2021-04-27 RX ORDER — WARFARIN SODIUM 1 MG/1
TABLET ORAL
Qty: 30 TABLET | Refills: 1 | Status: SHIPPED | OUTPATIENT
Start: 2021-04-27 | End: 2021-10-29 | Stop reason: SDUPTHER

## 2021-04-27 RX ORDER — LISINOPRIL 10 MG/1
10 TABLET ORAL EVERY MORNING
Qty: 90 TABLET | Refills: 3 | Status: SHIPPED | OUTPATIENT
Start: 2021-04-27 | End: 2022-02-22

## 2021-05-05 ENCOUNTER — ANTICOAGULATION VISIT (OUTPATIENT)
Dept: PHARMACY | Facility: HOSPITAL | Age: 71
End: 2021-05-05

## 2021-05-05 DIAGNOSIS — I48.92 PAROXYSMAL ATRIAL FLUTTER (HCC): Primary | ICD-10-CM

## 2021-05-05 DIAGNOSIS — Z95.2 HX OF MITRAL VALVE REPLACEMENT WITH MECHANICAL VALVE: ICD-10-CM

## 2021-05-05 LAB — INR PPP: 2.9

## 2021-05-05 NOTE — PROGRESS NOTES
Anticoagulation Clinic - Remote Progress Note  [facility]  Frequency of Monitorin-4x a month    Indication: Hx of mitral valve replacement with mechanical valve Kelly VHD  Referring Provider: Jenniffer Madrid 19)  Initial Warfarin Start Date:    Goal INR: 2.5-3.5  Current Drug Interactions: ensure (once daily); amitriptyline (PRN)  CHADS-VASc: 2 (age, HTN)     Diet: iceberg lettuce 1x/week, ensure 3-4x/week, V8 juice (20)  Alcohol: 12 pack of beer/week  Tobacco: None  OTC Pain Medication: None     INR History:  Date 8/25 9/11 9/29 10/19 11/5 12/2 12/11 1/4/18 1/30 2/19 3/7 4/5   Total Weekly Dose 24mg 24mg 24mg 22.75  mg? 22.75  mg? ? 22.5mg 22.5mg 22.5mg 22.5mg 22.5mg 22.5mg   INR 3.3 2.8 2.9 3.1 3.5 2.7 2.7 2.6 2.9 2.5 3.3 3.7   Notes    LVM LVM   call   LVM      Date 4/18 5/20 6/5 6/27 7/24 7/31 9/9 10/8 11/8 12/14 2/20/19 2/25   Total Weekly Dose 22.5mg 23mg 22.5mg  23mg 24mg 24mg unable   to verify unable   to verify unable   to verify unable   to verify 23mg   INR 2.6 2.8 2.9 2.4 2.9 3.3 3.4 3.0 2.7 2.6 3.8 3.4   Notes      rec'vd   recv'd 10/13;  mult calls         Date 3/11 3/25 4/9 5/1 5/15 6/11 6/27 7/10 7/26 8/9 9/11 10/3   Total Weekly Dose 23mg 23mg 22.5mg 23mg 22.5mg 23mg  22.5mg 22.5mg 22.5mg 23mg 22.5mg   INR 3.6 3.0 3.2 3.3 2.5 3.1 3.6 2.9 2.3 2.9 2.9 2.8   Notes        reported          Date 10/15 10/24 10/31 11/8 11/15 11/22 12/7 12/18 12/27 1/15/20 1/30 2/18   Total Weekly Dose 23mg 22.5mg 23mg 23mg 23.5mg 23mg 22.5mg 23mg 22.5mg 23mg ??? 23mg    INR 3.3 3.2 2.7 2.3 2.8 2.7 3.2  2.5 2.5 3.1 2.7 2.4   Notes  reported 10/25; sick; cefdinir stop cefdinir 10/29    Reported   Inc GLV  Unable to reach pt MVI; decr Ensure     Date 3/5 3/17 4/4 4/23 5/4 5/19 6/2 6/24 7/22 7/31 8/26 9/3   Total Weekly Dose 23mg 22.5mg 22.5mg 23mg 23mg 23mg? 23mg 22.75  mg avg 22.75  mg 23mg 22.75  mg 23.5mg   INR 2.8 3.0 3.3 2.9 2.6 2.8 2.9 2.49 2.4 3.1 2.5 3.4   Notes  call recv'd   rec'vd 4/24; call call recv'd  5/20; call recv'd 6/3; call  recv'd 7/31 recv'd 7/31 recv'd 9/3 Self-adj  CoQ10; email     Date 9/11 10/8 10/13 11/24 12/3 12/22 1/4/21 2/6 2/8 2/19 3/9 4/15   Total Weekly Dose 23mg 23.5mg  avg 23.5mg   avg 23.5mg  avg 23.5mg 22.75  mg avg 22.75  mg avg 22.5mg 19mg 22.75  mg avg 22.75   mg avg 22.75   mg avg   INR 2.8 3.4 3.3 3.7 3.1 2.7 2.6 3.7 3.2 2.8 3.2 2.6   Notes  email email Email; less Ensure Self-adjust dose dec  recv'd 1/5; call email Email; Self held x1 email email      Date 5/5              Total WeeklyDose 22.75 mg avg              INR 2.9              Notes email; fall                Phone Interview:  Tablet Strength: 3mg and 1mg tablets  Patient Contact Info:  Preferred *[phone]* anytime after noon  Other numbers on his profile:   [phone] (Home)    - brother in law, requested that we do NOT call this number  Lab Contact Info: [place]  Lab: [facility] in [facility] phone: [phone] , Fax: [phone]   **will call once monthly or if INR is out of range**  [email or web address] PLEASE USE EMAIL TO COMMUNICATE    Patient Findings:  Positives:  Bruising, Other complaints   Negatives:  Signs/symptoms of thrombosis, Signs/symptoms of bleeding, Laboratory test error suspected, Change in health, Change in alcohol use, Change in activity, Upcoming invasive procedure, Emergency department visit, Upcoming dental procedure, Missed doses, Extra doses, Change in medications, Change in diet/appetite, Hospital admission   Comments:  Mr. Turner's response email on 5/6/21:     My diet is unchanged and I continue to alternate the daily dose from 3 mg to 3.5. I had several fillings at my dentist's two weeks ago and he said my bleeding during the marathon session was well within what he recognizes as normal. My meds and supplement use hasn't changed. I had something of a spill on the back, outdoor steps last week when, as I tried to feed our two outside cats, two fat  xochitl rushed in to steal as many crunchies as they could. We're used to these antics but this time I went down! I have a giant hematoma on the side of my hip but that occurred the same day or the day after I had tested. I've always bruised like this, even as a child. Living in [facility] at [address] [place] is a wacky adventure at best and a hellish nightmare at worst! Possums are also well represented at the [place] home place. I feed them although my Dad, as a little boy, would eat them with sweet potatoes as part of his parents' regular fare. Weird, I know and he said he was never a fan of 'possum-related fare--excessively greasy according to him. Sorry, you all. Thanks for checking on me.  Doc     Responded to email and encouraged Mr. Turner to have his hematoma evaluated and to always be seen after a fall. Instructed him to continue current maintenance dosing and repeat INR in two weeks.     Plan:   1. INR is therapeutic today at 2.9 (goal 2.5-3.5). Instructed Mr. Turner via email to continue maintenance dose of warfarin 3mg and 3.5mg alternating every 2 days until recheck. (22.75mg/weekly avg)  2. Repeat INR in two weeks.  3. Doc Turner understands the importance of calling the [facility] if he notices any s/sx of bleeding, stroke, or abnormal bruising, if any changes are made to his medications or medication doses (Rx, OTC, herbal), or if any upcoming procedures are scheduled. Doc PANCHO Turner will likewise let us know if any other changes, questions, or concerns arise regarding anticoagulation therapy. he understands the importance of seeking medical attention immediately if he experiences any falls, vehicle accidents, or abnormal bleeding or bruising. Doc Turner voiced understanding of this information and confirms that he has the [facility]'s contact information. Otherwise, we will plan to contact the patient once monthly or if his INR is out of range. Will contact  via email for now     Teresita Redmond, YOUNG  5/5/2021  16:28 EDT     I, Olegario Macdonald, PharmD, have reviewed the note in full and agree with the assessment and plan.  05/07/21  14:52 EDT

## 2021-05-27 ENCOUNTER — ANTICOAGULATION VISIT (OUTPATIENT)
Dept: PHARMACY | Facility: HOSPITAL | Age: 71
End: 2021-05-27

## 2021-05-27 DIAGNOSIS — I48.92 PAROXYSMAL ATRIAL FLUTTER (HCC): Primary | ICD-10-CM

## 2021-05-27 DIAGNOSIS — Z95.2 HX OF MITRAL VALVE REPLACEMENT WITH MECHANICAL VALVE: ICD-10-CM

## 2021-05-27 LAB — INR PPP: 2.8

## 2021-05-27 RX ORDER — SOTALOL HYDROCHLORIDE 80 MG/1
TABLET ORAL
Qty: 180 TABLET | Refills: 1 | Status: SHIPPED | OUTPATIENT
Start: 2021-05-27 | End: 2021-11-23

## 2021-05-27 NOTE — PROGRESS NOTES
Duplicate encounter entered in error. Please disregard    Karime Hannah, PharmD  Pharmacy Resident  5/27/2021  08:52 EDT

## 2021-06-23 ENCOUNTER — TELEPHONE (OUTPATIENT)
Dept: PHARMACY | Facility: HOSPITAL | Age: 71
End: 2021-06-23

## 2021-06-23 NOTE — TELEPHONE ENCOUNTER
Emailed patient at [email or web address] re: overdue INR and to reschedule appt with cardiology

## 2021-06-30 ENCOUNTER — ANTICOAGULATION VISIT (OUTPATIENT)
Dept: PHARMACY | Facility: HOSPITAL | Age: 71
End: 2021-06-30

## 2021-06-30 DIAGNOSIS — I48.92 PAROXYSMAL ATRIAL FLUTTER (HCC): Primary | ICD-10-CM

## 2021-06-30 DIAGNOSIS — Z95.2 HX OF MITRAL VALVE REPLACEMENT WITH MECHANICAL VALVE: ICD-10-CM

## 2021-06-30 LAB — INR PPP: 3.5

## 2021-07-12 ENCOUNTER — ANTICOAGULATION VISIT (OUTPATIENT)
Dept: PHARMACY | Facility: HOSPITAL | Age: 71
End: 2021-07-12

## 2021-07-12 DIAGNOSIS — I48.92 PAROXYSMAL ATRIAL FLUTTER (HCC): Primary | ICD-10-CM

## 2021-07-12 DIAGNOSIS — Z95.2 HX OF MITRAL VALVE REPLACEMENT WITH MECHANICAL VALVE: ICD-10-CM

## 2021-07-12 LAB — INR PPP: 2.8

## 2021-07-12 NOTE — PROGRESS NOTES
Anticoagulation Clinic - Remote Progress Note  [facility]  Frequency of Monitorin-4x a month    Indication: Hx of mitral valve replacement with mechanical valve Kelly VHD  Referring Provider: Jenniffer Madrid 19)  Initial Warfarin Start Date:    Goal INR: 2.5-3.5  Current Drug Interactions: ensure (once daily); amitriptyline (PRN)  CHADS-VASc: 2 (age, HTN)     Diet: iceberg lettuce 1x/week, ensure 3-4x/week, V8 juice (20)  Alcohol: 12 pack of beer/week  Tobacco: None  OTC Pain Medication: None     INR History:  Date 8/25 9/11 9/29 10/19 11/5 12/2 12/11 1/4/18 1/30 2/19 3/7 4/5   Total Weekly Dose 24mg 24mg 24mg 22.75  mg? 22.75  mg? ? 22.5mg 22.5mg 22.5mg 22.5mg 22.5mg 22.5mg   INR 3.3 2.8 2.9 3.1 3.5 2.7 2.7 2.6 2.9 2.5 3.3 3.7   Notes    LVM LVM   call   LVM      Date 4/18 5/20 6/5 6/27 7/24 7/31 9/9 10/8 11/8 12/14 2/20/19 2/25   Total Weekly Dose 22.5mg 23mg 22.5mg  23mg 24mg 24mg unable   to verify unable   to verify unable   to verify unable   to verify 23mg   INR 2.6 2.8 2.9 2.4 2.9 3.3 3.4 3.0 2.7 2.6 3.8 3.4   Notes      rec'vd   recv'd 10/13;  mult calls         Date 3/11 3/25 4/9 5/1 5/15 6/11 6/27 7/10 7/26 8/9 9/11 10/3   Total Weekly Dose 23mg 23mg 22.5mg 23mg 22.5mg 23mg  22.5mg 22.5mg 22.5mg 23mg 22.5mg   INR 3.6 3.0 3.2 3.3 2.5 3.1 3.6 2.9 2.3 2.9 2.9 2.8   Notes        reported          Date 10/15 10/24 10/31 11/8 11/15 11/22 12/7 12/18 12/27 1/15/20 1/30 2/18   Total Weekly Dose 23mg 22.5mg 23mg 23mg 23.5mg 23mg 22.5mg 23mg 22.5mg 23mg ??? 23mg    INR 3.3 3.2 2.7 2.3 2.8 2.7 3.2  2.5 2.5 3.1 2.7 2.4   Notes  reported 10/25; sick; cefdinir stop cefdinir 10/29    Reported   Inc GLV  Unable to reach pt MVI; decr Ensure     Date 3/5 3/17 4/4 4/23 5/4 5/19 6/2 6/24 7/22 7/31 8/26 9/3   Total Weekly Dose 23mg 22.5mg 22.5mg 23mg 23mg 23mg? 23mg 22.75  mg avg 22.75  mg 23mg 22.75  mg 23.5mg   INR 2.8 3.0 3.3 2.9 2.6 2.8 2.9 2.49 2.4 3.1 2.5 3.4   Notes  call recv'd   "rec'vd 4/24; call call recv'd  5/20; call recv'd 6/3; call  recv'd 7/31 recv'd 7/31 recv'd 9/3 Self-adj  CoQ10; email     Date 9/11 10/8 10/13 11/24 12/3 12/22 1/4/21 2/6 2/8 2/19 3/9 4/15   Total Weekly Dose 23mg 23.5mg  avg 23.5mg   avg 23.5mg  avg 23.5mg 22.75  mg avg 22.75  mg avg 22.5mg 19mg 22.75  mg avg 22.75   mg avg 22.75   mg avg   INR 2.8 3.4 3.3 3.7 3.1 2.7 2.6 3.7 3.2 2.8 3.2 2.6   Notes  email email Email; less Ensure Self-adjust dose dec  recv'd 1/5; call email Email; Self held x1 email email      Date 5/5 5/26 6/30 7/9           Total WeeklyDose 22.75 mg avg 22.75  22.75 mg avg           INR 2.9 2.8 3.5 2.8           Notes email; fall rcv'd 5/27  rec'd 7/12             Phone Interview:  Tablet Strength: 3mg and 1mg tablets  Patient Contact Info:  Preferred *[phone]* anytime after noon  Other numbers on his profile:   [phone] (Home)    - brother in law, requested that we do NOT call this number  Lab Contact Info: [place]  Lab: [facility] phone: [phone] , Fax: [phone]   **will call once monthly or if INR is out of range**  [email or web address] PLEASE USE EMAIL TO COMMUNICATE    Patient Findings:  Positives:  Change in diet/appetite   Negatives:  Signs/symptoms of thrombosis, Signs/symptoms of bleeding, Laboratory test error suspected, Change in health, Change in alcohol use, Change in activity, Upcoming invasive procedure, Emergency department visit, Upcoming dental procedure, Missed doses, Extra doses, Change in medications, Hospital admission, Bruising, Other complaints   Comments:  Mr. Turner's response to email today (7/12): Hi, Ms Redmond: I continue to alternate 3.0-3.5 mg as before. I'm sorry I didn't get back to you all at the time of my last test. It was at the upper limit of my normal range as I recall. I had not \"indulged\" my Ensure habit for several days before that test. I've had no unusual bruising or bleeding and my diet and nutritional supplements " "have remained the same. I went \"all in\" on the stock market with a $450 investment in some no doubt loser tech stocks. In a matter of days my inbox was overwhelmed by stock market promotions. I only saw your last note to me a couple of days ago as a result. I'll do better next time. Thank you, Doc Zhang:   1. INR is therapeutic today at [facility] (goal 2.5-3.5). Instructed Mr. Turner via email to continue maintenance dose of warfarin 3 mg and 3.5 mg alternating every 2 days until recheck. (22.75mg/weekly avg)  2. Repeat INR in two weeks.  3. Doc DELEON Johnny understands the importance of calling the [facility] if he notices any s/sx of bleeding, stroke, or abnormal bruising, if any changes are made to his medications or medication doses (Rx, OTC, herbal), or if any upcoming procedures are scheduled. Doc DELEON Johnny will likewise let us know if any other changes, questions, or concerns arise regarding anticoagulation therapy. he understands the importance of seeking medical attention immediately if he experiences any falls, vehicle accidents, or abnormal bleeding or bruising. Doc PANCHO Turner voiced understanding of this information and confirms that he has the [facility]'s contact information. Otherwise, we will plan to contact the patient once monthly or if his INR is out of range. Will contact via email for now     Teresita Redmond CPhT  7/12/2021  08:22 EDT       I, Olegario Macdonald, PharmD, have reviewed the note in full and agree with the assessment and plan.  07/12/21  16:15 EDT    "

## 2021-08-02 LAB — INR PPP: 2.5

## 2021-08-03 ENCOUNTER — ANTICOAGULATION VISIT (OUTPATIENT)
Dept: PHARMACY | Facility: HOSPITAL | Age: 71
End: 2021-08-03

## 2021-08-03 DIAGNOSIS — I48.92 PAROXYSMAL ATRIAL FLUTTER (HCC): Primary | ICD-10-CM

## 2021-08-03 DIAGNOSIS — Z95.2 HX OF MITRAL VALVE REPLACEMENT WITH MECHANICAL VALVE: ICD-10-CM

## 2021-08-03 NOTE — PROGRESS NOTES
Anticoagulation Clinic - Remote Progress Note  [facility]  Frequency of Monitorin-4x a month    Indication: Hx of mitral valve replacement with mechanical valve Aizzy, VHD  Referring Provider: JF Ayala) -- (last visit 20  no show for 21 visit)  Initial Warfarin Start Date:    Goal INR: 2.5-3.5  Current Drug Interactions: ensure (once daily); amitriptyline (PRN)  CHADS-VASc: 2 (age, HTN)     Diet: iceberg lettuce 1x/week, ensure 3-4x/week, V8 juice (20)  Alcohol: 12 pack of beer/week  Tobacco: None  OTC Pain Medication: None     INR History:  Date 8/25 9/11 9/29 10/19 11/5 12/2 12/11 1/4/18 1/30 2/19 3/7 4/5   Total Weekly Dose 24mg 24mg 24mg 22.75  mg? 22.75  mg? ? 22.5mg 22.5mg 22.5mg 22.5mg 22.5mg 22.5mg   INR 3.3 2.8 2.9 3.1 3.5 2.7 2.7 2.6 2.9 2.5 3.3 3.7   Notes    LVM LVM   call   LVM      Date 4/18 5/20 6/5 6/27 7/24 7/31 9/9 10/8 11/8 12/14 2/20/19 2/25   Total Weekly Dose 22.5mg 23mg 22.5mg  23mg 24mg 24mg unable   to verify unable   to verify unable   to verify unable   to verify 23mg   INR 2.6 2.8 2.9 2.4 2.9 3.3 3.4 3.0 2.7 2.6 3.8 3.4   Notes      rec'vd   recv'd 10/13;  mult calls         Date 3/11 3/25 4/9 5/1 5/15 6/11 6/27 7/10 7/26 8/9 9/11 10/3   Total Weekly Dose 23mg 23mg 22.5mg 23mg 22.5mg 23mg  22.5mg 22.5mg 22.5mg 23mg 22.5mg   INR 3.6 3.0 3.2 3.3 2.5 3.1 3.6 2.9 2.3 2.9 2.9 2.8   Notes        reported          Date 10/15 10/24 10/31 11/8 11/15 11/22 12/7 12/18 12/27 1/15/20 1/30 2/18   Total Weekly Dose 23mg 22.5mg 23mg 23mg 23.5mg 23mg 22.5mg 23mg 22.5mg 23mg ??? 23mg    INR 3.3 3.2 2.7 2.3 2.8 2.7 3.2  2.5 2.5 3.1 2.7 2.4   Notes  reported 10/25; sick; cefdinir stop cefdinir 10/29    Reported   Inc GLV  Unable to reach pt MVI; decr Ensure     Date 3/5 3/17 4/4 4/23 5/4 5/19 6/2 6/24 7/22 7/31 8/26 9/3   Total Weekly Dose 23mg 22.5mg 22.5mg 23mg 23mg 23mg? 23mg 22.75  mg avg 22.75  mg 23mg 22.75  mg 23.5mg   INR 2.8 3.0 3.3 2.9 2.6 2.8 2.9  2.49 2.4 3.1 2.5 3.4   Notes  call recv'd 4/13 rec'vd 4/24; call call recv'd  5/20; call recv'd 6/3; call  recv'd 7/31 recv'd 7/31 recv'd 9/3 Self-adj  CoQ10; email     Date 9/11 10/8 10/13 11/24 12/3 12/22 1/4/21 2/6 2/8 2/19 3/9 4/15   Total Weekly Dose 23mg 23.5mg  avg 23.5mg   avg 23.5mg  avg 23.5mg 22.75  mg avg 22.75  mg avg 22.5mg 19mg 22.75  mg avg 22.75   mg avg 22.75   mg avg   INR 2.8 3.4 3.3 3.7 3.1 2.7 2.6 3.7 3.2 2.8 3.2 2.6   Notes  email email Email; less Ensure Self-adjust dose dec  recv'd 1/5; call email Email; Self held x1 email email      Date 5/5 5/26 6/30 7/9 7/28          Total WeeklyDose 22.75 mg avg 22.75  mg avg  22.75 mg avg 22.75  mg avg          INR 2.9 2.8 3.5 2.8 2.5          Notes email; fall rcv'd 5/27  rec'vd 7/12 rec'vd 8/3            Phone Interview:  Tablet Strength: 3mg and 1mg tablets  Patient Contact Info:  Preferred *[phone]* anytime after noon  Other numbers on his profile:   [phone] (Home)    - brother in law, requested that we do NOT call this number  Lab Contact Info: [facility]  Lab: [facility] phone: [phone] , Fax: [phone]   **will call once monthly or if INR is out of range**  [email or web address] PLEASE USE EMAIL TO COMMUNICATE      Patient Findings  Comments:  Patient was not contacted at this encounter       Plan:   1. INR is therapeutic today at 2.5, though this is LLN (goal 2.5-3.5). Mr. Turner will continue maintenance dose of warfarin 3mg and 3.5mg alternating every 2 days until recheck. (22.75mg/weekly avg)  2. Repeat INR in two weeks, 8/11 (since test date was 7/28)  3. Doc Turner understands the importance of calling the [facility] if he notices any s/sx of bleeding, stroke, or abnormal bruising, if any changes are made to his medications or medication doses (Rx, OTC, herbal), or if any upcoming procedures are scheduled. Doc Turner will likewise let us know if any other changes, questions, or concerns  arise regarding anticoagulation therapy. he understands the importance of seeking medical attention immediately if he experiences any falls, vehicle accidents, or abnormal bleeding or bruising. Doc Turner voiced understanding of this information and confirms that he has the [facility]'s contact information. Otherwise, we will plan to contact the patient once monthly or if his INR is out of range. Will contact via email for now     Olegario Fuentes CPhT  8/3/2021  08:20 EDT    I, Taylor Riedel, [facility], have reviewed the note in full and agree with the assessment and plan.  08/03/21  09:08 EDT

## 2021-08-23 ENCOUNTER — ANTICOAGULATION VISIT (OUTPATIENT)
Dept: PHARMACY | Facility: HOSPITAL | Age: 71
End: 2021-08-23

## 2021-08-23 DIAGNOSIS — I48.92 PAROXYSMAL ATRIAL FLUTTER (HCC): Primary | ICD-10-CM

## 2021-08-23 DIAGNOSIS — Z95.2 HX OF MITRAL VALVE REPLACEMENT WITH MECHANICAL VALVE: ICD-10-CM

## 2021-08-23 LAB — INR PPP: 3

## 2021-08-23 NOTE — PROGRESS NOTES
Anticoagulation Clinic - Remote Progress Note  [facility]  Frequency of Monitorin-4x a month    Indication: Hx of mitral valve replacement with mechanical valve Aizzy, VHD  Referring Provider: JF Ayala) -- (last visit 20  no show for 21 visit)  Initial Warfarin Start Date:    Goal INR: 2.5-3.5  Current Drug Interactions: ensure (once daily); amitriptyline (PRN)  CHADS-VASc: 2 (age, HTN)     Diet: iceberg lettuce 1x/week, ensure 3-4x/week, V8 juice (20)  Alcohol: 12 pack of beer/week  Tobacco: None  OTC Pain Medication: None     INR History:  Date 8/25 9/11 9/29 10/19 11/5 12/2 12/11 1/4/18 1/30 2/19 3/7 4/5   Total Weekly Dose 24mg 24mg 24mg 22.75  mg? 22.75  mg? ? 22.5mg 22.5mg 22.5mg 22.5mg 22.5mg 22.5mg   INR 3.3 2.8 2.9 3.1 3.5 2.7 2.7 2.6 2.9 2.5 3.3 3.7   Notes    LVM LVM   call   LVM      Date 4/18 5/20 6/5 6/27 7/24 7/31 9/9 10/8 11/8 12/14 2/20/19 2/25   Total Weekly Dose 22.5mg 23mg 22.5mg  23mg 24mg 24mg unable   to verify unable   to verify unable   to verify unable   to verify 23mg   INR 2.6 2.8 2.9 2.4 2.9 3.3 3.4 3.0 2.7 2.6 3.8 3.4   Notes      rec'vd   recv'd 10/13;  mult calls         Date 3/11 3/25 4/9 5/1 5/15 6/11 6/27 7/10 7/26 8/9 9/11 10/3   Total Weekly Dose 23mg 23mg 22.5mg 23mg 22.5mg 23mg  22.5mg 22.5mg 22.5mg 23mg 22.5mg   INR 3.6 3.0 3.2 3.3 2.5 3.1 3.6 2.9 2.3 2.9 2.9 2.8   Notes        reported          Date 10/15 10/24 10/31 11/8 11/15 11/22 12/7 12/18 12/27 1/15/20 1/30 2/18   Total Weekly Dose 23mg 22.5mg 23mg 23mg 23.5mg 23mg 22.5mg 23mg 22.5mg 23mg ??? 23mg    INR 3.3 3.2 2.7 2.3 2.8 2.7 3.2  2.5 2.5 3.1 2.7 2.4   Notes  reported 10/25; sick; cefdinir stop cefdinir 10/29    Reported   Inc GLV  Unable to reach pt MVI; decr Ensure     Date 3/5 3/17 4/4 4/23 5/4 5/19 6/2 6/24 7/22 7/31 8/26 9/3   Total Weekly Dose 23mg 22.5mg 22.5mg 23mg 23mg 23mg? 23mg 22.75  mg avg 22.75  mg 23mg 22.75  mg 23.5mg   INR 2.8 3.0 3.3 2.9 2.6 2.8 2.9  "2.49 2.4 3.1 2.5 3.4   Notes  call recv'd 4/13 rec'vd 4/24; call call recv'd  5/20; call recv'd 6/3; call  recv'd 7/31 recv'd 7/31 recv'd 9/3 Self-adj  CoQ10; email     Date 9/11 10/8 10/13 11/24 12/3 12/22 1/4/21 2/6 2/8 2/19 3/9 4/15   Total Weekly Dose 23mg 23.5mg  avg 23.5mg   avg 23.5mg  avg 23.5mg 22.75  mg avg 22.75  mg avg 22.5mg 19mg 22.75  mg avg 22.75   mg avg 22.75   mg avg   INR 2.8 3.4 3.3 3.7 3.1 2.7 2.6 3.7 3.2 2.8 3.2 2.6   Notes  email email Email; less Ensure Self-adjust dose dec  recv'd 1/5; call email Email; Self held x1 email email      Date 5/5 5/26 6/30 7/9 7/28 8/23         Total WeeklyDose 22.75 mg avg 22.75  mg avg  22.75 mg avg 22.75  mg avg 22.75 mg avg         INR 2.9 2.8 3.5 2.8 2.5 3.0         Notes email; fall rcv'd 5/27  rec'vd 7/12; email rec'vd 8/3            Phone Interview:  Tablet Strength: 3mg and 1mg tablets  Patient Contact Info: Preferred *[phone]* anytime after noon  Other numbers on his profile:   [phone] (Home)    - brother in law, requested that we do NOT call this number  Lab Contact Info: [facility]   Lab: [facility] in [facility] phone: [phone] , Fax: [phone]   **will call once monthly or if INR is out of range**  [email or web address] PLEASE USE EMAIL TO COMMUNICATE    Patient Findings:  Negatives:  Signs/symptoms of thrombosis, Signs/symptoms of bleeding, Laboratory test error suspected, Change in health, Change in alcohol use, Change in activity, Upcoming invasive procedure, Emergency department visit, Upcoming dental procedure, Missed doses, Extra doses, Change in medications, Change in diet/appetite, Hospital admission, Bruising, Other complaints   Comments:  Per email response from Mr. Turner (8/23):     I have stayed on the same schedule as before, 3.0 alternated with 3.5 day to day. When I tested on Aug. 8 I was a little concerned by some weird bruises and figured my results might exceed the upper limits. Instead, I barely \"passed\" at " "2.5. Before I ever began the warfarin routine I always bruised easily. Every surgery I've had, either pre or post warfarin, has been marked by really bad post-surgical bleeding requiring the dreaded \"re-op\"! Now, any medical facility I show up in routinely posts a quick response[employer] crew just outside my room! My diet has remained much the same as well as any supplements or meds. I need to return for some dental work and I'll be sure to let you know when that appointment is made. Thanks for your patience and I hope things are going okay down your way. PA     Plan:   1. INR is therapeutic today at 3.0. Mr. Turner will continue maintenance dose of warfarin 3 mg and 3.5 mg alternating every 2 days until recheck (22.75mg/weekly avg).  2. Repeat INR in ~ two weeks, 9/7.  3. Doc Turner understands the importance of calling the [facility] Anticoagulation Clinic if he notices any s/sx of bleeding, stroke, or abnormal bruising, if any changes are made to his medications or medication doses (Rx, OTC, herbal), or if any upcoming procedures are scheduled. Doc DELEON Johnny will likewise let us know if any other changes, questions, or concerns arise regarding anticoagulation therapy. he understands the importance of seeking medical attention immediately if he experiences any falls, vehicle accidents, or abnormal bleeding or bruising. Doc Turner voiced understanding of this information and confirms that he has the [facility] Anticoagulation Clinic's contact information. Otherwise, we will plan to contact the patient once monthly or if his INR is out of range. Will contact via email for now     Teresita Redmond CPhT  8/23/2021  08:27 EDT     I, Atif Mallory, PharmD, have reviewed the note in full and agree with the assessment and plan.  08/23/21  16:24 EDT      "

## 2021-09-16 NOTE — PROGRESS NOTES
Subjective:     Encounter Date:09/23/2021      Patient ID: Doc Turner is a 71 y.o.  white male from [place], [place], a retired .     PHYSICIAN: Kp Hobbs MD  FORMER CARDIOLOGISTS: Juan Wellington MD/Tino Allison MD  CARDIOTHORACIC SURGEON: Sylwia Gramajo MD/Tony Mcgill MD, [facility]  NEUROSURGEON: Jose Cadena MD  NEUROLOGIST: Dwayne Rhodes MD  GENERAL SURGEONS: Thierry Moore MD/Piero Ball MD .    Chief Complaint:   Chief Complaint   Patient presents with   • Essential hypertension     Problem List:  1. Chronic valvular heart disease with mitral valve prolapse syndrome/atrial flutter:  a. Remote progressive mitral regurgitation with porcine mitral valve replacement, [facility], 1998  b. Subsequent serial echocardiographic evaluations-data deficit, with abnormal stress test and diagnostic coronary angiography, data deficit, July 2006  c. Repeat sternotomy with mitral valve replacement with #31 [employer] S[employer]h prosthesis with postop bleeding requiring reexploration and associated atrial fib/flutter with sotalol therapy, November 2006  d. Acceptable combination Doppler echocardiogram, September 2009, with residual class I symptoms  e. Tachypalpitations with documented atrial fibrillation, 1/12/15 LAUREEN/ECV-successful  f. LAUREEN, 1/12/2015; LVEF 0.55-0.60, mild concentric LVH observed, postsurgical hypokinesis of the interventricular septum observed consistent with valve replacement, LA and RV mild to moderately dilated, RVSP mildly reduced, mild AR, mechanical mitral valve appears well seated with normal function, mild TR  g. [facility], 3/19/2017, with tachycardia, atrial flutter, anticoagulated with Coumadin  h. Echocardiogram, 6/14/2017: LVEF 0.60, RV moderately dilated, LA mild to moderately dilated, mildly reduced right ventricular systolic function noted, mild AR, TR, no pericardial effusion, no pulmonary hypertension, mechanical MVR appears  nominal LV function and leaflet motion without discernible associated thrombus last mass   i. External cardioversion, 6/20/2017, successful with a 100 J shock.    j. NSR April 2019 with acceptable QTc  k. Residual Class I symptoms, May 2020, September 2021  2. Intermittent tachypalpitations with apparent acceptable 24-hour Holter monitor-data deficit  3. Labile hypertension, probably essential  4. Chronic degenerative cervical spine disc disease with chronic narcotic use  5. Chronic insomnia/depression with recent improved clinical course  6. Remote Lyme disease with recurrence on 3 occasions, treated with periodic antibiotic therapy  7. Intermittent visual disturbance with hospitalization in neurology evaluation-data deficit, November 2009  8. Apparent symptomatic left inguinal hernia  9. Erectile dysfunction/Peyronie's disease  10. Apparent complicated inguinal herniorrhaphy, February 2014, with subsequent development of intermittent recurrent episode gastric and right upper quadrant pain with subsequent small bowel obstruction and lactic acidosis with abdominal CT scan demonstrating extensive and progressive mid right anterior abdominal mesenteric rotation/volvulus emergent surgery and postop wound hematoma/(with subsequent closure, September 2014  11. DeQuervain's tenosynovitis, left arm, currently wearing brace, spring 2020  12.  Covid January 2021 with fever and unusual facial pains for about 10 days  13. Surgical history:  a. Mitral valve replacement ×2  b. Volvulus  c. Back surgery  d. Hernia repair  e. Cardioversion    Allergies   Allergen Reactions   • Penicillins Angioedema         Current Outpatient Medications:   •  lisinopril (PRINIVIL,ZESTRIL) 10 MG tablet, Take 1 tablet by mouth Every Morning., Disp: 90 tablet, Rfl: 3  •  nortriptyline (PAMELOR) 25 MG capsule, 25 mg Every Night., Disp: , Rfl:   •  oxyCODONE-acetaminophen (LYNOX)  MG per tablet, Take 1 tablet by mouth Every 8 (Eight) Hours As  "Needed., Disp: , Rfl:   •  sotalol (BETAPACE) 80 MG tablet, TAKE 1 TABLET BY MOUTH TWICE DAILY, Disp: 180 tablet, Rfl: 1  •  warfarin (COUMADIN) 1 MG tablet, TAKE 1/2 TABLET BY MOUTH EVERY OTHER DAY OR AS DIRECTED BY THE ANTICOAGULATION CLINIC, Disp: 30 tablet, Rfl: 1  •  warfarin (COUMADIN) 3 MG tablet, TAKE ONE TABLET BY MOUTH DAILY AS DIRECTED, Disp: 90 tablet, Rfl: 1    HISTORY OF PRESENT ILLNESS:  The patient is here after a 16-month hiatus.  He is a no-show to his 6/4/2021 appointment.His INRs are managed by the anticoagulation clinic and have been therapeutic.  He denies any chest pain, palpitations, shortness of breath, edema, presyncope, or syncope.  He has not done much physical activity because he has a hernia that apparently is the size of a grapefruit.  He has had multiple hernia surgeries in the past and has a lot of scar tissue.  He had laboratory testing at [facility] 3 days ago but is unsure of the results.  He had Covid in January 2021 and had a fever and unusual aching in his face for about 10 days.  He has since had one Pfizer Covid vaccination.  He is planning on getting his teeth cleaned soon and always takes clindamycin before he has his cleanings.  The patient has had no other hospitalizations, surgeries, or new diagnoses.      Review of Systems   All other systems reviewed and are negative.     All other systems reviewed and otherwise negative.      ECG 12 Lead    Date/Time: 9/23/2021 4:20 PM  Performed by: Tabby Meléndez APRN  Authorized by: Tabby Meléndez APRN   Rhythm comments: Sinus rhythm with first-degree AV block, cannot rule out inferior infarct, age undetermined, abnormal ECG, 69 bpm,  ms,  ms, no significant changes from last ECG May 2020                 Objective:       Vitals:    09/23/21 1532   BP: 126/70   BP Location: Right arm   Patient Position: Sitting   Cuff Size: Adult   Pulse: 69   SpO2: 98%   Weight: 68 kg (150 lb)   Height: 177.8 cm (70\")   Recheck blood " pressure right arm sitting was 118/60  Body mass index is 21.52 kg/m².  Last weight May 2020 was 150 pounds  Constitutional:       Appearance: Healthy appearance. Not in distress.   HENT:    Mouth/Throat:      Mouth: No oral lesions.      Dentition: No gum lesions.      Comments: Crowns present  Neck:      Vascular: No JVR. JVD normal.   Pulmonary:      Effort: Pulmonary effort is normal.      Breath sounds: Normal breath sounds. No wheezing. No rhonchi. No rales.   Chest:      Chest wall: Not tender to palpatation.   Cardiovascular:      PMI at left midclavicular line. Normal rate. Regular rhythm. Normal S1. Normal S2.      Murmurs: There is a grade 2/6 high frequency blowing, machinery holosystolic murmur at the apex. Mechanical valve clicks present      No gallop. No click. No rub.   Pulses:     Dorsalis pedis: 1+ bilaterally.     Posterior tibial: 1+ bilaterally.  Edema:     Peripheral edema absent.   Abdominal:      General: Bowel sounds are normal.      Palpations: Abdomen is soft.      Tenderness: There is no abdominal tenderness.   Musculoskeletal: Normal range of motion.         General: No tenderness. Skin:     General: Skin is warm and dry.   Neurological:      General: No focal deficit present.      Mental Status: Alert and oriented to person, place and time.           Lab Review:   Lab Results   Component Value Date    GLUCOSE 91 06/20/2017    BUN 8 (L) 06/20/2017    CREATININE 0.60 06/20/2017    EGFRIFNONA 134 06/20/2017    BCR 13.3 06/20/2017    CO2 26.0 06/20/2017    CALCIUM 9.7 06/20/2017    ALBUMIN 4.40 03/19/2017    AST 32 03/19/2017    ALT 33 03/19/2017       Lab Results   Component Value Date    WBC 7.77 03/19/2017    HGB 15.2 03/19/2017    HCT 44.8 03/19/2017    MCV 95.9 03/19/2017     03/19/2017       Lab Results   Component Value Date    HGBA1C 5.0 01/12/2015       Lab Results   Component Value Date    TSH 0.557 01/12/2015         Lab Results   Component Value Date    TRIG 76  09/10/2014     Lab Results   Component Value Date    BNP 37 01/12/2015 01/22/2020:  · GGT - 112  · CBC - hematocrit 46.8%, hemoglobin 15.5, WBC 9.2, platelets 257, acceptable indices and differential  · CMP - normal electrolytes, serum creatinine 0.7, BUN 13, glucose 80 mg/dL, serum calcium 9.6, albumin 4.7, alk phos 69, ALT 39, AST 37      Assessment:     Overall continued acceptable course with no new interim cardiopulmonary complaints with acceptable functional status. We will defer additional diagnostic or therapeutic intervention from a cardiac perspective at this time.  I will order an echocardiogram in view of the patient's history of mitral valve replacement.  Hopefully I will get to review the patient's last laboratory testing results.  Lab results were requested [facility].  He is in SR with 1st degree AV block with acceptable QTC on sotalol therapy on ECG in office today.       Diagnosis Plan   1. Palpitations   Controlled with sotalol, He is in SR with 1st degree AV block with  acceptable QTC on sotalol therapy on ECG in office today   2. Paroxysmal atrial flutter (CMS/HCC)   Controlled with sotalol   3. VHD (valvular heart disease)   Echocardiogram same day as next appointment   4. Hx of mitral valve replacement with mechanical valve [Z95.2]  Adult Transthoracic Echo Complete w/ Color, Spectral and Contrast if necessary per protocol   5. Essential hypertension   Controlled, continue current cardiac medications          Plan:         1. Patient to continue current medications and close follow up with the above providers.  2. Tentative cardiology follow up in March 2022 or patient may return sooner PRN.  3. Echocardiogram same day as next appointment      Electronically signed by JF Briseno, 09/23/21, 4:21 PM EDT.

## 2021-09-23 ENCOUNTER — OFFICE VISIT (OUTPATIENT)
Dept: CARDIOLOGY | Facility: CLINIC | Age: 71
End: 2021-09-23

## 2021-09-23 VITALS
SYSTOLIC BLOOD PRESSURE: 126 MMHG | HEIGHT: 70 IN | OXYGEN SATURATION: 98 % | DIASTOLIC BLOOD PRESSURE: 70 MMHG | BODY MASS INDEX: 21.47 KG/M2 | WEIGHT: 150 LBS | HEART RATE: 69 BPM

## 2021-09-23 DIAGNOSIS — I48.92 PAROXYSMAL ATRIAL FLUTTER (HCC): ICD-10-CM

## 2021-09-23 DIAGNOSIS — Z95.2 HX OF MITRAL VALVE REPLACEMENT WITH MECHANICAL VALVE: ICD-10-CM

## 2021-09-23 DIAGNOSIS — I38 VHD (VALVULAR HEART DISEASE): ICD-10-CM

## 2021-09-23 DIAGNOSIS — R00.2 PALPITATIONS: Primary | ICD-10-CM

## 2021-09-23 DIAGNOSIS — I10 ESSENTIAL HYPERTENSION: ICD-10-CM

## 2021-09-23 PROCEDURE — 99214 OFFICE O/P EST MOD 30 MIN: CPT | Performed by: NURSE PRACTITIONER

## 2021-09-23 PROCEDURE — 93000 ELECTROCARDIOGRAM COMPLETE: CPT | Performed by: NURSE PRACTITIONER

## 2021-09-24 ENCOUNTER — DOCUMENTATION (OUTPATIENT)
Dept: CARDIOLOGY | Facility: CLINIC | Age: 71
End: 2021-09-24

## 2021-09-24 DIAGNOSIS — E78.5 HYPERLIPIDEMIA LDL GOAL <100: Primary | ICD-10-CM

## 2021-09-24 RX ORDER — ROSUVASTATIN CALCIUM 5 MG/1
5 TABLET, COATED ORAL NIGHTLY
Qty: 30 TABLET | Refills: 11 | Status: SHIPPED | OUTPATIENT
Start: 2021-09-24 | End: 2022-09-16

## 2021-09-24 NOTE — PROGRESS NOTES
I called and spoke with the patient regarding his recent laboratory testing results which were acceptable except for his lipid panel and sodium.  His ASCVD 10-year risk is 22.6%, 16.2% if treated so I will start rosuvastatin 5 mg nightly.  I will mail him a FLP and BMP lab slips to be obtained in 3 months when he is fasting and he verbalized understanding.      9/21/2021:  · CBC: WBC 7.14, RBC 4.67, hemoglobin 14.8, hematocrit 43.2, MCV 92.5, MCH 31.7, MCHC 34.3, platelets 251, RDW 13.1, neutrophils 57.7, lymphocytes 24.8, monocytes 11.1, eos 6, basos 0.4  · CMP: Sodium 128, potassium 3.8, chloride 92, carbon dioxide 28, BUN 9, creatinine 0.5, GFR greater than 60, glucose 97, calcium 9, magnesium 2, bilirubin 0.74, AST 29, ALT 30, protein 6.8, albumin 4.3, alkaline phosphatase 80  · Lipid panel: Cholesterol 219, triglycerides 155, HDL 70,   · TSH 2.65    Electronically signed by JF Briseno, 09/24/21, 9:51 AM EDT.

## 2021-09-29 ENCOUNTER — ANTICOAGULATION VISIT (OUTPATIENT)
Dept: PHARMACY | Facility: HOSPITAL | Age: 71
End: 2021-09-29

## 2021-09-29 DIAGNOSIS — Z95.2 HX OF MITRAL VALVE REPLACEMENT WITH MECHANICAL VALVE: ICD-10-CM

## 2021-09-29 DIAGNOSIS — I48.92 PAROXYSMAL ATRIAL FLUTTER (HCC): Primary | ICD-10-CM

## 2021-09-29 LAB — INR PPP: 2.6

## 2021-10-18 ENCOUNTER — ANTICOAGULATION VISIT (OUTPATIENT)
Dept: PHARMACY | Facility: HOSPITAL | Age: 71
End: 2021-10-18

## 2021-10-18 DIAGNOSIS — Z95.2 HX OF MITRAL VALVE REPLACEMENT WITH MECHANICAL VALVE: ICD-10-CM

## 2021-10-18 DIAGNOSIS — I48.92 PAROXYSMAL ATRIAL FLUTTER (HCC): Primary | ICD-10-CM

## 2021-10-18 LAB — INR PPP: 2.9

## 2021-10-18 NOTE — PROGRESS NOTES
Anticoagulation Clinic - Remote Progress Note  [facility]  Frequency of Monitorin-4x a month    Indication: Hx of mitral valve replacement with mechanical valve Aizzy, VHD  Referring Provider: JF Ayala) -- (last visit 20  no show for 21 visit)  Initial Warfarin Start Date:    Goal INR: 2.5-3.5  Current Drug Interactions: ensure (once daily); amitriptyline (PRN)  CHADS-VASc: 2 (age, HTN)     Diet: iceberg lettuce 1x/week, ensure 3-4x/week, V8 juice (20)  Alcohol: 12 pack of beer/week  Tobacco: None  OTC Pain Medication: None     INR History:  Date 8/25 9/11 9/29 10/19 11/5 12/2 12/11 1/4/18 1/30 2/19 3/7 4/5   Total Weekly Dose 24mg 24mg 24mg 22.75  mg? 22.75  mg? ? 22.5mg 22.5mg 22.5mg 22.5mg 22.5mg 22.5mg   INR 3.3 2.8 2.9 3.1 3.5 2.7 2.7 2.6 2.9 2.5 3.3 3.7   Notes    LVM LVM   call   LVM      Date 4/18 5/20 6/5 6/27 7/24 7/31 9/9 10/8 11/8 12/14 2/20/19 2/25   Total Weekly Dose 22.5mg 23mg 22.5mg  23mg 24mg 24mg unable   to verify unable   to verify unable   to verify unable   to verify 23mg   INR 2.6 2.8 2.9 2.4 2.9 3.3 3.4 3.0 2.7 2.6 3.8 3.4   Notes      rec'vd   recv'd 10/13;  mult calls         Date 3/11 3/25 4/9 5/1 5/15 6/11 6/27 7/10 7/26 8/9 9/11 10/3   Total Weekly Dose 23mg 23mg 22.5mg 23mg 22.5mg 23mg  22.5mg 22.5mg 22.5mg 23mg 22.5mg   INR 3.6 3.0 3.2 3.3 2.5 3.1 3.6 2.9 2.3 2.9 2.9 2.8   Notes        reported          Date 10/15 10/24 10/31 11/8 11/15 11/22 12/7 12/18 12/27 1/15/20 1/30 2/18   Total Weekly Dose 23mg 22.5mg 23mg 23mg 23.5mg 23mg 22.5mg 23mg 22.5mg 23mg ??? 23mg    INR 3.3 3.2 2.7 2.3 2.8 2.7 3.2  2.5 2.5 3.1 2.7 2.4   Notes  reported 10/25; sick; cefdinir stop cefdinir 10/29    Reported   Inc GLV  Unable to reach pt MVI; decr Ensure     Date 3/5 3/17 4/4 4/23 5/4 5/19 6/2 6/24 7/22 7/31 8/26 9/3   Total Weekly Dose 23mg 22.5mg 22.5mg 23mg 23mg 23mg? 23mg 22.75  mg avg 22.75  mg 23mg 22.75  mg 23.5mg   INR 2.8 3.0 3.3 2.9 2.6 2.8 2.9  2.49 2.4 3.1 2.5 3.4   Notes  call recv'd 4/13 rec'vd 4/24; call call recv'd  5/20; call recv'd 6/3; call  recv'd 7/31 recv'd 7/31 recv'd 9/3 Self-adj  CoQ10; email     Date 9/11 10/8 10/13 11/24 12/3 12/22 1/4/21 2/6 2/8 2/19 3/9 4/15   Total Weekly Dose 23mg 23.5mg  avg 23.5mg   avg 23.5mg  avg 23.5mg 22.75  mg avg 22.75  mg avg 22.5mg 19mg 22.75  mg avg 22.75   mg avg 22.75   mg avg   INR 2.8 3.4 3.3 3.7 3.1 2.7 2.6 3.7 3.2 2.8 3.2 2.6   Notes  email email Email; less Ensure Self-adjust dose dec  recv'd 1/5; call email Email; Self held x1 email email      Date 5/5 5/26 6/30 7/9 7/28 8/23 9/29 10/18       Total WeeklyDose 22.75 mg avg 22.75  mg avg  22.75 mg avg 22.75  mg avg 22.75 mg avg 22.75 mg avg 22.75 mg avg       INR 2.9 2.8 3.5 2.8 2.5 3.0 2.6 2.9       Notes email; fall rcv'd 5/27  rec'vd 7/12; email rec'vd 8/3  email          Phone Interview:  Tablet Strength: 3mg and 1mg tablets  Patient Contact Info: Preferred *[phone]* anytime after noon  Other numbers on his profile:   [phone] (Home)    - brother in law, requested that we do NOT call this number  Lab Contact Info: [place]   Lab: [facility] in [facility] phone: [phone] , Fax: [phone]   **will email once monthly or if INR is out of range**  [email or web address] PLEASE USE EMAIL TO COMMUNICATE    Plan:   1. INR is therapeutic today at 2.9 (goal 2.5-3.5). Mr. Turner will continue maintenance dose of warfarin 3 mg and 3.5 mg alternating every 2 days until recheck (22.75 mg/weekly avg).  2. Repeat INR in two weeks, 11/1.  3. Doc Turner understands the importance of calling the [facility] if he notices any s/sx of bleeding, stroke, or abnormal bruising, if any changes are made to his medications or medication doses (Rx, OTC, herbal), or if any upcoming procedures are scheduled. Doc DELEON Johnny will likewise let us know if any other changes, questions, or concerns arise regarding anticoagulation therapy. he  understands the importance of seeking medical attention immediately if he experiences any falls, vehicle accidents, or abnormal bleeding or bruising. Doc Turner voiced understanding of this information and confirms that he has the [facility]'s contact information. Otherwise, we will plan to contact the patient once monthly or if his INR is out of range. Will contact via email for now     Teresita Redmond CPhT  10/18/2021  15:51 EDT     I, Josephine Pineda, PharmD, have reviewed the note in full and agree with the assessment and plan.  10/19/21  08:37 EDT

## 2021-10-29 RX ORDER — WARFARIN SODIUM 1 MG/1
TABLET ORAL
Qty: 90 TABLET | Refills: 1 | Status: SHIPPED | OUTPATIENT
Start: 2021-10-29 | End: 2022-10-18

## 2021-10-29 RX ORDER — WARFARIN SODIUM 3 MG/1
TABLET ORAL
Qty: 90 TABLET | Refills: 1 | Status: SHIPPED | OUTPATIENT
Start: 2021-10-29 | End: 2022-04-22

## 2021-11-05 ENCOUNTER — TELEPHONE (OUTPATIENT)
Dept: PHARMACY | Facility: HOSPITAL | Age: 71
End: 2021-11-05

## 2021-11-19 ENCOUNTER — ANTICOAGULATION VISIT (OUTPATIENT)
Dept: PHARMACY | Facility: HOSPITAL | Age: 71
End: 2021-11-19

## 2021-11-19 DIAGNOSIS — Z95.2 HX OF MITRAL VALVE REPLACEMENT WITH MECHANICAL VALVE: ICD-10-CM

## 2021-11-19 DIAGNOSIS — I48.92 PAROXYSMAL ATRIAL FLUTTER (HCC): Primary | ICD-10-CM

## 2021-11-19 LAB — INR PPP: 2.7

## 2021-11-19 NOTE — PROGRESS NOTES
Anticoagulation Clinic - Remote Progress Note  [facility]  Frequency of Monitorin-4x a month    Indication: Hx of mitral valve replacement with mechanical valve Aizzy, VHD  Referring Provider: JF Ayala) -- (last visit 20  no show for 21 visit)  Initial Warfarin Start Date:    Goal INR: 2.5-3.5  Current Drug Interactions: ensure (once daily); amitriptyline (PRN)  CHADS-VASc: 2 (age, HTN)     Diet: iceberg lettuce 1x/week, ensure 3-4x/week, V8 juice (20)  Alcohol: 12 pack of beer/week  Tobacco: None  OTC Pain Medication: None     INR History:  Date 8/25 9/11 9/29 10/19 11/5 12/2 12/11 1/4/18 1/30 2/19 3/7 4/5   Total Weekly Dose 24mg 24mg 24mg 22.75  mg? 22.75  mg? ? 22.5mg 22.5mg 22.5mg 22.5mg 22.5mg 22.5mg   INR 3.3 2.8 2.9 3.1 3.5 2.7 2.7 2.6 2.9 2.5 3.3 3.7   Notes    LVM LVM   call   LVM      Date 4/18 5/20 6/5 6/27 7/24 7/31 9/9 10/8 11/8 12/14 2/20/19 2/25   Total Weekly Dose 22.5mg 23mg 22.5mg  23mg 24mg 24mg unable   to verify unable   to verify unable   to verify unable   to verify 23mg   INR 2.6 2.8 2.9 2.4 2.9 3.3 3.4 3.0 2.7 2.6 3.8 3.4   Notes      rec'vd   recv'd 10/13;  mult calls         Date 3/11 3/25 4/9 5/1 5/15 6/11 6/27 7/10 7/26 8/9 9/11 10/3   Total Weekly Dose 23mg 23mg 22.5mg 23mg 22.5mg 23mg  22.5mg 22.5mg 22.5mg 23mg 22.5mg   INR 3.6 3.0 3.2 3.3 2.5 3.1 3.6 2.9 2.3 2.9 2.9 2.8   Notes        reported          Date 10/15 10/24 10/31 11/8 11/15 11/22 12/7 12/18 12/27 1/15/20 1/30 2/18   Total Weekly Dose 23mg 22.5mg 23mg 23mg 23.5mg 23mg 22.5mg 23mg 22.5mg 23mg ??? 23mg    INR 3.3 3.2 2.7 2.3 2.8 2.7 3.2  2.5 2.5 3.1 2.7 2.4   Notes  reported 10/25; sick; cefdinir stop cefdinir 10/29    Reported   Inc GLV  Unable to reach pt MVI; decr Ensure     Date 3/5 3/17 4/4 4/23 5/4 5/19 6/2 6/24 7/22 7/31 8/26 9/3   Total Weekly Dose 23mg 22.5mg 22.5mg 23mg 23mg 23mg? 23mg 22.75  mg avg 22.75  mg 23mg 22.75  mg 23.5mg   INR 2.8 3.0 3.3 2.9 2.6 2.8 2.9  2.49 2.4 3.1 2.5 3.4   Notes  call recv'd 4/13 rec'vd 4/24; call call recv'd  5/20; call recv'd 6/3; call  recv'd 7/31 recv'd 7/31 recv'd 9/3 Self-adj  CoQ10; email     Date 9/11 10/8 10/13 11/24 12/3 12/22 1/4/21 2/6 2/8 2/19 3/9 4/15   Total Weekly Dose 23mg 23.5mg  avg 23.5mg   avg 23.5mg  avg 23.5mg 22.75  mg avg 22.75  mg avg 22.5mg 19mg 22.75  mg avg 22.75   mg avg 22.75   mg avg   INR 2.8 3.4 3.3 3.7 3.1 2.7 2.6 3.7 3.2 2.8 3.2 2.6   Notes  email email Email; less Ensure Self-adjust dose dec  recv'd 1/5; call email Email; Self held x1 email email      Date 5/5 5/26 6/30 7/9 7/28 8/23 9/29 10/18 11/11      Total WeeklyDose 22.75 mg avg 22.75  mg avg  22.75 mg avg 22.75  mg avg 22.75 mg avg 22.75 mg avg 22.75 mg avg 22.75 mg avg      INR 2.9 2.8 3.5 2.8 2.5 3.0 2.6 2.9 2.7      Notes email; fall rcv'd 5/27  rec'vd 7/12; email rec'vd 8/3  email  email        Phone Interview:  Tablet Strength: 3mg and 1mg tablets  Patient Contact Info: Preferred *[phone]* anytime after noon  Other numbers on his profile:   [phone] (Home)    - brother in law, requested that we do NOT call this number  Lab Contact Info: [place]   Lab: [facility] in [facility] phone: [phone] , Fax: [phone]   **will email once monthly or if INR is out of range**  [email or web address] PLEASE USE EMAIL TO COMMUNICATE    Patient Findings:  Positives:  Change in medications   Negatives:  Signs/symptoms of thrombosis, Signs/symptoms of bleeding, Laboratory test error suspected, Change in health, Change in alcohol use, Change in activity, Upcoming invasive procedure, Emergency department visit, Upcoming dental procedure, Missed doses, Extra doses, Change in diet/appetite, Hospital admission, Bruising, Other complaints   Comments:  Email response from Mr. Turner on 11/23: Hi Ms. Redmond. I'm sorry I've gotten so messed up. I thought at first I'd sent that result to [facility] on the 11th. Anyhow, my routine is the same as before as far  as med dosage and my diet. I am taking a supplement, Bergamot, but it hasn't seemed to have made a difference. I had a cursory tooth cleanin' done today and didn't have excessive bleeding. I took the preventive clump of Clindamycin and all went well. Let me know when to test again and Happy Thanksgiving! Doc     Plan:   1. INR was therapeutic on 11/11 at 2.7 (goal 2.5-3.5) however not reported by patient until 11/19. Mr. Turner will continue maintenance dose of warfarin 3 mg and 3.5 mg alternating every 2 days until recheck (22.75 mg/weekly avg).  2. Repeat INR in ~ three weeks, 12/1.  3. Verbal information provided via email. Patient RBV dosing instructions, expresses understanding by teach back, and has no further questions at this time.  3. Doc Turner understands the importance of calling the [facility] if he notices any s/sx of bleeding, stroke, or abnormal bruising, if any changes are made to his medications or medication doses (Rx, OTC, herbal), or if any upcoming procedures are scheduled. Doc Turner will likewise let us know if any other changes, questions, or concerns arise regarding anticoagulation therapy. he understands the importance of seeking medical attention immediately if he experiences any falls, vehicle accidents, or abnormal bleeding or bruising. Doc Turner voiced understanding of this information and confirms that he has the [facility]'s contact information. Otherwise, we will plan to contact the patient once monthly or if his INR is out of range. Will contact via email for now     Teresita Redmond CPhT  11/19/2021  08:23 EST    I, Olegario Macdonald, PharmD, have reviewed the note in full and agree with the assessment and plan.  11/24/21  11:53 EST

## 2021-11-23 RX ORDER — SOTALOL HYDROCHLORIDE 80 MG/1
TABLET ORAL
Qty: 180 TABLET | Refills: 1 | Status: SHIPPED | OUTPATIENT
Start: 2021-11-23 | End: 2022-05-23

## 2021-12-17 ENCOUNTER — ANTICOAGULATION VISIT (OUTPATIENT)
Dept: PHARMACY | Facility: HOSPITAL | Age: 71
End: 2021-12-17

## 2021-12-17 DIAGNOSIS — I48.92 PAROXYSMAL ATRIAL FLUTTER: Primary | ICD-10-CM

## 2021-12-17 DIAGNOSIS — Z95.2 HX OF MITRAL VALVE REPLACEMENT WITH MECHANICAL VALVE: ICD-10-CM

## 2021-12-17 LAB — INR PPP: 3.7

## 2022-01-03 ENCOUNTER — ANTICOAGULATION VISIT (OUTPATIENT)
Dept: PHARMACY | Facility: HOSPITAL | Age: 72
End: 2022-01-03

## 2022-01-03 DIAGNOSIS — Z95.2 HX OF MITRAL VALVE REPLACEMENT WITH MECHANICAL VALVE: ICD-10-CM

## 2022-01-03 DIAGNOSIS — I48.92 PAROXYSMAL ATRIAL FLUTTER: Primary | ICD-10-CM

## 2022-01-03 LAB — INR PPP: 3

## 2022-01-03 NOTE — PROGRESS NOTES
Anticoagulation Clinic - Remote Progress Note  [facility]  Frequency of Monitorin-4x a month    Indication: Hx of mitral valve replacement with mechanical valve Aizzy, VHD  Referring Provider: JF Ayala) -- (last visit 20  no show for 21 visit)  Initial Warfarin Start Date:    Goal INR: 2.5-3.5  Current Drug Interactions: ensure (once daily); amitriptyline (PRN)  CHADS-VASc: 2 (age, HTN)     Diet: iceberg lettuce 1x/week, ensure 3-4x/week, V8 juice (20)  Alcohol: 12 pack of beer/week  Tobacco: None  OTC Pain Medication: None     INR History:  Date 8/25 9/11 9/29 10/19 11/5 12/2 12/11 1/4/18 1/30 2/19 3/7 4/5   Total Weekly Dose 24mg 24mg 24mg 22.75  mg? 22.75  mg? ? 22.5mg 22.5mg 22.5mg 22.5mg 22.5mg 22.5mg   INR 3.3 2.8 2.9 3.1 3.5 2.7 2.7 2.6 2.9 2.5 3.3 3.7   Notes    LVM LVM   call   LVM      Date 4/18 5/20 6/5 6/27 7/24 7/31 9/9 10/8 11/8 12/14 2/20/19 2/25   Total Weekly Dose 22.5mg 23mg 22.5mg  23mg 24mg 24mg unable   to verify unable   to verify unable   to verify unable   to verify 23mg   INR 2.6 2.8 2.9 2.4 2.9 3.3 3.4 3.0 2.7 2.6 3.8 3.4   Notes      rec'vd   recv'd 10/13;  mult calls         Date 3/11 3/25 4/9 5/1 5/15 6/11 6/27 7/10 7/26 8/9 9/11 10/3   Total Weekly Dose 23mg 23mg 22.5mg 23mg 22.5mg 23mg  22.5mg 22.5mg 22.5mg 23mg 22.5mg   INR 3.6 3.0 3.2 3.3 2.5 3.1 3.6 2.9 2.3 2.9 2.9 2.8   Notes        reported          Date 10/15 10/24 10/31 11/8 11/15 11/22 12/7 12/18 12/27 1/15/20 1/30 2/18   Total Weekly Dose 23mg 22.5mg 23mg 23mg 23.5mg 23mg 22.5mg 23mg 22.5mg 23mg ??? 23mg    INR 3.3 3.2 2.7 2.3 2.8 2.7 3.2  2.5 2.5 3.1 2.7 2.4   Notes  reported 10/25; sick; cefdinir stop cefdinir 10/29    Reported   Inc GLV  Unable to reach pt MVI; decr Ensure     Date 3/5 3/17 4/4 4/23 5/4 5/19 6/2 6/24 7/22 7/31 8/26 9/3   Total Weekly Dose 23mg 22.5mg 22.5mg 23mg 23mg 23mg? 23mg 22.75  mg avg 22.75  mg 23mg 22.75  mg 23.5mg   INR 2.8 3.0 3.3 2.9 2.6 2.8 2.9  2.49 2.4 3.1 2.5 3.4   Notes  call recv'd 4/13 rec'vd 4/24; call call recv'd  5/20; call recv'd 6/3; call  recv'd 7/31 recv'd 7/31 recv'd 9/3 Self-adj  CoQ10; email     Date 9/11 10/8 10/13 11/24 12/3 12/22 1/4/21 2/6 2/8 2/19 3/9 4/15   Total Weekly Dose 23mg 23.5mg  avg 23.5mg   avg 23.5mg  avg 23.5mg 22.75  mg avg 22.75  mg avg 22.5mg 19mg 22.75  mg avg 22.75   mg avg 22.75   mg avg   INR 2.8 3.4 3.3 3.7 3.1 2.7 2.6 3.7 3.2 2.8 3.2 2.6   Notes  email email Email; less Ensure Self-adjust dose dec  recv'd 1/5; call email Email; Self held x1 email email      Date 5/5 5/26 6/30 7/9 7/28 8/23 9/29 10/18 11/11 12/17 1/3/22    Total WeeklyDose 22.75 mg avg 22.75  mg avg  22.75 mg avg 22.75  mg avg 22.75 mg avg 22.75 mg avg 22.75 mg avg 22.75 mg avg 22.75 mg avg 22.75 mg avg    INR 2.9 2.8 3.5 2.8 2.5 3.0 2.6 2.9 2.7 3.7 3.0    Notes email; fall rcv'd 5/27  rec'vd 7/12; email rec'vd 8/3  email  email Unable to contact Self-heldx1      Phone Interview:  Tablet Strength: 3mg and 1mg tablets  Patient Contact Info: Preferred *[phone]* anytime after noon  Other numbers on his profile:   [phone] (Home)    - brother in law, requested that we do NOT call this number  Lab Contact Info: [place]   Lab: [facility] in [facility] phone: [phone] , Fax: [phone]   **will email once monthly or if INR is out of range**  [email or web address] PLEASE USE EMAIL TO COMMUNICATE    Patient Findings  Positives:  Missed doses   Negatives:  Signs/symptoms of thrombosis, Signs/symptoms of bleeding, Laboratory test error suspected, Change in health, Change in alcohol use, Change in activity, Upcoming invasive procedure, Emergency department visit, Upcoming dental procedure, Extra doses, Change in medications, Change in diet/appetite, Hospital admission, Bruising, Other complaints   Comments:  Response from Mr. Turner on 1/5/22: My last results on 12/17 were 3.7. I can't figure out why that was but I skipped taking my  warfarin that evening and then resumed my same dosage schedule. Nothing has changed in my routine regarding meds and supplements. I have no scheduled medical or dental appointments. Thanks and best wishes for the new year! Doc         Plan:   1. INR was WNL on 1/3/22 at 3.0 (goal 2.5-3.5). Response received via email from patient on 1/5/22. Mr. Turner will continue maintenance dose of warfarin 3 mg and 3.5 mg alternating every 2 days until recheck (22.75 mg/weekly avg).  2. Repeat INR in ~ two weeks, 1/17/22. Have emailed patient back to confirm this.  3. Verbal information provided via email. Patient RBV dosing instructions, expresses understanding by teach back, and has no further questions at this time.  3. Doc Turner understands the importance of calling the [facility] if he notices any s/sx of bleeding, stroke, or abnormal bruising, if any changes are made to his medications or medication doses (Rx, OTC, herbal), or if any upcoming procedures are scheduled. Doc Turner will likewise let us know if any other changes, questions, or concerns arise regarding anticoagulation therapy. he understands the importance of seeking medical attention immediately if he experiences any falls, vehicle accidents, or abnormal bleeding or bruising. Doc Turner voiced understanding of this information and confirms that he has the [facility]'s contact information. Otherwise, we will plan to contact the patient once monthly or if his INR is out of range. Will contact via email for now     Teresita Redmond CPhT  1/5/2022  09:47 EST     I, Lauren Milligan, PharmD, have reviewed the note in full and agree with the assessment and plan.  01/06/22  09:14 EST

## 2022-02-22 RX ORDER — LISINOPRIL 10 MG/1
10 TABLET ORAL EVERY MORNING
Qty: 90 TABLET | Refills: 3 | Status: SHIPPED | OUTPATIENT
Start: 2022-02-22 | End: 2022-12-15 | Stop reason: SDUPTHER

## 2022-02-23 ENCOUNTER — ANTICOAGULATION VISIT (OUTPATIENT)
Dept: PHARMACY | Facility: HOSPITAL | Age: 72
End: 2022-02-23

## 2022-02-23 DIAGNOSIS — Z95.2 HX OF MITRAL VALVE REPLACEMENT WITH MECHANICAL VALVE: ICD-10-CM

## 2022-02-23 DIAGNOSIS — I48.92 PAROXYSMAL ATRIAL FLUTTER: Primary | ICD-10-CM

## 2022-02-23 LAB
INR PPP: 2.9
INR PPP: 3.2

## 2022-03-24 ENCOUNTER — APPOINTMENT (OUTPATIENT)
Dept: CARDIOLOGY | Facility: HOSPITAL | Age: 72
End: 2022-03-24

## 2022-03-31 ENCOUNTER — ANTICOAGULATION VISIT (OUTPATIENT)
Dept: PHARMACY | Facility: HOSPITAL | Age: 72
End: 2022-03-31

## 2022-03-31 ENCOUNTER — TELEPHONE (OUTPATIENT)
Dept: PHARMACY | Facility: HOSPITAL | Age: 72
End: 2022-03-31

## 2022-03-31 DIAGNOSIS — Z95.2 HX OF MITRAL VALVE REPLACEMENT WITH MECHANICAL VALVE: ICD-10-CM

## 2022-03-31 DIAGNOSIS — I48.92 PAROXYSMAL ATRIAL FLUTTER: Primary | ICD-10-CM

## 2022-03-31 LAB
INR PPP: 3.2
INR PPP: 4.1

## 2022-04-22 RX ORDER — WARFARIN SODIUM 3 MG/1
TABLET ORAL
Qty: 90 TABLET | Refills: 1 | Status: SHIPPED | OUTPATIENT
Start: 2022-04-22 | End: 2022-10-18

## 2022-05-19 ENCOUNTER — TELEPHONE (OUTPATIENT)
Dept: PHARMACY | Facility: HOSPITAL | Age: 72
End: 2022-05-19

## 2022-05-19 NOTE — TELEPHONE ENCOUNTER
Have sent email to [email or web address] reminding Mr. Turner he is overdue for INR check.

## 2022-05-23 RX ORDER — SOTALOL HYDROCHLORIDE 80 MG/1
TABLET ORAL
Qty: 180 TABLET | Refills: 1 | Status: SHIPPED | OUTPATIENT
Start: 2022-05-23 | End: 2022-09-16

## 2022-05-24 ENCOUNTER — ANTICOAGULATION VISIT (OUTPATIENT)
Dept: PHARMACY | Facility: HOSPITAL | Age: 72
End: 2022-05-24

## 2022-05-24 DIAGNOSIS — I48.92 PAROXYSMAL ATRIAL FLUTTER: Primary | ICD-10-CM

## 2022-05-24 DIAGNOSIS — Z95.2 HX OF MITRAL VALVE REPLACEMENT WITH MECHANICAL VALVE: ICD-10-CM

## 2022-05-24 LAB
INR PPP: 3
INR PPP: 3.5

## 2022-06-03 ENCOUNTER — ANTICOAGULATION VISIT (OUTPATIENT)
Dept: PHARMACY | Facility: HOSPITAL | Age: 72
End: 2022-06-03

## 2022-06-03 DIAGNOSIS — Z95.2 HX OF MITRAL VALVE REPLACEMENT WITH MECHANICAL VALVE: ICD-10-CM

## 2022-06-03 DIAGNOSIS — I48.92 PAROXYSMAL ATRIAL FLUTTER: Primary | ICD-10-CM

## 2022-06-03 NOTE — PROGRESS NOTES
Anticoagulation Clinic - Remote Progress Note  [facility]  Frequency of Monitorin-4x a month    Indication: Hx of mitral valve replacement with mechanical valve Aizzy, VHD  Referring Provider: JF Ayala) -- (last visit 20  no show for 21 visit)  Initial Warfarin Start Date:    Goal INR: 2.5-3.5  Current Drug Interactions: ensure (once daily); amitriptyline (PRN)  CHADS-VASc: 2 (age, HTN)     Diet: iceberg lettuce 1x/week, ensure 3-4x/week, V8 juice (20)  Alcohol: 12 pack of beer/week  Tobacco: None  OTC Pain Medication: None     INR History:  Date 8/25 9/11 9/29 10/19 11/5 12/2 12/11 1/4/18 1/30 2/19 3/7 4/5   Total Weekly Dose 24mg 24mg 24mg 22.75  mg? 22.75  mg? ? 22.5mg 22.5mg 22.5mg 22.5mg 22.5mg 22.5mg   INR 3.3 2.8 2.9 3.1 3.5 2.7 2.7 2.6 2.9 2.5 3.3 3.7   Notes    LVM LVM   call   LVM      Date 4/18 5/20 6/5 6/27 7/24 7/31 9/9 10/8 11/8 12/14 2/20/19 2/25   Total Weekly Dose 22.5mg 23mg 22.5mg  23mg 24mg 24mg unable   to verify unable   to verify unable   to verify unable   to verify 23mg   INR 2.6 2.8 2.9 2.4 2.9 3.3 3.4 3.0 2.7 2.6 3.8 3.4   Notes      rec'vd   recv'd 10/13;  mult calls         Date 3/11 3/25 4/9 5/1 5/15 6/11 6/27 7/10 7/26 8/9 9/11 10/3   Total Weekly Dose 23mg 23mg 22.5mg 23mg 22.5mg 23mg  22.5mg 22.5mg 22.5mg 23mg 22.5mg   INR 3.6 3.0 3.2 3.3 2.5 3.1 3.6 2.9 2.3 2.9 2.9 2.8   Notes        reported          Date 10/15 10/24 10/31 11/8 11/15 11/22 12/7 12/18 12/27 1/15/20 1/30 2/18   Total Weekly Dose 23mg 22.5mg 23mg 23mg 23.5mg 23mg 22.5mg 23mg 22.5mg 23mg ??? 23mg    INR 3.3 3.2 2.7 2.3 2.8 2.7 3.2  2.5 2.5 3.1 2.7 2.4   Notes  reported 10/25; sick; cefdinir stop cefdinir 10/29    Reported   Inc GLV  Unable to reach pt MVI; decr Ensure     Date 3/5 3/17 4/4 4/23 5/4 5/19 6/2 6/24 7/22 7/31 8/26 9/3   Total Weekly Dose 23mg 22.5mg 22.5mg 23mg 23mg 23mg? 23mg 22.75  mg avg 22.75  mg 23mg 22.75  mg 23.5mg   INR 2.8 3.0 3.3 2.9 2.6 2.8 2.9  2.49 2.4 3.1 2.5 3.4   Notes  call recv'd 4/13 rec'vd 4/24; call call recv'd  5/20; call recv'd 6/3; call  recv'd 7/31 recv'd 7/31 recv'd 9/3 Self-adj  CoQ10; email     Date 9/11 10/8 10/13 11/24 12/3 12/22 1/4/21 2/6 2/8 2/19 3/9 4/15   Total Weekly Dose 23mg 23.5mg  avg 23.5mg   avg 23.5mg  avg 23.5mg 22.75  mg avg 22.75  mg avg 22.5mg 19mg 22.75  mg avg 22.75   mg avg 22.75   mg avg   INR 2.8 3.4 3.3 3.7 3.1 2.7 2.6 3.7 3.2 2.8 3.2 2.6   Notes  email email Email; less Ensure Self-adjust dose dec  recv'd 1/5; call email Email; Self held x1 email email      Date 5/5 5/26 6/30 7/9 7/28 8/23 9/29 10/18 11/11 12/17 1/3/22 1/25   Total WeeklyDose 22.75 mg avg 22.75  mg avg  22.75 mg avg 22.75  mg avg 22.75 mg avg 22.75 mg avg 22.75 mg avg 22.75 mg avg 22.75 mg avg 22.75 mg avg 22.75 mg avg   INR 2.9 2.8 3.5 2.8 2.5 3.0 2.6 2.9 2.7 3.7 3.0 2.9   Notes email; fall rcv'd 5/27  rec'vd 7/12; email rec'vd 8/3  email  email Unable to contact email; Self-heldx1 rec'd 2/23     Date 2/23 3/26 3/28  5/23 5/24 5/24   Total Weekly Dose 22.75 mg avg 22.75mg 19.5mg Non compliant  22.75 mg avg 22.75 mg avg   INR 3.2 4.1 3.2   3.5 3.0   Notes Unable to contact garlic 1x hold d/c garlic  rec'd 5/24 ?? ??     Phone Interview:  Tablet Strength: 3mg and 1mg tablets  Patient Contact Info: Preferred *[phone]* anytime after noon  Other numbers on his profile:   [phone] (Home)    - brother in law, requested that we do NOT call this number  Lab Contact Info: [employer]   Lab: [facility] in [facility] phone: [phone] , Fax: [phone]   **will email once monthly or if INR is out of range**  [email or web address] PLEASE USE EMAIL TO COMMUNICATE      Sending communications letter and communications email today 5/31/22    Have been unable to contact patient regarding recent INR results    Plan:   1. Received two INR results on 5/24/22. First result was 3.5 and second result was 3.0. Have attempted to clarify with patient if he  truly tested twice on 5/24 or if these results were two different days.  2. Repeat INR   3. Verbal information provided via email. Patient RBV dosing instructions, expresses understanding by teach back, and has no further questions at this time.  4. Doc Turner understands the importance of calling the [facility] if he notices any s/sx of bleeding, stroke, or abnormal bruising, if any changes are made to his medications or medication doses (Rx, OTC, herbal), or if any upcoming procedures are scheduled. Doc Turner will likewise let us know if any other changes, questions, or concerns arise regarding anticoagulation therapy. he understands the importance of seeking medical attention immediately if he experiences any falls, vehicle accidents, or abnormal bleeding or bruising. Doc Turner voiced understanding of this information and confirms that he has the [facility]'s contact information. Otherwise, we will plan to contact the patient once monthly or if his INR is out of range. Will contact via email for now     Teresita Redmond CPhT  6/3/2022  08:08 EDT      Thank you,    Atif Mallory PharmD, [place], BCPS  6/3/2022  11:30 EDT

## 2022-06-15 ENCOUNTER — ANTICOAGULATION VISIT (OUTPATIENT)
Dept: PHARMACY | Facility: HOSPITAL | Age: 72
End: 2022-06-15

## 2022-06-15 DIAGNOSIS — Z95.2 HX OF MITRAL VALVE REPLACEMENT WITH MECHANICAL VALVE: ICD-10-CM

## 2022-06-15 DIAGNOSIS — I48.92 PAROXYSMAL ATRIAL FLUTTER: Primary | ICD-10-CM

## 2022-06-15 LAB
INR PPP: 3
INR PPP: 3.4

## 2022-06-15 NOTE — PROGRESS NOTES
Anticoagulation Clinic - Remote Progress Note  [facility]  Frequency of Monitorin-4x a month    Indication: Hx of mitral valve replacement with mechanical valve Aizzy, VHD  Referring Provider: JF Ayala) -- (last visit 20  no show for 21 visit)  Initial Warfarin Start Date:    Goal INR: 2.5-3.5  Current Drug Interactions: ensure (once daily); amitriptyline (PRN)  CHADS-VASc: 2 (age, HTN)     Diet: iceberg lettuce 1x/week, ensure 3-4x/week, V8 juice (20)  Alcohol: 12 pack of beer/week  Tobacco: None  OTC Pain Medication: None     INR History:  Date 8/25 9/11 9/29 10/19 11/5 12/2 12/11 1/4/18 1/30 2/19 3/7 4/5   Total Weekly Dose 24mg 24mg 24mg 22.75  mg? 22.75  mg? ? 22.5mg 22.5mg 22.5mg 22.5mg 22.5mg 22.5mg   INR 3.3 2.8 2.9 3.1 3.5 2.7 2.7 2.6 2.9 2.5 3.3 3.7   Notes    LVM LVM   call   LVM      Date 4/18 5/20 6/5 6/27 7/24 7/31 9/9 10/8 11/8 12/14 2/20/19 2/25   Total Weekly Dose 22.5mg 23mg 22.5mg  23mg 24mg 24mg unable   to verify unable   to verify unable   to verify unable   to verify 23mg   INR 2.6 2.8 2.9 2.4 2.9 3.3 3.4 3.0 2.7 2.6 3.8 3.4   Notes      rec'vd   recv'd 10/13;  mult calls         Date 3/11 3/25 4/9 5/1 5/15 6/11 6/27 7/10 7/26 8/9 9/11 10/3   Total Weekly Dose 23mg 23mg 22.5mg 23mg 22.5mg 23mg  22.5mg 22.5mg 22.5mg 23mg 22.5mg   INR 3.6 3.0 3.2 3.3 2.5 3.1 3.6 2.9 2.3 2.9 2.9 2.8   Notes        reported          Date 10/15 10/24 10/31 11/8 11/15 11/22 12/7 12/18 12/27 1/15/20 1/30 2/18   Total Weekly Dose 23mg 22.5mg 23mg 23mg 23.5mg 23mg 22.5mg 23mg 22.5mg 23mg ??? 23mg    INR 3.3 3.2 2.7 2.3 2.8 2.7 3.2  2.5 2.5 3.1 2.7 2.4   Notes  reported 10/25; sick; cefdinir stop cefdinir 10/29    Reported   Inc GLV  Unable to reach pt MVI; decr Ensure     Date 3/5 3/17 4/4 4/23 5/4 5/19 6/2 6/24 7/22 7/31 8/26 9/3   Total Weekly Dose 23mg 22.5mg 22.5mg 23mg 23mg 23mg? 23mg 22.75  mg avg 22.75  mg 23mg 22.75  mg 23.5mg   INR 2.8 3.0 3.3 2.9 2.6 2.8 2.9  2.49 2.4 3.1 2.5 3.4   Notes  call recv'd 4/13 rec'vd 4/24; call call recv'd  5/20; call recv'd 6/3; call  recv'd 7/31 recv'd 7/31 recv'd 9/3 Self-adj  CoQ10; email     Date 9/11 10/8 10/13 11/24 12/3 12/22 1/4/21 2/6 2/8 2/19 3/9 4/15   Total Weekly Dose 23mg 23.5mg  avg 23.5mg   avg 23.5mg  avg 23.5mg 22.75  mg avg 22.75  mg avg 22.5mg 19mg 22.75  mg avg 22.75   mg avg 22.75   mg avg   INR 2.8 3.4 3.3 3.7 3.1 2.7 2.6 3.7 3.2 2.8 3.2 2.6   Notes  email email Email; less Ensure Self-adjust dose dec  recv'd 1/5; call email Email; Self held x1 email email      Date 5/5 5/26 6/30 7/9 7/28 8/23 9/29 10/18 11/11 12/17 1/3/22 1/25   Total WeeklyDose 22.75 mg avg 22.75  mg avg  22.75 mg avg 22.75  mg avg 22.75 mg avg 22.75 mg avg 22.75 mg avg 22.75 mg avg 22.75 mg avg 22.75 mg avg 22.75 mg avg   INR 2.9 2.8 3.5 2.8 2.5 3.0 2.6 2.9 2.7 3.7 3.0 2.9   Notes email; fall rcv'd 5/27  rec'vd 7/12; email rec'vd 8/3  email  email Unable to contact email; Self-heldx1 rec'd 2/23     Date 2/23 3/26 3/28  4/17 5/24 5/24 5/26   Total Weekly Dose 22.75 mg avg 22.75mg 19.5mg Non compliant 22.75 mg avg? 22.75 mg avg 22.75 mg avg 22.75 mg avg   INR 3.2 4.1 3.2  3.0 3.5 3.0 3.4   Notes Unable to contact garlic 1x hold d/c garlic  ?? ?? ?? ??     Phone Interview:  Tablet Strength: 3mg and 1mg tablets  Patient Contact Info: Preferred *[phone]* anytime after noon  Other numbers on his profile:   [phone] (Home)    - brother in law, requested that we do NOT call this number  Lab Contact Info: [place]   Lab: [facility] phone: [phone] , Fax: [phone]   **will email once monthly or if INR is out of range**  [email or web address] PLEASE USE EMAIL TO COMMUNICATE    Patient Findings:  Comments:  Via email - patient reports on 4/17 INR was 3.0 and on 5/26 INR was 3.4. Have responded to get further clarification and to ask patient to repeat INR, however, have been unsuccessful in contacting.     Sent first overdue  INR email today. Will close encounter at this time.     Plan:   1. .  2. Repeat INR ASAP.  3. .  4. Doc Turner understands the importance of calling the [facility] if he notices any s/sx of bleeding, stroke, or abnormal bruising, if any changes are made to his medications or medication doses (Rx, OTC, herbal), or if any upcoming procedures are scheduled. Doc Turner will likewise let us know if any other changes, questions, or concerns arise regarding anticoagulation therapy. he understands the importance of seeking medical attention immediately if he experiences any falls, vehicle accidents, or abnormal bleeding or bruising. Doc Turner voiced understanding of this information and confirms that he has the [facility]'s contact information. Otherwise, we will plan to contact the patient once monthly or if his INR is out of range. Will contact via email for now     Teresita Redmond CPhT  6/15/2022  12:14 EDT      Letter sent due to unsuccessful attempts to contact patient.  Close encounter.  Lauren Milligan, PharmD

## 2022-06-15 NOTE — PROGRESS NOTES
Anticoagulation Clinic - Remote Progress Note  [facility]  Frequency of Monitorin-4x a month    Indication: Hx of mitral valve replacement with mechanical valve Aizzy, VHD  Referring Provider: JF Ayala) -- (last visit 20  no show for 21 visit)  Initial Warfarin Start Date:    Goal INR: 2.5-3.5  Current Drug Interactions: ensure (once daily); amitriptyline (PRN)  CHADS-VASc: 2 (age, HTN)     Diet: iceberg lettuce 1x/week, ensure 3-4x/week, V8 juice (20)  Alcohol: 12 pack of beer/week  Tobacco: None  OTC Pain Medication: None     INR History:  Date 8/25 9/11 9/29 10/19 11/5 12/2 12/11 1/4/18 1/30 2/19 3/7 4/5   Total Weekly Dose 24mg 24mg 24mg 22.75  mg? 22.75  mg? ? 22.5mg 22.5mg 22.5mg 22.5mg 22.5mg 22.5mg   INR 3.3 2.8 2.9 3.1 3.5 2.7 2.7 2.6 2.9 2.5 3.3 3.7   Notes    LVM LVM   call   LVM      Date 4/18 5/20 6/5 6/27 7/24 7/31 9/9 10/8 11/8 12/14 2/20/19 2/25   Total Weekly Dose 22.5mg 23mg 22.5mg  23mg 24mg 24mg unable   to verify unable   to verify unable   to verify unable   to verify 23mg   INR 2.6 2.8 2.9 2.4 2.9 3.3 3.4 3.0 2.7 2.6 3.8 3.4   Notes      rec'vd   recv'd 10/13;  mult calls         Date 3/11 3/25 4/9 5/1 5/15 6/11 6/27 7/10 7/26 8/9 9/11 10/3   Total Weekly Dose 23mg 23mg 22.5mg 23mg 22.5mg 23mg  22.5mg 22.5mg 22.5mg 23mg 22.5mg   INR 3.6 3.0 3.2 3.3 2.5 3.1 3.6 2.9 2.3 2.9 2.9 2.8   Notes        reported          Date 10/15 10/24 10/31 11/8 11/15 11/22 12/7 12/18 12/27 1/15/20 1/30 2/18   Total Weekly Dose 23mg 22.5mg 23mg 23mg 23.5mg 23mg 22.5mg 23mg 22.5mg 23mg ??? 23mg    INR 3.3 3.2 2.7 2.3 2.8 2.7 3.2  2.5 2.5 3.1 2.7 2.4   Notes  reported 10/25; sick; cefdinir stop cefdinir 10/29    Reported   Inc GLV  Unable to reach pt MVI; decr Ensure     Date 3/5 3/17 4/4 4/23 5/4 5/19 6/2 6/24 7/22 7/31 8/26 9/3   Total Weekly Dose 23mg 22.5mg 22.5mg 23mg 23mg 23mg? 23mg 22.75  mg avg 22.75  mg 23mg 22.75  mg 23.5mg   INR 2.8 3.0 3.3 2.9 2.6 2.8 2.9  2.49 2.4 3.1 2.5 3.4   Notes  call recv'd 4/13 rec'vd 4/24; call call recv'd  5/20; call recv'd 6/3; call  recv'd 7/31 recv'd 7/31 recv'd 9/3 Self-adj  CoQ10; email     Date 9/11 10/8 10/13 11/24 12/3 12/22 1/4/21 2/6 2/8 2/19 3/9 4/15   Total Weekly Dose 23mg 23.5mg  avg 23.5mg   avg 23.5mg  avg 23.5mg 22.75  mg avg 22.75  mg avg 22.5mg 19mg 22.75  mg avg 22.75   mg avg 22.75   mg avg   INR 2.8 3.4 3.3 3.7 3.1 2.7 2.6 3.7 3.2 2.8 3.2 2.6   Notes  email email Email; less Ensure Self-adjust dose dec  recv'd 1/5; call email Email; Self held x1 email email      Date 5/5 5/26 6/30 7/9 7/28 8/23 9/29 10/18 11/11 12/17 1/3/22 1/25   Total WeeklyDose 22.75 mg avg 22.75  mg avg  22.75 mg avg 22.75  mg avg 22.75 mg avg 22.75 mg avg 22.75 mg avg 22.75 mg avg 22.75 mg avg 22.75 mg avg 22.75 mg avg   INR 2.9 2.8 3.5 2.8 2.5 3.0 2.6 2.9 2.7 3.7 3.0 2.9   Notes email; fall rcv'd 5/27  rec'vd 7/12; email rec'vd 8/3  email  email Unable to contact email; Self-heldx1 rec'd 2/23     Date 2/23 3/26 3/28  5/23 5/24 5/24   Total Weekly Dose 22.75 mg avg 22.75mg 19.5mg Non compliant  22.75 mg avg 22.75 mg avg   INR 3.2 4.1 3.2   3.5 3.0   Notes Unable to contact garlic 1x hold d/c garlic  rec'd 5/24 ?? ??     Phone Interview:  Tablet Strength: 3mg and 1mg tablets  Patient Contact Info: Preferred *[phone]* anytime after noon  Other numbers on his profile:   [phone] (Home)    - brother in law, requested that we do NOT call this number  Lab Contact Info: [employer]   Lab: [facility] phone: [phone] , Fax: [phone]   **will email once monthly or if INR is out of range**  [email or web address] PLEASE USE EMAIL TO COMMUNICATE      Sending communications letter and communications email today 5/31/22    Have been unable to contact patient regarding recent INR results    Plan:   1. Received two INR results on 5/24/22. First result was 3.5 and second result was 3.0. Have attempted to clarify with patient if he  truly tested twice on 5/24 or if these results were two different days.  2. Repeat INR   3. Verbal information provided via email. Patient RBV dosing instructions, expresses understanding by teach back, and has no further questions at this time.  4. Doc Turner understands the importance of calling the [facility] if he notices any s/sx of bleeding, stroke, or abnormal bruising, if any changes are made to his medications or medication doses (Rx, OTC, herbal), or if any upcoming procedures are scheduled. Doc Turner will likewise let us know if any other changes, questions, or concerns arise regarding anticoagulation therapy. he understands the importance of seeking medical attention immediately if he experiences any falls, vehicle accidents, or abnormal bleeding or bruising. Doc Turner voiced understanding of this information and confirms that he has the [facility]'s contact information. Otherwise, we will plan to contact the patient once monthly or if his INR is out of range. Will contact via email for now     Teresita Redmond CPhT  6/15/2022  12:13 EDT      I, Teresita Redmond, Pharmacy Technician, have reviewed the note in full and agree with the assessment and plan.  06/15/22  12:13 EDT

## 2022-06-22 ENCOUNTER — ANTICOAGULATION VISIT (OUTPATIENT)
Dept: PHARMACY | Facility: HOSPITAL | Age: 72
End: 2022-06-22

## 2022-06-22 DIAGNOSIS — I48.92 PAROXYSMAL ATRIAL FLUTTER: Primary | ICD-10-CM

## 2022-06-22 DIAGNOSIS — Z95.2 HX OF MITRAL VALVE REPLACEMENT WITH MECHANICAL VALVE: ICD-10-CM

## 2022-06-22 LAB — INR PPP: 3

## 2022-07-05 NOTE — PROGRESS NOTES
Will send overdue email and close encounter at this time    Josephine Pineda, ToriD.  07/05/22   15:54 EDT

## 2022-07-11 ENCOUNTER — TELEPHONE (OUTPATIENT)
Dept: PHARMACY | Facility: HOSPITAL | Age: 72
End: 2022-07-11

## 2022-07-14 ENCOUNTER — ANTICOAGULATION VISIT (OUTPATIENT)
Dept: PHARMACY | Facility: HOSPITAL | Age: 72
End: 2022-07-14

## 2022-07-14 DIAGNOSIS — Z95.2 HX OF MITRAL VALVE REPLACEMENT WITH MECHANICAL VALVE: ICD-10-CM

## 2022-07-14 DIAGNOSIS — I48.92 PAROXYSMAL ATRIAL FLUTTER: Primary | ICD-10-CM

## 2022-07-14 LAB — INR PPP: 2.7 (ref 2.5–3.5)

## 2022-07-14 NOTE — PROGRESS NOTES
Anticoagulation Clinic - Remote Progress Note  [facility]  Frequency of Monitorin-4x a month    Indication: Hx of mitral valve replacement with mechanical valve Aizzy, VHD  Referring Provider: JF Ayala) -- (last visit 20  no show for 21 visit)  Initial Warfarin Start Date:    Goal INR: 2.5-3.5  Current Drug Interactions: ensure (once daily); amitriptyline (PRN)  CHADS-VASc: 2 (age, HTN)     Diet: iceberg lettuce 1x/week, ensure 3-4x/week, V8 juice (20)  Alcohol: 12 pack of beer/week  Tobacco: None  OTC Pain Medication: None     INR History:  Date 8/25 9/11 9/29 10/19 11/5 12/2 12/11 1/4/18 1/30 2/19 3/7 4/5   Total Weekly Dose 24mg 24mg 24mg 22.75  mg? 22.75  mg? ? 22.5mg 22.5mg 22.5mg 22.5mg 22.5mg 22.5mg   INR 3.3 2.8 2.9 3.1 3.5 2.7 2.7 2.6 2.9 2.5 3.3 3.7   Notes    LVM LVM   call   LVM      Date 4/18 5/20 6/5 6/27 7/24 7/31 9/9 10/8 11/8 12/14 2/20/19 2/25   Total Weekly Dose 22.5mg 23mg 22.5mg  23mg 24mg 24mg unable   to verify unable   to verify unable   to verify unable   to verify 23mg   INR 2.6 2.8 2.9 2.4 2.9 3.3 3.4 3.0 2.7 2.6 3.8 3.4   Notes      rec'vd   recv'd 10/13;  mult calls         Date 3/11 3/25 4/9 5/1 5/15 6/11 6/27 7/10 7/26 8/9 9/11 10/3   Total Weekly Dose 23mg 23mg 22.5mg 23mg 22.5mg 23mg  22.5mg 22.5mg 22.5mg 23mg 22.5mg   INR 3.6 3.0 3.2 3.3 2.5 3.1 3.6 2.9 2.3 2.9 2.9 2.8   Notes        reported          Date 10/15 10/24 10/31 11/8 11/15 11/22 12/7 12/18 12/27 1/15/20 1/30 2/18   Total Weekly Dose 23mg 22.5mg 23mg 23mg 23.5mg 23mg 22.5mg 23mg 22.5mg 23mg ??? 23mg    INR 3.3 3.2 2.7 2.3 2.8 2.7 3.2  2.5 2.5 3.1 2.7 2.4   Notes  reported 10/25; sick; cefdinir stop cefdinir 10/29    Reported   Inc GLV  Unable to reach pt MVI; decr Ensure     Date 3/5 3/17 4/4 4/23 5/4 5/19 6/2 6/24 7/22 7/31 8/26 9/3   Total Weekly Dose 23mg 22.5mg 22.5mg 23mg 23mg 23mg? 23mg 22.75  mg avg 22.75  mg 23mg 22.75  mg 23.5mg   INR 2.8 3.0 3.3 2.9 2.6 2.8 2.9  2.49 2.4 3.1 2.5 3.4   Notes  call recv'd 4/13 rec'vd 4/24; call call recv'd  5/20; call recv'd 6/3; call  recv'd 7/31 recv'd 7/31 recv'd 9/3 Self-adj  CoQ10; email     Date 9/11 10/8 10/13 11/24 12/3 12/22 1/4/21 2/6 2/8 2/19 3/9 4/15   Total Weekly Dose 23mg 23.5mg  avg 23.5mg   avg 23.5mg  avg 23.5mg 22.75  mg avg 22.75  mg avg 22.5mg 19mg 22.75  mg avg 22.75   mg avg 22.75   mg avg   INR 2.8 3.4 3.3 3.7 3.1 2.7 2.6 3.7 3.2 2.8 3.2 2.6   Notes  email email Email; less Ensure Self-adjust dose dec  recv'd 1/5; call email Email; Self held x1 email email      Date 5/5 5/26 6/30 7/9 7/28 8/23 9/29 10/18 11/11 12/17 1/3/22 1/25   Total WeeklyDose 22.75 mg avg 22.75  mg avg  22.75 mg avg 22.75  mg avg 22.75 mg avg 22.75 mg avg 22.75 mg avg 22.75 mg avg 22.75 mg avg 22.75 mg avg 22.75 mg avg   INR 2.9 2.8 3.5 2.8 2.5 3.0 2.6 2.9 2.7 3.7 3.0 2.9   Notes email; fall rcv'd 5/27  rec'vd 7/12; email rec'vd 8/3  email  email Unable to contact email; Self-heldx1 rec'd 2/23     Date 2/23 3/26 3/28  4/17 5/24 5/24 5/26 6/17 7/14   Total Weekly Dose 22.75 mg avg 22.75mg 19.5mg Non compliant 22.75 mg avg? 22.75 mg avg 22.75 mg avg 22.75 mg avg  23mg   INR 3.2 4.1 3.2  3.0 3.5 3.0 3.4 3.0 2.7   Notes Unable to contact garlic 1x hold d/c garlic  ?? ?? ?? ?? Rec/d 6/22      Phone Interview:  Tablet Strength: 3mg and 1mg tablets  Patient Contact Info: Preferred *[phone]* anytime after noon  Other numbers on his profile:   [phone] (Home)    - brother in law, requested that we do NOT call this number  Lab Contact Info: [employer]   Lab: [facility] in [facility] phone: [phone] , Fax: [phone]   **will email once monthly or if INR is out of range**  [email or web address] PLEASE USE EMAIL TO COMMUNICATE    Patient Findings    Comments:  Patient prefers not to be contacted by phone.        Plan:   1. Mr. Turner's INR was therapeutic at 2.7. Instructed Mr. Turner to continue current regimen of alternating 3mg and 3.5mg  daily.   2. Repeat INR 7/28.   3. Verbal information provided via email. Patient RBV dosing instructions, expresses understanding by teach back, and has no further questions at this time.  4. Doc Turner understands the importance of calling the [facility] if he notices any s/sx of bleeding, stroke, or abnormal bruising, if any changes are made to his medications or medication doses (Rx, OTC, herbal), or if any upcoming procedures are scheduled. Doc Turner will likewise let us know if any other changes, questions, or concerns arise regarding anticoagulation therapy. he understands the importance of seeking medical attention immediately if he experiences any falls, vehicle accidents, or abnormal bleeding or bruising. Doc Turner voiced understanding of this information and confirms that he has the [facility]'s contact information. Otherwise, we will plan to contact the patient once monthly or if his INR is out of range. Will contact via email for now     Tori SolorzanoD Candidate 2023 07/14/22  14:28 EDT    I, Tori VenturaD, have reviewed the note in full and agree with the assessment and plan.  07/14/22  14:56 EDT

## 2022-08-19 ENCOUNTER — ANTICOAGULATION VISIT (OUTPATIENT)
Dept: PHARMACY | Facility: HOSPITAL | Age: 72
End: 2022-08-19

## 2022-08-19 DIAGNOSIS — Z95.2 HX OF MITRAL VALVE REPLACEMENT WITH MECHANICAL VALVE: ICD-10-CM

## 2022-08-19 DIAGNOSIS — I48.92 PAROXYSMAL ATRIAL FLUTTER: Primary | ICD-10-CM

## 2022-08-19 LAB — INR PPP: 3.4

## 2022-08-19 NOTE — PROGRESS NOTES
Anticoagulation Clinic - Remote Progress Note  [facility]  Frequency of Monitorin-4x a month    Indication: Hx of mitral valve replacement with mechanical valve Aizzy, VHD  Referring Provider: JF Ayala) -- (last visit 20  no show for 21 visit)  Initial Warfarin Start Date:    Goal INR: 2.5-3.5  Current Drug Interactions: ensure (once daily); amitriptyline (PRN)  CHADS-VASc: 2 (age, HTN)     Diet: iceberg lettuce 1x/week, ensure 3-4x/week, V8 juice (20)  Alcohol: 12 pack of beer/week  Tobacco: None  OTC Pain Medication: None     INR History:  Date 8/25 9/11 9/29 10/19 11/5 12/2 12/11 1/4/18 1/30 2/19 3/7 4/5   Total Weekly Dose 24mg 24mg 24mg 22.75  mg? 22.75  mg? ? 22.5mg 22.5mg 22.5mg 22.5mg 22.5mg 22.5mg   INR 3.3 2.8 2.9 3.1 3.5 2.7 2.7 2.6 2.9 2.5 3.3 3.7   Notes    LVM LVM   call   LVM      Date 4/18 5/20 6/5 6/27 7/24 7/31 9/9 10/8 11/8 12/14 2/20/19 2/25   Total Weekly Dose 22.5mg 23mg 22.5mg  23mg 24mg 24mg unable   to verify unable   to verify unable   to verify unable   to verify 23mg   INR 2.6 2.8 2.9 2.4 2.9 3.3 3.4 3.0 2.7 2.6 3.8 3.4   Notes      rec'vd   recv'd 10/13;  mult calls         Date 3/11 3/25 4/9 5/1 5/15 6/11 6/27 7/10 7/26 8/9 9/11 10/3   Total Weekly Dose 23mg 23mg 22.5mg 23mg 22.5mg 23mg  22.5mg 22.5mg 22.5mg 23mg 22.5mg   INR 3.6 3.0 3.2 3.3 2.5 3.1 3.6 2.9 2.3 2.9 2.9 2.8   Notes        reported          Date 10/15 10/24 10/31 11/8 11/15 11/22 12/7 12/18 12/27 1/15/20 1/30 2/18   Total Weekly Dose 23mg 22.5mg 23mg 23mg 23.5mg 23mg 22.5mg 23mg 22.5mg 23mg ??? 23mg    INR 3.3 3.2 2.7 2.3 2.8 2.7 3.2  2.5 2.5 3.1 2.7 2.4   Notes  reported 10/25; sick; cefdinir stop cefdinir 10/29    Reported   Inc GLV  Unable to reach pt MVI; decr Ensure     Date 3/5 3/17 4/4 4/23 5/4 5/19 6/2 6/24 7/22 7/31 8/26 9/3   Total Weekly Dose 23mg 22.5mg 22.5mg 23mg 23mg 23mg? 23mg 22.75  mg avg 22.75  mg 23mg 22.75  mg 23.5mg   INR 2.8 3.0 3.3 2.9 2.6 2.8 2.9  2.49 2.4 3.1 2.5 3.4   Notes  call recv'd 4/13 rec'vd 4/24; call call recv'd  5/20; call recv'd 6/3; call  recv'd 7/31 recv'd 7/31 recv'd 9/3 Self-adj  CoQ10; email     Date 9/11 10/8 10/13 11/24 12/3 12/22 1/4/21 2/6 2/8 2/19 3/9 4/15   Total Weekly Dose 23mg 23.5mg  avg 23.5mg   avg 23.5mg  avg 23.5mg 22.75  mg avg 22.75  mg avg 22.5mg 19mg 22.75  mg avg 22.75   mg avg 22.75   mg avg   INR 2.8 3.4 3.3 3.7 3.1 2.7 2.6 3.7 3.2 2.8 3.2 2.6   Notes  email email Email; less Ensure Self-adjust dose dec  recv'd 1/5; call email Email; Self held x1 email email      Date 5/5 5/26 6/30 7/9 7/28 8/23 9/29 10/18 11/11 12/17 1/3/22 1/25   Total WeeklyDose 22.75 mg avg 22.75  mg avg  22.75 mg avg 22.75  mg avg 22.75 mg avg 22.75 mg avg 22.75 mg avg 22.75 mg avg 22.75 mg avg 22.75 mg avg 22.75 mg avg   INR 2.9 2.8 3.5 2.8 2.5 3.0 2.6 2.9 2.7 3.7 3.0 2.9   Notes email; fall rcv'd 5/27  rec'vd 7/12; email rec'vd 8/3  email  email Unable to contact email; Self-heldx1 rec'd 2/23     Date 2/23 3/26 3/28  4/17 5/24 5/24 5/26 6/17 7/14  8/19   Total Weekly Dose 22.75 mg avg 22.75mg 19.5mg Non compliant 22.75 mg avg? 22.75 mg avg 22.75 mg avg 22.75 mg avg  23mg Non-  Compliant 22.75 mg   avg   INR 3.2 4.1 3.2  3.0 3.5 3.0 3.4 3.0 2.7  3.4   Notes Unable to contact garlic 1x hold d/c garlic  ?? ?? ?? ?? Rec/d 6/22   email     Phone Interview:  Tablet Strength: 3mg and 1mg tablets  Patient Contact Info: Preferred *[phone]* anytime after noon  Other numbers on his profile:   [phone] (Home)    - brother in law, requested that we do NOT call this number  Lab Contact Info: [employer]   Lab: [facility] phone: [phone] , Fax: [phone]   **will email once monthly or if INR is out of range**  [email or web address] PLEASE USE EMAIL TO COMMUNICATE    Patient Findings  Negatives:  Signs/symptoms of thrombosis, Signs/symptoms of bleeding, Laboratory test error suspected, Change in health, Change in alcohol use,  Change in activity, Upcoming invasive procedure, Emergency department visit, Upcoming dental procedure, Missed doses, Extra doses, Change in medications, Change in diet/appetite, Hospital admission, Bruising, Other complaints   Comments:  Mr. Turner prefers not to be contacted by phone. Sent patient an e-mail with instructions to continue current warfarin dose and to let the clinic know if he has been taking his warfarin differently than instructed.          Plan:   1. Mr. Turner's INR was therapeutic at 3.4. Instructed Mr. Turner by e-mail to continue current regimen of alternating 3mg and 3.5mg daily.   2. Repeat INR 9/2 .   3. Verbal information provided via email. Patient RBV dosing instructions, expresses understanding by teach back, and has no further questions at this time.  4. Doc PANCHO Turner understands the importance of calling the [facility] if he notices any s/sx of bleeding, stroke, or abnormal bruising, if any changes are made to his medications or medication doses (Rx, OTC, herbal), or if any upcoming procedures are scheduled. Doc Turner will likewise let us know if any other changes, questions, or concerns arise regarding anticoagulation therapy. he understands the importance of seeking medical attention immediately if he experiences any falls, vehicle accidents, or abnormal bleeding or bruising. Doc Turner voiced understanding of this information and confirms that he has the [facility]'s contact information. Otherwise, we will plan to contact the patient once monthly or if his INR is out of range. Will contact via email for now     Elaina Winter PharmD  8/19/2022   13:18 EDT

## 2022-09-04 LAB — INR PPP: 2.8

## 2022-09-12 LAB — INR PPP: 3.8

## 2022-09-13 ENCOUNTER — ANTICOAGULATION VISIT (OUTPATIENT)
Dept: PHARMACY | Facility: HOSPITAL | Age: 72
End: 2022-09-13

## 2022-09-13 DIAGNOSIS — Z95.2 HX OF MITRAL VALVE REPLACEMENT WITH MECHANICAL VALVE: ICD-10-CM

## 2022-09-13 DIAGNOSIS — I48.92 PAROXYSMAL ATRIAL FLUTTER: Primary | ICD-10-CM

## 2022-09-13 LAB — INR PPP: 2.8

## 2022-09-13 NOTE — PROGRESS NOTES
Anticoagulation Clinic - Remote Progress Note  [facility]  Frequency of Monitorin-4x a month    Indication: Hx of mitral valve replacement with mechanical valve Aizzy, VHD  Referring Provider: JF Ayala) -- (last visit 20  no show for 21 visit)  Initial Warfarin Start Date:    Goal INR: 2.5-3.5  Current Drug Interactions: ensure (once daily); amitriptyline (PRN)  CHADS-VASc: 2 (age, HTN)     Diet: iceberg lettuce 1x/week, ensure 3-4x/week, V8 juice (20)  Alcohol: 12 pack of beer/week  Tobacco: None  OTC Pain Medication: None     INR History:  Date 8/25 9/11 9/29 10/19 11/5 12/2 12/11 1/4/18 1/30 2/19 3/7 4/5   Total Weekly Dose 24mg 24mg 24mg 22.75  mg? 22.75  mg? ? 22.5mg 22.5mg 22.5mg 22.5mg 22.5mg 22.5mg   INR 3.3 2.8 2.9 3.1 3.5 2.7 2.7 2.6 2.9 2.5 3.3 3.7   Notes    LVM LVM   call   LVM      Date 4/18 5/20 6/5 6/27 7/24 7/31 9/9 10/8 11/8 12/14 2/20/19 2/25   Total Weekly Dose 22.5mg 23mg 22.5mg  23mg 24mg 24mg unable   to verify unable   to verify unable   to verify unable   to verify 23mg   INR 2.6 2.8 2.9 2.4 2.9 3.3 3.4 3.0 2.7 2.6 3.8 3.4   Notes      rec'vd   recv'd 10/13;  mult calls         Date 3/11 3/25 4/9 5/1 5/15 6/11 6/27 7/10 7/26 8/9 9/11 10/3   Total Weekly Dose 23mg 23mg 22.5mg 23mg 22.5mg 23mg  22.5mg 22.5mg 22.5mg 23mg 22.5mg   INR 3.6 3.0 3.2 3.3 2.5 3.1 3.6 2.9 2.3 2.9 2.9 2.8   Notes        reported          Date 10/15 10/24 10/31 11/8 11/15 11/22 12/7 12/18 12/27 1/15/20 1/30 2/18   Total Weekly Dose 23mg 22.5mg 23mg 23mg 23.5mg 23mg 22.5mg 23mg 22.5mg 23mg ??? 23mg    INR 3.3 3.2 2.7 2.3 2.8 2.7 3.2  2.5 2.5 3.1 2.7 2.4   Notes  reported 10/25; sick; cefdinir stop cefdinir 10/29    Reported   Inc GLV  Unable to reach pt MVI; decr Ensure     Date 3/5 3/17 4/4 4/23 5/4 5/19 6/2 6/24 7/22 7/31 8/26 9/3   Total Weekly Dose 23mg 22.5mg 22.5mg 23mg 23mg 23mg? 23mg 22.75  mg avg 22.75  mg 23mg 22.75  mg 23.5mg   INR 2.8 3.0 3.3 2.9 2.6 2.8 2.9  2.49 2.4 3.1 2.5 3.4   Notes  call recv'd 4/13 rec'vd 4/24; call call recv'd  5/20; call recv'd 6/3; call  recv'd 7/31 recv'd 7/31 recv'd 9/3 Self-adj  CoQ10; email     Date 9/11 10/8 10/13 11/24 12/3 12/22 1/4/21 2/6 2/8 2/19 3/9 4/15   Total Weekly Dose 23mg 23.5mg  avg 23.5mg   avg 23.5mg  avg 23.5mg 22.75  mg avg 22.75  mg avg 22.5mg 19mg 22.75  mg avg 22.75   mg avg 22.75   mg avg   INR 2.8 3.4 3.3 3.7 3.1 2.7 2.6 3.7 3.2 2.8 3.2 2.6   Notes  email email Email; less Ensure Self-adjust dose dec  recv'd 1/5; call email Email; Self held x1 email email      Date 5/5 5/26 6/30 7/9 7/28 8/23 9/29 10/18 11/11 12/17 1/3/22 1/25   Total WeeklyDose 22.75 mg avg 22.75  mg avg  22.75 mg avg 22.75  mg avg 22.75 mg avg 22.75 mg avg 22.75 mg avg 22.75 mg avg 22.75 mg avg 22.75 mg avg 22.75 mg avg   INR 2.9 2.8 3.5 2.8 2.5 3.0 2.6 2.9 2.7 3.7 3.0 2.9   Notes email; fall rcv'd 5/27  rec'vd 7/12; email rec'vd 8/3  email  email Unable to contact email; Self-heldx1 rec'd 2/23     Date 2/23 3/26 3/28  4/17 5/24 5/24 5/26 6/17 7/14  8/19   Total Weekly Dose 22.75 mg avg 22.75mg 19.5mg Non compliant 22.75 mg avg? 22.75 mg avg 22.75 mg avg 22.75 mg avg  23mg Non-  Compliant 22.75 mg   avg   INR 3.2 4.1 3.2  3.0 3.5 3.0 3.4 3.0 2.7  3.4   Notes Unable to contact garlic 1x hold d/c garlic  ?? ?? ?? ?? Rec/d 6/22   email     Date 9/2 9/4             Total WeeklyDose 22.5 19.5mg             INR 3.8 2.8             Notes rec 9/13 1x hold rec 9/13                   Phone Interview:  Tablet Strength: 3mg and 1mg tablets  Patient Contact Info: Preferred *[phone]* anytime after noon  Other numbers on his profile:   [phone] (Home)    - brother in law, requested that we do NOT call this number  Lab Contact Info: [place]   Lab: [facility] phone: [phone] , Fax: [phone]   **will email once monthly or if INR is out of range**  [email or web address] PLEASE USE EMAIL TO COMMUNICATE    Patient  Findings  Negatives:  Signs/symptoms of thrombosis, Signs/symptoms of bleeding, Laboratory test error suspected, Change in health, Change in alcohol use, Change in activity, Upcoming invasive procedure, Emergency department visit, Upcoming dental procedure, Missed doses, Extra doses, Change in medications, Change in diet/appetite, Hospital admission, Bruising, Other complaints   Comments:  Mr. Turner prefers not to be contacted by phone. Sent patient an e-mail with instructions to continue current warfarin dose and to let the clinic know if he has been taking his warfarin differently than instructed.      Plan:   1. Mr. Turner's INR was therapeutic at 2.8. Instructed Mr. Turner by e-mail to continue current regimen of alternating 3mg and 3.5mg daily.   2. Repeat INR 9/19 .   3. Verbal information provided via email. Patient RBV dosing instructions, expresses understanding by teach back, and has no further questions at this time.  4. Doc Turner understands the importance of calling the [facility] if he notices any s/sx of bleeding, stroke, or abnormal bruising, if any changes are made to his medications or medication doses (Rx, OTC, herbal), or if any upcoming procedures are scheduled. Doc Turner will likewise let us know if any other changes, questions, or concerns arise regarding anticoagulation therapy. he understands the importance of seeking medical attention immediately if he experiences any falls, vehicle accidents, or abnormal bleeding or bruising. Doc Turner voiced understanding of this information and confirms that he has the [facility]'s contact information. Otherwise, we will plan to contact the patient once monthly or if his INR is out of range. Will contact via email for now     Brock Bishop, Pharmacy Technician  9/13/2022  10:16 EDT      At this time MR Turner has not responded to email  Kait MATHEWS, PharmD, have reviewed the note in full and agree with the  assessment and plan.  09/13/22  19:14 EDT      Per email:   Guille Burton, My Sept. 2 test was too high so I skipped my dose of 3 mg. the following day. The next day I tested and the numbers looked okay. I think my problem may have resulted from the two or three days I took a tablespoon of olive oil, first thing in the morning. I'd read something about it being a healthy habits to take up. I've dropped the oil routine and am now back to alternating 3.0 and 3.5 mg. per day. All else is as before. Please tell me when to test again. Thanks, Doc      Asked pt to recheck 9/19  I, Josephine Pineda, PharmD, have reviewed the note in full and agree with the assessment and plan.  09/14/22  09:40 EDT

## 2022-09-16 RX ORDER — ROSUVASTATIN CALCIUM 5 MG/1
5 TABLET, COATED ORAL NIGHTLY
Qty: 30 TABLET | Refills: 0 | Status: SHIPPED | OUTPATIENT
Start: 2022-09-16 | End: 2022-10-17

## 2022-09-16 RX ORDER — SOTALOL HYDROCHLORIDE 80 MG/1
80 TABLET ORAL 2 TIMES DAILY
Qty: 60 TABLET | Refills: 0 | Status: SHIPPED | OUTPATIENT
Start: 2022-09-16 | End: 2023-01-04

## 2022-09-20 ENCOUNTER — ANTICOAGULATION VISIT (OUTPATIENT)
Dept: PHARMACY | Facility: HOSPITAL | Age: 72
End: 2022-09-20

## 2022-09-20 DIAGNOSIS — I48.92 PAROXYSMAL ATRIAL FLUTTER: Primary | ICD-10-CM

## 2022-09-20 DIAGNOSIS — Z95.2 HX OF MITRAL VALVE REPLACEMENT WITH MECHANICAL VALVE: ICD-10-CM

## 2022-09-20 LAB — INR PPP: 2.9

## 2022-09-20 NOTE — PROGRESS NOTES
Anticoagulation Clinic - Remote Progress Note  [facility]ITOR  Frequency of Monitorin-4x a month    Indication: Hx of mitral valve replacement with mechanical valve Aizzy, VHD  Referring Provider: JF Ayala) -- (last visit 20  no show for 21 visit)  Initial Warfarin Start Date:    Goal INR: 2.5-3.5  Current Drug Interactions: ensure (once daily); amitriptyline (PRN)  CHADS-VASc: 2 (age, HTN)     Diet: iceberg lettuce 1x/week, ensure 3-4x/week, V8 juice (20)  Alcohol: 12 pack of beer/week  Tobacco: None  OTC Pain Medication: None     INR History:  Date 8/25 9/11 9/29 10/19 11/5 12/2 12/11 1/4/18 1/30 2/19 3/7 4/5   Total Weekly Dose 24mg 24mg 24mg 22.75  mg? 22.75  mg? ? 22.5mg 22.5mg 22.5mg 22.5mg 22.5mg 22.5mg   INR 3.3 2.8 2.9 3.1 3.5 2.7 2.7 2.6 2.9 2.5 3.3 3.7   Notes    LVM LVM   call   LVM      Date 4/18 5/20 6/5 6/27 7/24 7/31 9/9 10/8 11/8 12/14 2/20/19 2/25   Total Weekly Dose 22.5mg 23mg 22.5mg  23mg 24mg 24mg unable   to verify unable   to verify unable   to verify unable   to verify 23mg   INR 2.6 2.8 2.9 2.4 2.9 3.3 3.4 3.0 2.7 2.6 3.8 3.4   Notes      rec'vd   recv'd 10/13;  mult calls         Date 3/11 3/25 4/9 5/1 5/15 6/11 6/27 7/10 7/26 8/9 9/11 10/3   Total Weekly Dose 23mg 23mg 22.5mg 23mg 22.5mg 23mg  22.5mg 22.5mg 22.5mg 23mg 22.5mg   INR 3.6 3.0 3.2 3.3 2.5 3.1 3.6 2.9 2.3 2.9 2.9 2.8   Notes        reported          Date 10/15 10/24 10/31 11/8 11/15 11/22 12/7 12/18 12/27 1/15/20 1/30 2/18   Total Weekly Dose 23mg 22.5mg 23mg 23mg 23.5mg 23mg 22.5mg 23mg 22.5mg 23mg ??? 23mg    INR 3.3 3.2 2.7 2.3 2.8 2.7 3.2  2.5 2.5 3.1 2.7 2.4   Notes  reported 10/25; sick; cefdinir stop cefdinir 10/29    Reported   Inc GLV  Unable to reach pt MVI; decr Ensure     Date 3/5 3/17 4/4 4/23 5/4 5/19 6/2 6/24 7/22 7/31 8/26 9/3   Total Weekly Dose 23mg 22.5mg 22.5mg 23mg 23mg 23mg? 23mg 22.75  mg avg 22.75  mg 23mg 22.75  mg 23.5mg   INR 2.8 3.0 3.3 2.9 2.6 2.8 2.9  2.49 2.4 3.1 2.5 3.4   Notes  call recv'd 4/13 rec'vd 4/24; call call recv'd  5/20; call recv'd 6/3; call  recv'd 7/31 recv'd 7/31 recv'd 9/3 Self-adj  CoQ10; email     Date 9/11 10/8 10/13 11/24 12/3 12/22 1/4/21 2/6 2/8 2/19 3/9 4/15   Total Weekly Dose 23mg 23.5mg  avg 23.5mg   avg 23.5mg  avg 23.5mg 22.75  mg avg 22.75  mg avg 22.5mg 19mg 22.75  mg avg 22.75   mg avg 22.75   mg avg   INR 2.8 3.4 3.3 3.7 3.1 2.7 2.6 3.7 3.2 2.8 3.2 2.6   Notes  email email Email; less Ensure Self-adjust dose dec  recv'd 1/5; call email Email; Self held x1 email email      Date 5/5 5/26 6/30 7/9 7/28 8/23 9/29 10/18 11/11 12/17 1/3/22 1/25   Total WeeklyDose 22.75 mg avg 22.75  mg avg  22.75 mg avg 22.75  mg avg 22.75 mg avg 22.75 mg avg 22.75 mg avg 22.75 mg avg 22.75 mg avg 22.75 mg avg 22.75 mg avg   INR 2.9 2.8 3.5 2.8 2.5 3.0 2.6 2.9 2.7 3.7 3.0 2.9   Notes email; fall rcv'd 5/27  rec'vd 7/12; email rec'vd 8/3  email  email Unable to contact email; Self-heldx1 rec'd 2/23     Date 2/23 3/26 3/28  4/17 5/24 5/24 5/26 6/17 7/14  8/19   Total Weekly Dose 22.75 mg avg 22.75mg 19.5mg Non compliant 22.75 mg avg? 22.75 mg avg 22.75 mg avg 22.75 mg avg  23mg Non-  Compliant 22.75 mg   avg   INR 3.2 4.1 3.2  3.0 3.5 3.0 3.4 3.0 2.7  3.4   Notes Unable to contact garlic 1x hold d/c garlic  ?? ?? ?? ?? Rec/d 6/22   email     Date 9/2 9/4 9/19            Total WeeklyDose 22.5 19.5mg 21 mg            INR 3.8 2.8 2.9            Notes rec 9/13 1x hold rec 9/13 rec 9/20  Self-adj              Phone Interview:  Tablet Strength: 3mg and 1mg tablets  Patient Contact Info: Preferred *[phone]* anytime after noon  Other numbers on his profile:   [phone] (Home)    - brother in law, requested that we do NOT call this number  Lab Contact Info: [place]   Lab: [facility] phone: [phone] , Fax: [phone]   **will email once monthly or if INR is out of range**  [email or web address] PLEASE USE EMAIL TO  "COMMUNICATE    Patient Findings  Positives:  Missed doses, Change in medications   Negatives:  Signs/symptoms of thrombosis, Signs/symptoms of bleeding, Laboratory test error suspected, Change in health, Change in alcohol use, Change in activity, Upcoming invasive procedure, Emergency department visit, Upcoming dental procedure, Extra doses, Change in diet/appetite, Hospital admission, Bruising, Other complaints   Comments:  EMAIL CORRESPONDENCE:     We received your INR from 9/19/22, result was 2.9. Can you confirm what dose of warfarin you've been taking? any signs or symptoms of bleeding? Any changes in your diet or medications? Any upcoming surgeries or procedures?     \"Mr. Patrick, Since my last test I've taken a 3.0 mg each night. I have begun taking a krill oil gel tab on a daily basis and I was aware such supplements can make platelets less \"sticky.\" Otherwise, nothing more has changed in my routine. Please give me a suggestion for my next date for testing.\"     Just to verify, are you no longer alternating 3.0 mg and 3.5 mg warfarin tablets nightly?     \"Yes, that is correct.\"     We advise you to continue taking warfarin 3 mg every day until your next INR re-check in two weeks, 10/3/22.   If you have any questions or concerns, please feel free to contact the [facility] anytime Monday through Friday, 8 AM - 4:30 PM.     \"I understand. Thank you.\"     Plan:   1. Mr. Turner's INR was therapeutic at 2.9 on Monday, 9/19/22. Per Olegario Macdonald, PharmD, instructed Mr. Turner by e-mail to continue current self-adjusted regimen of warfarin 3 mg oral daily until recheck.   2. Repeat INR in two weeks, 10/3/22.  3. Written information provided via email. Patient responded that he understood dosing instructions, expresses understanding via email and has no further questions at this time.  4. Doc Turner understands the importance of calling the [facility] if he notices any " s/sx of bleeding, stroke, or abnormal bruising, if any changes are made to his medications or medication doses (Rx, OTC, herbal), or if any upcoming procedures are scheduled. Doc DELEON Johnny will likewise let us know if any other changes, questions, or concerns arise regarding anticoagulation therapy. he understands the importance of seeking medical attention immediately if he experiences any falls, vehicle accidents, or abnormal bleeding or bruising. Doc Turner voiced understanding of this information and confirms that he has the [facility]'s contact information. Otherwise, we will plan to contact the patient once monthly or if his INR is out of range. Will contact via email for now     Giles Patrick CPhT  9/21/2022  14:28 EDT     I, Lauren Milligan, PharmD, have reviewed the note in full and agree with the assessment and plan.  09/21/22  14:40 EDT

## 2022-10-15 DIAGNOSIS — I48.92 PAROXYSMAL ATRIAL FLUTTER: Primary | ICD-10-CM

## 2022-10-17 RX ORDER — ROSUVASTATIN CALCIUM 5 MG/1
5 TABLET, COATED ORAL NIGHTLY
Qty: 30 TABLET | Refills: 0 | Status: SHIPPED | OUTPATIENT
Start: 2022-10-17 | End: 2023-01-11 | Stop reason: SDUPTHER

## 2022-10-17 RX ORDER — SOTALOL HYDROCHLORIDE 80 MG/1
TABLET ORAL
Qty: 180 TABLET | OUTPATIENT
Start: 2022-10-17

## 2022-10-18 RX ORDER — WARFARIN SODIUM 3 MG/1
TABLET ORAL
Qty: 30 TABLET | Refills: 0 | Status: SHIPPED | OUTPATIENT
Start: 2022-10-18 | End: 2023-01-11 | Stop reason: SDUPTHER

## 2022-10-18 RX ORDER — WARFARIN SODIUM 1 MG/1
TABLET ORAL
Qty: 90 TABLET | Refills: 1 | Status: SHIPPED | OUTPATIENT
Start: 2022-10-18 | End: 2022-10-18 | Stop reason: SDUPTHER

## 2022-10-19 ENCOUNTER — ANTICOAGULATION VISIT (OUTPATIENT)
Dept: PHARMACY | Facility: HOSPITAL | Age: 72
End: 2022-10-19

## 2022-10-19 DIAGNOSIS — Z95.2 HX OF MITRAL VALVE REPLACEMENT WITH MECHANICAL VALVE: ICD-10-CM

## 2022-10-19 DIAGNOSIS — I48.92 PAROXYSMAL ATRIAL FLUTTER: Primary | ICD-10-CM

## 2022-10-19 LAB
INR PPP: 2.9
INR PPP: 3.1

## 2022-10-20 ENCOUNTER — ANTICOAGULATION VISIT (OUTPATIENT)
Dept: PHARMACY | Facility: HOSPITAL | Age: 72
End: 2022-10-20

## 2022-10-20 DIAGNOSIS — Z95.2 HX OF MITRAL VALVE REPLACEMENT WITH MECHANICAL VALVE: ICD-10-CM

## 2022-10-20 DIAGNOSIS — I48.92 PAROXYSMAL ATRIAL FLUTTER: Primary | ICD-10-CM

## 2022-11-10 ENCOUNTER — TELEPHONE (OUTPATIENT)
Dept: PHARMACY | Facility: HOSPITAL | Age: 72
End: 2022-11-10

## 2022-11-14 RX ORDER — ROSUVASTATIN CALCIUM 5 MG/1
TABLET, COATED ORAL
Qty: 90 TABLET | OUTPATIENT
Start: 2022-11-14

## 2022-11-15 ENCOUNTER — ANTICOAGULATION VISIT (OUTPATIENT)
Dept: PHARMACY | Facility: HOSPITAL | Age: 72
End: 2022-11-15

## 2022-11-15 DIAGNOSIS — I48.92 PAROXYSMAL ATRIAL FLUTTER: Primary | ICD-10-CM

## 2022-11-15 DIAGNOSIS — Z95.2 HX OF MITRAL VALVE REPLACEMENT WITH MECHANICAL VALVE: ICD-10-CM

## 2022-11-15 LAB — INR PPP: 3.3

## 2022-12-15 RX ORDER — LISINOPRIL 10 MG/1
10 TABLET ORAL EVERY MORNING
Qty: 90 TABLET | Refills: 3 | Status: SHIPPED | OUTPATIENT
Start: 2022-12-15

## 2022-12-26 LAB — INR PPP: 2.7

## 2022-12-29 ENCOUNTER — ANTICOAGULATION VISIT (OUTPATIENT)
Dept: PHARMACY | Facility: HOSPITAL | Age: 72
End: 2022-12-29
Payer: MEDICARE

## 2022-12-29 DIAGNOSIS — Z95.2 HX OF MITRAL VALVE REPLACEMENT WITH MECHANICAL VALVE: ICD-10-CM

## 2022-12-29 DIAGNOSIS — I48.92 PAROXYSMAL ATRIAL FLUTTER: Primary | ICD-10-CM

## 2022-12-29 NOTE — PROGRESS NOTES
Anticoagulation Clinic - Remote Progress Note  [facility] MONITOR  Frequency of Monitorin-4x a month    Indication: Hx of mitral valve replacement with mechanical valve Aizzy, VHD  Referring Provider: JF Ayala) -- (last visit 20  no show for 21 visit)  Initial Warfarin Start Date:    Goal INR: 2.5-3.5  Current Drug Interactions: ensure (once daily); amitriptyline (PRN)  CHADS-VASc: 2 (age, HTN)     Diet: iceberg lettuce 1x/week, ensure 3-4x/week, V8 juice (20)  Alcohol: 12 pack of beer/week  Tobacco: None  OTC Pain Medication: None     INR History:  Date 8/25 9/11 9/29 10/19 11/5 12/2 12/11 1/4/18 1/30 2/19 3/7 4/5   Total Weekly Dose 24mg 24mg 24mg 22.75  mg? 22.75  mg? ? 22.5mg 22.5mg 22.5mg 22.5mg 22.5mg 22.5mg   INR 3.3 2.8 2.9 3.1 3.5 2.7 2.7 2.6 2.9 2.5 3.3 3.7   Notes    LVM LVM   call   LVM      Date 4/18 5/20 6/5 6/27 7/24 7/31 9/9 10/8 11/8 12/14 2/20/19 2/25   Total Weekly Dose 22.5mg 23mg 22.5mg  23mg 24mg 24mg unable   to verify unable   to verify unable   to verify unable   to verify 23mg   INR 2.6 2.8 2.9 2.4 2.9 3.3 3.4 3.0 2.7 2.6 3.8 3.4   Notes      rec'vd   recv'd 10/13;  mult calls         Date 3/11 3/25 4/9 5/1 5/15 6/11 6/27 7/10 7/26 8/9 9/11 10/3   Total Weekly Dose 23mg 23mg 22.5mg 23mg 22.5mg 23mg  22.5mg 22.5mg 22.5mg 23mg 22.5mg   INR 3.6 3.0 3.2 3.3 2.5 3.1 3.6 2.9 2.3 2.9 2.9 2.8   Notes        reported          Date 10/15 10/24 10/31 11/8 11/15 11/22 12/7 12/18 12/27 1/15/20 1/30 2/18   Total Weekly Dose 23mg 22.5mg 23mg 23mg 23.5mg 23mg 22.5mg 23mg 22.5mg 23mg ??? 23mg    INR 3.3 3.2 2.7 2.3 2.8 2.7 3.2  2.5 2.5 3.1 2.7 2.4   Notes  reported 10/25; sick; cefdinir stop cefdinir 10/29    Reported   Inc GLV  Unable to reach pt MVI; decr Ensure     Date 3/5 3/17 4/4 4/23 5/4 5/19 6/2 6/24 7/22 7/31 8/26 9/3   Total Weekly Dose 23mg 22.5mg 22.5mg 23mg 23mg 23mg? 23mg 22.75  mg avg 22.75  mg 23mg 22.75  mg 23.5mg   INR 2.8 3.0 3.3 2.9 2.6 2.8 2.9  2.49 2.4 3.1 2.5 3.4   Notes  call recv'd 4/13 rec'vd 4/24; call call recv'd  5/20; call recv'd 6/3; call  recv'd 7/31 recv'd 7/31 recv'd 9/3 Self-adj  CoQ10; email     Date 9/11 10/8 10/13 11/24 12/3 12/22 1/4/21 2/6 2/8 2/19 3/9 4/15   Total Weekly Dose 23mg 23.5mg  avg 23.5mg   avg 23.5mg  avg 23.5mg 22.75  mg avg 22.75  mg avg 22.5mg 19mg 22.75  mg avg 22.75   mg avg 22.75   mg avg   INR 2.8 3.4 3.3 3.7 3.1 2.7 2.6 3.7 3.2 2.8 3.2 2.6   Notes  email email Email; less Ensure Self-adjust dose dec  recv'd 1/5; call email Email; Self held x1 email email      Date 5/5 5/26 6/30 7/9 7/28 8/23 9/29 10/18 11/11 12/17 1/3/22 1/25   Total WeeklyDose 22.75 mg avg 22.75  mg avg  22.75 mg avg 22.75  mg avg 22.75 mg avg 22.75 mg avg 22.75 mg avg 22.75 mg avg 22.75 mg avg 22.75 mg avg 22.75 mg avg   INR 2.9 2.8 3.5 2.8 2.5 3.0 2.6 2.9 2.7 3.7 3.0 2.9   Notes email; fall rcv'd 5/27  rec'vd 7/12; email rec'vd 8/3  email  email Unable to contact email; Self-heldx1 rec'd 2/23     Date 2/23 3/26 3/28  4/17 5/24 5/24 5/26 6/17 7/14  8/19   Total Weekly Dose 22.75 mg avg 22.75mg 19.5mg Non compliant 22.75 mg avg? 22.75 mg avg 22.75 mg avg 22.75 mg avg  23mg Non-  Compliant 22.75 mg   avg   INR 3.2 4.1 3.2  3.0 3.5 3.0 3.4 3.0 2.7  3.4   Notes Unable to contact garlic 1x hold d/c garlic  ?? ?? ?? ?? Rec/d 6/22   email     Date 9/2 9/4 9/19 10/2 10/19 11/11 12/26        Total WeeklyDose 22.5 19.5mg 21 mg 21 mg 21 mg  23 mg        INR 3.8 2.8 2.9 3.1 2.9 3.3 2.7        Notes rec 9/13 1x hold rec 9/13 rec 9/20  Self-adj   recvd 11/15   Unable to contact          Phone Interview:  Tablet Strength: 3mg and 1mg tablets  Patient Contact Info: Preferred *[phone]* anytime after noon  Other numbers on his profile:   [phone] (Home)    - brother in law, requested that we do NOT call this number  Lab Contact Info: [place]   Lab: [facility] phone: [phone] , Fax: [phone]   **will email once monthly or if INR  is out of range**  [email or web address] PLEASE USE EMAIL TO COMMUNICATE        Patient Findings    Comments:  Called LVM & Emailed patient 12/27  with instructions to set up [employer] messages.   UNABLE TO GET IN CONTACT WITH THE PATIENT. PLEASE DISREGARD THE FOLLOWING PLAN UNTIL ABLE TO GET IN CONTACT WITH PATIENT/ PATIENT REPRESENTATIVE.     Communicating with patients via email prohibited as of 11/29. I was unable to contact patient via My Chart messaging.          Plan:   1. Mr. Turner's INR was therapeutic 12/26  at 2.7. .until recheck.   2. Repeat INR on  3. Written information provided via email. Patient responded that he understood dosing instructions, expresses understanding via email and has no further questions at this time.  4. Doc Turner understands the importance of calling the [facility] if he notices any s/sx of bleeding, stroke, or abnormal bruising, if any changes are made to his medications or medication doses (Rx, OTC, herbal), or if any upcoming procedures are scheduled. Doc Turner will likewise let us know if any other changes, questions, or concerns arise regarding anticoagulation therapy. he understands the importance of seeking medical attention immediately if he experiences any falls, vehicle accidents, or abnormal bleeding or bruising. Doc Turner voiced understanding of this information and confirms that he has the [facility]'s contact information. Otherwise, we will plan to contact the patient once monthly or if his INR is out of range.      Unable to contact patient this encounter, communications letter sent instructing patient to set up [employer]    Josephine Pineda, ToriD.  01/11/23   14:26 MELANIE Bishop, Pharmacy Technician  12/29/2022  08:09 EST

## 2023-01-04 RX ORDER — SOTALOL HYDROCHLORIDE 80 MG/1
TABLET ORAL
Qty: 28 TABLET | Refills: 0 | Status: SHIPPED | OUTPATIENT
Start: 2023-01-04 | End: 2023-01-11 | Stop reason: SDUPTHER

## 2023-01-04 NOTE — TELEPHONE ENCOUNTER
Called and spoke to patient about need for EKG and appt for further refills of sotalol. Pt verbalizes understanding.    Pt is going to go to PCP to have EKG and have faxed to our office for review. Transferred pt to scheduling to have appt scheduled.

## 2023-01-10 RX ORDER — OXYCODONE HYDROCHLORIDE 10 MG/1
10 TABLET ORAL 3 TIMES DAILY PRN
COMMUNITY
Start: 2022-12-06

## 2023-01-10 NOTE — PROGRESS NOTES
Subjective:     Encounter Date: 01/11/23       Patient ID: Doc Turner is a 72 y.o.  white male from [place], [place], a retired .     PHYSICIAN: Kp Hobbs MD  FORMER CARDIOLOGISTS: Juan Wellington MD/Tino Allison MD  CARDIOTHORACIC SURGEON: Sylwia Gramaoj MD/Tony Mcgill MD, [facility]  NEUROSURGEON: Jose Cadena MD  NEUROLOGIST: Dwayne Rhodes MD  GENERAL SURGEONS: Thierry Moore MD/Piero Ball MD .    Chief Complaint:   Chief Complaint   Patient presents with   • Follow-up     HTN, VHD, PAF     Problem List:  1. Chronic valvular heart disease with mitral valve prolapse syndrome/atrial flutter:  a. mitral regurgitation with porcine mitral valve replacement, [facility], 1998  b. Subsequent serial echocardiographic evaluations-data deficit, with abnormal stress test and diagnostic coronary angiography, data deficit, July 2006  c. Repeat sternotomy with mitral valve replacement with #31 [employer] prosthesis with postop bleeding requiring reexploration and associated atrial fib/flutter with sotalol therapy, November 2006  d. Acceptable combination Doppler echocardiogram, September 2009, with residual class I symptoms  e. palpitations with atrial fibrillation, 1/12/15 LAUREEN/ECV-successful  f. LAUREEN, 1/12/2015; LVEF 0.55-0.60, mild concentric LVH observed, postsurgical hypokinesis of the interventricular septum observed consistent with valve replacement, LA and RV mild to moderately dilated, RVSP mildly reduced, mild AR, mechanical mitral valve appears well seated with normal function, mild TR  g. [facility], 3/19/2017, with tachycardia, atrial flutter, anticoagulated with Coumadin  h. Echocardiogram, 6/14/2017: LVEF 0.60, RV moderately dilated, LA mild to moderately dilated, mildly reduced right ventricular systolic function noted, mild AR, TR, no pericardial effusion, no pulmonary hypertension, mechanical MVR appears nominal LV function and leaflet  motion without discernible associated thrombus last mass   i. External cardioversion, 6/20/2017, successful with a 100 J shock.    j. NSR April 2019 with acceptable QTc  k. Residual Class I symptoms, May 2020, September 2021  2. Intermittent tachypalpitations with apparent acceptable 24-hour Holter monitor-data deficit  3. Labile hypertension, probably essential  4. Chronic degenerative cervical spine disc disease with chronic narcotic use  5. Chronic insomnia/depression with recent improved clinical course  6. Remote Lyme disease with recurrence on 3 occasions, treated with periodic antibiotic therapy  7. Intermittent visual disturbance with hospitalization in neurology evaluation-data deficit, November 2009  8. Apparent symptomatic left inguinal hernia  9. Erectile dysfunction/Peyronie's disease  10. Apparent complicated inguinal herniorrhaphy, February 2014, with subsequent development of intermittent recurrent episode gastric and right upper quadrant pain with subsequent small bowel obstruction and lactic acidosis with abdominal CT scan demonstrating extensive and progressive mid right anterior abdominal mesenteric rotation/volvulus emergent surgery and postop wound hematoma/(with subsequent closure, September 2014  11. DeQuervain's tenosynovitis, left arm, currently wearing brace, spring 2020  12.  Covid January 2021 with fever and unusual facial pains for about 10 days  13. Surgical history:  a. Mitral valve replacement ×2  b. Volvulus  c. Back surgery  d. Hernia repair  e. Cardioversion    Allergies   Allergen Reactions   • Penicillins Angioedema       Current Outpatient Medications:   •  lisinopril (PRINIVIL,ZESTRIL) 10 MG tablet, Take 1 tablet by mouth Every Morning., Disp: 90 tablet, Rfl: 3  •  nortriptyline (PAMELOR) 25 MG capsule, 25 mg Every Night., Disp: , Rfl:   •  oxyCODONE (ROXICODONE) 10 MG tablet, Take 10 mg by mouth 3 (Three) Times a Day As Needed., Disp: , Rfl:   •  rosuvastatin (CRESTOR) 5 MG  "tablet, Take 1 tablet by mouth Every Night. NEED LABS AND APPOINTMENT, Disp: 30 tablet, Rfl: 0  •  sotalol (BETAPACE) 80 MG tablet, TAKE 1 TABLET BY MOUTH TWICE DAILY, Disp: 28 tablet, Rfl: 0  •  warfarin (COUMADIN) 3 MG tablet, TAKE 1 TABLET BY MOUTH DAILY AS DIRECTED, Disp: 30 tablet, Rfl: 0    HISTORY OF PRESENT ILLNESS:  Doc Turner returns for routine follow-up after last seen Tabby Meléndez in September 2021.  His warfarin has been managed by the anticoagulation clinic and reports no issues.  Since his last cardiology clinic visit he reports overall feeling well with no activity restrictions and no palpitations consistent with his previous episodes of atrial fibrillation.  He rides an exercise bike multiple times per day and will use a pulmonary on his property for recreation.    Review of Systems   Cardiovascular: Negative for chest pain, dyspnea on exertion, irregular heartbeat, leg swelling and syncope.        ECG 12 Lead    Date/Time: 1/11/2023 2:05 PM  Performed by: Camden Garcia MD  Authorized by: Camden Garcia MD   Comparison: compared with previous ECG from 9/23/2021  Similar to previous ECG  Rhythm: sinus rhythm  Rate: normal  BPM: 60  Conduction: 1st degree AV block  ST Segments: ST segments normal  T Waves: T waves normal  QRS axis: normal  Other: no other findings    Clinical impression: normal ECG             Objective:       Vitals:    01/11/23 1329   BP: 130/78   BP Location: Right arm   Patient Position: Sitting   Cuff Size: Adult   Pulse: 60   Resp: 18   SpO2: 95%   Weight: 64.9 kg (143 lb)   Height: 177.8 cm (70\")     Body mass index is 20.52 kg/m².  Gen: well developed, older white male sitting up on exam table  HEENT: MMM, sclera anicteric, conjunctiva normal  CV: regular rate, regular rhythm, 2 out of 6 systolic murmur at apex, crisp mechanical S1  Pulm: RA, normal work of breathing, no wheezes, rales, rhonchi  Ext: normal bulk for age, normal tone, no dependent edema  Neuro: " alert, oriented, face symmetrical, moving all extremities well  Psych: normal mood, appropriate affect    Lab Review:     Lab Results   Component Value Date    INR 2.70 12/26/2022    INR 3.30 11/11/2022    INR 3.10 10/19/2022    INR 2.90 10/19/2022    INR 2.90 09/19/2022         Assessment:     Overall continued acceptable course with no new interim cardiopulmonary complaints with acceptable functional status.  Maintaining sinus rhythm with sotalol with stable WY and QTC.  This time for his routine echocardiogram for evaluation of his mitral valve.      Diagnosis Plan   1. Palpitations   Controlled with sotalol, EKG stable from previous SR with 1st degree AV block with  acceptable QTC on sotalol therapy on ECG in office today     2. Paroxysmal atrial flutter (CMS/HCC)   Controlled with sotalol was reordered     3. VHD (valvular heart disease)   Echocardiogram same day as next appointment, re-ordered     4. Hx of mitral valve replacement with mechanical valve [Z95.2]   routine screening echo before next appointment  Anticoagulation to continue through [facility] clinic     5. Essential hypertension   Controlled, continue current cardiac medications            Plan:         1. Patient to continue current medications and close follow up with the above providers.  2. Return in about 6 months (around 7/11/2023).     ELISHA Garcia MD, MS  01/11/23 14:10 EST

## 2023-01-11 ENCOUNTER — OFFICE VISIT (OUTPATIENT)
Dept: CARDIOLOGY | Facility: CLINIC | Age: 73
End: 2023-01-11
Payer: MEDICARE

## 2023-01-11 VITALS
DIASTOLIC BLOOD PRESSURE: 78 MMHG | SYSTOLIC BLOOD PRESSURE: 130 MMHG | RESPIRATION RATE: 18 BRPM | BODY MASS INDEX: 20.47 KG/M2 | HEART RATE: 60 BPM | OXYGEN SATURATION: 95 % | WEIGHT: 143 LBS | HEIGHT: 70 IN

## 2023-01-11 DIAGNOSIS — Z95.2 HX OF MITRAL VALVE REPLACEMENT WITH MECHANICAL VALVE: Primary | ICD-10-CM

## 2023-01-11 DIAGNOSIS — I48.92 PAROXYSMAL ATRIAL FLUTTER: ICD-10-CM

## 2023-01-11 LAB — INR PPP: 2.7

## 2023-01-11 PROCEDURE — 93000 ELECTROCARDIOGRAM COMPLETE: CPT | Performed by: INTERNAL MEDICINE

## 2023-01-11 PROCEDURE — 99213 OFFICE O/P EST LOW 20 MIN: CPT | Performed by: INTERNAL MEDICINE

## 2023-01-11 RX ORDER — SOTALOL HYDROCHLORIDE 80 MG/1
80 TABLET ORAL 2 TIMES DAILY
Qty: 180 TABLET | Refills: 3 | Status: SHIPPED | OUTPATIENT
Start: 2023-01-11

## 2023-01-11 RX ORDER — WARFARIN SODIUM 3 MG/1
3 TABLET ORAL DAILY
Qty: 30 TABLET | Refills: 0 | Status: SHIPPED | OUTPATIENT
Start: 2023-01-11 | End: 2023-02-17

## 2023-01-11 RX ORDER — ROSUVASTATIN CALCIUM 5 MG/1
5 TABLET, COATED ORAL NIGHTLY
Qty: 90 TABLET | Refills: 3 | Status: SHIPPED | OUTPATIENT
Start: 2023-01-11

## 2023-01-12 ENCOUNTER — ANTICOAGULATION VISIT (OUTPATIENT)
Dept: PHARMACY | Facility: HOSPITAL | Age: 73
End: 2023-01-12
Payer: MEDICARE

## 2023-01-12 DIAGNOSIS — I48.92 PAROXYSMAL ATRIAL FLUTTER: Primary | ICD-10-CM

## 2023-01-12 DIAGNOSIS — Z95.2 HX OF MITRAL VALVE REPLACEMENT WITH MECHANICAL VALVE: ICD-10-CM

## 2023-01-12 NOTE — PROGRESS NOTES
Anticoagulation Clinic - Remote Progress Note  [facility]  Frequency of Monitorin-4x a month    Indication: Hx of mitral valve replacement with mechanical valve Aizzy, VHD  Referring Provider: JF Ayala) -- (last visit 20  no show for 21 visit)  Initial Warfarin Start Date:    Goal INR: 2.5-3.5  Current Drug Interactions: ensure (once daily); amitriptyline (PRN)  CHADS-VASc: 2 (age, HTN)     Diet: iceberg lettuce 1x/week, ensure 3-4x/week, V8 juice (20)  Alcohol: 12 pack of beer/week  Tobacco: None  OTC Pain Medication: None     INR History:  Date 8/25 9/11 9/29 10/19 11/5 12/2 12/11 1/4/18 1/30 2/19 3/7 4/5   Total Weekly Dose 24mg 24mg 24mg 22.75  mg? 22.75  mg? ? 22.5mg 22.5mg 22.5mg 22.5mg 22.5mg 22.5mg   INR 3.3 2.8 2.9 3.1 3.5 2.7 2.7 2.6 2.9 2.5 3.3 3.7   Notes    LVM LVM   call   LVM      Date 4/18 5/20 6/5 6/27 7/24 7/31 9/9 10/8 11/8 12/14 2/20/19 2/25   Total Weekly Dose 22.5mg 23mg 22.5mg  23mg 24mg 24mg unable   to verify unable   to verify unable   to verify unable   to verify 23mg   INR 2.6 2.8 2.9 2.4 2.9 3.3 3.4 3.0 2.7 2.6 3.8 3.4   Notes      rec'vd   recv'd 10/13;  mult calls         Date 3/11 3/25 4/9 5/1 5/15 6/11 6/27 7/10 7/26 8/9 9/11 10/3   Total Weekly Dose 23mg 23mg 22.5mg 23mg 22.5mg 23mg  22.5mg 22.5mg 22.5mg 23mg 22.5mg   INR 3.6 3.0 3.2 3.3 2.5 3.1 3.6 2.9 2.3 2.9 2.9 2.8   Notes        reported          Date 10/15 10/24 10/31 11/8 11/15 11/22 12/7 12/18 12/27 1/15/20 1/30 2/18   Total Weekly Dose 23mg 22.5mg 23mg 23mg 23.5mg 23mg 22.5mg 23mg 22.5mg 23mg ??? 23mg    INR 3.3 3.2 2.7 2.3 2.8 2.7 3.2  2.5 2.5 3.1 2.7 2.4   Notes  reported 10/25; sick; cefdinir stop cefdinir 10/29    Reported   Inc GLV  Unable to reach pt MVI; decr Ensure     Date 3/5 3/17 4/4 4/23 5/4 5/19 6/2 6/24 7/22 7/31 8/26 9/3   Total Weekly Dose 23mg 22.5mg 22.5mg 23mg 23mg 23mg? 23mg 22.75  mg avg 22.75  mg 23mg 22.75  mg 23.5mg   INR 2.8 3.0 3.3 2.9 2.6 2.8 2.9  2.49 2.4 3.1 2.5 3.4   Notes  call recv'd 4/13 rec'vd 4/24; call call recv'd  5/20; call recv'd 6/3; call  recv'd 7/31 recv'd 7/31 recv'd 9/3 Self-adj  CoQ10; email     Date 9/11 10/8 10/13 11/24 12/3 12/22 1/4/21 2/6 2/8 2/19 3/9 4/15   Total Weekly Dose 23mg 23.5mg  avg 23.5mg   avg 23.5mg  avg 23.5mg 22.75  mg avg 22.75  mg avg 22.5mg 19mg 22.75  mg avg 22.75   mg avg 22.75   mg avg   INR 2.8 3.4 3.3 3.7 3.1 2.7 2.6 3.7 3.2 2.8 3.2 2.6   Notes  email email Email; less Ensure Self-adjust dose dec  recv'd 1/5; call email Email; Self held x1 email email      Date 5/5 5/26 6/30 7/9 7/28 8/23 9/29 10/18 11/11 12/17 1/3/22 1/25   Total WeeklyDose 22.75 mg avg 22.75  mg avg  22.75 mg avg 22.75  mg avg 22.75 mg avg 22.75 mg avg 22.75 mg avg 22.75 mg avg 22.75 mg avg 22.75 mg avg 22.75 mg avg   INR 2.9 2.8 3.5 2.8 2.5 3.0 2.6 2.9 2.7 3.7 3.0 2.9   Notes email; fall rcv'd 5/27  rec'vd 7/12; email rec'vd 8/3  email  email Unable to contact email; Self-heldx1 rec'd 2/23     Date 2/23 3/26 3/28  4/17 5/24 5/24 5/26 6/17 7/14  8/19   Total Weekly Dose 22.75 mg avg 22.75mg 19.5mg Non compliant 22.75 mg avg? 22.75 mg avg 22.75 mg avg 22.75 mg avg  23mg Non-  Compliant 22.75 mg   avg   INR 3.2 4.1 3.2  3.0 3.5 3.0 3.4 3.0 2.7  3.4   Notes Unable to contact garlic 1x hold d/c garlic  ?? ?? ?? ?? Rec/d 6/22   email     Date 9/2 9/4 9/19 10/2 10/19 11/11 12/26 1/12/23       Total WeeklyDose 22.5 19.5mg 21 mg 21 mg 21 mg  23 mg 23 mg       INR 3.8 2.8 2.9 3.1 2.9 3.3 2.7 2.7       Notes rec 9/13 1x hold rec 9/13 rec 9/20  Self-adj   recvd 11/15   Unable to contact Emailed rec'd 1/16         Phone Interview:  Tablet Strength: 3mg and 1mg tablets  Patient Contact Info: Preferred *[phone]* anytime after noon  Other numbers on his profile:   [phone] (Home)    - brother in law, requested that we do NOT call this number  Lab Contact Info: [employer]   Lab: [facility] phone: [phone] , Fax: [phone]    **will email once monthly or if INR is out of range**  [email or web address] PLEASE USE EMAIL TO COMMUNICATE     Patient Findings  Positives:  Change in medications   Negatives:  Signs/symptoms of thrombosis, Signs/symptoms of bleeding, Laboratory test error suspected, Change in health, Change in alcohol use, Change in activity, Upcoming invasive procedure, Emergency department visit, Upcoming dental procedure, Missed doses, Extra doses, Change in diet/appetite, Hospital admission, Bruising, Other complaints   Comments:  Patient emailed and verified no changes since last visit besides taking a vitamin B supplement.        Plan:   1. Mr. Turner's INR was therapeutic 1/12  at 2.7. Unable to reach patient until 1/16/2023. Instructed Mr. Turner by e-mail to continue current self-adjusted regimen of warfarin 3 mg oral daily until recheck.   2. Repeat INR on 1/26/23.  3. Written information provided via email. Patient responded that he understood dosing instructions, expresses understanding via email and has no further questions at this time.  4. Doc Turner understands the importance of calling the [facility] if he notices any s/sx of bleeding, stroke, or abnormal bruising, if any changes are made to his medications or medication doses (Rx, OTC, herbal), or if any upcoming procedures are scheduled. Doc Turner will likewise let us know if any other changes, questions, or concerns arise regarding anticoagulation therapy. he understands the importance of seeking medical attention immediately if he experiences any falls, vehicle accidents, or abnormal bleeding or bruising. Doc Turner voiced understanding of this information and confirms that he has the [facility]'s contact information. Otherwise, we will plan to contact the patient once monthly or if his INR is out of range.      Ryland Nova, PharmD  Pharmacy Resident  1/12/2023  08:28 EST    I, Frances Pedroza, PharmD, have  reviewed the note in full and agree with the assessment and plan.  01/16/23  16:07 EST

## 2023-01-16 RX ORDER — VITAMIN B COMPLEX
CAPSULE ORAL 3 TIMES WEEKLY
COMMUNITY

## 2023-02-02 ENCOUNTER — ANTICOAGULATION VISIT (OUTPATIENT)
Dept: PHARMACY | Facility: HOSPITAL | Age: 73
End: 2023-02-02
Payer: MEDICARE

## 2023-02-02 DIAGNOSIS — I48.92 PAROXYSMAL ATRIAL FLUTTER: Primary | ICD-10-CM

## 2023-02-02 DIAGNOSIS — Z95.2 HX OF MITRAL VALVE REPLACEMENT WITH MECHANICAL VALVE: ICD-10-CM

## 2023-02-02 LAB — INR PPP: 3.2

## 2023-02-02 NOTE — PROGRESS NOTES
Anticoagulation Clinic - Remote Progress Note  [facility]  Frequency of Monitorin-4x a month    Indication: Hx of mitral valve replacement with mechanical valve KONG Mendoza  Referring Provider: Dr. Garcia  Initial Warfarin Start Date:    Goal INR: 2.5-3.5  Current Drug Interactions: ensure (once daily); amitriptyline (PRN)  CHADS-VASc: 2 (age, HTN)     Diet: iceberg lettuce 1x/week, ensure 3-4x/week, V8 juice (20)  Alcohol: 12 pack of beer/week  Tobacco: None  OTC Pain Medication: None     INR History:  Date 8/25 9/11 9/29 10/19 11/5 12/2 12/11 1/4/18 1/30 2/19 3/7 4/5   Total Weekly Dose 24mg 24mg 24mg 22.75  mg? 22.75  mg? ? 22.5mg 22.5mg 22.5mg 22.5mg 22.5mg 22.5mg   INR 3.3 2.8 2.9 3.1 3.5 2.7 2.7 2.6 2.9 2.5 3.3 3.7   Notes    LVM LVM   call   LVM      Date 4/18 5/20 6/5 6/27 7/24 7/31 9/9 10/8 11/8 12/14 2/20/19 2/25   Total Weekly Dose 22.5mg 23mg 22.5mg  23mg 24mg 24mg unable   to verify unable   to verify unable   to verify unable   to verify 23mg   INR 2.6 2.8 2.9 2.4 2.9 3.3 3.4 3.0 2.7 2.6 3.8 3.4   Notes      rec'vd   recv'd 10/13;  mult calls         Date 3/11 3/25 4/9 5/1 5/15 6/11 6/27 7/10 7/26 8/9 9/11 10/3   Total Weekly Dose 23mg 23mg 22.5mg 23mg 22.5mg 23mg  22.5mg 22.5mg 22.5mg 23mg 22.5mg   INR 3.6 3.0 3.2 3.3 2.5 3.1 3.6 2.9 2.3 2.9 2.9 2.8   Notes        reported          Date 10/15 10/24 10/31 11/8 11/15 11/22 12/7 12/18 12/27 1/15/20 1/30 2/18   Total Weekly Dose 23mg 22.5mg 23mg 23mg 23.5mg 23mg 22.5mg 23mg 22.5mg 23mg ??? 23mg    INR 3.3 3.2 2.7 2.3 2.8 2.7 3.2  2.5 2.5 3.1 2.7 2.4   Notes  reported 10/25; sick; cefdinir stop cefdinir 10/29    Reported   Inc GLV  Unable to reach pt MVI; decr Ensure     Date 3/5 3/17 4/4 4/23 5/4 5/19 6/2 6/24 7/22 7/31 8/26 9/3   Total Weekly Dose 23mg 22.5mg 22.5mg 23mg 23mg 23mg? 23mg 22.75  mg avg 22.75  mg 23mg 22.75  mg 23.5mg   INR 2.8 3.0 3.3 2.9 2.6 2.8 2.9 2.49 2.4 3.1 2.5 3.4   Notes  call recv'd  rec'vd ;  call call recv'd  5/20; call recv'd 6/3; call  recv'd 7/31 recv'd 7/31 recv'd 9/3 Self-adj  CoQ10; email     Date 9/11 10/8 10/13 11/24 12/3 12/22 1/4/21 2/6 2/8 2/19 3/9 4/15   Total Weekly Dose 23mg 23.5mg  avg 23.5mg   avg 23.5mg  avg 23.5mg 22.75  mg avg 22.75  mg avg 22.5mg 19mg 22.75  mg avg 22.75   mg avg 22.75   mg avg   INR 2.8 3.4 3.3 3.7 3.1 2.7 2.6 3.7 3.2 2.8 3.2 2.6   Notes  email email Email; less Ensure Self-adjust dose dec  recv'd 1/5; call email Email; Self held x1 email email      Date 5/5 5/26 6/30 7/9 7/28 8/23 9/29 10/18 11/11 12/17 1/3/22 1/25   Total WeeklyDose 22.75 mg avg 22.75  mg avg  22.75 mg avg 22.75  mg avg 22.75 mg avg 22.75 mg avg 22.75 mg avg 22.75 mg avg 22.75 mg avg 22.75 mg avg 22.75 mg avg   INR 2.9 2.8 3.5 2.8 2.5 3.0 2.6 2.9 2.7 3.7 3.0 2.9   Notes email; fall rcv'd 5/27  rec'vd 7/12; email rec'vd 8/3  email  email Unable to contact email; Self-heldx1 rec'd 2/23     Date 2/23 3/26 3/28  4/17 5/24 5/24 5/26 6/17 7/14  8/19   Total Weekly Dose 22.75 mg avg 22.75mg 19.5mg Non compliant 22.75 mg avg? 22.75 mg avg 22.75 mg avg 22.75 mg avg  23mg Non-  Compliant 22.75 mg   avg   INR 3.2 4.1 3.2  3.0 3.5 3.0 3.4 3.0 2.7  3.4   Notes Unable to contact garlic 1x hold d/c garlic  ?? ?? ?? ?? Rec/d 6/22   email     Date 9/2 9/4 9/19 10/2 10/19 11/11 12/26 1/12/23 2/1      Total WeeklyDose 22.5 19.5mg 21 mg 21 mg 21 mg  23 mg 23 mg 21 mg      INR 3.8 2.8 2.9 3.1 2.9 3.3 2.7 2.7 3.2       Notes rec 9/13 1x hold rec 9/13 rec 9/20  Self-adj   recvd 11/15   Unable to contact Emailed rec'd 1/16 rec 2/2        Phone Interview:  Tablet Strength: 3mg and 1mg tablets  Patient Contact Info: Preferred *[phone]* anytime after noon  Other numbers on his profile:   [phone] (Home)    - brother in law, requested that we do NOT call this number  Lab Contact Info: [employer]   Lab: [facility] phone: [phone] , Fax: [phone]   **will email once monthly or if INR is out  of range** PLEASE  USE [employer] TO COMMUNICATE  Patient Findings  Comments:  Patient not contacted at this encounter.      Plan:   1. INR was therapeutic yesterday at  at 3.2 (goal 2.5-3.5). Mr. Turner will continue current self-adjusted regimen of warfarin 3 mg oral daily until recheck.   2. Repeat INR in two weeks, 2/15/23.  3. Doc Turner understands the importance of calling the [facility] if he notices any s/sx of bleeding, stroke, or abnormal bruising, if any changes are made to his medications or medication doses (Rx, OTC, herbal), or if any upcoming procedures are scheduled. Doc Turner will likewise let us know if any other changes, questions, or concerns arise regarding anticoagulation therapy. he understands the importance of seeking medical attention immediately if he experiences any falls, vehicle accidents, or abnormal bleeding or bruising. Doc Turner voiced understanding of this information and confirms that he has the [facility]'s contact information. Otherwise, we will plan to contact the patient once monthly or if his INR is out of range.    Giles Patrick, [place]  2/2/2023  08:35 Frances MENDEZ, PharmD, have reviewed the note in full and agree with the assessment and plan.  02/02/23  09:00 EST

## 2023-02-16 DIAGNOSIS — I48.92 PAROXYSMAL ATRIAL FLUTTER: ICD-10-CM

## 2023-02-17 RX ORDER — WARFARIN SODIUM 3 MG/1
3 TABLET ORAL DAILY
Qty: 90 TABLET | Refills: 0 | Status: SHIPPED | OUTPATIENT
Start: 2023-02-17

## 2023-03-01 ENCOUNTER — TELEPHONE (OUTPATIENT)
Dept: PHARMACY | Facility: HOSPITAL | Age: 73
End: 2023-03-01

## 2023-03-13 ENCOUNTER — ANTICOAGULATION VISIT (OUTPATIENT)
Dept: PHARMACY | Facility: HOSPITAL | Age: 73
End: 2023-03-13
Payer: MEDICARE

## 2023-03-13 DIAGNOSIS — I48.92 PAROXYSMAL ATRIAL FLUTTER: Primary | ICD-10-CM

## 2023-03-13 DIAGNOSIS — Z95.2 HX OF MITRAL VALVE REPLACEMENT WITH MECHANICAL VALVE: ICD-10-CM

## 2023-03-13 LAB — INR PPP: 2.6

## 2023-03-13 NOTE — PROGRESS NOTES
Anticoagulation Clinic - Remote Progress Note  [facility]  Frequency of Monitorin-4x a month    Indication: Hx of mitral valve replacement with mechanical valve KONG Mendoza  Referring Provider: Dr. Garcia  Initial Warfarin Start Date:    Goal INR: 2.5-3.5  Current Drug Interactions: ensure (once daily); amitriptyline (PRN)  CHADS-VASc: 2 (age, HTN)     Diet: iceberg lettuce 1x/week, ensure 3-4x/week, V8 juice (20)  Alcohol: 12 pack of beer/week  Tobacco: None  OTC Pain Medication: None     INR History:  Date 8/25 9/11 9/29 10/19 11/5 12/2 12/11 1/4/18 1/30 2/19 3/7 4/5   Total Weekly Dose 24mg 24mg 24mg 22.75  mg? 22.75  mg? ? 22.5mg 22.5mg 22.5mg 22.5mg 22.5mg 22.5mg   INR 3.3 2.8 2.9 3.1 3.5 2.7 2.7 2.6 2.9 2.5 3.3 3.7   Notes    LVM LVM   call   LVM      Date 4/18 5/20 6/5 6/27 7/24 7/31 9/9 10/8 11/8 12/14 2/20/19 2/25   Total Weekly Dose 22.5mg 23mg 22.5mg  23mg 24mg 24mg unable   to verify unable   to verify unable   to verify unable   to verify 23mg   INR 2.6 2.8 2.9 2.4 2.9 3.3 3.4 3.0 2.7 2.6 3.8 3.4   Notes      rec'vd   recv'd 10/13;  mult calls         Date 3/11 3/25 4/9 5/1 5/15 6/11 6/27 7/10 7/26 8/9 9/11 10/3   Total Weekly Dose 23mg 23mg 22.5mg 23mg 22.5mg 23mg  22.5mg 22.5mg 22.5mg 23mg 22.5mg   INR 3.6 3.0 3.2 3.3 2.5 3.1 3.6 2.9 2.3 2.9 2.9 2.8   Notes        reported          Date 10/15 10/24 10/31 11/8 11/15 11/22 12/7 12/18 12/27 1/15/20 1/30 2/18   Total Weekly Dose 23mg 22.5mg 23mg 23mg 23.5mg 23mg 22.5mg 23mg 22.5mg 23mg ??? 23mg    INR 3.3 3.2 2.7 2.3 2.8 2.7 3.2  2.5 2.5 3.1 2.7 2.4   Notes  reported 10/25; sick; cefdinir stop cefdinir 10/29    Reported   Inc GLV  Unable to reach pt MVI; decr Ensure     Date 3/5 3/17 4/4 4/23 5/4 5/19 6/2 6/24 7/22 7/31 8/26 9/3   Total Weekly Dose 23mg 22.5mg 22.5mg 23mg 23mg 23mg? 23mg 22.75  mg avg 22.75  mg 23mg 22.75  mg 23.5mg   INR 2.8 3.0 3.3 2.9 2.6 2.8 2.9 2.49 2.4 3.1 2.5 3.4   Notes  call recv'd  rec'vd ;  call call recv'd  5/20; call recv'd 6/3; call  recv'd 7/31 recv'd 7/31 recv'd 9/3 Self-adj  CoQ10; email     Date 9/11 10/8 10/13 11/24 12/3 12/22 1/4/21 2/6 2/8 2/19 3/9 4/15   Total Weekly Dose 23mg 23.5mg  avg 23.5mg   avg 23.5mg  avg 23.5mg 22.75  mg avg 22.75  mg avg 22.5mg 19mg 22.75  mg avg 22.75   mg avg 22.75   mg avg   INR 2.8 3.4 3.3 3.7 3.1 2.7 2.6 3.7 3.2 2.8 3.2 2.6   Notes  email email Email; less Ensure Self-adjust dose dec  recv'd 1/5; call email Email; Self held x1 email email      Date 5/5 5/26 6/30 7/9 7/28 8/23 9/29 10/18 11/11 12/17 1/3/22 1/25   Total WeeklyDose 22.75 mg avg 22.75  mg avg  22.75 mg avg 22.75  mg avg 22.75 mg avg 22.75 mg avg 22.75 mg avg 22.75 mg avg 22.75 mg avg 22.75 mg avg 22.75 mg avg   INR 2.9 2.8 3.5 2.8 2.5 3.0 2.6 2.9 2.7 3.7 3.0 2.9   Notes email; fall rcv'd 5/27  rec'vd 7/12; email rec'vd 8/3  email  email Unable to contact email; Self-heldx1 rec'd 2/23     Date 2/23 3/26 3/28  4/17 5/24 5/24 5/26 6/17 7/14  8/19   Total Weekly Dose 22.75 mg avg 22.75mg 19.5mg Non compliant 22.75 mg avg? 22.75 mg avg 22.75 mg avg 22.75 mg avg  23mg Non-  Compliant 22.75 mg   avg   INR 3.2 4.1 3.2  3.0 3.5 3.0 3.4 3.0 2.7  3.4   Notes Unable to contact garlic 1x hold d/c garlic  ?? ?? ?? ?? Rec/d 6/22   email     Date 9/2 9/4 9/19 10/2 10/19 11/11 12/26 1/12/23 2/1 3/12     Total WeeklyDose 22.5 19.5mg 21 mg 21 mg 21 mg  23 mg 23 mg 21 mg ?     INR 3.8 2.8 2.9 3.1 2.9 3.3 2.7 2.7 3.2  2.6     Notes rec 9/13 1x hold rec 9/13 rec 9/20  Self-adj   recvd 11/15   Unable to contact Emailed rec'd 1/16 rec 2/2 rec 3/13       Phone Interview:  Tablet Strength: 3mg and 1mg tablets  Patient Contact Info: Preferred *[phone]* anytime after noon  Other numbers on his profile:   [phone] (Home)    - brother in law, requested that we do NOT call this number  Lab Contact Info: [employer]   Lab: [facility] phone: [phone] , Fax: [phone]   **will email once monthly or  if INR is out of range** PLEASE  USE [employer] TO COMMUNICATE  *LVM 3/13/2023 @ 13:22 EDT*  LVM 3/15  LVM 3/21  LVM 3/24-- closing encounter and sending communications letter.        Plan:   1. INR was therapeutic on 3/12 at 2.6 (goal 2.5-3.5). - closing encounter and sending communications letter.  2. Patient to contact clinic per communications letter.  3. Doc Turner understands the importance of calling the [facility] if he notices any s/sx of bleeding, stroke, or abnormal bruising, if any changes are made to his medications or medication doses (Rx, OTC, herbal), or if any upcoming procedures are scheduled. Doc Turner will likewise let us know if any other changes, questions, or concerns arise regarding anticoagulation therapy. he understands the importance of seeking medical attention immediately if he experiences any falls, vehicle accidents, or abnormal bleeding or bruising. Doc Turner voiced understanding of this information and confirms that he has the [facility]'s contact information. Otherwise, we will plan to contact the patient once monthly or if his INR is out of range.        Unable to speak with patient sending communications letter 3/24/2023.   Frances Pedroza, ToriD

## 2023-03-24 ENCOUNTER — TELEPHONE (OUTPATIENT)
Dept: PHARMACY | Facility: HOSPITAL | Age: 73
End: 2023-03-24

## 2023-04-20 ENCOUNTER — ANTICOAGULATION VISIT (OUTPATIENT)
Dept: PHARMACY | Facility: HOSPITAL | Age: 73
End: 2023-04-20
Payer: MEDICARE

## 2023-04-20 DIAGNOSIS — I48.92 PAROXYSMAL ATRIAL FLUTTER: Primary | ICD-10-CM

## 2023-04-20 DIAGNOSIS — Z95.2 HX OF MITRAL VALVE REPLACEMENT WITH MECHANICAL VALVE: ICD-10-CM

## 2023-04-20 LAB — INR PPP: 2.1

## 2023-04-20 NOTE — PROGRESS NOTES
Anticoagulation Clinic - Remote Progress Note  [facility]  Frequency of Monitorin-4x a month    Indication: Hx of mitral valve replacement with mechanical valve KONG Mendoza  Referring Provider: Dr. Garcia  Initial Warfarin Start Date:    Goal INR: 2.5-3.5  Current Drug Interactions: ensure (once daily); amitriptyline (PRN)  CHADS-VASc: 2 (age, HTN)     Diet: iceberg lettuce 1x/week, ensure 3-4x/week, V8 juice (20)  Alcohol: 12 pack of beer/week  Tobacco: None  OTC Pain Medication: None     INR History:  Date 8/25 9/11 9/29 10/19 11/5 12/2 12/11 1/4/18 1/30 2/19 3/7 4/5   Total Weekly Dose 24mg 24mg 24mg 22.75  mg? 22.75  mg? ? 22.5mg 22.5mg 22.5mg 22.5mg 22.5mg 22.5mg   INR 3.3 2.8 2.9 3.1 3.5 2.7 2.7 2.6 2.9 2.5 3.3 3.7   Notes    LVM LVM   call   LVM      Date 4/18 5/20 6/5 6/27 7/24 7/31 9/9 10/8 11/8 12/14 2/20/19 2/25   Total Weekly Dose 22.5mg 23mg 22.5mg  23mg 24mg 24mg unable   to verify unable   to verify unable   to verify unable   to verify 23mg   INR 2.6 2.8 2.9 2.4 2.9 3.3 3.4 3.0 2.7 2.6 3.8 3.4   Notes      rec'vd   recv'd 10/13;  mult calls         Date 3/11 3/25 4/9 5/1 5/15 6/11 6/27 7/10 7/26 8/9 9/11 10/3   Total Weekly Dose 23mg 23mg 22.5mg 23mg 22.5mg 23mg  22.5mg 22.5mg 22.5mg 23mg 22.5mg   INR 3.6 3.0 3.2 3.3 2.5 3.1 3.6 2.9 2.3 2.9 2.9 2.8   Notes        reported          Date 10/15 10/24 10/31 11/8 11/15 11/22 12/7 12/18 12/27 1/15/20 1/30 2/18   Total Weekly Dose 23mg 22.5mg 23mg 23mg 23.5mg 23mg 22.5mg 23mg 22.5mg 23mg ??? 23mg    INR 3.3 3.2 2.7 2.3 2.8 2.7 3.2  2.5 2.5 3.1 2.7 2.4   Notes  reported 10/25; sick; cefdinir stop cefdinir 10/29    Reported   Inc GLV  Unable to reach pt MVI; decr Ensure     Date 3/5 3/17 4/4 4/23 5/4 5/19 6/2 6/24 7/22 7/31 8/26 9/3   Total Weekly Dose 23mg 22.5mg 22.5mg 23mg 23mg 23mg? 23mg 22.75  mg avg 22.75  mg 23mg 22.75  mg 23.5mg   INR 2.8 3.0 3.3 2.9 2.6 2.8 2.9 2.49 2.4 3.1 2.5 3.4   Notes  call recv'd  rec'vd ;  call call recv'd  5/20; call recv'd 6/3; call  recv'd 7/31 recv'd 7/31 recv'd 9/3 Self-adj  CoQ10; email     Date 9/11 10/8 10/13 11/24 12/3 12/22 1/4/21 2/6 2/8 2/19 3/9 4/15   Total Weekly Dose 23mg 23.5mg  avg 23.5mg   avg 23.5mg  avg 23.5mg 22.75  mg avg 22.75  mg avg 22.5mg 19mg 22.75  mg avg 22.75   mg avg 22.75   mg avg   INR 2.8 3.4 3.3 3.7 3.1 2.7 2.6 3.7 3.2 2.8 3.2 2.6   Notes  email email Email; less Ensure Self-adjust dose dec  recv'd 1/5; call email Email; Self held x1 email email      Date 5/5 5/26 6/30 7/9 7/28 8/23 9/29 10/18 11/11 12/17 1/3/22 1/25   Total WeeklyDose 22.75 mg avg 22.75  mg avg  22.75 mg avg 22.75  mg avg 22.75 mg avg 22.75 mg avg 22.75 mg avg 22.75 mg avg 22.75 mg avg 22.75 mg avg 22.75 mg avg   INR 2.9 2.8 3.5 2.8 2.5 3.0 2.6 2.9 2.7 3.7 3.0 2.9   Notes email; fall rcv'd 5/27  rec'vd 7/12; email rec'vd 8/3  email  email Unable to contact email; Self-heldx1 rec'd 2/23     Date 2/23 3/26 3/28  4/17 5/24 5/24 5/26 6/17 7/14  8/19   Total Weekly Dose 22.75 mg avg 22.75mg 19.5mg Non compliant 22.75 mg avg? 22.75 mg avg 22.75 mg avg 22.75 mg avg  23mg Non-  Compliant 22.75 mg   avg   INR 3.2 4.1 3.2  3.0 3.5 3.0 3.4 3.0 2.7  3.4   Notes Unable to contact garlic 1x hold d/c garlic  ?? ?? ?? ?? Rec/d 6/22   email     Date 9/2 9/4 9/19 10/2 10/19 11/11 12/26 1/12/23 2/1 3/12 4/19    Total WeeklyDose 22.5 19.5mg 21 mg 21 mg 21 mg  23 mg 23 mg 21 mg ? Unable to contact.    INR 3.8 2.8 2.9 3.1 2.9 3.3 2.7 2.7 3.2  2.6 2.1    Notes rec 9/13 1x hold rec 9/13 rec 9/20  Self-adj   recvd 11/15   Unable to contact Emailed rec'd 1/16 rec 2/2 rec 3/13 Rcv/d 4/20      Phone Interview:  Tablet Strength: 3mg and 1mg tablets  Patient Contact Info: Preferred *[phone]* anytime after noon  Other numbers on his profile:   [phone] (Home)    - brother in law, requested that we do NOT call this number  Lab Contact Info: [employer]   Lab: [facility] phone: [phone] , Fax:  [phone]   **will email once monthly or if INR is out of range** PLEASE  USE [employer] TO COMMUNICATE  LVM 4/27:  Sending communications letter with needing to establish better line of communication to continue to use her LVM.  *LVM 4/20/2023 @ 15:45 EDT*  LVM 3/15  LVM 3/21  LVM 3/24-- closing encounter and sending communications letter.        Plan:   1. INR was therapeutic on 3/12 at 2.6 (goal 2.5-3.5). - closing encounter and sending communications letter. Need to establish a better line of communication to continue in care.   2. Patient to contact clinic per communications letter.  3. Doc Turner understands the importance of calling the [facility]ticoagulation [facility] if he notices any s/sx of bleeding, stroke, or abnormal bruising, if any changes are made to his medications or medication doses (Rx, OTC, herbal), or if any upcoming procedures are scheduled. Doc Turner will likewise let us know if any other changes, questions, or concerns arise regarding anticoagulation therapy. he understands the importance of seeking medical attention immediately if he experiences any falls, vehicle accidents, or abnormal bleeding or bruising. Doc Turner voiced understanding of this information and confirms that he has the [facility] Anticoagulation [facility]'s contact information. Otherwise, we will plan to contact the patient once monthly or if his INR is out of range.        Unable to speak with patient sending communications letter 4/27/2023. Need to establish a better line of communication to continue in care.     Frances Pedroza, PharmD

## 2023-04-27 ENCOUNTER — TELEPHONE (OUTPATIENT)
Dept: PHARMACY | Facility: HOSPITAL | Age: 73
End: 2023-04-27

## 2023-05-05 ENCOUNTER — ANTICOAGULATION VISIT (OUTPATIENT)
Dept: PHARMACY | Facility: HOSPITAL | Age: 73
End: 2023-05-05
Payer: MEDICARE

## 2023-05-05 DIAGNOSIS — Z95.2 HX OF MITRAL VALVE REPLACEMENT WITH MECHANICAL VALVE: ICD-10-CM

## 2023-05-05 DIAGNOSIS — I48.92 PAROXYSMAL ATRIAL FLUTTER: Primary | ICD-10-CM

## 2023-05-05 LAB — INR PPP: 3.5

## 2023-05-10 NOTE — PROGRESS NOTES
Anticoagulation Clinic - Remote Progress Note  [facility]  Frequency of Monitorin-4x a month    Indication: Hx of mitral valve replacement with mechanical valve KONG Mendoza  Referring Provider: Dr. Garcia  Initial Warfarin Start Date:    Goal INR: 2.5-3.5  Current Drug Interactions: ensure (once daily); amitriptyline (PRN)  CHADS-VASc: 2 (age, HTN)     Diet: iceberg lettuce 1x/week, ensure 3-4x/week, V8 juice (20)  Alcohol: 12 pack of beer/week  Tobacco: None  OTC Pain Medication: None     INR History:  Date 8/25 9/11 9/29 10/19 11/5 12/2 12/11 1/4/18 1/30 2/19 3/7 4/5   Total Weekly Dose 24mg 24mg 24mg 22.75  mg? 22.75  mg? ? 22.5mg 22.5mg 22.5mg 22.5mg 22.5mg 22.5mg   INR 3.3 2.8 2.9 3.1 3.5 2.7 2.7 2.6 2.9 2.5 3.3 3.7   Notes    LVM LVM   call   LVM      Date 4/18 5/20 6/5 6/27 7/24 7/31 9/9 10/8 11/8 12/14 2/20/19 2/25   Total Weekly Dose 22.5mg 23mg 22.5mg  23mg 24mg 24mg unable   to verify unable   to verify unable   to verify unable   to verify 23mg   INR 2.6 2.8 2.9 2.4 2.9 3.3 3.4 3.0 2.7 2.6 3.8 3.4   Notes      rec'vd   recv'd 10/13;  mult calls         Date 3/11 3/25 4/9 5/1 5/15 6/11 6/27 7/10 7/26 8/9 9/11 10/3   Total Weekly Dose 23mg 23mg 22.5mg 23mg 22.5mg 23mg  22.5mg 22.5mg 22.5mg 23mg 22.5mg   INR 3.6 3.0 3.2 3.3 2.5 3.1 3.6 2.9 2.3 2.9 2.9 2.8   Notes        reported          Date 10/15 10/24 10/31 11/8 11/15 11/22 12/7 12/18 12/27 1/15/20 1/30 2/18   Total Weekly Dose 23mg 22.5mg 23mg 23mg 23.5mg 23mg 22.5mg 23mg 22.5mg 23mg ??? 23mg    INR 3.3 3.2 2.7 2.3 2.8 2.7 3.2  2.5 2.5 3.1 2.7 2.4   Notes  reported 10/25; sick; cefdinir stop cefdinir 10/29    Reported   Inc GLV  Unable to reach pt MVI; decr Ensure     Date 3/5 3/17 4/4 4/23 5/4 5/19 6/2 6/24 7/22 7/31 8/26 9/3   Total Weekly Dose 23mg 22.5mg 22.5mg 23mg 23mg 23mg? 23mg 22.75  mg avg 22.75  mg 23mg 22.75  mg 23.5mg   INR 2.8 3.0 3.3 2.9 2.6 2.8 2.9 2.49 2.4 3.1 2.5 3.4   Notes  call recv'd  rec'vd ;  call call recv'd  5/20; call recv'd 6/3; call  recv'd 7/31 recv'd 7/31 recv'd 9/3 Self-adj  CoQ10; email     Date 9/11 10/8 10/13 11/24 12/3 12/22 1/4/21 2/6 2/8 2/19 3/9 4/15   Total Weekly Dose 23mg 23.5mg  avg 23.5mg   avg 23.5mg  avg 23.5mg 22.75  mg avg 22.75  mg avg 22.5mg 19mg 22.75  mg avg 22.75   mg avg 22.75   mg avg   INR 2.8 3.4 3.3 3.7 3.1 2.7 2.6 3.7 3.2 2.8 3.2 2.6   Notes  email email Email; less Ensure Self-adjust dose dec  recv'd 1/5; call email Email; Self held x1 email email      Date 5/5 5/26 6/30 7/9 7/28 8/23 9/29 10/18 11/11 12/17 1/3/22 1/25   Total WeeklyDose 22.75 mg avg 22.75  mg avg  22.75 mg avg 22.75  mg avg 22.75 mg avg 22.75 mg avg 22.75 mg avg 22.75 mg avg 22.75 mg avg 22.75 mg avg 22.75 mg avg   INR 2.9 2.8 3.5 2.8 2.5 3.0 2.6 2.9 2.7 3.7 3.0 2.9   Notes email; fall rcv'd 5/27  rec'vd 7/12; email rec'vd 8/3  email  email Unable to contact email; Self-heldx1 rec'd 2/23     Date 2/23 3/26 3/28  4/17 5/24 5/24 5/26 6/17 7/14  8/19   Total Weekly Dose 22.75 mg avg 22.75mg 19.5mg Non compliant 22.75 mg avg? 22.75 mg avg 22.75 mg avg 22.75 mg avg  23mg Non-  Compliant 22.75 mg   avg   INR 3.2 4.1 3.2  3.0 3.5 3.0 3.4 3.0 2.7  3.4   Notes Unable to contact garlic 1x hold d/c garlic  ?? ?? ?? ?? Rec/d 6/22   email     Date 9/2 9/4 9/19 10/2 10/19 11/11 12/26 1/12/23 2/1 3/12 4/19 05/10   Total WeeklyDose 22.5 19.5mg 21 mg 21 mg 21 mg  23 mg 23 mg 21 mg ? Unable to contact. Unable to contact   INR 3.8 2.8 2.9 3.1 2.9 3.3 2.7 2.7 3.2  2.6 2.1 3.5   Notes rec 9/13 1x hold rec 9/13 rec 9/20  Self-adj   recvd 11/15   Unable to contact Emailed rec'd 1/16 rec 2/2 rec 3/13 Rcv/d 4/20      Phone Interview:  Tablet Strength: 3mg and 1mg tablets  Patient Contact Info: Preferred *[phone]* anytime after noon  Other numbers on his profile:   [phone] (Home)    - brother in law, requested that we do NOT call this number  Lab Contact Info: [employer]   Lab: [facility] phone:  [phone] , Fax: [phone]   **will email once monthly or if INR is out of range** PLEASE  USE [employer] TO COMMUNICATE  LVM 4/27:  Sending communications letter with needing to establish better line of communication to continue to use her LVM.  *LVM 5/10/2023 @ 14:03 EDT*  LVM 3/15  LVM 3/21  LVM 3/24-- closing encounter and sending communications letter.      Plan:   1. INR was therapeutic on 05/10 at 3.5 (goal 2.5-3.5).  2. Patient to contact clinic per communications letter.  3. Doc Turner understands the importance of calling the [facility] Anticoagulation Clinic if he notices any s/sx of bleeding, stroke, or abnormal bruising, if any changes are made to his medications or medication doses (Rx, OTC, herbal), or if any upcoming procedures are scheduled. Doc Turner will likewise let us know if any other changes, questions, or concerns arise regarding anticoagulation therapy. he understands the importance of seeking medical attention immediately if he experiences any falls, vehicle accidents, or abnormal bleeding or bruising. Doc Turner voiced understanding of this information and confirms that he has the [facility] Anticoagulation Clinic's contact information. Otherwise, we will plan to contact the patient once monthly or if his INR is out of range.        Need to establish a better line of communication to continue in care.     Sterling Gallagher, Pharmacy Intern

## 2023-05-17 NOTE — PROGRESS NOTES
Anticoagulation Clinic - Remote Progress Note  [facility]  Frequency of Monitorin-4x a month    Indication: Hx of mitral valve replacement with mechanical valve KONG Mendoza  Referring Provider: Dr. Garcia (last ravindra: 23)  Initial Warfarin Start Date:    Goal INR: 2.5-3.5  Current Drug Interactions: ensure (once daily); amitriptyline (PRN)  CHADS-VASc: 2 (age, HTN)     Diet: iceberg lettuce 1x/week, ensure 3-4x/week, V8 juice (20)  Alcohol: 12 pack of beer/week  Tobacco: None  OTC Pain Medication: None     INR History:  Date 8/25 9/11 9/29 10/19 11/5 12/2 12/11 1/4/18 1/30 2/19 3/7 4/5   Total Weekly Dose 24mg 24mg 24mg 22.75  mg? 22.75  mg? ? 22.5mg 22.5mg 22.5mg 22.5mg 22.5mg 22.5mg   INR 3.3 2.8 2.9 3.1 3.5 2.7 2.7 2.6 2.9 2.5 3.3 3.7   Notes    LVM LVM   call   LVM      Date 4/18 5/20 6/5 6/27 7/24 7/31 9/9 10/8 11/8 12/14 2/20/19 2/25   Total Weekly Dose 22.5mg 23mg 22.5mg  23mg 24mg 24mg unable   to verify unable   to verify unable   to verify unable   to verify 23mg   INR 2.6 2.8 2.9 2.4 2.9 3.3 3.4 3.0 2.7 2.6 3.8 3.4   Notes      rec'vd   recv'd 10/13;  mult calls         Date 3/11 3/25 4/9 5/1 5/15 6/11 6/27 7/10 7/26 8/9 9/11 10/3   Total Weekly Dose 23mg 23mg 22.5mg 23mg 22.5mg 23mg  22.5mg 22.5mg 22.5mg 23mg 22.5mg   INR 3.6 3.0 3.2 3.3 2.5 3.1 3.6 2.9 2.3 2.9 2.9 2.8   Notes        reported          Date 10/15 10/24 10/31 11/8 11/15 11/22 12/7 12/18 12/27 1/15/20 1/30 2/18   Total Weekly Dose 23mg 22.5mg 23mg 23mg 23.5mg 23mg 22.5mg 23mg 22.5mg 23mg ??? 23mg    INR 3.3 3.2 2.7 2.3 2.8 2.7 3.2  2.5 2.5 3.1 2.7 2.4   Notes  reported 10/25; sick; cefdinir stop cefdinir 10/29    Reported   Inc GLV  Unable to reach pt MVI; decr Ensure     Date 3/5 3/17 4/4 4/23 5/4 5/19 6/2 6/24 7/22 7/31 8/26 9/3   Total Weekly Dose 23mg 22.5mg 22.5mg 23mg 23mg 23mg? 23mg 22.75  mg avg 22.75  mg 23mg 22.75  mg 23.5mg   INR 2.8 3.0 3.3 2.9 2.6 2.8 2.9 2.49 2.4 3.1 2.5 3.4   Notes  call recv'd  4/13 rec'vd 4/24; call call recv'd  5/20; call recv'd 6/3; call  recv'd 7/31 recv'd 7/31 recv'd 9/3 Self-adj  CoQ10; email     Date 9/11 10/8 10/13 11/24 12/3 12/22 1/4/21 2/6 2/8 2/19 3/9 4/15   Total Weekly Dose 23mg 23.5mg  avg 23.5mg   avg 23.5mg  avg 23.5mg 22.75  mg avg 22.75  mg avg 22.5mg 19mg 22.75  mg avg 22.75   mg avg 22.75   mg avg   INR 2.8 3.4 3.3 3.7 3.1 2.7 2.6 3.7 3.2 2.8 3.2 2.6   Notes  email email Email; less Ensure Self-adjust dose dec  recv'd 1/5; call email Email; Self held x1 email email      Date 5/5 5/26 6/30 7/9 7/28 8/23 9/29 10/18 11/11 12/17 1/3/22 1/25   Total WeeklyDose 22.75 mg avg 22.75  mg avg  22.75 mg avg 22.75  mg avg 22.75 mg avg 22.75 mg avg 22.75 mg avg 22.75 mg avg 22.75 mg avg 22.75 mg avg 22.75 mg avg   INR 2.9 2.8 3.5 2.8 2.5 3.0 2.6 2.9 2.7 3.7 3.0 2.9   Notes email; fall rcv'd 5/27  rec'vd 7/12; email rec'vd 8/3  email  email Unable to contact email; Self-heldx1 rec'd 2/23     Date 2/23 3/26 3/28  4/17 5/24 5/24 5/26 6/17 7/14  8/19   Total Weekly Dose 22.75 mg avg 22.75mg 19.5mg Non compliant 22.75 mg avg? 22.75 mg avg 22.75 mg avg 22.75 mg avg  23mg Non-  Compliant 22.75 mg   avg   INR 3.2 4.1 3.2  3.0 3.5 3.0 3.4 3.0 2.7  3.4   Notes Unable to contact garlic 1x hold d/c garlic  ?? ?? ?? ?? Rec/d 6/22   email     Date 9/2 9/4 9/19 10/2 10/19 11/11 12/26 1/12/23 2/1 3/12 4/19 4/16   Total WeeklyDose 22.5 19.5mg 21 mg 21 mg 21 mg  23 mg 23 mg 21 mg ? Unable to contact. ~ 22.75 mg   INR 3.8 2.8 2.9 3.1 2.9 3.3 2.7 2.7 3.2  2.6 2.1 3.5   Notes rec 9/13 1x hold rec 9/13 rec 9/20  Self-adj   recvd 11/15   Unable to contact Emailed rec'd 1/16 rec 2/2 rec 3/13 Rcv/d 4/20 Reported 5/2 in [employer] and called clinic 5/17     Phone Interview:  Tablet Strength: 3mg and 1mg tablets  Patient Contact Info: Preferred *[phone]* anytime after noon  Other numbers on his profile:   [phone] (Home)    - brother in law, requested that we do NOT call this number  Lab Contact Info:  [facility]   Lab: [facility] phone: [phone] , Fax: [phone]   **will email once monthly or if INR is out of range** PLEASE  USE [employer] TO COMMUNICATE  Patient Findings    Negatives:  Signs/symptoms of thrombosis, Signs/symptoms of bleeding, Laboratory test error suspected, Change in health, Change in alcohol use, Change in activity, Upcoming invasive procedure, Emergency department visit, Upcoming dental procedure, Missed doses, Extra doses, Change in medications, Change in diet/appetite, Hospital admission, Bruising, Other complaints   Comments:  Mr Turner continues to alternate warfarin 3 mg and 3.5 mg every two days. He has received communications letters from clinic. Spoke with Mr Turner re: compliance. From now on Mr Turner will report results to [facility], THE FOLLOWING DAY HE WILL TRAVEL TO [facility]OT WITH [facility] AND CALL CLINIC.      Plan:   1. INR was therapeutic 5/2 at 3.5. (goal 2.5-3.5). Instructed Mr Turner to continue taking warfarin 3 mg then 3.5 mg repeating every two days   2. Mr Turner agreeable to check INR and report to [place] vidal. He will call clinic tomorrow.  3. Doc PANCHO Turner understands the importance of calling the [facility] Anticoagulation Clinic if he notices any s/sx of bleeding, stroke, or abnormal bruising, if any changes are made to his medications or medication doses (Rx, OTC, herbal), or if any upcoming procedures are scheduled. Doc PANCHO Turner will likewise let us know if any other changes, questions, or concerns arise regarding anticoagulation therapy. he understands the importance of seeking medical attention immediately if he experiences any falls, vehicle accidents, or abnormal bleeding or bruising. Doc Turner voiced understanding of this information and confirms that he has the [facility] Anticoagulation Clinic's contact information. Otherwise, we will plan to contact the patient once monthly or if his INR is out of range.    Giles Patrick CPhT  5/17/2023  15:37  EDT       Need to establish a better line of communication to continue in care.     [facility], have reviewed the note in full and agree with the assessment and plan.  05/18/23  09:44 EDT

## 2023-05-18 ENCOUNTER — ANTICOAGULATION VISIT (OUTPATIENT)
Dept: PHARMACY | Facility: HOSPITAL | Age: 73
End: 2023-05-18
Payer: MEDICARE

## 2023-05-18 DIAGNOSIS — Z95.2 HX OF MITRAL VALVE REPLACEMENT WITH MECHANICAL VALVE: ICD-10-CM

## 2023-05-18 DIAGNOSIS — I48.92 PAROXYSMAL ATRIAL FLUTTER: Primary | ICD-10-CM

## 2023-05-18 LAB — INR PPP: 3.4

## 2023-05-18 NOTE — PROGRESS NOTES
Anticoagulation Clinic - Remote Progress Note  [facility]  Frequency of Monitorin-4x a month    Indication: Hx of mitral valve replacement with mechanical valve KONG Mendoza  Referring Provider: Dr. Garcia (last ravindra: 23)  Initial Warfarin Start Date:    Goal INR: 2.5-3.5  Current Drug Interactions: ensure (once daily); amitriptyline (PRN)  CHADS-VASc: 2 (age, HTN)     Diet: iceberg lettuce 1x/week, ensure 3-4x/week, V8 juice (20)  Alcohol: 12 pack of beer/week  Tobacco: None  OTC Pain Medication: None     INR History:  Date 8/25 9/11 9/29 10/19 11/5 12/2 12/11 1/4/18 1/30 2/19 3/7 4/5   Total Weekly Dose 24mg 24mg 24mg 22.75  mg? 22.75  mg? ? 22.5mg 22.5mg 22.5mg 22.5mg 22.5mg 22.5mg   INR 3.3 2.8 2.9 3.1 3.5 2.7 2.7 2.6 2.9 2.5 3.3 3.7   Notes    LVM LVM   call   LVM      Date 4/18 5/20 6/5 6/27 7/24 7/31 9/9 10/8 11/8 12/14 2/20/19 2/25   Total Weekly Dose 22.5mg 23mg 22.5mg  23mg 24mg 24mg unable   to verify unable   to verify unable   to verify unable   to verify 23mg   INR 2.6 2.8 2.9 2.4 2.9 3.3 3.4 3.0 2.7 2.6 3.8 3.4   Notes      rec'vd   recv'd 10/13;  mult calls         Date 3/11 3/25 4/9 5/1 5/15 6/11 6/27 7/10 7/26 8/9 9/11 10/3   Total Weekly Dose 23mg 23mg 22.5mg 23mg 22.5mg 23mg  22.5mg 22.5mg 22.5mg 23mg 22.5mg   INR 3.6 3.0 3.2 3.3 2.5 3.1 3.6 2.9 2.3 2.9 2.9 2.8   Notes        reported          Date 10/15 10/24 10/31 11/8 11/15 11/22 12/7 12/18 12/27 1/15/20 1/30 2/18   Total Weekly Dose 23mg 22.5mg 23mg 23mg 23.5mg 23mg 22.5mg 23mg 22.5mg 23mg ??? 23mg    INR 3.3 3.2 2.7 2.3 2.8 2.7 3.2  2.5 2.5 3.1 2.7 2.4   Notes  reported 10/25; sick; cefdinir stop cefdinir 10/29    Reported   Inc GLV  Unable to reach pt MVI; decr Ensure     Date 3/5 3/17 4/4 4/23 5/4 5/19 6/2 6/24 7/22 7/31 8/26 9/3   Total Weekly Dose 23mg 22.5mg 22.5mg 23mg 23mg 23mg? 23mg 22.75  mg avg 22.75  mg 23mg 22.75  mg 23.5mg   INR 2.8 3.0 3.3 2.9 2.6 2.8 2.9 2.49 2.4 3.1 2.5 3.4   Notes  call recv'd  4/13 rec'vd 4/24; call call recv'd  5/20; call recv'd 6/3; call  recv'd 7/31 recv'd 7/31 recv'd 9/3 Self-adj  CoQ10; email     Date 9/11 10/8 10/13 11/24 12/3 12/22 1/4/21 2/6 2/8 2/19 3/9 4/15   Total Weekly Dose 23mg 23.5mg  avg 23.5mg   avg 23.5mg  avg 23.5mg 22.75  mg avg 22.75  mg avg 22.5mg 19mg 22.75  mg avg 22.75   mg avg 22.75   mg avg   INR 2.8 3.4 3.3 3.7 3.1 2.7 2.6 3.7 3.2 2.8 3.2 2.6   Notes  email email Email; less Ensure Self-adjust dose dec  recv'd 1/5; call email Email; Self held x1 email email      Date 5/5 5/26 6/30 7/9 7/28 8/23 9/29 10/18 11/11 12/17 1/3/22 1/25   Total WeeklyDose 22.75 mg avg 22.75  mg avg  22.75 mg avg 22.75  mg avg 22.75 mg avg 22.75 mg avg 22.75 mg avg 22.75 mg avg 22.75 mg avg 22.75 mg avg 22.75 mg avg   INR 2.9 2.8 3.5 2.8 2.5 3.0 2.6 2.9 2.7 3.7 3.0 2.9   Notes email; fall rcv'd 5/27  rec'vd 7/12; email rec'vd 8/3  email  email Unable to contact email; Self-heldx1 rec'd 2/23     Date 2/23 3/26 3/28  4/17 5/24 5/24 5/26 6/17 7/14  8/19   Total Weekly Dose 22.75 mg avg 22.75mg 19.5mg Non compliant 22.75 mg avg? 22.75 mg avg 22.75 mg avg 22.75 mg avg  23mg Non-  Compliant 22.75 mg   avg   INR 3.2 4.1 3.2  3.0 3.5 3.0 3.4 3.0 2.7  3.4   Notes Unable to contact garlic 1x hold d/c garlic  ?? ?? ?? ?? Rec/d 6/22   email     Date 9/2 9/4 9/19 10/2 10/19 11/11 12/26 1/12/23 2/1 3/12 4/19 4/16   Total WeeklyDose 22.5 19.5mg 21 mg 21 mg 21 mg  23 mg 23 mg 21 mg ? Unable to contact. ~ 22.75 mg   INR 3.8 2.8 2.9 3.1 2.9 3.3 2.7 2.7 3.2  2.6 2.1 3.5   Notes rec 9/13 1x hold rec 9/13 rec 9/20  Self-adj   recvd 11/15   Unable to contact Emailed rec'd 1/16 rec 2/2 rec 3/13 Rcv/d 4/20 Reported 5/2 in [employer] and called clinic 5/17     Date 5/18              Total WeeklyDose 23 mg              INR 3.4              Notes                   Phone Interview:  Tablet Strength: 3mg and 1mg tablets  Patient Contact Info: Preferred *[phone]* anytime after noon  Other numbers on his profile:    [phone] (Home)    - brother in law, requested that we do NOT call this number  Lab Contact Info: [employer]ces   Lab: [facility] phone: [phone] , Fax: [phone]   **will email once monthly or if INR is out of range** PLEASE  USE [employer] TO COMMUNICATE    Patient Findings  Positives:  Upcoming dental procedure   Negatives:  Signs/symptoms of thrombosis, Signs/symptoms of bleeding, Laboratory test error suspected, Change in health, Change in alcohol use, Change in activity, Upcoming invasive procedure, Emergency department visit, Missed doses, Extra doses, Change in medications, Change in diet/appetite, Hospital admission, Bruising, Other complaints   Comments:   Mr Turner continues to alternate warfarin 3 mg and 3.5 mg every two days.   Patient reported he will have oral surgery June 18th with Dr. Paco Herrmann [phone]   All other findings negative per patient     I will call Dr. Grace office tomorrow for warfarin hold recommendations        Plan:   1. INR was therapeutic today at 3.4. (goal 2.5-3.5). Instructed Mr Turner to continue taking warfarin 3 mg then 3.5 mg repeating every two days until recheck.  2. Mr Turner agreeable to check INR and report to [employer] next Thursday.  3. Doc DELEON Johnny understands the importance of calling the [facility] if he notices any s/sx of bleeding, stroke, or abnormal bruising, if any changes are made to his medications or medication doses (Rx, OTC, herbal), or if any upcoming procedures are scheduled. Doc DELEON Johnny will likewise let us know if any other changes, questions, or concerns arise regarding anticoagulation therapy. he understands the importance of seeking medical attention immediately if he experiences any falls, vehicle accidents, or abnormal bleeding or bruising. Doc PANCHO Turner voiced understanding of this information and confirms that he has the [facility]'s contact information. Otherwise, we will plan to  contact the patient once monthly or if his INR is out of range.    Brock Bishop, Pharmacy Technician  5/18/2023  16:26 EDT      I, Elaina Winter [facility], have reviewed the note in full and agree with the assessment and plan.  05/19/23  09:15 EDT

## 2023-05-21 DIAGNOSIS — I48.92 PAROXYSMAL ATRIAL FLUTTER: ICD-10-CM

## 2023-05-23 RX ORDER — WARFARIN SODIUM 3 MG/1
3 TABLET ORAL DAILY
Qty: 90 TABLET | Refills: 0 | Status: SHIPPED | OUTPATIENT
Start: 2023-05-23

## 2023-05-25 ENCOUNTER — ANTICOAGULATION VISIT (OUTPATIENT)
Dept: PHARMACY | Facility: HOSPITAL | Age: 73
End: 2023-05-25
Payer: MEDICARE

## 2023-05-25 DIAGNOSIS — I48.92 PAROXYSMAL ATRIAL FLUTTER: Primary | ICD-10-CM

## 2023-05-25 DIAGNOSIS — Z95.2 HX OF MITRAL VALVE REPLACEMENT WITH MECHANICAL VALVE: ICD-10-CM

## 2023-05-25 LAB — INR PPP: 2.7

## 2023-05-25 NOTE — PROGRESS NOTES
Anticoagulation Clinic - Remote Progress Note  [facility]  Frequency of Monitorin-4x a month    Indication: Hx of mitral valve replacement with mechanical valve KONG Mendoza  Referring Provider: Dr. Garcia (last ravindra: 23)  Initial Warfarin Start Date:    Goal INR: 2.5-3.5  Current Drug Interactions: ensure (once daily); amitriptyline (PRN)  CHADS-VASc: 2 (age, HTN)     Diet: iceberg lettuce 1x/week, ensure 3-4x/week, V8 juice (20)  Alcohol: 12 pack of beer/week  Tobacco: None  OTC Pain Medication: None     INR History:  Date 8/25 9/11 9/29 10/19 11/5 12/2 12/11 1/4/18 1/30 2/19 3/7 4/5   Total Weekly Dose 24mg 24mg 24mg 22.75  mg? 22.75  mg? ? 22.5mg 22.5mg 22.5mg 22.5mg 22.5mg 22.5mg   INR 3.3 2.8 2.9 3.1 3.5 2.7 2.7 2.6 2.9 2.5 3.3 3.7   Notes    LVM LVM   call   LVM      Date 4/18 5/20 6/5 6/27 7/24 7/31 9/9 10/8 11/8 12/14 2/20/19 2/25   Total Weekly Dose 22.5mg 23mg 22.5mg  23mg 24mg 24mg unable   to verify unable   to verify unable   to verify unable   to verify 23mg   INR 2.6 2.8 2.9 2.4 2.9 3.3 3.4 3.0 2.7 2.6 3.8 3.4   Notes      rec'vd   recv'd 10/13;  mult calls         Date 3/11 3/25 4/9 5/1 5/15 6/11 6/27 7/10 7/26 8/9 9/11 10/3   Total Weekly Dose 23mg 23mg 22.5mg 23mg 22.5mg 23mg  22.5mg 22.5mg 22.5mg 23mg 22.5mg   INR 3.6 3.0 3.2 3.3 2.5 3.1 3.6 2.9 2.3 2.9 2.9 2.8   Notes        reported          Date 10/15 10/24 10/31 11/8 11/15 11/22 12/7 12/18 12/27 1/15/20 1/30 2/18   Total Weekly Dose 23mg 22.5mg 23mg 23mg 23.5mg 23mg 22.5mg 23mg 22.5mg 23mg ??? 23mg    INR 3.3 3.2 2.7 2.3 2.8 2.7 3.2  2.5 2.5 3.1 2.7 2.4   Notes  reported 10/25; sick; cefdinir stop cefdinir 10/29    Reported   Inc GLV  Unable to reach pt MVI; decr Ensure     Date 3/5 3/17 4/4 4/23 5/4 5/19 6/2 6/24 7/22 7/31 8/26 9/3   Total Weekly Dose 23mg 22.5mg 22.5mg 23mg 23mg 23mg? 23mg 22.75  mg avg 22.75  mg 23mg 22.75  mg 23.5mg   INR 2.8 3.0 3.3 2.9 2.6 2.8 2.9 2.49 2.4 3.1 2.5 3.4   Notes  call recv'd  4/13 rec'vd 4/24; call call recv'd  5/20; call recv'd 6/3; call  recv'd 7/31 recv'd 7/31 recv'd 9/3 Self-adj  CoQ10; email     Date 9/11 10/8 10/13 11/24 12/3 12/22 1/4/21 2/6 2/8 2/19 3/9 4/15   Total Weekly Dose 23mg 23.5mg  avg 23.5mg   avg 23.5mg  avg 23.5mg 22.75  mg avg 22.75  mg avg 22.5mg 19mg 22.75  mg avg 22.75   mg avg 22.75   mg avg   INR 2.8 3.4 3.3 3.7 3.1 2.7 2.6 3.7 3.2 2.8 3.2 2.6   Notes  email email Email; less Ensure Self-adjust dose dec  recv'd 1/5; call email Email; Self held x1 email email      Date 5/5 5/26 6/30 7/9 7/28 8/23 9/29 10/18 11/11 12/17 1/3/22 1/25   Total WeeklyDose 22.75 mg avg 22.75  mg avg  22.75 mg avg 22.75  mg avg 22.75 mg avg 22.75 mg avg 22.75 mg avg 22.75 mg avg 22.75 mg avg 22.75 mg avg 22.75 mg avg   INR 2.9 2.8 3.5 2.8 2.5 3.0 2.6 2.9 2.7 3.7 3.0 2.9   Notes email; fall rcv'd 5/27  rec'vd 7/12; email rec'vd 8/3  email  email Unable to contact email; Self-heldx1 rec'd 2/23     Date 2/23 3/26 3/28  4/17 5/24 5/24 5/26 6/17 7/14  8/19   Total Weekly Dose 22.75 mg avg 22.75mg 19.5mg Non compliant 22.75 mg avg? 22.75 mg avg 22.75 mg avg 22.75 mg avg  23mg Non-  Compliant 22.75 mg   avg   INR 3.2 4.1 3.2  3.0 3.5 3.0 3.4 3.0 2.7  3.4   Notes Unable to contact garlic 1x hold d/c garlic  ?? ?? ?? ?? Rec/d 6/22   email     Date 9/2 9/4 9/19 10/2 10/19 11/11 12/26 1/12/23 2/1 3/12 4/19 4/16   Total WeeklyDose 22.5 19.5mg 21 mg 21 mg 21 mg  23 mg 23 mg 21 mg ? Unable to contact. ~ 22.75 mg   INR 3.8 2.8 2.9 3.1 2.9 3.3 2.7 2.7 3.2  2.6 2.1 3.5   Notes rec 9/13 1x hold rec 9/13 rec 9/20  Self-adj   recvd 11/15   Unable to contact Emailed rec'd 1/16 rec 2/2 rec 3/13 Rcv/d 4/20 Reported 5/2 in [facility] and called clinic 5/17     Date 5/18 5/25             Total WeeklyDose 23 mg ~22.5 mg              INR 3.4 2.7             Notes                   Phone Interview:  Tablet Strength: 3mg and 1mg tablets  Patient Contact Info: Preferred *[phone]* anytime after noon  Other numbers on  his profile:   [phone] (Home)    - brother in law, requested that we do NOT call this number  Lab Contact Info: [facility]   Lab: [facility] phone: [phone] , Fax: [phone]   **will email once monthly or if INR is out of range** PLEASE  USE [employer] TO COMMUNICATE  Patient Findings  Negatives:  Signs/symptoms of thrombosis, Signs/symptoms of bleeding, Laboratory test error suspected, Change in health, Change in alcohol use, Change in activity, Upcoming invasive procedure, Emergency department visit, Upcoming dental procedure, Missed doses, Extra doses, Change in medications, Change in diet/appetite, Hospital admission, Bruising, Other complaints   Comments:  All findings negative per patient     Oral surgery consultation June 18th with Dr. Paco Herrmann [phone]     Plan:   1. INR was therapeutic today at 2.7 (goal 2.5-3.5). Instructed Mr Turner to continue taking warfarin 3 mg then 3.5 mg repeating every two days until recheck.  2. Mr Turner agreeable to check INR and report to [employer] in two weeks 6/8/23  3. Doc Turner understands the importance of calling the [facility] if he notices any s/sx of bleeding, stroke, or abnormal bruising, if any changes are made to his medications or medication doses (Rx, OTC, herbal), or if any upcoming procedures are scheduled. Doc Turner will likewise let us know if any other changes, questions, or concerns arise regarding anticoagulation therapy. he understands the importance of seeking medical attention immediately if he experiences any falls, vehicle accidents, or abnormal bleeding or bruising. Doc Turner voiced understanding of this information and confirms that he has the [facility]'s contact information. Otherwise, we will plan to contact the patient once monthly or if his INR is out of range.    Brock Bishop, Pharmacy Technician  5/25/2023  16:14 EDT    I, Frances Pedroza, PharmD, have reviewed the  note in full and agree with the assessment and plan.  05/26/23  12:24 EDT

## 2023-06-07 ENCOUNTER — ANTICOAGULATION VISIT (OUTPATIENT)
Dept: PHARMACY | Facility: HOSPITAL | Age: 73
End: 2023-06-07
Payer: MEDICARE

## 2023-06-07 DIAGNOSIS — Z95.2 HX OF MITRAL VALVE REPLACEMENT WITH MECHANICAL VALVE: ICD-10-CM

## 2023-06-07 DIAGNOSIS — I48.92 PAROXYSMAL ATRIAL FLUTTER: Primary | ICD-10-CM

## 2023-06-07 LAB — INR PPP: 2.8

## 2023-06-07 NOTE — PROGRESS NOTES
Anticoagulation Clinic - Remote Progress Note  [facility]  Frequency of Monitorin-4x a month    Indication: Hx of mitral valve replacement with mechanical valve KONG Mendoza  Referring Provider: Dr. Garcia (last ravindra: 23)  Initial Warfarin Start Date:    Goal INR: 2.5-3.5  Current Drug Interactions: ensure (once daily); amitriptyline (PRN)  CHADS-VASc: 2 (age, HTN)     Diet: iceberg lettuce 1x/week, ensure 3-4x/week, V8 juice (20)  Alcohol: 12 pack of beer/week  Tobacco: None  OTC Pain Medication: None     INR History:  Date 8/25 9/11 9/29 10/19 11/5 12/2 12/11 1/4/18 1/30 2/19 3/7 4/5   Total Weekly Dose 24mg 24mg 24mg 22.75  mg? 22.75  mg? ? 22.5mg 22.5mg 22.5mg 22.5mg 22.5mg 22.5mg   INR 3.3 2.8 2.9 3.1 3.5 2.7 2.7 2.6 2.9 2.5 3.3 3.7   Notes    LVM LVM   call   LVM      Date 4/18 5/20 6/5 6/27 7/24 7/31 9/9 10/8 11/8 12/14 2/20/19 2/25   Total Weekly Dose 22.5mg 23mg 22.5mg  23mg 24mg 24mg unable   to verify unable   to verify unable   to verify unable   to verify 23mg   INR 2.6 2.8 2.9 2.4 2.9 3.3 3.4 3.0 2.7 2.6 3.8 3.4   Notes      rec'vd   recv'd 10/13;  mult calls         Date 3/11 3/25 4/9 5/1 5/15 6/11 6/27 7/10 7/26 8/9 9/11 10/3   Total Weekly Dose 23mg 23mg 22.5mg 23mg 22.5mg 23mg  22.5mg 22.5mg 22.5mg 23mg 22.5mg   INR 3.6 3.0 3.2 3.3 2.5 3.1 3.6 2.9 2.3 2.9 2.9 2.8   Notes        reported          Date 10/15 10/24 10/31 11/8 11/15 11/22 12/7 12/18 12/27 1/15/20 1/30 2/18   Total Weekly Dose 23mg 22.5mg 23mg 23mg 23.5mg 23mg 22.5mg 23mg 22.5mg 23mg ??? 23mg    INR 3.3 3.2 2.7 2.3 2.8 2.7 3.2  2.5 2.5 3.1 2.7 2.4   Notes  reported 10/25; sick; cefdinir stop cefdinir 10/29    Reported   Inc GLV  Unable to reach pt MVI; decr Ensure     Date 3/5 3/17 4/4 4/23 5/4 5/19 6/2 6/24 7/22 7/31 8/26 9/3   Total Weekly Dose 23mg 22.5mg 22.5mg 23mg 23mg 23mg? 23mg 22.75  mg avg 22.75  mg 23mg 22.75  mg 23.5mg   INR 2.8 3.0 3.3 2.9 2.6 2.8 2.9 2.49 2.4 3.1 2.5 3.4   Notes  call recv'd  4/13 rec'vd 4/24; call call recv'd  5/20; call recv'd 6/3; call  recv'd 7/31 recv'd 7/31 recv'd 9/3 Self-adj  CoQ10; email     Date 9/11 10/8 10/13 11/24 12/3 12/22 1/4/21 2/6 2/8 2/19 3/9 4/15   Total Weekly Dose 23mg 23.5mg  avg 23.5mg   avg 23.5mg  avg 23.5mg 22.75  mg avg 22.75  mg avg 22.5mg 19mg 22.75  mg avg 22.75   mg avg 22.75   mg avg   INR 2.8 3.4 3.3 3.7 3.1 2.7 2.6 3.7 3.2 2.8 3.2 2.6   Notes  email email Email; less Ensure Self-adjust dose dec  recv'd 1/5; call email Email; Self held x1 email email      Date 5/5 5/26 6/30 7/9 7/28 8/23 9/29 10/18 11/11 12/17 1/3/22 1/25   Total WeeklyDose 22.75 mg avg 22.75  mg avg  22.75 mg avg 22.75  mg avg 22.75 mg avg 22.75 mg avg 22.75 mg avg 22.75 mg avg 22.75 mg avg 22.75 mg avg 22.75 mg avg   INR 2.9 2.8 3.5 2.8 2.5 3.0 2.6 2.9 2.7 3.7 3.0 2.9   Notes email; fall rcv'd 5/27  rec'vd 7/12; email rec'vd 8/3  email  email Unable to contact email; Self-heldx1 rec'd 2/23     Date 2/23 3/26 3/28  4/17 5/24 5/24 5/26 6/17 7/14  8/19   Total Weekly Dose 22.75 mg avg 22.75mg 19.5mg Non compliant 22.75 mg avg? 22.75 mg avg 22.75 mg avg 22.75 mg avg  23mg Non-  Compliant 22.75 mg   avg   INR 3.2 4.1 3.2  3.0 3.5 3.0 3.4 3.0 2.7  3.4   Notes Unable to contact garlic 1x hold d/c garlic  ?? ?? ?? ?? Rec/d 6/22   email     Date 9/2 9/4 9/19 10/2 10/19 11/11 12/26 1/12/23 2/1 3/12 4/19 4/16   Total WeeklyDose 22.5 19.5mg 21 mg 21 mg 21 mg  23 mg 23 mg 21 mg ? Unable to contact. ~ 22.75 mg   INR 3.8 2.8 2.9 3.1 2.9 3.3 2.7 2.7 3.2  2.6 2.1 3.5   Notes rec 9/13 1x hold rec 9/13 rec 9/20  Self-adj   recvd 11/15   Unable to contact Emailed rec'd 1/16 rec 2/2 rec 3/13 Rcv/d 4/20 Reported 5/2 in [facility] and called clinic 5/17     Date 5/18 5/25 6/6            Total WeeklyDose 23 mg ~22.75 mg  ~22.75 mg            INR 3.4 2.7 2.8            Notes  call                 Phone Interview:  Tablet Strength: 3mg and 1mg tablets  Patient Contact Info: Preferred *[phone]* anytime after  noon  Other numbers on his profile:   [phone] (Home)    - brother in law, requested that we do NOT call this number  Lab Contact Info: [place]   Lab: [facility] in [facility] phone: [phone] , Fax: [phone]   **will email once monthly or if INR is out of range** PLEASE  USE [employer] TO COMMUNICATE    Patient Findings    Comments: Patient not contacted at this encounter.     Plan:   1. INR was therapeutic yesterday at 2.8 (goal 2.5-3.5). Mr Turner will continue taking warfarin 3 mg then 3.5 mg repeating every two days until recheck.  2. Recheck INR in two weeks, 6/20/23  3. Doc Turner understands the importance of calling the [facility] if he notices any s/sx of bleeding, stroke, or abnormal bruising, if any changes are made to his medications or medication doses (Rx, OTC, herbal), or if any upcoming procedures are scheduled. Doc DELEON Johnny will likewise let us know if any other changes, questions, or concerns arise regarding anticoagulation therapy. he understands the importance of seeking medical attention immediately if he experiences any falls, vehicle accidents, or abnormal bleeding or bruising. Doc Turner voiced understanding of this information and confirms that he has the [facility]'s contact information. Otherwise, we will plan to contact the patient once monthly or if his INR is out of range.    Giles Patrick CPhT  6/7/2023  08:21 EDT     I, Lc Valenzuela [facility], have reviewed the note in full and agree with the assessment and plan.  06/07/23  13:38 EDT

## 2023-07-28 ENCOUNTER — ANTICOAGULATION VISIT (OUTPATIENT)
Dept: PHARMACY | Facility: HOSPITAL | Age: 73
End: 2023-07-28
Payer: MEDICARE

## 2023-07-28 DIAGNOSIS — Z95.2 HX OF MITRAL VALVE REPLACEMENT WITH MECHANICAL VALVE: ICD-10-CM

## 2023-07-28 DIAGNOSIS — I48.92 PAROXYSMAL ATRIAL FLUTTER: Primary | ICD-10-CM

## 2023-07-28 LAB — INR PPP: 2.8

## 2023-08-02 ENCOUNTER — ANTICOAGULATION VISIT (OUTPATIENT)
Dept: PHARMACY | Facility: HOSPITAL | Age: 73
End: 2023-08-02
Payer: MEDICARE

## 2023-08-02 DIAGNOSIS — Z95.2 HX OF MITRAL VALVE REPLACEMENT WITH MECHANICAL VALVE: ICD-10-CM

## 2023-08-02 DIAGNOSIS — I48.92 PAROXYSMAL ATRIAL FLUTTER: Primary | ICD-10-CM

## 2023-08-02 LAB — INR PPP: 1.9

## 2023-08-04 ENCOUNTER — ANTICOAGULATION VISIT (OUTPATIENT)
Dept: PHARMACY | Facility: HOSPITAL | Age: 73
End: 2023-08-04
Payer: MEDICARE

## 2023-08-04 DIAGNOSIS — Z95.2 HX OF MITRAL VALVE REPLACEMENT WITH MECHANICAL VALVE: ICD-10-CM

## 2023-08-04 DIAGNOSIS — I48.92 PAROXYSMAL ATRIAL FLUTTER: Primary | ICD-10-CM

## 2023-08-04 LAB — INR PPP: 2

## 2023-08-22 DIAGNOSIS — I48.92 PAROXYSMAL ATRIAL FLUTTER: ICD-10-CM

## 2023-08-22 RX ORDER — WARFARIN SODIUM 3 MG/1
3 TABLET ORAL DAILY
Qty: 90 TABLET | Refills: 0 | Status: SHIPPED | OUTPATIENT
Start: 2023-08-22

## 2023-08-31 ENCOUNTER — ANTICOAGULATION VISIT (OUTPATIENT)
Dept: PHARMACY | Facility: HOSPITAL | Age: 73
End: 2023-08-31
Payer: MEDICARE

## 2023-08-31 DIAGNOSIS — Z95.2 HX OF MITRAL VALVE REPLACEMENT WITH MECHANICAL VALVE: ICD-10-CM

## 2023-08-31 DIAGNOSIS — I48.92 PAROXYSMAL ATRIAL FLUTTER: Primary | ICD-10-CM

## 2023-08-31 LAB — INR PPP: 2.5

## 2023-08-31 NOTE — PROGRESS NOTES
Anticoagulation Clinic - Remote Progress Note  [facility]  Frequency of Monitorin-4x a month    Indication: Hx of mitral valve replacement with mechanical valve KONG Mendoza  Referring Provider: Dr. Garcia (last ravindra: 23)  Initial Warfarin Start Date:    Goal INR: 2.5-3.5  Current Drug Interactions: ensure (once daily); amitriptyline (PRN)  CHADS-VASc: 2 (age, HTN)     Diet: iceberg lettuce 1x/week, ensure 3-4x/week, V8 juice (20)  Alcohol: 12 pack of beer/week  Tobacco: None  OTC Pain Medication: None     INR History:  Date 8/25 9/11 9/29 10/19 11/5 12/2 12/11 1/4/18 1/30 2/19 3/7 4/5   Total Weekly Dose 24mg 24mg 24mg 22.75  mg? 22.75  mg? ? 22.5mg 22.5mg 22.5mg 22.5mg 22.5mg 22.5mg   INR 3.3 2.8 2.9 3.1 3.5 2.7 2.7 2.6 2.9 2.5 3.3 3.7   Notes    LVM LVM   call   LVM      Date 4/18 5/20 6/5 6/27 7/24 7/31 9/9 10/8 11/8 12/14 2/20/19 2/25   Total Weekly Dose 22.5mg 23mg 22.5mg  23mg 24mg 24mg unable   to verify unable   to verify unable   to verify unable   to verify 23mg   INR 2.6 2.8 2.9 2.4 2.9 3.3 3.4 3.0 2.7 2.6 3.8 3.4   Notes      rec'vd   recv'd 10/13;  mult calls         Date 3/11 3/25 4/9 5/1 5/15 6/11 6/27 7/10 7/26 8/9 9/11 10/3   Total Weekly Dose 23mg 23mg 22.5mg 23mg 22.5mg 23mg  22.5mg 22.5mg 22.5mg 23mg 22.5mg   INR 3.6 3.0 3.2 3.3 2.5 3.1 3.6 2.9 2.3 2.9 2.9 2.8   Notes        reported          Date 10/15 10/24 10/31 11/8 11/15 11/22 12/7 12/18 12/27 1/15/20 1/30 2/18   Total Weekly Dose 23mg 22.5mg 23mg 23mg 23.5mg 23mg 22.5mg 23mg 22.5mg 23mg ??? 23mg    INR 3.3 3.2 2.7 2.3 2.8 2.7 3.2  2.5 2.5 3.1 2.7 2.4   Notes  reported 10/25; sick; cefdinir stop cefdinir 10/29    Reported   Inc GLV  Unable to reach pt MVI; decr Ensure     Date 3/5 3/17 4/4 4/23 5/4 5/19 6/2 6/24 7/22 7/31 8/26 9/3   Total Weekly Dose 23mg 22.5mg 22.5mg 23mg 23mg 23mg? 23mg 22.75  mg avg 22.75  mg 23mg 22.75  mg 23.5mg   INR 2.8 3.0 3.3 2.9 2.6 2.8 2.9 2.49 2.4 3.1 2.5 3.4   Notes  call recv'd  4/13 rec'vd 4/24; call call recv'd  5/20; call recv'd 6/3; call  recv'd 7/31 recv'd 7/31 recv'd 9/3 Self-adj  CoQ10; email     Date 9/11 10/8 10/13 11/24 12/3 12/22 1/4/21 2/6 2/8 2/19 3/9 4/15   Total Weekly Dose 23mg 23.5mg  avg 23.5mg   avg 23.5mg  avg 23.5mg 22.75  mg avg 22.75  mg avg 22.5mg 19mg 22.75  mg avg 22.75   mg avg 22.75   mg avg   INR 2.8 3.4 3.3 3.7 3.1 2.7 2.6 3.7 3.2 2.8 3.2 2.6   Notes  email email Email; less Ensure Self-adjust dose dec  recv'd 1/5; call email Email; Self held x1 email email      Date 5/5 5/26 6/30 7/9 7/28 8/23 9/29 10/18 11/11 12/17 1/3/22 1/25   Total WeeklyDose 22.75 mg avg 22.75  mg avg  22.75 mg avg 22.75  mg avg 22.75 mg avg 22.75 mg avg 22.75 mg avg 22.75 mg avg 22.75 mg avg 22.75 mg avg 22.75 mg avg   INR 2.9 2.8 3.5 2.8 2.5 3.0 2.6 2.9 2.7 3.7 3.0 2.9   Notes email; fall rcv'd 5/27  rec'vd 7/12; email rec'vd 8/3  email  email Unable to contact email; Self-heldx1 rec'd 2/23     Date 2/23 3/26 3/28  4/17 5/24 5/24 5/26 6/17 7/14  8/19   Total Weekly Dose 22.75 mg avg 22.75mg 19.5mg Non compliant 22.75 mg avg? 22.75 mg avg 22.75 mg avg 22.75 mg avg  23mg Non-  Compliant 22.75 mg   avg   INR 3.2 4.1 3.2  3.0 3.5 3.0 3.4 3.0 2.7  3.4   Notes Unable to contact garlic 1x hold d/c garlic  ?? ?? ?? ?? Rec/d 6/22   email     Date 9/2 9/4 9/19 10/2 10/19 11/11 12/26 1/12/23 2/1 3/12 4/19 4/16   Total WeeklyDose 22.5 19.5mg 21 mg 21 mg 21 mg  23 mg 23 mg 21 mg ? Unable to contact. ~ 22.75 mg   INR 3.8 2.8 2.9 3.1 2.9 3.3 2.7 2.7 3.2  2.6 2.1 3.5   Notes rec 9/13 1x hold rec 9/13 rec 9/20  Self-adj   recvd 11/15   Unable to contact Emailed rec'd 1/16 rec 2/2 rec 3/13 Rcv/d 4/20 Reported 5/2 in [facility] and called clinic 5/17     Date 5/18 5/25 6/6 6/26 7/10 7/27  8/4 8/30      Total WeeklyDose 23 mg ~22.75 mg  ~22.75 mg ~ 22.75 mg ~22.75 mg ~22.5 mg  22.5mg ~22.75mg      INR 3.4 2.7 2.8 2.4  2.6 2.8 1.9 2.0 2.5      Notes  call   Rec'd 7/11 Rec'd  7/28  clinda Rec 8/31          Phone  Interview:  Tablet Strength: 3mg and 1mg tablets  Patient Contact Info: Preferred *[phone]* anytime after noon  Other numbers on his profile:   [phone] (Home)    - brother in law, requested that we do NOT call this number  Lab Contact Info: [employer]   Lab: [facility] in [facility] phone: [phone] , Fax: [phone]   **will email once monthly or if INR is out of range**  PLEASE  USE [employer] TO COMMUNICATE--patient has yet to set up [employer] messages as of 7/11/23    Patient Findings          Negatives:  Signs/symptoms of thrombosis, Signs/symptoms of bleeding, Laboratory test error suspected, Change in health, Change in alcohol use, Change in activity, Upcoming invasive procedure, Emergency department visit, Upcoming dental procedure, Missed doses, Extra doses, Change in medications, Change in diet/appetite, Hospital admission, Bruising, Other complaints         Plan:   1. INR is therapeutic at 2.5 (goal 2.5-3.5). Patient was noncompliant with requested recheck date.  Instructed Mr Turner to continue his maintenance regimen is warfarin 3 mg then 3.5 mg repeating every two days.    2. Recheck INR in 2 weeks  3. Verbal information provided over the phone. Patient RBV dosing instructions, expresses understanding by teach back, and has no further questions at this time.  4. Doc DELEON Johnny understands the importance of calling the [facility] if he notices any s/sx of bleeding, stroke, or abnormal bruising, if any changes are made to his medications or medication doses (Rx, OTC, herbal), or if any upcoming procedures are scheduled. Doc PANCHO Turner will likewise let us know if any other changes, questions, or concerns arise regarding anticoagulation therapy. he understands the importance of seeking medical attention immediately if he experiences any falls, vehicle accidents, or abnormal bleeding or bruising. Doc Turner voiced understanding of this information and confirms that he has the [facility]  [facility]'s contact information. Otherwise, we will plan to contact the patient once monthly or if his INR is out of range.           Josephine Pineda PharmD.  08/31/23   09:29 EDT

## 2023-09-01 ENCOUNTER — TELEPHONE (OUTPATIENT)
Dept: CARDIOLOGY | Facility: CLINIC | Age: 73
End: 2023-09-01
Payer: MEDICARE

## 2023-09-01 NOTE — TELEPHONE ENCOUNTER
Caller: Doc Turner    Relationship to patient: Self    Best call back number: [phone]    Type of visit: FOLLOW UP    Requested date: ASAP     If rescheduling, when is the original appointment:  07/12/23    Additional notes: PATIENT CALLED IN TO RESCHEDULE APPOINTMENT THAT HE MISSED IN JULY.     PATIENT IS NOT CURRENTLY HAVING ANY CARDIAC ISSUES

## 2023-09-08 NOTE — TELEPHONE ENCOUNTER
ATTEMPTED TO REACH PT TO SCHEDWANDA NEW APPT DATE AND TIME, MAILED APPT REMINDER, REACHED OUT TO PT'S SISTER (EMERGENCY CONTACT) TO ASSIST IN NOTIFICATION, SW CONTACTS  AND ASKED THAT SHE RETURN MY CALL.

## 2023-09-20 ENCOUNTER — ANTICOAGULATION VISIT (OUTPATIENT)
Dept: PHARMACY | Facility: HOSPITAL | Age: 73
End: 2023-09-20
Payer: MEDICARE

## 2023-09-20 DIAGNOSIS — I48.92 PAROXYSMAL ATRIAL FLUTTER: Primary | ICD-10-CM

## 2023-09-20 DIAGNOSIS — Z95.2 HX OF MITRAL VALVE REPLACEMENT WITH MECHANICAL VALVE: ICD-10-CM

## 2023-09-20 LAB — INR PPP: 3.3

## 2023-09-20 NOTE — PROGRESS NOTES
Anticoagulation Clinic - Remote Progress Note  [facility]  Frequency of Monitorin-4x a month    Indication: Hx of mitral valve replacement with mechanical valve KONG Mendoza  Referring Provider: Dr. Garcia (last ravindra: 23)  Initial Warfarin Start Date:    Goal INR: 2.5-3.5  Current Drug Interactions: ensure (once daily); amitriptyline (PRN)  CHADS-VASc: 2 (age, HTN)     Diet: iceberg lettuce 1x/week, ensure 3-4x/week, V8 juice (20)  Alcohol: 12 pack of beer/week  Tobacco: None  OTC Pain Medication: None     INR History:  Date 8/25 9/11 9/29 10/19 11/5 12/2 12/11 1/4/18 1/30 2/19 3/7 4/5   Total Weekly Dose 24mg 24mg 24mg 22.75  mg? 22.75  mg? ? 22.5mg 22.5mg 22.5mg 22.5mg 22.5mg 22.5mg   INR 3.3 2.8 2.9 3.1 3.5 2.7 2.7 2.6 2.9 2.5 3.3 3.7   Notes    LVM LVM   call   LVM      Date 4/18 5/20 6/5 6/27 7/24 7/31 9/9 10/8 11/8 12/14 2/20/19 2/25   Total Weekly Dose 22.5mg 23mg 22.5mg  23mg 24mg 24mg unable   to verify unable   to verify unable   to verify unable   to verify 23mg   INR 2.6 2.8 2.9 2.4 2.9 3.3 3.4 3.0 2.7 2.6 3.8 3.4   Notes      rec'vd   recv'd 10/13;  mult calls         Date 3/11 3/25 4/9 5/1 5/15 6/11 6/27 7/10 7/26 8/9 9/11 10/3   Total Weekly Dose 23mg 23mg 22.5mg 23mg 22.5mg 23mg  22.5mg 22.5mg 22.5mg 23mg 22.5mg   INR 3.6 3.0 3.2 3.3 2.5 3.1 3.6 2.9 2.3 2.9 2.9 2.8   Notes        reported          Date 10/15 10/24 10/31 11/8 11/15 11/22 12/7 12/18 12/27 1/15/20 1/30 2/18   Total Weekly Dose 23mg 22.5mg 23mg 23mg 23.5mg 23mg 22.5mg 23mg 22.5mg 23mg ??? 23mg    INR 3.3 3.2 2.7 2.3 2.8 2.7 3.2  2.5 2.5 3.1 2.7 2.4   Notes  reported 10/25; sick; cefdinir stop cefdinir 10/29    Reported   Inc GLV  Unable to reach pt MVI; decr Ensure     Date 3/5 3/17 4/4 4/23 5/4 5/19 6/2 6/24 7/22 7/31 8/26 9/3   Total Weekly Dose 23mg 22.5mg 22.5mg 23mg 23mg 23mg? 23mg 22.75  mg avg 22.75  mg 23mg 22.75  mg 23.5mg   INR 2.8 3.0 3.3 2.9 2.6 2.8 2.9 2.49 2.4 3.1 2.5 3.4   Notes  call recv'd  4/13 rec'vd 4/24; call call recv'd  5/20; call recv'd 6/3; call  recv'd 7/31 recv'd 7/31 recv'd 9/3 Self-adj  CoQ10; email     Date 9/11 10/8 10/13 11/24 12/3 12/22 1/4/21 2/6 2/8 2/19 3/9 4/15   Total Weekly Dose 23mg 23.5mg  avg 23.5mg   avg 23.5mg  avg 23.5mg 22.75  mg avg 22.75  mg avg 22.5mg 19mg 22.75  mg avg 22.75   mg avg 22.75   mg avg   INR 2.8 3.4 3.3 3.7 3.1 2.7 2.6 3.7 3.2 2.8 3.2 2.6   Notes  email email Email; less Ensure Self-adjust dose dec  recv'd 1/5; call email Email; Self held x1 email email      Date 5/5 5/26 6/30 7/9 7/28 8/23 9/29 10/18 11/11 12/17 1/3/22 1/25   Total WeeklyDose 22.75 mg avg 22.75  mg avg  22.75 mg avg 22.75  mg avg 22.75 mg avg 22.75 mg avg 22.75 mg avg 22.75 mg avg 22.75 mg avg 22.75 mg avg 22.75 mg avg   INR 2.9 2.8 3.5 2.8 2.5 3.0 2.6 2.9 2.7 3.7 3.0 2.9   Notes email; fall rcv'd 5/27  rec'vd 7/12; email rec'vd 8/3  email  email Unable to contact email; Self-heldx1 rec'd 2/23     Date 2/23 3/26 3/28  4/17 5/24 5/24 5/26 6/17 7/14  8/19   Total Weekly Dose 22.75 mg avg 22.75mg 19.5mg Non compliant 22.75 mg avg? 22.75 mg avg 22.75 mg avg 22.75 mg avg  23mg Non-  Compliant 22.75 mg   avg   INR 3.2 4.1 3.2  3.0 3.5 3.0 3.4 3.0 2.7  3.4   Notes Unable to contact garlic 1x hold d/c garlic  ?? ?? ?? ?? Rec/d 6/22   email     Date 9/2 9/4 9/19 10/2 10/19 11/11 12/26 1/12/23 2/1 3/12 4/19 4/16   Total WeeklyDose 22.5 19.5mg 21 mg 21 mg 21 mg  23 mg 23 mg 21 mg ? Unable to contact. ~ 22.75 mg   INR 3.8 2.8 2.9 3.1 2.9 3.3 2.7 2.7 3.2  2.6 2.1 3.5   Notes rec 9/13 1x hold rec 9/13 rec 9/20  Self-adj   recvd 11/15   Unable to contact Emailed rec'd 1/16 rec 2/2 rec 3/13 Rcv/d 4/20 Reported 5/2 in [facility] and called clinic 5/17     Date 5/18 5/25 6/6 6/26 7/10 7/27  8/4 8/30 09/17     Total WeeklyDose 23 mg ~22.75 mg  ~22.75 mg ~ 22.75 mg ~22.75 mg ~22.5 mg  22.5mg ~22.75mg ~22.75 mg     INR 3.4 2.7 2.8 2.4  2.6 2.8 1.9 2.0 2.5 3.3     Notes  call   Rec'd 7/11 Rec'd  7/28  clinda Rec  8/31 REC 09/20       Phone Interview:  Tablet Strength: 3mg and 1mg tablets  Patient Contact Info: Preferred *[phone]* anytime after noon  Other numbers on his profile:   [phone] (Home)    - brother in law, requested that we do NOT call this number  Lab Contact Info: [facility]   Lab: [facility] phone: [phone] , Fax: [phone]   **will email once monthly or if INR is out of range**  PLEASE  USE [employer] TO COMMUNICATE--patient has yet to set up [employer] messages as of 7/11/23      Patient Findings  Negatives: Signs/symptoms of thrombosis, Signs/symptoms of bleeding, Laboratory test error suspected, Change in health, Change in alcohol use, Change in activity, Upcoming invasive procedure, Emergency department visit, Upcoming dental procedure, Missed doses, Extra doses, Change in medications, Change in diet/appetite, Hospital admission, Bruising, Other complaints   Comments: All findings negative per pt.  Pt gave me a verbal INR result, he said he did his INR on Saturday or Sunday he can't remember which day but he remember what his inr was because he had it wrote down and calling us to see if we have gotten his result from [facility]       Plan:   1. INR was therapeutic on 09/17/23 at 3.3 (goal 2.5-3.5). verbal from PT.  Patient was noncompliant with requested recheck date.  Instructed Mr Turner to continue his maintenance regimen of warfarin 3 mg then 3.5 mg repeating every two days.  2. Recheck INR in 2 weeks 10/4/23  3. Verbal information provided over the phone. Patient RBV dosing instructions, expresses understanding by teach back, and has no further questions at this time.  4. Doc Turner understands the importance of calling the [facility] if he notices any s/sx of bleeding, stroke, or abnormal bruising, if any changes are made to his medications or medication doses (Rx, OTC, herbal), or if any upcoming procedures are scheduled. Doc Turner will likewise let us  know if any other changes, questions, or concerns arise regarding anticoagulation therapy. he understands the importance of seeking medical attention immediately if he experiences any falls, vehicle accidents, or abnormal bleeding or bruising. Doc Turner voiced understanding of this information and confirms that he has the [facility]'s contact information. Otherwise, we will plan to contact the patient once monthly or if his INR is out of range.  5. Patient canceled 9/13 appointment with Dr. Garcia, will need to reschedule     Umu Flores  Pharmacy Technician  9/20/2023 16:20 EDT      I, Lauren Milligan, PharmD, have reviewed the note in full and agree with the assessment and plan.  09/21/23  15:14 EDT

## 2023-10-19 ENCOUNTER — TELEPHONE (OUTPATIENT)
Dept: PHARMACY | Facility: HOSPITAL | Age: 73
End: 2023-10-19

## 2023-10-19 NOTE — TELEPHONE ENCOUNTER
Called PT on 10/19/2023 about OVER DUE INR, Patient did not answer, left a voice mail for patient to contact the anticoagulation clinic at there convenience.    PT last INR test was on 09/17/2023 at 3.3, Patient was needing to retest INR on 10/4/23.      Umu Flores  Pharmacy Technician  10/19/2023 16:14 EDT

## 2023-10-20 ENCOUNTER — ANTICOAGULATION VISIT (OUTPATIENT)
Dept: PHARMACY | Facility: HOSPITAL | Age: 73
End: 2023-10-20
Payer: MEDICARE

## 2023-10-20 DIAGNOSIS — I48.92 PAROXYSMAL ATRIAL FLUTTER: Primary | ICD-10-CM

## 2023-10-20 DIAGNOSIS — Z95.2 HX OF MITRAL VALVE REPLACEMENT WITH MECHANICAL VALVE: ICD-10-CM

## 2023-10-20 DIAGNOSIS — I48.92 PAROXYSMAL ATRIAL FLUTTER: ICD-10-CM

## 2023-10-20 LAB
INR PPP: 2.5
INR PPP: 2.5

## 2023-10-20 RX ORDER — WARFARIN SODIUM 3 MG/1
TABLET ORAL
Qty: 90 TABLET | Refills: 0 | Status: SHIPPED | OUTPATIENT
Start: 2023-10-20

## 2023-10-20 RX ORDER — WARFARIN SODIUM 1 MG/1
TABLET ORAL
Qty: 15 TABLET | Refills: 0 | Status: SHIPPED | OUTPATIENT
Start: 2023-10-20

## 2023-10-20 RX ORDER — WARFARIN SODIUM 3 MG/1
3 TABLET ORAL DAILY
Qty: 90 TABLET | Refills: 0 | Status: CANCELLED | OUTPATIENT
Start: 2023-10-20

## 2023-10-20 NOTE — TELEPHONE ENCOUNTER
Patient request a refill for 1 mg tablets as well     Brock Bishop, Pharmacy Technician  10/20/2023  15:39 EDT

## 2023-10-20 NOTE — PROGRESS NOTES
Anticoagulation Clinic - Remote Progress Note  [facility]  Frequency of Monitorin-4x a month    Indication: Hx of mitral valve replacement with mechanical valve KONG Mendoza  Referring Provider: Dr. Garcia (last ravindra: 23)  Initial Warfarin Start Date:    Goal INR: 2.5-3.5  Current Drug Interactions: ensure (once daily); amitriptyline (PRN)  CHADS-VASc: 2 (age, HTN)     Diet: iceberg lettuce 1x/week, ensure 3-4x/week, V8 juice (20)  Alcohol: 12 pack of beer/week  Tobacco: None  OTC Pain Medication: None     INR History:  Date 8/25 9/11 9/29 10/19 11/5 12/2 12/11 1/4/18 1/30 2/19 3/7 4/5   Total Weekly Dose 24mg 24mg 24mg 22.75  mg? 22.75  mg? ? 22.5mg 22.5mg 22.5mg 22.5mg 22.5mg 22.5mg   INR 3.3 2.8 2.9 3.1 3.5 2.7 2.7 2.6 2.9 2.5 3.3 3.7   Notes    LVM LVM   call   LVM      Date 4/18 5/20 6/5 6/27 7/24 7/31 9/9 10/8 11/8 12/14 2/20/19 2/25   Total Weekly Dose 22.5mg 23mg 22.5mg  23mg 24mg 24mg unable   to verify unable   to verify unable   to verify unable   to verify 23mg   INR 2.6 2.8 2.9 2.4 2.9 3.3 3.4 3.0 2.7 2.6 3.8 3.4   Notes      rec'vd   recv'd 10/13;  mult calls         Date 3/11 3/25 4/9 5/1 5/15 6/11 6/27 7/10 7/26 8/9 9/11 10/3   Total Weekly Dose 23mg 23mg 22.5mg 23mg 22.5mg 23mg  22.5mg 22.5mg 22.5mg 23mg 22.5mg   INR 3.6 3.0 3.2 3.3 2.5 3.1 3.6 2.9 2.3 2.9 2.9 2.8   Notes        reported          Date 10/15 10/24 10/31 11/8 11/15 11/22 12/7 12/18 12/27 1/15/20 1/30 2/18   Total Weekly Dose 23mg 22.5mg 23mg 23mg 23.5mg 23mg 22.5mg 23mg 22.5mg 23mg ??? 23mg    INR 3.3 3.2 2.7 2.3 2.8 2.7 3.2  2.5 2.5 3.1 2.7 2.4   Notes  reported 10/25; sick; cefdinir stop cefdinir 10/29    Reported   Inc GLV  Unable to reach pt MVI; decr Ensure     Date 3/5 3/17 4/4 4/23 5/4 5/19 6/2 6/24 7/22 7/31 8/26 9/3   Total Weekly Dose 23mg 22.5mg 22.5mg 23mg 23mg 23mg? 23mg 22.75  mg avg 22.75  mg 23mg 22.75  mg 23.5mg   INR 2.8 3.0 3.3 2.9 2.6 2.8 2.9 2.49 2.4 3.1 2.5 3.4   Notes  call recv'd  4/13 rec'vd 4/24; call call recv'd  5/20; call recv'd 6/3; call  recv'd 7/31 recv'd 7/31 recv'd 9/3 Self-adj  CoQ10; email     Date 9/11 10/8 10/13 11/24 12/3 12/22 1/4/21 2/6 2/8 2/19 3/9 4/15   Total Weekly Dose 23mg 23.5mg  avg 23.5mg   avg 23.5mg  avg 23.5mg 22.75  mg avg 22.75  mg avg 22.5mg 19mg 22.75  mg avg 22.75   mg avg 22.75   mg avg   INR 2.8 3.4 3.3 3.7 3.1 2.7 2.6 3.7 3.2 2.8 3.2 2.6   Notes  email email Email; less Ensure Self-adjust dose dec  recv'd 1/5; call email Email; Self held x1 email email      Date 5/5 5/26 6/30 7/9 7/28 8/23 9/29 10/18 11/11 12/17 1/3/22 1/25   Total WeeklyDose 22.75 mg avg 22.75  mg avg  22.75 mg avg 22.75  mg avg 22.75 mg avg 22.75 mg avg 22.75 mg avg 22.75 mg avg 22.75 mg avg 22.75 mg avg 22.75 mg avg   INR 2.9 2.8 3.5 2.8 2.5 3.0 2.6 2.9 2.7 3.7 3.0 2.9   Notes email; fall rcv'd 5/27  rec'vd 7/12; email rec'vd 8/3  email  email Unable to contact email; Self-heldx1 rec'd 2/23     Date 2/23 3/26 3/28  4/17 5/24 5/24 5/26 6/17 7/14  8/19   Total Weekly Dose 22.75 mg avg 22.75mg 19.5mg Non compliant 22.75 mg avg? 22.75 mg avg 22.75 mg avg 22.75 mg avg  23mg Non-  Compliant 22.75 mg   avg   INR 3.2 4.1 3.2  3.0 3.5 3.0 3.4 3.0 2.7  3.4   Notes Unable to contact garlic 1x hold d/c garlic  ?? ?? ?? ?? Rec/d 6/22   email     Date 9/2 9/4 9/19 10/2 10/19 11/11 12/26 1/12/23 2/1 3/12 4/19 4/16   Total WeeklyDose 22.5 19.5mg 21 mg 21 mg 21 mg  23 mg 23 mg 21 mg ? Unable to contact. ~ 22.75 mg   INR 3.8 2.8 2.9 3.1 2.9 3.3 2.7 2.7 3.2  2.6 2.1 3.5   Notes rec 9/13 1x hold rec 9/13 rec 9/20  Self-adj   recvd 11/15   Unable to contact Emailed rec'd 1/16 rec 2/2 rec 3/13 Rcv/d 4/20 Reported 5/2 in [facility] and called clinic 5/17     Date 5/18 5/25 6/6 6/26 7/10 7/27  8/4 8/30 09/17 10/16    Total WeeklyDose 23 mg ~22.75 mg  ~22.75 mg ~ 22.75 mg ~22.75 mg ~22.5 mg  22.5mg ~22.75mg ~22.75 mg 22.5 mg    INR 3.4 2.7 2.8 2.4  2.6 2.8 1.9 2.0 2.5 3.3 2.5    Notes  call   Rec'd 7/11 Rec'd  7/28   clinda Rec 8/31 REC 09/20 Rec'd 10/20/23      Phone Interview:  Tablet Strength: 3mg and 1mg tablets  Patient Contact Info: Preferred *[phone]* anytime after noon  Other numbers on his profile:   [phone] (Home)    - brother in law, requested that we do NOT call this number  Lab Contact Info: [facility]   Lab: [facility] phone: [phone] , Fax: [phone]   **will email once monthly or if INR is out of range**  PLEASE  USE [employer] TO COMMUNICATE--patient has yet to set up [employer] messages as of 7/11/23      Patient Findings  Negatives: Signs/symptoms of thrombosis, Signs/symptoms of bleeding, Laboratory test error suspected, Change in health, Change in alcohol use, Change in activity, Upcoming invasive procedure, Emergency department visit, Upcoming dental procedure, Missed doses, Extra doses, Change in medications, Change in diet/appetite, Hospital admission, Bruising, Other complaints   Comments: All findings negative per patient. Verified dosing     Patient claims he rechecked this past Monday, he couldn't remember if he reported his INR to [employer] or not. I've reached out to Leela Lancaster our [employer] rep to see if there an issue with his order form and difficulty receiving patient's most recent results.    Patient request warfarin refills for 1 mg tablets      Plan:   1. INR was therapeutic on 10/16/23 at 2.5 (goal 2.5-3.5). verbal from PT. Instructed Mr Turner to continue his maintenance regimen of warfarin 3 mg then 3.5 mg repeating every two days.  2. Recheck INR in 2 weeks 10/30/23  3. Verbal information provided over the phone. Patient RBV dosing instructions, expresses understanding by teach back, and has no further questions at this time.  4. Doc Turner understands the importance of calling the [facility] if he notices any s/sx of bleeding, stroke, or abnormal bruising, if any changes are made to his medications or medication doses (Rx, OTC, herbal), or if  any upcoming procedures are scheduled. Doc Turner will likewise let us know if any other changes, questions, or concerns arise regarding anticoagulation therapy. he understands the importance of seeking medical attention immediately if he experiences any falls, vehicle accidents, or abnormal bleeding or bruising. Doc Turner voiced understanding of this information and confirms that he has the [facility]'s contact information. Otherwise, we will plan to contact the patient once monthly or if his INR is out of range.  5. Patient canceled 9/13 appointment with Dr. Garcia, will need to reschedule     Brock Bishop, Pharmacy Technician  10/20/2023  15:41 EDT    Sent in refills for 1 mg and 3 mg.  I, Olegario Macdonald, PharmD, have reviewed the note in full and agree with the assessment and plan.  10/20/23  16:18 EDT

## 2023-11-02 LAB — INR PPP: 2

## 2023-11-03 ENCOUNTER — ANTICOAGULATION VISIT (OUTPATIENT)
Dept: PHARMACY | Facility: HOSPITAL | Age: 73
End: 2023-11-03
Payer: MEDICARE

## 2023-11-03 DIAGNOSIS — I48.92 PAROXYSMAL ATRIAL FLUTTER: Primary | ICD-10-CM

## 2023-11-03 DIAGNOSIS — Z95.2 HX OF MITRAL VALVE REPLACEMENT WITH MECHANICAL VALVE: ICD-10-CM

## 2023-11-03 NOTE — PROGRESS NOTES
Anticoagulation Clinic - Remote Progress Note  [facility]  Frequency of Monitorin-4x a month    Indication: Hx of mitral valve replacement with mechanical valve KONG Mendoza  Referring Provider: Dr. Garcia (last ravindra: 23)  Initial Warfarin Start Date:    Goal INR: 2.5-3.5  Current Drug Interactions: ensure (once daily); amitriptyline (PRN)  CHADS-VASc: 2 (age, HTN)     Diet: iceberg lettuce 1x/week, ensure 3-4x/week, V8 juice (20)  Alcohol: 12 pack of beer/week  Tobacco: None  OTC Pain Medication: None     INR History:  Date 8/25 9/11 9/29 10/19 11/5 12/2 12/11 1/4/18 1/30 2/19 3/7 4/5   Total Weekly Dose 24mg 24mg 24mg 22.75  mg? 22.75  mg? ? 22.5mg 22.5mg 22.5mg 22.5mg 22.5mg 22.5mg   INR 3.3 2.8 2.9 3.1 3.5 2.7 2.7 2.6 2.9 2.5 3.3 3.7   Notes    LVM LVM   call   LVM      Date 4/18 5/20 6/5 6/27 7/24 7/31 9/9 10/8 11/8 12/14 2/20/19 2/25   Total Weekly Dose 22.5mg 23mg 22.5mg  23mg 24mg 24mg unable   to verify unable   to verify unable   to verify unable   to verify 23mg   INR 2.6 2.8 2.9 2.4 2.9 3.3 3.4 3.0 2.7 2.6 3.8 3.4   Notes      rec'vd   recv'd 10/13;  mult calls         Date 3/11 3/25 4/9 5/1 5/15 6/11 6/27 7/10 7/26 8/9 9/11 10/3   Total Weekly Dose 23mg 23mg 22.5mg 23mg 22.5mg 23mg  22.5mg 22.5mg 22.5mg 23mg 22.5mg   INR 3.6 3.0 3.2 3.3 2.5 3.1 3.6 2.9 2.3 2.9 2.9 2.8   Notes        reported          Date 10/15 10/24 10/31 11/8 11/15 11/22 12/7 12/18 12/27 1/15/20 1/30 2/18   Total Weekly Dose 23mg 22.5mg 23mg 23mg 23.5mg 23mg 22.5mg 23mg 22.5mg 23mg ??? 23mg    INR 3.3 3.2 2.7 2.3 2.8 2.7 3.2  2.5 2.5 3.1 2.7 2.4   Notes  reported 10/25; sick; cefdinir stop cefdinir 10/29    Reported   Inc GLV  Unable to reach pt MVI; decr Ensure     Date 3/5 3/17 4/4 4/23 5/4 5/19 6/2 6/24 7/22 7/31 8/26 9/3   Total Weekly Dose 23mg 22.5mg 22.5mg 23mg 23mg 23mg? 23mg 22.75  mg avg 22.75  mg 23mg 22.75  mg 23.5mg   INR 2.8 3.0 3.3 2.9 2.6 2.8 2.9 2.49 2.4 3.1 2.5 3.4   Notes  call recv'd  4/13 rec'vd 4/24; call call recv'd  5/20; call recv'd 6/3; call  recv'd 7/31 recv'd 7/31 recv'd 9/3 Self-adj  CoQ10; email     Date 9/11 10/8 10/13 11/24 12/3 12/22 1/4/21 2/6 2/8 2/19 3/9 4/15   Total Weekly Dose 23mg 23.5mg  avg 23.5mg   avg 23.5mg  avg 23.5mg 22.75  mg avg 22.75  mg avg 22.5mg 19mg 22.75  mg avg 22.75   mg avg 22.75   mg avg   INR 2.8 3.4 3.3 3.7 3.1 2.7 2.6 3.7 3.2 2.8 3.2 2.6   Notes  email email Email; less Ensure Self-adjust dose dec  recv'd 1/5; call email Email; Self held x1 email email      Date 5/5 5/26 6/30 7/9 7/28 8/23 9/29 10/18 11/11 12/17 1/3/22 1/25   Total WeeklyDose 22.75 mg avg 22.75  mg avg  22.75 mg avg 22.75  mg avg 22.75 mg avg 22.75 mg avg 22.75 mg avg 22.75 mg avg 22.75 mg avg 22.75 mg avg 22.75 mg avg   INR 2.9 2.8 3.5 2.8 2.5 3.0 2.6 2.9 2.7 3.7 3.0 2.9   Notes email; fall rcv'd 5/27  rec'vd 7/12; email rec'vd 8/3  email  email Unable to contact email; Self-heldx1 rec'd 2/23     Date 2/23 3/26 3/28  4/17 5/24 5/24 5/26 6/17 7/14  8/19   Total Weekly Dose 22.75 mg avg 22.75mg 19.5mg Non compliant 22.75 mg avg? 22.75 mg avg 22.75 mg avg 22.75 mg avg  23mg Non-  Compliant 22.75 mg   avg   INR 3.2 4.1 3.2  3.0 3.5 3.0 3.4 3.0 2.7  3.4   Notes Unable to contact garlic 1x hold d/c garlic  ?? ?? ?? ?? Rec/d 6/22   email     Date 9/2 9/4 9/19 10/2 10/19 11/11 12/26 1/12/23 2/1 3/12 4/19 4/16   Total WeeklyDose 22.5 19.5mg 21 mg 21 mg 21 mg  23 mg 23 mg 21 mg ? Unable to contact. ~ 22.75 mg   INR 3.8 2.8 2.9 3.1 2.9 3.3 2.7 2.7 3.2  2.6 2.1 3.5   Notes rec 9/13 1x hold rec 9/13 rec 9/20  Self-adj   recvd 11/15   Unable to contact Emailed rec'd 1/16 rec 2/2 rec 3/13 Rcv/d 4/20 Reported 5/2 in [facility] and called clinic 5/17     Date 5/18 5/25 6/6 6/26 7/10 7/27  8/4 8/30 09/17 10/16 11/2   Total WeeklyDose 23 mg ~22.75 mg  ~22.75 mg ~ 22.75 mg ~22.75 mg ~22.5 mg  22.5mg ~22.75mg ~22.75 mg 22.5 mg ~22.75 mg   INR 3.4 2.7 2.8 2.4  2.6 2.8 1.9 2.0 2.5 3.3 2.5 2.0   Notes  call   Rec'd  7/11 Rec'd  7/28  clinda Rec 8/31 REC 09/20 Rec'd 10/20/23 Rec'd 11/3  Coq10 change     Phone Interview:  Tablet Strength: 3mg and 1mg tablets  Patient Contact Info: Preferred *[phone]* anytime after noon  Other numbers on his profile:   [phone] (Home)    - brother in law, requested that we do NOT call this number  Lab Contact Info: [employer]   Lab: [facility] phone: [phone] , Fax: [phone]   **will email once monthly or if INR is out of range**  PLEASE  USE [employer] TO COMMUNICATE--patient has yet to set up [employer] messages as of 7/11/23      Patient Findings  Positives: Change in medications   Negatives: Signs/symptoms of thrombosis, Signs/symptoms of bleeding, Laboratory test error suspected, Change in health, Change in alcohol use, Change in activity, Upcoming invasive procedure, Emergency department visit, Upcoming dental procedure, Missed doses, Extra doses, Change in diet/appetite, Hospital admission, Bruising, Other complaints   Comments: Taking new brand of CoQ10 supplement called Qunol that patient reports has 3x better absorption over typical CoQ10 supplements. Patient thinks this may be why his INR dropped although he is aware there is no way to know this for sure. Patient says he took 3.5 mg dose last night, this is opposite of what we had on tracker which has now been updated to reflect. No other changes per patient.        Plan:   1. INR was subtherapeutic on 11/2 at 2.0 (goal 2.5-3.5). Mr Turner is agreeable to change out of his typical alternating dose/day regimen to a new dose. Instructed Mr Turner to take an adjusted dose of warfarin 3.5 mg daily except for warfarin 3 mg Mon until recheck.  2. Recheck INR next week per patient preference, 11/9 PM. Told patient to expect call 11/10.  3. Verbal information provided over the phone. Patient RBV dosing instructions, expresses understanding by teach back, and has no further questions at this time.  4. Doc Turner  understands the importance of calling the [facility] if he notices any s/sx of bleeding, stroke, or abnormal bruising, if any changes are made to his medications or medication doses (Rx, OTC, herbal), or if any upcoming procedures are scheduled. Doc Turner will likewise let us know if any other changes, questions, or concerns arise regarding anticoagulation therapy. he understands the importance of seeking medical attention immediately if he experiences any falls, vehicle accidents, or abnormal bleeding or bruising. Doc Turner voiced understanding of this information and confirms that he has the [facility]'s contact information. Otherwise, we will plan to contact the patient once monthly or if his INR is out of range.  5. Patient canceled 9/13 appointment with Dr. Garcia, will need to reschedule     Marcus Crawford, PharmD  11/3/2023  08:43 EDT

## 2023-11-17 ENCOUNTER — ANTICOAGULATION VISIT (OUTPATIENT)
Dept: PHARMACY | Facility: HOSPITAL | Age: 73
End: 2023-11-17
Payer: MEDICARE

## 2023-11-17 DIAGNOSIS — Z95.2 HX OF MITRAL VALVE REPLACEMENT WITH MECHANICAL VALVE: ICD-10-CM

## 2023-11-17 DIAGNOSIS — I48.92 PAROXYSMAL ATRIAL FLUTTER: Primary | ICD-10-CM

## 2023-11-17 LAB — INR PPP: 2.5

## 2023-11-17 NOTE — PROGRESS NOTES
Anticoagulation Clinic - Remote Progress Note  [facility]  Frequency of Monitorin-4x a month    Indication: Hx of mitral valve replacement with mechanical valve KONG Mendoza  Referring Provider: Dr. Garcia (last ravindra: 23)  Initial Warfarin Start Date:    Goal INR: 2.5-3.5  Current Drug Interactions: ensure (once daily); amitriptyline (PRN)  CHADS-VASc: 2 (age, HTN)     Diet: iceberg lettuce 1x/week, ensure 3-4x/week, V8 juice (20)  Alcohol: 12 pack of beer/week  Tobacco: None  OTC Pain Medication: None     INR History:  Date 8/25 9/11 9/29 10/19 11/5 12/2 12/11 1/4/18 1/30 2/19 3/7 4/5   Total Weekly Dose 24mg 24mg 24mg 22.75  mg? 22.75  mg? ? 22.5mg 22.5mg 22.5mg 22.5mg 22.5mg 22.5mg   INR 3.3 2.8 2.9 3.1 3.5 2.7 2.7 2.6 2.9 2.5 3.3 3.7   Notes    LVM LVM   call   LVM      Date 4/18 5/20 6/5 6/27 7/24 7/31 9/9 10/8 11/8 12/14 2/20/19 2/25   Total Weekly Dose 22.5mg 23mg 22.5mg  23mg 24mg 24mg unable   to verify unable   to verify unable   to verify unable   to verify 23mg   INR 2.6 2.8 2.9 2.4 2.9 3.3 3.4 3.0 2.7 2.6 3.8 3.4   Notes      rec'vd   recv'd 10/13;  mult calls         Date 3/11 3/25 4/9 5/1 5/15 6/11 6/27 7/10 7/26 8/9 9/11 10/3   Total Weekly Dose 23mg 23mg 22.5mg 23mg 22.5mg 23mg  22.5mg 22.5mg 22.5mg 23mg 22.5mg   INR 3.6 3.0 3.2 3.3 2.5 3.1 3.6 2.9 2.3 2.9 2.9 2.8   Notes        reported          Date 10/15 10/24 10/31 11/8 11/15 11/22 12/7 12/18 12/27 1/15/20 1/30 2/18   Total Weekly Dose 23mg 22.5mg 23mg 23mg 23.5mg 23mg 22.5mg 23mg 22.5mg 23mg ??? 23mg    INR 3.3 3.2 2.7 2.3 2.8 2.7 3.2  2.5 2.5 3.1 2.7 2.4   Notes  reported 10/25; sick; cefdinir stop cefdinir 10/29    Reported   Inc GLV  Unable to reach pt MVI; decr Ensure     Date 3/5 3/17 4/4 4/23 5/4 5/19 6/2 6/24 7/22 7/31 8/26 9/3   Total Weekly Dose 23mg 22.5mg 22.5mg 23mg 23mg 23mg? 23mg 22.75  mg avg 22.75  mg 23mg 22.75  mg 23.5mg   INR 2.8 3.0 3.3 2.9 2.6 2.8 2.9 2.49 2.4 3.1 2.5 3.4   Notes  call recv'd  4/13 rec'vd 4/24; call call recv'd  5/20; call recv'd 6/3; call  recv'd 7/31 recv'd 7/31 recv'd 9/3 Self-adj  CoQ10; email     Date 9/11 10/8 10/13 11/24 12/3 12/22 1/4/21 2/6 2/8 2/19 3/9 4/15   Total Weekly Dose 23mg 23.5mg  avg 23.5mg   avg 23.5mg  avg 23.5mg 22.75  mg avg 22.75  mg avg 22.5mg 19mg 22.75  mg avg 22.75   mg avg 22.75   mg avg   INR 2.8 3.4 3.3 3.7 3.1 2.7 2.6 3.7 3.2 2.8 3.2 2.6   Notes  email email Email; less Ensure Self-adjust dose dec  recv'd 1/5; call email Email; Self held x1 email email      Date 5/5 5/26 6/30 7/9 7/28 8/23 9/29 10/18 11/11 12/17 1/3/22 1/25   Total WeeklyDose 22.75 mg avg 22.75  mg avg  22.75 mg avg 22.75  mg avg 22.75 mg avg 22.75 mg avg 22.75 mg avg 22.75 mg avg 22.75 mg avg 22.75 mg avg 22.75 mg avg   INR 2.9 2.8 3.5 2.8 2.5 3.0 2.6 2.9 2.7 3.7 3.0 2.9   Notes email; fall rcv'd 5/27  rec'vd 7/12; email rec'vd 8/3  email  email Unable to contact email; Self-heldx1 rec'd 2/23     Date 2/23 3/26 3/28  4/17 5/24 5/24 5/26 6/17 7/14  8/19   Total Weekly Dose 22.75 mg avg 22.75mg 19.5mg Non compliant 22.75 mg avg? 22.75 mg avg 22.75 mg avg 22.75 mg avg  23mg Non-  Compliant 22.75 mg   avg   INR 3.2 4.1 3.2  3.0 3.5 3.0 3.4 3.0 2.7  3.4   Notes Unable to contact garlic 1x hold d/c garlic  ?? ?? ?? ?? Rec/d 6/22   email     Date 9/2 9/4 9/19 10/2 10/19 11/11 12/26 1/12/23 2/1 3/12 4/19 4/16   Total WeeklyDose 22.5 19.5mg 21 mg 21 mg 21 mg  23 mg 23 mg 21 mg ? Unable to contact. ~ 22.75 mg   INR 3.8 2.8 2.9 3.1 2.9 3.3 2.7 2.7 3.2  2.6 2.1 3.5   Notes rec 9/13 1x hold rec 9/13 rec 9/20  Self-adj   recvd 11/15   Unable to contact Emailed rec'd 1/16 rec 2/2 rec 3/13 Rcv/d 4/20 Reported 5/2 in [employer] and called clinic 5/17     Date 5/18 5/25 6/6 6/26 7/10 7/27  8/4 8/30 09/17 10/16 11/2   Total WeeklyDose 23 mg ~22.75 mg  ~22.75 mg ~ 22.75 mg ~22.75 mg ~22.5 mg  22.5mg ~22.75mg ~22.75 mg 22.5 mg ~22.75 mg   INR 3.4 2.7 2.8 2.4  2.6 2.8 1.9 2.0 2.5 3.3 2.5 2.0   Notes  call   Rec'd  7/11 Rec'd  7/28  clinda Rec 8/31 REC 09/20 Rec'd 10/20/23 Rec'd 11/3  Coq10 change     Date 11/17              Total Weekly Dose 24.5 mg              INR 2.5              Notes                 Phone Interview:  Tablet Strength: 3mg and 1mg tablets  Patient Contact Info: Preferred *[phone]* anytime after noon  Other numbers on his profile:   [phone] (Home)    - brother in law, requested that we do NOT call this number  Lab Contact Info: [place]   Lab: [facility] phone: [phone] , Fax: [phone]   **will email once monthly or if INR is out of range**  PLEASE  USE [employer] TO COMMUNICATE--patient has yet to set up [employer] messages as of 7/11/23    Patient Findings  Negatives: Signs/symptoms of thrombosis, Signs/symptoms of bleeding, Laboratory test error suspected, Change in health, Change in alcohol use, Change in activity, Upcoming invasive procedure, Emergency department visit, Upcoming dental procedure, Missed doses, Extra doses, Change in medications, Change in diet/appetite, Hospital admission, Bruising, Other complaints   Comments: Patient has been taking 3.5 mg every day for the past two weeks and is now within range at the low end at 2.5.       Plan:   1. INR was therapeutic on 11/12 at 2.5 (goal 2.5-3.5).  Mr Turner to continue taking warfarin 3.5 mg daily until recheck.  2. Recheck INR on 11/21, this date works best with patient.  3. Patient not contacted at this encounter but patient knows RBV dosing instructions, expresses understanding by teach back, and has no further questions at last encounter.  4. Doc Turner understands the importance of calling the [facility] if he notices any s/sx of bleeding, stroke, or abnormal bruising, if any changes are made to his medications or medication doses (Rx, OTC, herbal), or if any upcoming procedures are scheduled. Doc Turner will likewise let us know if any other changes, questions, or concerns arise regarding  anticoagulation therapy. he understands the importance of seeking medical attention immediately if he experiences any falls, vehicle accidents, or abnormal bleeding or bruising. Doc PANCHO Turner voiced understanding of this information and confirms that he has the [facility]'s contact information. Otherwise, we will plan to contact the patient once monthly or if his INR is out of range.  5. Patient canceled 9/13 appointment with Dr. Garcia, will need to reschedule     Prabhakar Klein  Pharmacy Intern  11/17/2023 08:17 Frances MENDEZ, PharmD, have reviewed the note in full and agree with the assessment and plan.  11/20/23  10:51 EST

## 2023-11-21 DIAGNOSIS — Z95.2 HX OF MITRAL VALVE REPLACEMENT WITH MECHANICAL VALVE: ICD-10-CM

## 2023-11-21 DIAGNOSIS — I48.92 PAROXYSMAL ATRIAL FLUTTER: ICD-10-CM

## 2023-11-22 ENCOUNTER — ANTICOAGULATION VISIT (OUTPATIENT)
Dept: PHARMACY | Facility: HOSPITAL | Age: 73
End: 2023-11-22
Payer: MEDICARE

## 2023-11-22 DIAGNOSIS — I48.92 PAROXYSMAL ATRIAL FLUTTER: Primary | ICD-10-CM

## 2023-11-22 DIAGNOSIS — Z95.2 HX OF MITRAL VALVE REPLACEMENT WITH MECHANICAL VALVE: ICD-10-CM

## 2023-11-22 LAB — INR PPP: 2.4

## 2023-11-22 RX ORDER — WARFARIN SODIUM 3 MG/1
TABLET ORAL
Qty: 90 TABLET | Refills: 0 | Status: SHIPPED | OUTPATIENT
Start: 2023-11-22

## 2023-11-22 RX ORDER — WARFARIN SODIUM 1 MG/1
TABLET ORAL
Qty: 45 TABLET | Refills: 0 | Status: SHIPPED | OUTPATIENT
Start: 2023-11-22

## 2023-12-04 LAB — INR PPP: 3.2

## 2023-12-06 ENCOUNTER — ANTICOAGULATION VISIT (OUTPATIENT)
Dept: PHARMACY | Facility: HOSPITAL | Age: 73
End: 2023-12-06
Payer: MEDICARE

## 2023-12-06 DIAGNOSIS — Z95.2 HX OF MITRAL VALVE REPLACEMENT WITH MECHANICAL VALVE: ICD-10-CM

## 2023-12-06 DIAGNOSIS — I48.92 PAROXYSMAL ATRIAL FLUTTER: Primary | ICD-10-CM

## 2023-12-06 NOTE — PROGRESS NOTES
Anticoagulation Clinic - Remote Progress Note  [facility]  Frequency of Monitorin-4x a month    Indication: Hx of mitral valve replacement with mechanical valve KONG Mendoza  Referring Provider: Dr. Garcia (last ravindra: 23)  Initial Warfarin Start Date:    Goal INR: 2.5-3.5  Current Drug Interactions: ensure (once daily); amitriptyline (PRN)  CHADS-VASc: 2 (age, HTN)     Diet: iceberg lettuce 1x/week, ensure 3-4x/week, V8 juice (20)  Alcohol: 12 pack of beer/week  Tobacco: None  OTC Pain Medication: None     INR History:  Date 8/25 9/11 9/29 10/19 11/5 12/2 12/11 1/4/18 1/30 2/19 3/7 4/5   Total Weekly Dose 24mg 24mg 24mg 22.75  mg? 22.75  mg? ? 22.5mg 22.5mg 22.5mg 22.5mg 22.5mg 22.5mg   INR 3.3 2.8 2.9 3.1 3.5 2.7 2.7 2.6 2.9 2.5 3.3 3.7   Notes    LVM LVM   call   LVM      Date 4/18 5/20 6/5 6/27 7/24 7/31 9/9 10/8 11/8 12/14 2/20/19 2/25   Total Weekly Dose 22.5mg 23mg 22.5mg  23mg 24mg 24mg unable   to verify unable   to verify unable   to verify unable   to verify 23mg   INR 2.6 2.8 2.9 2.4 2.9 3.3 3.4 3.0 2.7 2.6 3.8 3.4   Notes      rec'vd   recv'd 10/13;  mult calls         Date 3/11 3/25 4/9 5/1 5/15 6/11 6/27 7/10 7/26 8/9 9/11 10/3   Total Weekly Dose 23mg 23mg 22.5mg 23mg 22.5mg 23mg  22.5mg 22.5mg 22.5mg 23mg 22.5mg   INR 3.6 3.0 3.2 3.3 2.5 3.1 3.6 2.9 2.3 2.9 2.9 2.8   Notes        reported          Date 10/15 10/24 10/31 11/8 11/15 11/22 12/7 12/18 12/27 1/15/20 1/30 2/18   Total Weekly Dose 23mg 22.5mg 23mg 23mg 23.5mg 23mg 22.5mg 23mg 22.5mg 23mg ??? 23mg    INR 3.3 3.2 2.7 2.3 2.8 2.7 3.2  2.5 2.5 3.1 2.7 2.4   Notes  reported 10/25; sick; cefdinir stop cefdinir 10/29    Reported   Inc GLV  Unable to reach pt MVI; decr Ensure     Date 3/5 3/17 4/4 4/23 5/4 5/19 6/2 6/24 7/22 7/31 8/26 9/3   Total Weekly Dose 23mg 22.5mg 22.5mg 23mg 23mg 23mg? 23mg 22.75  mg avg 22.75  mg 23mg 22.75  mg 23.5mg   INR 2.8 3.0 3.3 2.9 2.6 2.8 2.9 2.49 2.4 3.1 2.5 3.4   Notes  call recv'd  4/13 rec'vd 4/24; call call recv'd  5/20; call recv'd 6/3; call  recv'd 7/31 recv'd 7/31 recv'd 9/3 Self-adj  CoQ10; email     Date 9/11 10/8 10/13 11/24 12/3 12/22 1/4/21 2/6 2/8 2/19 3/9 4/15   Total Weekly Dose 23mg 23.5mg  avg 23.5mg   avg 23.5mg  avg 23.5mg 22.75  mg avg 22.75  mg avg 22.5mg 19mg 22.75  mg avg 22.75   mg avg 22.75   mg avg   INR 2.8 3.4 3.3 3.7 3.1 2.7 2.6 3.7 3.2 2.8 3.2 2.6   Notes  email email Email; less Ensure Self-adjust dose dec  recv'd 1/5; call email Email; Self held x1 email email      Date 5/5 5/26 6/30 7/9 7/28 8/23 9/29 10/18 11/11 12/17 1/3/22 1/25   Total WeeklyDose 22.75 mg avg 22.75  mg avg  22.75 mg avg 22.75  mg avg 22.75 mg avg 22.75 mg avg 22.75 mg avg 22.75 mg avg 22.75 mg avg 22.75 mg avg 22.75 mg avg   INR 2.9 2.8 3.5 2.8 2.5 3.0 2.6 2.9 2.7 3.7 3.0 2.9   Notes email; fall rcv'd 5/27  rec'vd 7/12; email rec'vd 8/3  email  email Unable to contact email; Self-heldx1 rec'd 2/23     Date 2/23 3/26 3/28  4/17 5/24 5/24 5/26 6/17 7/14  8/19   Total Weekly Dose 22.75 mg avg 22.75mg 19.5mg Non compliant 22.75 mg avg? 22.75 mg avg 22.75 mg avg 22.75 mg avg  23mg Non-  Compliant 22.75 mg   avg   INR 3.2 4.1 3.2  3.0 3.5 3.0 3.4 3.0 2.7  3.4   Notes Unable to contact garlic 1x hold d/c garlic  ?? ?? ?? ?? Rec/d 6/22   email     Date 9/2 9/4 9/19 10/2 10/19 11/11 12/26 1/12/23 2/1 3/12 4/19 4/16   Total WeeklyDose 22.5 19.5mg 21 mg 21 mg 21 mg  23 mg 23 mg 21 mg ? Unable to contact. ~ 22.75 mg   INR 3.8 2.8 2.9 3.1 2.9 3.3 2.7 2.7 3.2  2.6 2.1 3.5   Notes rec 9/13 1x hold rec 9/13 rec 9/20  Self-adj   recvd 11/15   Unable to contact Emailed rec'd 1/16 rec 2/2 rec 3/13 Rcv/d 4/20 Reported 5/2 in [employer] and called clinic 5/17     Date 5/18 5/25 6/6 6/26 7/10 7/27  8/4 8/30 09/17 10/16 11/2   Total WeeklyDose 23 mg ~22.75 mg  ~22.75 mg ~ 22.75 mg ~22.75 mg ~22.5 mg  22.5mg ~22.75mg ~22.75 mg 22.5 mg ~22.75 mg   INR 3.4 2.7 2.8 2.4  2.6 2.8 1.9 2.0 2.5 3.3 2.5 2.0   Notes  call   Rec'd  7/11 Rec'd  7/28  clinda Rec 8/31 REC 09/20 Rec'd 10/20/23 Rec'd 11/3  Coq10 change     Date 11/17 11/22 12/01            Total Weekly Dose 24.5 mg 24.5 mg 24.5 mg            INR 2.5 2.4 3.2            Notes                 Phone Interview:  Tablet Strength: 3mg and 1mg tablets  Patient Contact Info: **will message on [employer] once monthly or if INR is out of range**PLEASE  USE [employer] TO COMMUNICATE - as of 7/11/2023 patient has not set up mychart.  Preferred *[phone]* anytime after noon  Other numbers on his profile:   [phone] (Home)    - brother in law, requested that we do NOT call this number  Lab Contact Info: [place]   Lab: [facility] phone: [phone] , Fax: [phone]       Communications letter sent to patient last visit. Will include that I have started to use [employer] to communicate with patient as previously discussed with patient. Have been unable to get in contact with Mr. Turner since INR draw on 11/22. Frances Pedroza sent patient message on [employer] yesterday 12/05.    1. INR was therapeutic on 12/01 at 3.2 (goal 2.5-3.5).  Mr Turner to continue taking warfarin 3.5 mg daily until recheck.  2. Recheck INR on 12/08, this date works best with patient.  3. Patient not contacted at this encounter but patient knows RBV dosing instructions, expresses understanding by teach back, and has no further questions at last encounter.  4. Doc PANCHO Turner understands the importance of calling the [facility] if he notices any s/sx of bleeding, stroke, or abnormal bruising, if any changes are made to his medications or medication doses (Rx, OTC, herbal), or if any upcoming procedures are scheduled. Doc Turner will likewise let us know if any other changes, questions, or concerns arise regarding anticoagulation therapy. he understands the importance of seeking medical attention immediately if he experiences any falls, vehicle accidents, or abnormal bleeding or bruising. Doc  PANCHO Turner voiced understanding of this information and confirms that he has the [facility]'s contact information. Otherwise, we will plan to contact the patient once monthly or if his INR is out of range.      Prabhakar Klein  Pharmacy Intern  12/6/2023 08:05 MELANIE MATHEWS, Frances Pedroza, PharmD, have reviewed the note in full and agree with the assessment and plan.  12/07/23  14:50 EST

## 2024-01-02 RX ORDER — LISINOPRIL 10 MG/1
10 TABLET ORAL EVERY MORNING
Qty: 90 TABLET | Refills: 3 | Status: SHIPPED | OUTPATIENT
Start: 2024-01-02

## 2024-01-12 ENCOUNTER — ANTICOAGULATION VISIT (OUTPATIENT)
Dept: PHARMACY | Facility: HOSPITAL | Age: 74
End: 2024-01-12
Payer: MEDICARE

## 2024-01-12 DIAGNOSIS — I48.92 PAROXYSMAL ATRIAL FLUTTER: Primary | ICD-10-CM

## 2024-01-12 DIAGNOSIS — Z95.2 HX OF MITRAL VALVE REPLACEMENT WITH MECHANICAL VALVE: ICD-10-CM

## 2024-01-12 LAB
INR PPP: 1.8
INR PPP: 2.7

## 2024-01-12 NOTE — PROGRESS NOTES
Anticoagulation Clinic - Remote Progress Note  [facility]  Frequency of Monitorin-4x a month    Indication: Hx of mitral valve replacement with mechanical valve KONG Mendoza  Referring Provider: Dr. Garcia (last ravindra: 23)  Initial Warfarin Start Date:    Goal INR: 2.5-3.5  Current Drug Interactions: ensure (once daily); amitriptyline (PRN)  CHADS-VASc: 2 (age, HTN)     Diet: iceberg lettuce 1x/week, ensure 3-4x/week, V8 juice (20)  Alcohol: 12 pack of beer/week  Tobacco: None  OTC Pain Medication: None     INR History:  Date 8/25 9/11 9/29 10/19 11/5 12/2 12/11 1/4/18 1/30 2/19 3/7 4/5   Total Weekly Dose 24mg 24mg 24mg 22.75  mg? 22.75  mg? ? 22.5mg 22.5mg 22.5mg 22.5mg 22.5mg 22.5mg   INR 3.3 2.8 2.9 3.1 3.5 2.7 2.7 2.6 2.9 2.5 3.3 3.7   Notes    LVM LVM   call   LVM      Date 4/18 5/20 6/5 6/27 7/24 7/31 9/9 10/8 11/8 12/14 2/20/19 2/25   Total Weekly Dose 22.5mg 23mg 22.5mg  23mg 24mg 24mg unable   to verify unable   to verify unable   to verify unable   to verify 23mg   INR 2.6 2.8 2.9 2.4 2.9 3.3 3.4 3.0 2.7 2.6 3.8 3.4   Notes      rec'vd   recv'd 10/13;  mult calls         Date 3/11 3/25 4/9 5/1 5/15 6/11 6/27 7/10 7/26 8/9 9/11 10/3   Total Weekly Dose 23mg 23mg 22.5mg 23mg 22.5mg 23mg  22.5mg 22.5mg 22.5mg 23mg 22.5mg   INR 3.6 3.0 3.2 3.3 2.5 3.1 3.6 2.9 2.3 2.9 2.9 2.8   Notes        reported          Date 10/15 10/24 10/31 11/8 11/15 11/22 12/7 12/18 12/27 1/15/20 1/30 2/18   Total Weekly Dose 23mg 22.5mg 23mg 23mg 23.5mg 23mg 22.5mg 23mg 22.5mg 23mg ??? 23mg    INR 3.3 3.2 2.7 2.3 2.8 2.7 3.2  2.5 2.5 3.1 2.7 2.4   Notes  reported 10/25; sick; cefdinir stop cefdinir 10/29    Reported   Inc GLV  Unable to reach pt MVI; decr Ensure     Date 3/5 3/17 4/4 4/23 5/4 5/19 6/2 6/24 7/22 7/31 8/26 9/3   Total Weekly Dose 23mg 22.5mg 22.5mg 23mg 23mg 23mg? 23mg 22.75  mg avg 22.75  mg 23mg 22.75  mg 23.5mg   INR 2.8 3.0 3.3 2.9 2.6 2.8 2.9 2.49 2.4 3.1 2.5 3.4   Notes  call recv'd  4/13 rec'vd 4/24; call call recv'd  5/20; call recv'd 6/3; call  recv'd 7/31 recv'd 7/31 recv'd 9/3 Self-adj  CoQ10; email     Date 9/11 10/8 10/13 11/24 12/3 12/22 1/4/21 2/6 2/8 2/19 3/9 4/15   Total Weekly Dose 23mg 23.5mg  avg 23.5mg   avg 23.5mg  avg 23.5mg 22.75  mg avg 22.75  mg avg 22.5mg 19mg 22.75  mg avg 22.75   mg avg 22.75   mg avg   INR 2.8 3.4 3.3 3.7 3.1 2.7 2.6 3.7 3.2 2.8 3.2 2.6   Notes  email email Email; less Ensure Self-adjust dose dec  recv'd 1/5; call email Email; Self held x1 email email      Date 5/5 5/26 6/30 7/9 7/28 8/23 9/29 10/18 11/11 12/17 1/3/22 1/25   Total WeeklyDose 22.75 mg avg 22.75  mg avg  22.75 mg avg 22.75  mg avg 22.75 mg avg 22.75 mg avg 22.75 mg avg 22.75 mg avg 22.75 mg avg 22.75 mg avg 22.75 mg avg   INR 2.9 2.8 3.5 2.8 2.5 3.0 2.6 2.9 2.7 3.7 3.0 2.9   Notes email; fall rcv'd 5/27  rec'vd 7/12; email rec'vd 8/3  email  email Unable to contact email; Self-heldx1 rec'd 2/23     Date 2/23 3/26 3/28  4/17 5/24 5/24 5/26 6/17 7/14  8/19   Total Weekly Dose 22.75 mg avg 22.75mg 19.5mg Non compliant 22.75 mg avg? 22.75 mg avg 22.75 mg avg 22.75 mg avg  23mg Non-  Compliant 22.75 mg   avg   INR 3.2 4.1 3.2  3.0 3.5 3.0 3.4 3.0 2.7  3.4   Notes Unable to contact garlic 1x hold d/c garlic  ?? ?? ?? ?? Rec/d 6/22   email     Date 9/2 9/4 9/19 10/2 10/19 11/11 12/26 1/12/23 2/1 3/12 4/19 4/16   Total WeeklyDose 22.5 19.5mg 21 mg 21 mg 21 mg  23 mg 23 mg 21 mg ? Unable to contact. ~ 22.75 mg   INR 3.8 2.8 2.9 3.1 2.9 3.3 2.7 2.7 3.2  2.6 2.1 3.5   Notes rec 9/13 1x hold rec 9/13 rec 9/20  Self-adj   recvd 11/15   Unable to contact Emailed rec'd 1/16 rec 2/2 rec 3/13 Rcv/d 4/20 Reported 5/2 in [employer] and called clinic 5/17     Date 5/18 5/25 6/6 6/26 7/10 7/27  8/4 8/30 09/17 10/16 11/2   Total WeeklyDose 23 mg ~22.75 mg  ~22.75 mg ~ 22.75 mg ~22.75 mg ~22.5 mg  22.5mg ~22.75mg ~22.75 mg 22.5 mg ~22.75 mg   INR 3.4 2.7 2.8 2.4  2.6 2.8 1.9 2.0 2.5 3.3 2.5 2.0   Notes  call   Rec'd  "7/11 Rec'd  7/28  clinda Rec 8/31 REC 09/20 Rec'd 10/20/23 Rec'd 11/3  Coq10 change     Date 11/17 11/22 12/01 1/6 1/12          Total Weekly Dose 24.5 mg 24.5 mg 24.5 mg 24.5 mg 26 mg          INR 2.5 2.4 3.2 1.8 2.7          Notes    Rec 1/12             Phone Interview:  Tablet Strength: 3mg and 1mg tablets  Patient Contact Info: **will message on [employer] once monthly or if INR is out of range**PLEASE  USE [employer] TO COMMUNICATE - as of 7/11/2023 patient has not set up mychart.  Preferred *[phone]* anytime after noon  Other numbers on his profile:   [phone] (Home)    - brother in law, requested that we do NOT call this number  Lab Contact Info: [employer]   Lab: [facility] in [facility] phone: [phone] , Fax: [phone]       Communications letter sent to patient last visit. Will include that I have started to use [employer] to communicate with patient as previously discussed with patient. Have been unable to get in contact with Mr. Turner since INR draw on 11/22. Frances Pedroza sent patient message on [employer] yesterday 12/05.      Per pt response 1/23: \"Hi. Sorry, I only found this just now. I'd been taking 3.5 every day yet still came up with that low result. The day of the low result I took 4.5 mg and the next day 4 mg. and since then I've been back on my daily 3.5 dosage. I should retest today and call it in. There's been no changes in anything I can think of. \"      1. INR was therapeutic on 1/12 at 2.7 (goal 2.5-3.5). UNABLE TO GET IN CONTACT WITH THE PATIENTuntil 1/23. See pt findings      Sent msg 1/12     Mr Turner to continue taking warfarin 3.5 mg daily until recheck.  2. Recheck INR on 1/23  3.  Doc Turner understands the importance of calling the [facility] if he notices any s/sx of bleeding, stroke, or abnormal bruising, if any changes are made to his medications or medication doses (Rx, OTC, herbal), or if any upcoming procedures are scheduled. Doc Turner will " likewise let us know if any other changes, questions, or concerns arise regarding anticoagulation therapy. he understands the importance of seeking medical attention immediately if he experiences any falls, vehicle accidents, or abnormal bleeding or bruising. Doc Turner voiced understanding of this information and confirms that he has the [facility]'s contact information. Otherwise, we will plan to contact the patient once monthly or if his INR is out of range.    Tori FultonD.  01/23/24   08:17 EST

## 2024-01-25 ENCOUNTER — ANTICOAGULATION VISIT (OUTPATIENT)
Dept: PHARMACY | Facility: HOSPITAL | Age: 74
End: 2024-01-25
Payer: MEDICARE

## 2024-01-25 DIAGNOSIS — I48.92 PAROXYSMAL ATRIAL FLUTTER: Primary | ICD-10-CM

## 2024-01-25 DIAGNOSIS — Z95.2 HX OF MITRAL VALVE REPLACEMENT WITH MECHANICAL VALVE: ICD-10-CM

## 2024-01-25 LAB — INR PPP: 1.7

## 2024-01-25 RX ORDER — SOTALOL HYDROCHLORIDE 80 MG/1
80 TABLET ORAL 2 TIMES DAILY
Qty: 90 TABLET | Refills: 1 | Status: SHIPPED | OUTPATIENT
Start: 2024-01-25

## 2024-01-25 NOTE — PROGRESS NOTES
Anticoagulation Clinic - Remote Progress Note  [facility]  Frequency of Monitorin-4x a month    Indication: Hx of mitral valve replacement with mechanical valve KONG Mendoza  Referring Provider: Dr. Garcia (last ravindra: 23)  Initial Warfarin Start Date:    Goal INR: 2.5-3.5  Current Drug Interactions: ensure (once daily); amitriptyline (PRN)  CHADS-VASc: 2 (age, HTN)     Diet: iceberg lettuce 1x/week, ensure 3-4x/week, V8 juice (20)  Alcohol: 12 pack of beer/week  Tobacco: None  OTC Pain Medication: None     INR History:  Date 8/25 9/11 9/29 10/19 11/5 12/2 12/11 1/4/18 1/30 2/19 3/7 4/5   Total Weekly Dose 24mg 24mg 24mg 22.75  mg? 22.75  mg? ? 22.5mg 22.5mg 22.5mg 22.5mg 22.5mg 22.5mg   INR 3.3 2.8 2.9 3.1 3.5 2.7 2.7 2.6 2.9 2.5 3.3 3.7   Notes    LVM LVM   call   LVM      Date 4/18 5/20 6/5 6/27 7/24 7/31 9/9 10/8 11/8 12/14 2/20/19 2/25   Total Weekly Dose 22.5mg 23mg 22.5mg  23mg 24mg 24mg unable   to verify unable   to verify unable   to verify unable   to verify 23mg   INR 2.6 2.8 2.9 2.4 2.9 3.3 3.4 3.0 2.7 2.6 3.8 3.4   Notes      rec'vd   recv'd 10/13;  mult calls         Date 3/11 3/25 4/9 5/1 5/15 6/11 6/27 7/10 7/26 8/9 9/11 10/3   Total Weekly Dose 23mg 23mg 22.5mg 23mg 22.5mg 23mg  22.5mg 22.5mg 22.5mg 23mg 22.5mg   INR 3.6 3.0 3.2 3.3 2.5 3.1 3.6 2.9 2.3 2.9 2.9 2.8   Notes        reported          Date 10/15 10/24 10/31 11/8 11/15 11/22 12/7 12/18 12/27 1/15/20 1/30 2/18   Total Weekly Dose 23mg 22.5mg 23mg 23mg 23.5mg 23mg 22.5mg 23mg 22.5mg 23mg ??? 23mg    INR 3.3 3.2 2.7 2.3 2.8 2.7 3.2  2.5 2.5 3.1 2.7 2.4   Notes  reported 10/25; sick; cefdinir stop cefdinir 10/29    Reported   Inc GLV  Unable to reach pt MVI; decr Ensure     Date 3/5 3/17 4/4 4/23 5/4 5/19 6/2 6/24 7/22 7/31 8/26 9/3   Total Weekly Dose 23mg 22.5mg 22.5mg 23mg 23mg 23mg? 23mg 22.75  mg avg 22.75  mg 23mg 22.75  mg 23.5mg   INR 2.8 3.0 3.3 2.9 2.6 2.8 2.9 2.49 2.4 3.1 2.5 3.4   Notes  call recv'd  4/13 rec'vd 4/24; call call recv'd  5/20; call recv'd 6/3; call  recv'd 7/31 recv'd 7/31 recv'd 9/3 Self-adj  CoQ10; email     Date 9/11 10/8 10/13 11/24 12/3 12/22 1/4/21 2/6 2/8 2/19 3/9 4/15   Total Weekly Dose 23mg 23.5mg  avg 23.5mg   avg 23.5mg  avg 23.5mg 22.75  mg avg 22.75  mg avg 22.5mg 19mg 22.75  mg avg 22.75   mg avg 22.75   mg avg   INR 2.8 3.4 3.3 3.7 3.1 2.7 2.6 3.7 3.2 2.8 3.2 2.6   Notes  email email Email; less Ensure Self-adjust dose dec  recv'd 1/5; call email Email; Self held x1 email email      Date 5/5 5/26 6/30 7/9 7/28 8/23 9/29 10/18 11/11 12/17 1/3/22 1/25   Total WeeklyDose 22.75 mg avg 22.75  mg avg  22.75 mg avg 22.75  mg avg 22.75 mg avg 22.75 mg avg 22.75 mg avg 22.75 mg avg 22.75 mg avg 22.75 mg avg 22.75 mg avg   INR 2.9 2.8 3.5 2.8 2.5 3.0 2.6 2.9 2.7 3.7 3.0 2.9   Notes email; fall rcv'd 5/27  rec'vd 7/12; email rec'vd 8/3  email  email Unable to contact email; Self-heldx1 rec'd 2/23     Date 2/23 3/26 3/28  4/17 5/24 5/24 5/26 6/17 7/14  8/19   Total Weekly Dose 22.75 mg avg 22.75mg 19.5mg Non compliant 22.75 mg avg? 22.75 mg avg 22.75 mg avg 22.75 mg avg  23mg Non-  Compliant 22.75 mg   avg   INR 3.2 4.1 3.2  3.0 3.5 3.0 3.4 3.0 2.7  3.4   Notes Unable to contact garlic 1x hold d/c garlic  ?? ?? ?? ?? Rec/d 6/22   email     Date 9/2 9/4 9/19 10/2 10/19 11/11 12/26 1/12/23 2/1 3/12 4/19 4/16   Total WeeklyDose 22.5 19.5mg 21 mg 21 mg 21 mg  23 mg 23 mg 21 mg ? Unable to contact. ~ 22.75 mg   INR 3.8 2.8 2.9 3.1 2.9 3.3 2.7 2.7 3.2  2.6 2.1 3.5   Notes rec 9/13 1x hold rec 9/13 rec 9/20  Self-adj   recvd 11/15   Unable to contact Emailed rec'd 1/16 rec 2/2 rec 3/13 Rcv/d 4/20 Reported 5/2 in [facility] and called clinic 5/17     Date 5/18 5/25 6/6 6/26 7/10 7/27  8/4 8/30 09/17 10/16 11/2   Total WeeklyDose 23 mg ~22.75 mg  ~22.75 mg ~ 22.75 mg ~22.75 mg ~22.5 mg  22.5mg ~22.75mg ~22.75 mg 22.5 mg ~22.75 mg   INR 3.4 2.7 2.8 2.4  2.6 2.8 1.9 2.0 2.5 3.3 2.5 2.0   Notes  call   Rec'd  "7/11 Rec'd  7/28  clinda Rec 8/31 REC 09/20 Rec'd 10/20/23 Rec'd 11/3  Coq10 change     Date 11/17 11/22 12/01 1/6 1/12 1/25/24         Total Weekly Dose 24.5 mg 24.5 mg 24.5 mg 24.5 mg 26 mg 24.5 mg         INR 2.5 2.4 3.2 1.8 2.7 1.7         Notes    Rec 1/12             Phone Interview:  Tablet Strength: 3mg and 1mg tablets  Patient Contact Info: **will message on [employer] once monthly or if INR is out of range**PLEASE  USE [employer] TO COMMUNICATE - as of 7/11/2023 patient has not set up [employer].  Preferred *[phone]* anytime after noon  Other numbers on his profile:   [phone] (Home)    - brother in law, requested that we do NOT call this number  Lab Contact Info: Nancys   Lab: [facility] phone: [phone] , Fax: [phone]       Negatives: Signs/symptoms of thrombosis, Signs/symptoms of bleeding, Laboratory test error suspected, Change in health, Change in alcohol use, Change in activity, Upcoming invasive procedure, Emergency department visit, Upcoming dental procedure, Missed doses, Extra doses, Change in medications, Change in diet/appetite, Hospital admission, Bruising, Other complaints   Comments: He doesn't have any lovenox shots.    He has already took his \"boost\" dose today of 4.5 mg         1. INR is SUBtherapeutic on 1/25 at 1.7 (goal 2.5-3.5). he calls us and called result into [facility].  he has already taken warfarin 4.5 mg and  Mr Turner to increase to warfarin 4 mg daily until recheck. (14% increase) to better target INR goal range of 2.5  - 3.5. he declines lovenox injections.  2. Recheck INR on 2/1  3.  Doc Turner understands the importance of calling the [facility] if he notices any s/sx of bleeding, stroke, or abnormal bruising, if any changes are made to his medications or medication doses (Rx, OTC, herbal), or if any upcoming procedures are scheduled. Doc Turner will likewise let us know if any other changes, questions, or concerns arise " regarding anticoagulation therapy. he understands the importance of seeking medical attention immediately if he experiences any falls, vehicle accidents, or abnormal bleeding or bruising. Doc Turner voiced understanding of this information and confirms that he has the [facility]'s contact information. Otherwise, we will plan to contact the patient once monthly or if his INR is out of range.    Olegario Macdonald, PharmD  01/25/24   15:34 EST

## 2024-02-03 LAB — INR PPP: 2.5

## 2024-02-05 ENCOUNTER — ANTICOAGULATION VISIT (OUTPATIENT)
Dept: PHARMACY | Facility: HOSPITAL | Age: 74
End: 2024-02-05
Payer: MEDICARE

## 2024-02-05 DIAGNOSIS — Z95.2 HX OF MITRAL VALVE REPLACEMENT WITH MECHANICAL VALVE: ICD-10-CM

## 2024-02-05 DIAGNOSIS — I48.92 PAROXYSMAL ATRIAL FLUTTER: Primary | ICD-10-CM

## 2024-02-05 RX ORDER — WARFARIN SODIUM 1 MG/1
TABLET ORAL
Qty: 30 TABLET | Refills: 1 | Status: SHIPPED | OUTPATIENT
Start: 2024-02-05

## 2024-02-05 NOTE — TELEPHONE ENCOUNTER
Second email sent to [email or web address] reminding Mr. Turner he is overdue to check his INR.   ambulatory

## 2024-02-05 NOTE — PROGRESS NOTES
Anticoagulation Clinic - Remote Progress Note  [facility]  Frequency of Monitorin-4x a month    Indication: Hx of mitral valve replacement with mechanical valve KONG Mendoza  Referring Provider: Dr. Garcia (last ravindra: 23)  Initial Warfarin Start Date:    Goal INR: 2.5-3.5  Current Drug Interactions: ensure (once daily); amitriptyline (PRN)  CHADS-VASc: 2 (age, HTN)     Diet: iceberg lettuce 1x/week, ensure 3-4x/week, V8 juice (20)  Alcohol: 12 pack of beer/week  Tobacco: None  OTC Pain Medication: None     INR History:  Date 8/25 9/11 9/29 10/19 11/5 12/2 12/11 1/4/18 1/30 2/19 3/7 4/5   Total Weekly Dose 24mg 24mg 24mg 22.75  mg? 22.75  mg? ? 22.5mg 22.5mg 22.5mg 22.5mg 22.5mg 22.5mg   INR 3.3 2.8 2.9 3.1 3.5 2.7 2.7 2.6 2.9 2.5 3.3 3.7   Notes    LVM LVM   call   LVM      Date 4/18 5/20 6/5 6/27 7/24 7/31 9/9 10/8 11/8 12/14 2/20/19 2/25   Total Weekly Dose 22.5mg 23mg 22.5mg  23mg 24mg 24mg unable   to verify unable   to verify unable   to verify unable   to verify 23mg   INR 2.6 2.8 2.9 2.4 2.9 3.3 3.4 3.0 2.7 2.6 3.8 3.4   Notes      rec'vd   recv'd 10/13;  mult calls         Date 3/11 3/25 4/9 5/1 5/15 6/11 6/27 7/10 7/26 8/9 9/11 10/3   Total Weekly Dose 23mg 23mg 22.5mg 23mg 22.5mg 23mg  22.5mg 22.5mg 22.5mg 23mg 22.5mg   INR 3.6 3.0 3.2 3.3 2.5 3.1 3.6 2.9 2.3 2.9 2.9 2.8   Notes        reported          Date 10/15 10/24 10/31 11/8 11/15 11/22 12/7 12/18 12/27 1/15/20 1/30 2/18   Total Weekly Dose 23mg 22.5mg 23mg 23mg 23.5mg 23mg 22.5mg 23mg 22.5mg 23mg ??? 23mg    INR 3.3 3.2 2.7 2.3 2.8 2.7 3.2  2.5 2.5 3.1 2.7 2.4   Notes  reported 10/25; sick; cefdinir stop cefdinir 10/29    Reported   Inc GLV  Unable to reach pt MVI; decr Ensure     Date 3/5 3/17 4/4 4/23 5/4 5/19 6/2 6/24 7/22 7/31 8/26 9/3   Total Weekly Dose 23mg 22.5mg 22.5mg 23mg 23mg 23mg? 23mg 22.75  mg avg 22.75  mg 23mg 22.75  mg 23.5mg   INR 2.8 3.0 3.3 2.9 2.6 2.8 2.9 2.49 2.4 3.1 2.5 3.4   Notes  call recv'd  4/13 rec'vd 4/24; call call recv'd  5/20; call recv'd 6/3; call  recv'd 7/31 recv'd 7/31 recv'd 9/3 Self-adj  CoQ10; email     Date 9/11 10/8 10/13 11/24 12/3 12/22 1/4/21 2/6 2/8 2/19 3/9 4/15   Total Weekly Dose 23mg 23.5mg  avg 23.5mg   avg 23.5mg  avg 23.5mg 22.75  mg avg 22.75  mg avg 22.5mg 19mg 22.75  mg avg 22.75   mg avg 22.75   mg avg   INR 2.8 3.4 3.3 3.7 3.1 2.7 2.6 3.7 3.2 2.8 3.2 2.6   Notes  email email Email; less Ensure Self-adjust dose dec  recv'd 1/5; call email Email; Self held x1 email email      Date 5/5 5/26 6/30 7/9 7/28 8/23 9/29 10/18 11/11 12/17 1/3/22 1/25   Total WeeklyDose 22.75 mg avg 22.75  mg avg  22.75 mg avg 22.75  mg avg 22.75 mg avg 22.75 mg avg 22.75 mg avg 22.75 mg avg 22.75 mg avg 22.75 mg avg 22.75 mg avg   INR 2.9 2.8 3.5 2.8 2.5 3.0 2.6 2.9 2.7 3.7 3.0 2.9   Notes email; fall rcv'd 5/27  rec'vd 7/12; email rec'vd 8/3  email  email Unable to contact email; Self-heldx1 rec'd 2/23     Date 2/23 3/26 3/28  4/17 5/24 5/24 5/26 6/17 7/14  8/19   Total Weekly Dose 22.75 mg avg 22.75mg 19.5mg Non compliant 22.75 mg avg? 22.75 mg avg 22.75 mg avg 22.75 mg avg  23mg Non-  Compliant 22.75 mg   avg   INR 3.2 4.1 3.2  3.0 3.5 3.0 3.4 3.0 2.7  3.4   Notes Unable to contact garlic 1x hold d/c garlic  ?? ?? ?? ?? Rec/d 6/22   email     Date 9/2 9/4 9/19 10/2 10/19 11/11 12/26 1/12/23 2/1 3/12 4/19 4/16   Total WeeklyDose 22.5 19.5mg 21 mg 21 mg 21 mg  23 mg 23 mg 21 mg ? Unable to contact. ~ 22.75 mg   INR 3.8 2.8 2.9 3.1 2.9 3.3 2.7 2.7 3.2  2.6 2.1 3.5   Notes rec 9/13 1x hold rec 9/13 rec 9/20  Self-adj   recvd 11/15   Unable to contact Emailed rec'd 1/16 rec 2/2 rec 3/13 Rcv/d 4/20 Reported 5/2 in [facility] and called clinic 5/17     Date 5/18 5/25 6/6 6/26 7/10 7/27  8/4 8/30 09/17 10/16 11/2   Total WeeklyDose 23 mg ~22.75 mg  ~22.75 mg ~ 22.75 mg ~22.75 mg ~22.5 mg  22.5mg ~22.75mg ~22.75 mg 22.5 mg ~22.75 mg   INR 3.4 2.7 2.8 2.4  2.6 2.8 1.9 2.0 2.5 3.3 2.5 2.0   Notes  call   Rec'd  7/11 Rec'd  7/28  clinda Rec 8/31 REC 09/20 Rec'd 10/20/23 Rec'd 11/3  Coq10 change     Date 11/17 11/22 12/01 1/6 1/12 1/25/24 2/3        Total Weekly Dose 24.5 mg 24.5 mg 24.5 mg 24.5 mg 26 mg 24.5 mg 28 mg        INR 2.5 2.4 3.2 1.8 2.7 1.7 2.5        Notes    Rec 1/12   Rec'd 2/5  Boost x 1          Phone Interview:  Tablet Strength: 3mg and 1mg tablets  Patient Contact Info: **will message on [employer] once monthly or if INR is out of range**PLEASE  USE [employer]otop[employer] TO COMMUNICATE - as of 7/11/2023 patient has not set up [employer].  Preferred *[phone]* anytime after noon  Other numbers on his profile:   [phone] (Home)    - brother in law, requested that we do NOT call this number  Lab Contact Info: [facility]   Lab: [facility] phone: [phone] , Fax: [phone]     Patient Findings    Negatives: Signs/symptoms of thrombosis, Signs/symptoms of bleeding, Laboratory test error suspected, Change in health, Change in alcohol use, Change in activity, Upcoming invasive procedure, Emergency department visit, Upcoming dental procedure, Missed doses, Extra doses, Change in medications, Change in diet/appetite, Hospital admission, Bruising, Other complaints   Comments: Patient drinks ensure shakes twice weekly and has been using them consistently for at least the last month.       1. INR was therapeutic Saturday 2/3 at 2.5 (goal 2.5-3.5) results from [facility].  Instructed Doc Turner to continue standard dose of warfarin 4 mg daily until recheck on 2/12 to ensure patient is staying within INR goal (2.5-3.0). If still in range we may be able to return testing every 2 weeks to 1 month  2. Recheck INR on 2/12  3. Patient requested refills on warfarin 1 mg tablets. (Sent in to pharmacy)  3.  Doc Turner understands the importance of calling the [facility] if he notices any s/sx of bleeding, stroke, or abnormal bruising, if any changes are made to his medications or medication doses (Rx,  OTC, herbal), or if any upcoming procedures are scheduled. Doc PANCHO Turner will likewise let us know if any other changes, questions, or concerns arise regarding anticoagulation therapy. he understands the importance of seeking medical attention immediately if he experiences any falls, vehicle accidents, or abnormal bleeding or bruising. Doc Turner voiced understanding of this information and confirms that he has the [facility]'s contact information. Otherwise, we will plan to contact the patient once monthly or if his INR is out of range.    Viola Huitron [facility]  02/05/24   10:39 EST

## 2024-02-15 ENCOUNTER — ANTICOAGULATION VISIT (OUTPATIENT)
Dept: PHARMACY | Facility: HOSPITAL | Age: 74
End: 2024-02-15
Payer: MEDICARE

## 2024-02-15 DIAGNOSIS — Z95.2 HX OF MITRAL VALVE REPLACEMENT WITH MECHANICAL VALVE: ICD-10-CM

## 2024-02-15 DIAGNOSIS — I48.92 PAROXYSMAL ATRIAL FLUTTER: Primary | ICD-10-CM

## 2024-02-15 LAB — INR PPP: 2.6

## 2024-02-15 NOTE — PROGRESS NOTES
Anticoagulation Clinic - Remote Progress Note  [facility]  Frequency of Monitorin-4x a month    Indication: Hx of mitral valve replacement with mechanical valve KONG Mendoza  Referring Provider: Dr. Garcia (last ravindra: 23)  Initial Warfarin Start Date:    Goal INR: 2.5-3.5  Current Drug Interactions: ensure (once daily); amitriptyline (PRN)  CHADS-VASc: 2 (age, HTN)     Diet: iceberg lettuce 1x/week, ensure 3-4x/week, V8 juice (20)  Alcohol: 12 pack of beer/week  Tobacco: None  OTC Pain Medication: None     INR History:  Date 8/25 9/11 9/29 10/19 11/5 12/2 12/11 1/4/18 1/30 2/19 3/7 4/5   Total Weekly Dose 24mg 24mg 24mg 22.75  mg? 22.75  mg? ? 22.5mg 22.5mg 22.5mg 22.5mg 22.5mg 22.5mg   INR 3.3 2.8 2.9 3.1 3.5 2.7 2.7 2.6 2.9 2.5 3.3 3.7   Notes    LVM LVM   call   LVM      Date 4/18 5/20 6/5 6/27 7/24 7/31 9/9 10/8 11/8 12/14 2/20/19 2/25   Total Weekly Dose 22.5mg 23mg 22.5mg  23mg 24mg 24mg unable   to verify unable   to verify unable   to verify unable   to verify 23mg   INR 2.6 2.8 2.9 2.4 2.9 3.3 3.4 3.0 2.7 2.6 3.8 3.4   Notes      rec'vd   recv'd 10/13;  mult calls         Date 3/11 3/25 4/9 5/1 5/15 6/11 6/27 7/10 7/26 8/9 9/11 10/3   Total Weekly Dose 23mg 23mg 22.5mg 23mg 22.5mg 23mg  22.5mg 22.5mg 22.5mg 23mg 22.5mg   INR 3.6 3.0 3.2 3.3 2.5 3.1 3.6 2.9 2.3 2.9 2.9 2.8   Notes        reported          Date 10/15 10/24 10/31 11/8 11/15 11/22 12/7 12/18 12/27 1/15/20 1/30 2/18   Total Weekly Dose 23mg 22.5mg 23mg 23mg 23.5mg 23mg 22.5mg 23mg 22.5mg 23mg ??? 23mg    INR 3.3 3.2 2.7 2.3 2.8 2.7 3.2  2.5 2.5 3.1 2.7 2.4   Notes  reported 10/25; sick; cefdinir stop cefdinir 10/29    Reported   Inc GLV  Unable to reach pt MVI; decr Ensure     Date 3/5 3/17 4/4 4/23 5/4 5/19 6/2 6/24 7/22 7/31 8/26 9/3   Total Weekly Dose 23mg 22.5mg 22.5mg 23mg 23mg 23mg? 23mg 22.75  mg avg 22.75  mg 23mg 22.75  mg 23.5mg   INR 2.8 3.0 3.3 2.9 2.6 2.8 2.9 2.49 2.4 3.1 2.5 3.4   Notes  call recv'd  4/13 rec'vd 4/24; call call recv'd  5/20; call recv'd 6/3; call  recv'd 7/31 recv'd 7/31 recv'd 9/3 Self-adj  CoQ10; email     Date 9/11 10/8 10/13 11/24 12/3 12/22 1/4/21 2/6 2/8 2/19 3/9 4/15   Total Weekly Dose 23mg 23.5mg  avg 23.5mg   avg 23.5mg  avg 23.5mg 22.75  mg avg 22.75  mg avg 22.5mg 19mg 22.75  mg avg 22.75   mg avg 22.75   mg avg   INR 2.8 3.4 3.3 3.7 3.1 2.7 2.6 3.7 3.2 2.8 3.2 2.6   Notes  email email Email; less Ensure Self-adjust dose dec  recv'd 1/5; call email Email; Self held x1 email email      Date 5/5 5/26 6/30 7/9 7/28 8/23 9/29 10/18 11/11 12/17 1/3/22 1/25   Total WeeklyDose 22.75 mg avg 22.75  mg avg  22.75 mg avg 22.75  mg avg 22.75 mg avg 22.75 mg avg 22.75 mg avg 22.75 mg avg 22.75 mg avg 22.75 mg avg 22.75 mg avg   INR 2.9 2.8 3.5 2.8 2.5 3.0 2.6 2.9 2.7 3.7 3.0 2.9   Notes email; fall rcv'd 5/27  rec'vd 7/12; email rec'vd 8/3  email  email Unable to contact email; Self-heldx1 rec'd 2/23     Date 2/23 3/26 3/28  4/17 5/24 5/24 5/26 6/17 7/14  8/19   Total Weekly Dose 22.75 mg avg 22.75mg 19.5mg Non compliant 22.75 mg avg? 22.75 mg avg 22.75 mg avg 22.75 mg avg  23mg Non-  Compliant 22.75 mg   avg   INR 3.2 4.1 3.2  3.0 3.5 3.0 3.4 3.0 2.7  3.4   Notes Unable to contact garlic 1x hold d/c garlic  ?? ?? ?? ?? Rec/d 6/22   email     Date 9/2 9/4 9/19 10/2 10/19 11/11 12/26 1/12/23 2/1 3/12 4/19 4/16   Total WeeklyDose 22.5 19.5mg 21 mg 21 mg 21 mg  23 mg 23 mg 21 mg ? Unable to contact. ~ 22.75 mg   INR 3.8 2.8 2.9 3.1 2.9 3.3 2.7 2.7 3.2  2.6 2.1 3.5   Notes rec 9/13 1x hold rec 9/13 rec 9/20  Self-adj   recvd 11/15   Unable to contact Emailed rec'd 1/16 rec 2/2 rec 3/13 Rcv/d 4/20 Reported 5/2 in [facility] and called clinic 5/17     Date 5/18 5/25 6/6 6/26 7/10 7/27  8/4 8/30 09/17 10/16 11/2   Total WeeklyDose 23 mg ~22.75 mg  ~22.75 mg ~ 22.75 mg ~22.75 mg ~22.5 mg  22.5mg ~22.75mg ~22.75 mg 22.5 mg ~22.75 mg   INR 3.4 2.7 2.8 2.4  2.6 2.8 1.9 2.0 2.5 3.3 2.5 2.0   Notes  call   Rec'd  7/11 Rec'd  7/28  clinda Rec 8/31 REC 09/20 Rec'd 10/20/23 Rec'd 11/3  Coq10 change     Date 11/17 11/22 12/01 1/6 1/12 1/25/24 2/3 2/13       Total Weekly Dose 24.5 mg 24.5 mg 24.5 mg 24.5 mg 26 mg 24.5 mg 28 mg 28 mg       INR 2.5 2.4 3.2 1.8 2.7 1.7 2.5 2.6       Notes    Rec 1/12   Rec'd 2/5  Boost x 1 Rec 2/15         Phone Interview:  Tablet Strength: 3mg and 1mg tablets  Patient Contact Info: **will message on Trump[employer] once monthly or if INR is out of range**PLEASE  USE [employer] TO COMMUNICATE - as of 7/11/2023 patient has not set up [employer].  Preferred *[phone]* anytime after noon  Other numbers on his profile:    disconnected   - brother in law, requested that we do NOT call this number  Lab Contact Info: [facility]   Lab: [facility] phone: [phone] , Fax: [phone]     Patient Findings      Negatives: Signs/symptoms of thrombosis, Signs/symptoms of bleeding, Laboratory test error suspected, Change in health, Change in alcohol use, Change in activity, Upcoming invasive procedure, Emergency department visit, Upcoming dental procedure, Missed doses, Extra doses, Change in medications, Change in diet/appetite, Hospital admission, Bruising, Other complaints   Comments: Patient called 2/21-- reports he has adjusted dose to 4mg , 4mg, 4.5mg following last reading         1. INR was therapeutic 2/13 at 2.6 (goal 2.5-3.5) results from [facility]. As of 2/21, patient called to report his current dosing is alternating warfarin 4 mg, 4mg, 4.5mg every 3 days until recheck (2.5-3.0).   2. Recheck INR in 2/27  3. Doc Turner understands the importance of calling the [facility] if he notices any s/sx of bleeding, stroke, or abnormal bruising, if any changes are made to his medications or medication doses (Rx, OTC, herbal), or if any upcoming procedures are scheduled. Doc Turner will likewise let us know if any other changes, questions, or concerns arise regarding anticoagulation  therapy. he understands the importance of seeking medical attention immediately if he experiences any falls, vehicle accidents, or abnormal bleeding or bruising. Doc Turner voiced understanding of this information and confirms that he has the [facility]'s contact information. Otherwise, we will plan to contact the patient once monthly or if his INR is out of range.    Josephine Pineda, PharmD  02/15/24   11:03 EST

## 2024-02-21 DIAGNOSIS — I48.92 PAROXYSMAL ATRIAL FLUTTER: ICD-10-CM

## 2024-02-21 DIAGNOSIS — Z95.2 HX OF MITRAL VALVE REPLACEMENT WITH MECHANICAL VALVE: ICD-10-CM

## 2024-02-22 RX ORDER — WARFARIN SODIUM 3 MG/1
TABLET ORAL
Qty: 90 TABLET | Refills: 0 | Status: SHIPPED | OUTPATIENT
Start: 2024-02-22

## 2024-02-22 RX ORDER — WARFARIN SODIUM 1 MG/1
TABLET ORAL
Qty: 30 TABLET | Refills: 1 | Status: SHIPPED | OUTPATIENT
Start: 2024-02-22

## 2024-02-26 LAB — INR PPP: 2.5

## 2024-02-28 ENCOUNTER — ANTICOAGULATION VISIT (OUTPATIENT)
Dept: PHARMACY | Facility: HOSPITAL | Age: 74
End: 2024-02-28
Payer: MEDICARE

## 2024-02-28 DIAGNOSIS — I48.92 PAROXYSMAL ATRIAL FLUTTER: Primary | ICD-10-CM

## 2024-02-28 DIAGNOSIS — Z95.2 HX OF MITRAL VALVE REPLACEMENT WITH MECHANICAL VALVE: ICD-10-CM

## 2024-02-28 LAB — INR PPP: 2.5

## 2024-02-28 NOTE — PROGRESS NOTES
Anticoagulation Clinic - Remote Progress Note  [facility]  Frequency of Monitorin-4x a month    Indication: Hx of mitral valve replacement with mechanical valve KONG Mendoza  Referring Provider: Dr. Garcia (last ravindra: 23)  Initial Warfarin Start Date:    Goal INR: 2.5-3.5  Current Drug Interactions: ensure (once daily); amitriptyline (PRN)  CHADS-VASc: 2 (age, HTN)     Diet: iceberg lettuce 1x/week, ensure 3-4x/week, V8 juice (20)  Alcohol: 12 pack of beer/week  Tobacco: None  OTC Pain Medication: None     INR History:  Date 8/25 9/11 9/29 10/19 11/5 12/2 12/11 1/4/18 1/30 2/19 3/7 4/5   Total Weekly Dose 24mg 24mg 24mg 22.75  mg? 22.75  mg? ? 22.5mg 22.5mg 22.5mg 22.5mg 22.5mg 22.5mg   INR 3.3 2.8 2.9 3.1 3.5 2.7 2.7 2.6 2.9 2.5 3.3 3.7   Notes    LVM LVM   call   LVM      Date 4/18 5/20 6/5 6/27 7/24 7/31 9/9 10/8 11/8 12/14 2/20/19 2/25   Total Weekly Dose 22.5mg 23mg 22.5mg  23mg 24mg 24mg unable   to verify unable   to verify unable   to verify unable   to verify 23mg   INR 2.6 2.8 2.9 2.4 2.9 3.3 3.4 3.0 2.7 2.6 3.8 3.4   Notes      rec'vd   recv'd 10/13;  mult calls         Date 3/11 3/25 4/9 5/1 5/15 6/11 6/27 7/10 7/26 8/9 9/11 10/3   Total Weekly Dose 23mg 23mg 22.5mg 23mg 22.5mg 23mg  22.5mg 22.5mg 22.5mg 23mg 22.5mg   INR 3.6 3.0 3.2 3.3 2.5 3.1 3.6 2.9 2.3 2.9 2.9 2.8   Notes        reported          Date 10/15 10/24 10/31 11/8 11/15 11/22 12/7 12/18 12/27 1/15/20 1/30 2/18   Total Weekly Dose 23mg 22.5mg 23mg 23mg 23.5mg 23mg 22.5mg 23mg 22.5mg 23mg ??? 23mg    INR 3.3 3.2 2.7 2.3 2.8 2.7 3.2  2.5 2.5 3.1 2.7 2.4   Notes  reported 10/25; sick; cefdinir stop cefdinir 10/29    Reported   Inc GLV  Unable to reach pt MVI; decr Ensure     Date 3/5 3/17 4/4 4/23 5/4 5/19 6/2 6/24 7/22 7/31 8/26 9/3   Total Weekly Dose 23mg 22.5mg 22.5mg 23mg 23mg 23mg? 23mg 22.75  mg avg 22.75  mg 23mg 22.75  mg 23.5mg   INR 2.8 3.0 3.3 2.9 2.6 2.8 2.9 2.49 2.4 3.1 2.5 3.4   Notes  call recv'd  4/13 rec'vd 4/24; call call recv'd  5/20; call recv'd 6/3; call  recv'd 7/31 recv'd 7/31 recv'd 9/3 Self-adj  CoQ10; email     Date 9/11 10/8 10/13 11/24 12/3 12/22 1/4/21 2/6 2/8 2/19 3/9 4/15   Total Weekly Dose 23mg 23.5mg  avg 23.5mg   avg 23.5mg  avg 23.5mg 22.75  mg avg 22.75  mg avg 22.5mg 19mg 22.75  mg avg 22.75   mg avg 22.75   mg avg   INR 2.8 3.4 3.3 3.7 3.1 2.7 2.6 3.7 3.2 2.8 3.2 2.6   Notes  email email Email; less Ensure Self-adjust dose dec  recv'd 1/5; call email Email; Self held x1 email email      Date 5/5 5/26 6/30 7/9 7/28 8/23 9/29 10/18 11/11 12/17 1/3/22 1/25   Total WeeklyDose 22.75 mg avg 22.75  mg avg  22.75 mg avg 22.75  mg avg 22.75 mg avg 22.75 mg avg 22.75 mg avg 22.75 mg avg 22.75 mg avg 22.75 mg avg 22.75 mg avg   INR 2.9 2.8 3.5 2.8 2.5 3.0 2.6 2.9 2.7 3.7 3.0 2.9   Notes email; fall rcv'd 5/27  rec'vd 7/12; email rec'vd 8/3  email  email Unable to contact email; Self-heldx1 rec'd 2/23     Date 2/23 3/26 3/28  4/17 5/24 5/24 5/26 6/17 7/14  8/19   Total Weekly Dose 22.75 mg avg 22.75mg 19.5mg Non compliant 22.75 mg avg? 22.75 mg avg 22.75 mg avg 22.75 mg avg  23mg Non-  Compliant 22.75 mg   avg   INR 3.2 4.1 3.2  3.0 3.5 3.0 3.4 3.0 2.7  3.4   Notes Unable to contact garlic 1x hold d/c garlic  ?? ?? ?? ?? Rec/d 6/22   email     Date 9/2 9/4 9/19 10/2 10/19 11/11 12/26 1/12/23 2/1 3/12 4/19 4/16   Total WeeklyDose 22.5 19.5mg 21 mg 21 mg 21 mg  23 mg 23 mg 21 mg ? Unable to contact. ~ 22.75 mg   INR 3.8 2.8 2.9 3.1 2.9 3.3 2.7 2.7 3.2  2.6 2.1 3.5   Notes rec 9/13 1x hold rec 9/13 rec 9/20  Self-adj   recvd 11/15   Unable to contact Emailed rec'd 1/16 rec 2/2 rec 3/13 Rcv/d 4/20 Reported 5/2 in [facility] and called clinic 5/17     Date 5/18 5/25 6/6 6/26 7/10 7/27  8/4 8/30 09/17 10/16 11/2  11/17   Total WeeklyDose 23 mg ~22.75 mg  ~22.75 mg ~ 22.75 mg ~22.75 mg ~22.5 mg  22.5mg ~22.75mg ~22.75 mg 22.5 mg ~22.75 mg 24.5 mg   INR 3.4 2.7 2.8 2.4  2.6 2.8 1.9 2.0 2.5 3.3 2.5 2.0 2.5    Notes  call   Rec'd 7/11 Rec'd  7/28  clinda Rec 8/31 REC 09/20 Rec'd 10/20/23 Rec'd 11/3  Coq10 change      Date 11/22 12/01 1/6 1/12 1/25/24 2/3 2/13  2/26        Total Weekly Dose 24.5 mg 24.5 mg 24.5 mg 26 mg 24.5 mg 28 mg 28 mg  29 mg        INR 2.4 3.2 1.8 2.7 1.7 2.5 2.6  2.5        Notes   Rec 1/12   Rec'd 2/5  Boost x 1 Rec 2/15 call self-adj Rec 2/28          Phone Interview:  Tablet Strength: 3mg and 1mg tablets  Patient Contact Info: **will message on [employer] once monthly or if INR is out of range**PLEASE  USE [employer] TO COMMUNICATE - as of 7/11/2023 patient has not set up [employer].  Preferred *[phone]* anytime after noon  Other numbers on his profile:    disconnected   - brother in law, requested that we do NOT call this number  Lab Contact Info: [facility]   Lab: [facility] phone: [phone] , Fax: [phone]     Patient Findings      Negatives: Signs/symptoms of thrombosis, Signs/symptoms of bleeding, Laboratory test error suspected, Change in health, Change in alcohol use, Change in activity, Upcoming invasive procedure, Emergency department visit, Upcoming dental procedure, Missed doses, Extra doses, Change in medications, Change in diet/appetite, Hospital admission, Bruising, Other complaints   Comments: Patient called 2/21-- reports he has adjusted dose to 4mg , 4mg, 4.5mg following last reading         1. INR was therapeutic on 2/26 at 2.5 (goal 2.5-3.5).  Results received today.  As of 2/21, Mr. Turner called to report he self-adjusted to alternating warfarin 4 mg, 4mg, 4.5mg every 3 days until recheck (2.5-3.0).   2. Recheck INR in 2/27  3. Doc Turner understands the importance of calling the [facility] if he notices any s/sx of bleeding, stroke, or abnormal bruising, if any changes are made to his medications or medication doses (Rx, OTC, herbal), or if any upcoming procedures are scheduled. oDc Turner will likewise let us know if any other  changes, questions, or concerns arise regarding anticoagulation therapy. he understands the importance of seeking medical attention immediately if he experiences any falls, vehicle accidents, or abnormal bleeding or bruising. Doc Turner voiced understanding of this information and confirms that he has the [facility]'s contact information. Otherwise, we will plan to contact the patient once monthly or if his INR is out of range.  4. Sent message via my chart today, 2/28 for confirmation of dosing or if any changes since last encounter.  Awaiting response    Lauren Milligan, PharmD  02/28/24   09:23 EST

## 2024-03-11 ENCOUNTER — ANTICOAGULATION VISIT (OUTPATIENT)
Dept: PHARMACY | Facility: HOSPITAL | Age: 74
End: 2024-03-11
Payer: MEDICARE

## 2024-03-11 DIAGNOSIS — Z95.2 HX OF MITRAL VALVE REPLACEMENT WITH MECHANICAL VALVE: ICD-10-CM

## 2024-03-11 DIAGNOSIS — I48.92 PAROXYSMAL ATRIAL FLUTTER: Primary | ICD-10-CM

## 2024-03-11 NOTE — PROGRESS NOTES
Opened in error,   Opened encounter from 2/28 was last INR check per [facility]  Patient was contacted multiple times on 2/28, 3/4, and 3/11 and we have not been able to reach    Viola Huitron PharmD  3/11/2024  13:45 EDT

## 2024-03-14 ENCOUNTER — ANTICOAGULATION VISIT (OUTPATIENT)
Dept: PHARMACY | Facility: HOSPITAL | Age: 74
End: 2024-03-14
Payer: MEDICARE

## 2024-03-14 DIAGNOSIS — Z95.2 HX OF MITRAL VALVE REPLACEMENT WITH MECHANICAL VALVE: ICD-10-CM

## 2024-03-14 DIAGNOSIS — I48.92 PAROXYSMAL ATRIAL FLUTTER: Primary | ICD-10-CM

## 2024-03-14 LAB — INR PPP: 2.7

## 2024-03-14 NOTE — PROGRESS NOTES
Anticoagulation Clinic - Remote Progress Note  [facility]  Frequency of Monitorin-4x a month    Indication: Hx of mitral valve replacement with mechanical valve KONG Mendoza  Referring Provider: Dr. Garcia (last ravindra: 23)  Initial Warfarin Start Date:    Goal INR: 2.5-3.5  Current Drug Interactions: ensure (once daily); amitriptyline (PRN)  CHADS-VASc: 2 (age, HTN)     Diet: iceberg lettuce 1x/week, ensure 3-4x/week, V8 juice (20)  Alcohol: 12 pack of beer/week  Tobacco: None  OTC Pain Medication: None     INR History:  Date 8/25 9/11 9/29 10/19 11/5 12/2 12/11 1/4/18 1/30 2/19 3/7 4/5   Total Weekly Dose 24mg 24mg 24mg 22.75  mg? 22.75  mg? ? 22.5mg 22.5mg 22.5mg 22.5mg 22.5mg 22.5mg   INR 3.3 2.8 2.9 3.1 3.5 2.7 2.7 2.6 2.9 2.5 3.3 3.7   Notes    LVM LVM   call   LVM      Date 4/18 5/20 6/5 6/27 7/24 7/31 9/9 10/8 11/8 12/14 2/20/19 2/25   Total Weekly Dose 22.5mg 23mg 22.5mg  23mg 24mg 24mg unable   to verify unable   to verify unable   to verify unable   to verify 23mg   INR 2.6 2.8 2.9 2.4 2.9 3.3 3.4 3.0 2.7 2.6 3.8 3.4   Notes      rec'vd   recv'd 10/13;  mult calls         Date 3/11 3/25 4/9 5/1 5/15 6/11 6/27 7/10 7/26 8/9 9/11 10/3   Total Weekly Dose 23mg 23mg 22.5mg 23mg 22.5mg 23mg  22.5mg 22.5mg 22.5mg 23mg 22.5mg   INR 3.6 3.0 3.2 3.3 2.5 3.1 3.6 2.9 2.3 2.9 2.9 2.8   Notes        reported          Date 10/15 10/24 10/31 11/8 11/15 11/22 12/7 12/18 12/27 1/15/20 1/30 2/18   Total Weekly Dose 23mg 22.5mg 23mg 23mg 23.5mg 23mg 22.5mg 23mg 22.5mg 23mg ??? 23mg    INR 3.3 3.2 2.7 2.3 2.8 2.7 3.2  2.5 2.5 3.1 2.7 2.4   Notes  reported 10/25; sick; cefdinir stop cefdinir 10/29    Reported   Inc GLV  Unable to reach pt MVI; decr Ensure     Date 3/5 3/17 4/4 4/23 5/4 5/19 6/2 6/24 7/22 7/31 8/26 9/3   Total Weekly Dose 23mg 22.5mg 22.5mg 23mg 23mg 23mg? 23mg 22.75  mg avg 22.75  mg 23mg 22.75  mg 23.5mg   INR 2.8 3.0 3.3 2.9 2.6 2.8 2.9 2.49 2.4 3.1 2.5 3.4   Notes  call recv'd  4/13 rec'vd 4/24; call call recv'd  5/20; call recv'd 6/3; call  recv'd 7/31 recv'd 7/31 recv'd 9/3 Self-adj  CoQ10; email     Date 9/11 10/8 10/13 11/24 12/3 12/22 1/4/21 2/6 2/8 2/19 3/9 4/15   Total Weekly Dose 23mg 23.5mg  avg 23.5mg   avg 23.5mg  avg 23.5mg 22.75  mg avg 22.75  mg avg 22.5mg 19mg 22.75  mg avg 22.75   mg avg 22.75   mg avg   INR 2.8 3.4 3.3 3.7 3.1 2.7 2.6 3.7 3.2 2.8 3.2 2.6   Notes  email email Email; less Ensure Self-adjust dose dec  recv'd 1/5; call email Email; Self held x1 email email      Date 5/5 5/26 6/30 7/9 7/28 8/23 9/29 10/18 11/11 12/17 1/3/22 1/25   Total WeeklyDose 22.75 mg avg 22.75  mg avg  22.75 mg avg 22.75  mg avg 22.75 mg avg 22.75 mg avg 22.75 mg avg 22.75 mg avg 22.75 mg avg 22.75 mg avg 22.75 mg avg   INR 2.9 2.8 3.5 2.8 2.5 3.0 2.6 2.9 2.7 3.7 3.0 2.9   Notes email; fall rcv'd 5/27  rec'vd 7/12; email rec'vd 8/3  email  email Unable to contact email; Self-heldx1 rec'd 2/23     Date 2/23 3/26 3/28  4/17 5/24 5/24 5/26 6/17 7/14  8/19   Total Weekly Dose 22.75 mg avg 22.75mg 19.5mg Non compliant 22.75 mg avg? 22.75 mg avg 22.75 mg avg 22.75 mg avg  23mg Non-  Compliant 22.75 mg   avg   INR 3.2 4.1 3.2  3.0 3.5 3.0 3.4 3.0 2.7  3.4   Notes Unable to contact garlic 1x hold d/c garlic  ?? ?? ?? ?? Rec/d 6/22   email     Date 9/2 9/4 9/19 10/2 10/19 11/11 12/26 1/12/23 2/1 3/12 4/19 4/16   Total WeeklyDose 22.5 19.5mg 21 mg 21 mg 21 mg  23 mg 23 mg 21 mg ? Unable to contact. ~ 22.75 mg   INR 3.8 2.8 2.9 3.1 2.9 3.3 2.7 2.7 3.2  2.6 2.1 3.5   Notes rec 9/13 1x hold rec 9/13 rec 9/20  Self-adj   recvd 11/15   Unable to contact Emailed rec'd 1/16 rec 2/2 rec 3/13 Rcv/d 4/20 Reported 5/2 in [facility] and called clinic 5/17     Date 5/18 5/25 6/6 6/26 7/10 7/27  8/4 8/30 09/17 10/16 11/2  11/17   Total WeeklyDose 23 mg ~22.75 mg  ~22.75 mg ~ 22.75 mg ~22.75 mg ~22.5 mg  22.5mg ~22.75mg ~22.75 mg 22.5 mg ~22.75 mg 24.5 mg   INR 3.4 2.7 2.8 2.4  2.6 2.8 1.9 2.0 2.5 3.3 2.5 2.0 2.5    Notes  call   Rec'd 7/11 Rec'd  7/28  clinda Rec 8/31 REC 09/20 Rec'd 10/20/23 Rec'd 11/3  Coq10 change      Date 11/22 12/01 1/6 1/12 1/25/24 2/3 2/13  2/26 3/13/24       Total Weekly Dose 24.5 mg 24.5 mg 24.5 mg 26 mg 24.5 mg 28 mg 28 mg  29 mg 29 mg       INR 2.4 3.2 1.8 2.7 1.7 2.5 2.6  2.5 2.7       Notes   Rec 1/12   Rec'd 2/5  Boost x 1 Rec 2/15 call self-adj Rec 2/28 Rec'd 3/14         Phone Interview:  Tablet Strength: 3mg and 1mg tablets  Patient Contact Info: **will message on [employer] once monthly or if INR is out of range**PLEASE  USE [employer] TO COMMUNICATE - as of 7/11/2023 patient has not set up [employer].  Preferred *[phone]* anytime after noon  Other numbers on his profile:    disconnected   - brother in law, requested that we do NOT call this number  Lab Contact Info: [employer]   Lab: [facility] phone: [phone] , Fax: [phone]     Patient Findings    Negatives: Signs/symptoms of thrombosis, Signs/symptoms of bleeding, Laboratory test error suspected, Change in health, Change in alcohol use, Change in activity, Upcoming invasive procedure, Emergency department visit, Upcoming dental procedure, Missed doses, Extra doses, Change in medications, Change in diet/appetite, Hospital admission, Bruising, Other complaints   Comments: Pt contacted 3/14/24 and did not answer, INR is in range , he has not answered his my chart message or voicemail's since 2/28.  Must contact next time       1. INR was therapeutic at 2.7 (goal 2.5-3.5).  As of 2/21, Mr. Turner called to report he self-adjusted to alternating warfarin 4 mg and 4.5 mg every 3 days until recheck (2.5-3.0).   2. Recheck INR in 2 weeks 3/28/24  3. Doc DELEON Johnny understands the importance of calling the [facility] if he notices any s/sx of bleeding, stroke, or abnormal bruising, if any changes are made to his medications or medication doses (Rx, OTC, herbal), or if any upcoming procedures are scheduled.  Doc DELEON Johnny will likewise let us know if any other changes, questions, or concerns arise regarding anticoagulation therapy. he understands the importance of seeking medical attention immediately if he experiences any falls, vehicle accidents, or abnormal bleeding or bruising. Doc Turner voiced understanding of this information and confirms that he has the [facility]'s contact information. Otherwise, we will plan to contact the patient once monthly or if his INR is out of range.  4. Sent message via my chart today, 2/28 for confirmation of dosing or if any changes since last encounter.  Awaiting response    Sabrina Hannah  Pharmacy Technician   3/14/2024 09:23 EDT    I, Viola Huitron, [facility], have reviewed the note in full and agree with the assessment and plan.  03/14/24  14:33 EDT

## 2024-03-22 DIAGNOSIS — Z95.2 HX OF MITRAL VALVE REPLACEMENT WITH MECHANICAL VALVE: ICD-10-CM

## 2024-03-22 DIAGNOSIS — I48.92 PAROXYSMAL ATRIAL FLUTTER: ICD-10-CM

## 2024-03-22 RX ORDER — WARFARIN SODIUM 1 MG/1
TABLET ORAL
Qty: 60 TABLET | Refills: 1 | Status: SHIPPED | OUTPATIENT
Start: 2024-03-22

## 2024-03-22 RX ORDER — WARFARIN SODIUM 4 MG/1
TABLET ORAL
Qty: 60 TABLET | Refills: 2 | Status: SHIPPED | OUTPATIENT
Start: 2024-03-22

## 2024-03-22 NOTE — TELEPHONE ENCOUNTER
Requesting more than thirty 1 mg tablets.    Reviewed recent dosing and INR results with Mr Turner. Sent in prescriptions for 4mg and 1mg tablets to allow for current dosing.

## 2024-03-29 ENCOUNTER — ANTICOAGULATION VISIT (OUTPATIENT)
Dept: PHARMACY | Facility: HOSPITAL | Age: 74
End: 2024-03-29
Payer: MEDICARE

## 2024-03-29 DIAGNOSIS — I48.92 PAROXYSMAL ATRIAL FLUTTER: Primary | ICD-10-CM

## 2024-03-29 DIAGNOSIS — Z95.2 HX OF MITRAL VALVE REPLACEMENT WITH MECHANICAL VALVE: ICD-10-CM

## 2024-03-29 LAB — INR PPP: 2.7

## 2024-03-29 NOTE — PROGRESS NOTES
Anticoagulation Clinic - Remote Progress Note  [facility]  Frequency of Monitorin-4x a month    Indication: Hx of mitral valve replacement with mechanical valve KONG Mendoza  Referring Provider: Dr. Garcia (last ravindra: 23)  Initial Warfarin Start Date:    Goal INR: 2.5-3.5  Current Drug Interactions: ensure (once daily); amitriptyline (PRN)  CHADS-VASc: 2 (age, HTN)     Diet: iceberg lettuce 1x/week, ensure 3-4x/week, V8 juice (20)  Alcohol: 12 pack of beer/week  Tobacco: None  OTC Pain Medication: None     INR History:  Date 8/25 9/11 9/29 10/19 11/5 12/2 12/11 1/4/18 1/30 2/19 3/7 4/5   Total Weekly Dose 24mg 24mg 24mg 22.75  mg? 22.75  mg? ? 22.5mg 22.5mg 22.5mg 22.5mg 22.5mg 22.5mg   INR 3.3 2.8 2.9 3.1 3.5 2.7 2.7 2.6 2.9 2.5 3.3 3.7   Notes    LVM LVM   call   LVM      Date 4/18 5/20 6/5 6/27 7/24 7/31 9/9 10/8 11/8 12/14 2/20/19 2/25   Total Weekly Dose 22.5mg 23mg 22.5mg  23mg 24mg 24mg unable   to verify unable   to verify unable   to verify unable   to verify 23mg   INR 2.6 2.8 2.9 2.4 2.9 3.3 3.4 3.0 2.7 2.6 3.8 3.4   Notes      rec'vd   recv'd 10/13;  mult calls         Date 3/11 3/25 4/9 5/1 5/15 6/11 6/27 7/10 7/26 8/9 9/11 10/3   Total Weekly Dose 23mg 23mg 22.5mg 23mg 22.5mg 23mg  22.5mg 22.5mg 22.5mg 23mg 22.5mg   INR 3.6 3.0 3.2 3.3 2.5 3.1 3.6 2.9 2.3 2.9 2.9 2.8   Notes        reported          Date 10/15 10/24 10/31 11/8 11/15 11/22 12/7 12/18 12/27 1/15/20 1/30 2/18   Total Weekly Dose 23mg 22.5mg 23mg 23mg 23.5mg 23mg 22.5mg 23mg 22.5mg 23mg ??? 23mg    INR 3.3 3.2 2.7 2.3 2.8 2.7 3.2  2.5 2.5 3.1 2.7 2.4   Notes  reported 10/25; sick; cefdinir stop cefdinir 10/29    Reported   Inc GLV  Unable to reach pt MVI; decr Ensure     Date 3/5 3/17 4/4 4/23 5/4 5/19 6/2 6/24 7/22 7/31 8/26 9/3   Total Weekly Dose 23mg 22.5mg 22.5mg 23mg 23mg 23mg? 23mg 22.75  mg avg 22.75  mg 23mg 22.75  mg 23.5mg   INR 2.8 3.0 3.3 2.9 2.6 2.8 2.9 2.49 2.4 3.1 2.5 3.4   Notes  call recv'd  4/13 rec'vd 4/24; call call recv'd  5/20; call recv'd 6/3; call  recv'd 7/31 recv'd 7/31 recv'd 9/3 Self-adj  CoQ10; email     Date 9/11 10/8 10/13 11/24 12/3 12/22 1/4/21 2/6 2/8 2/19 3/9 4/15   Total Weekly Dose 23mg 23.5mg  avg 23.5mg   avg 23.5mg  avg 23.5mg 22.75  mg avg 22.75  mg avg 22.5mg 19mg 22.75  mg avg 22.75   mg avg 22.75   mg avg   INR 2.8 3.4 3.3 3.7 3.1 2.7 2.6 3.7 3.2 2.8 3.2 2.6   Notes  email email Email; less Ensure Self-adjust dose dec  recv'd 1/5; call email Email; Self held x1 email email      Date 5/5 5/26 6/30 7/9 7/28 8/23 9/29 10/18 11/11 12/17 1/3/22 1/25   Total WeeklyDose 22.75 mg avg 22.75  mg avg  22.75 mg avg 22.75  mg avg 22.75 mg avg 22.75 mg avg 22.75 mg avg 22.75 mg avg 22.75 mg avg 22.75 mg avg 22.75 mg avg   INR 2.9 2.8 3.5 2.8 2.5 3.0 2.6 2.9 2.7 3.7 3.0 2.9   Notes email; fall rcv'd 5/27  rec'vd 7/12; email rec'vd 8/3  email  email Unable to contact email; Self-heldx1 rec'd 2/23     Date 2/23 3/26 3/28  4/17 5/24 5/24 5/26 6/17 7/14  8/19   Total Weekly Dose 22.75 mg avg 22.75mg 19.5mg Non compliant 22.75 mg avg? 22.75 mg avg 22.75 mg avg 22.75 mg avg  23mg Non-  Compliant 22.75 mg   avg   INR 3.2 4.1 3.2  3.0 3.5 3.0 3.4 3.0 2.7  3.4   Notes Unable to contact garlic 1x hold d/c garlic  ?? ?? ?? ?? Rec/d 6/22   email     Date 9/2 9/4 9/19 10/2 10/19 11/11 12/26 1/12/23 2/1 3/12 4/19 4/16   Total WeeklyDose 22.5 19.5mg 21 mg 21 mg 21 mg  23 mg 23 mg 21 mg ? Unable to contact. ~ 22.75 mg   INR 3.8 2.8 2.9 3.1 2.9 3.3 2.7 2.7 3.2  2.6 2.1 3.5   Notes rec 9/13 1x hold rec 9/13 rec 9/20  Self-adj   recvd 11/15   Unable to contact Emailed rec'd 1/16 rec 2/2 rec 3/13 Rcv/d 4/20 Reported 5/2 in [facility] and called clinic 5/17     Date 5/18 5/25 6/6 6/26 7/10 7/27  8/4 8/30 09/17 10/16 11/2  11/17   Total WeeklyDose 23 mg ~22.75 mg  ~22.75 mg ~ 22.75 mg ~22.75 mg ~22.5 mg  22.5mg ~22.75mg ~22.75 mg 22.5 mg ~22.75 mg 24.5 mg   INR 3.4 2.7 2.8 2.4  2.6 2.8 1.9 2.0 2.5 3.3 2.5 2.0 2.5    Notes  call   Rec'd 7/11 Rec'd  7/28  clinda Rec 8/31 REC 09/20 Rec'd 10/20/23 Rec'd 11/3  Coq10 change      Date 11/22 12/01 1/6 1/12 1/25/24 2/3 2/13  2/26 3/13/24 3/28      Total Weekly Dose 24.5 mg 24.5 mg 24.5 mg 26 mg 24.5 mg 28 mg 28 mg  29 mg 29 mg 29 mg      INR 2.4 3.2 1.8 2.7 1.7 2.5 2.6  2.5 2.7 2.7      Notes   Rec 1/12   Rec'd 2/5  Boost x 1 Rec 2/15 call self-adj Rec 2/28 Rec'd 3/14 Rec'd 3/29  Sent message in [employer]        Phone Interview:  Tablet Strength: 3mg and 1mg tablets  Patient Contact Info: **will message on [employer] once monthly or if INR is out of range**PLEASE  USE [employer] TO COMMUNICATE - as of 7/11/2023 patient has not set up [employer].  Preferred *[phone]* anytime after noon  Other numbers on his profile:    disconnected   - brother in law, requested that we do NOT call this number  Lab Contact Info: [employer]   Lab: [facility] phone: [phone] , Fax: [phone]     Patient Findings    Negatives: Signs/symptoms of thrombosis, Signs/symptoms of bleeding, Laboratory test error suspected, Change in health, Change in alcohol use, Change in activity, Upcoming invasive procedure, Emergency department visit, Upcoming dental procedure, Missed doses, Extra doses, Change in medications, Change in diet/appetite, Hospital admission, Bruising, Other complaints   Comments: All findings negative per patient.     Plan  1. INR therapeutic 3/28 2.7 (goal 2.5-3.5).    2. Recheck INR in 2 weeks 4/11/24.  3. Doc Turner understands the importance of calling the [facility] if he notices any s/sx of bleeding, stroke, or abnormal bruising, if any changes are made to his medications or medication doses (Rx, OTC, herbal), or if any upcoming procedures are scheduled. Doc Turner will likewise let us know if any other changes, questions, or concerns arise regarding anticoagulation therapy. he understands the importance of seeking medical attention immediately if he  "experiences any falls, vehicle accidents, or abnormal bleeding or bruising. Doc PANCHO Johnny voiced understanding of this information and confirms that he has the [facility]'s contact information. Otherwise, we will plan to contact the patient once monthly or if his INR is out of range.  4. Sent message via my chart today, 3/29    4/1/24 Addendum: Patient responded on 3/29/24 \"My diet remains unchanged, I haven't had any episodes of bleeding or bruising and have had no interaction with a hospital. I am currently taking 4 mg one day, 4.5 mg the next. Thanks for calling my pharmacy for the 4 mg. prescription. That simplifies things considerably.\"     Radha Trujillo CPhT   16:03 EDT 3/29/2024    I, Viola Huitron, [facility], have reviewed the note in full and agree with the assessment and plan.  04/01/24  15:48 EDT        "

## 2024-04-12 ENCOUNTER — ANTICOAGULATION VISIT (OUTPATIENT)
Dept: PHARMACY | Facility: HOSPITAL | Age: 74
End: 2024-04-12
Payer: MEDICARE

## 2024-04-12 DIAGNOSIS — Z95.2 HX OF MITRAL VALVE REPLACEMENT WITH MECHANICAL VALVE: ICD-10-CM

## 2024-04-12 DIAGNOSIS — I48.92 PAROXYSMAL ATRIAL FLUTTER: Primary | ICD-10-CM

## 2024-04-12 LAB — INR PPP: 2.9

## 2024-04-12 NOTE — PROGRESS NOTES
Anticoagulation Clinic - Remote Progress Note  [facility]  Frequency of Monitorin-4x a month    Indication: Hx of mitral valve replacement with mechanical valve KONG Mendoza  Referring Provider: Dr. Garcia (last ravindra: 23)  Initial Warfarin Start Date:    Goal INR: 2.5-3.5  Current Drug Interactions: ensure (once daily); amitriptyline (PRN)  CHADS-VASc: 2 (age, HTN)     Diet: iceberg lettuce 1x/week, ensure 3-4x/week, V8 juice (20)  Alcohol: 12 pack of beer/week  Tobacco: None  OTC Pain Medication: None     INR History:  Date 8/25 9/11 9/29 10/19 11/5 12/2 12/11 1/4/18 1/30 2/19 3/7 4/5   Total Weekly Dose 24mg 24mg 24mg 22.75  mg? 22.75  mg? ? 22.5mg 22.5mg 22.5mg 22.5mg 22.5mg 22.5mg   INR 3.3 2.8 2.9 3.1 3.5 2.7 2.7 2.6 2.9 2.5 3.3 3.7   Notes    LVM LVM   call   LVM      Date 4/18 5/20 6/5 6/27 7/24 7/31 9/9 10/8 11/8 12/14 2/20/19 2/25   Total Weekly Dose 22.5mg 23mg 22.5mg  23mg 24mg 24mg unable   to verify unable   to verify unable   to verify unable   to verify 23mg   INR 2.6 2.8 2.9 2.4 2.9 3.3 3.4 3.0 2.7 2.6 3.8 3.4   Notes      rec'vd   recv'd 10/13;  mult calls         Date 3/11 3/25 4/9 5/1 5/15 6/11 6/27 7/10 7/26 8/9 9/11 10/3   Total Weekly Dose 23mg 23mg 22.5mg 23mg 22.5mg 23mg  22.5mg 22.5mg 22.5mg 23mg 22.5mg   INR 3.6 3.0 3.2 3.3 2.5 3.1 3.6 2.9 2.3 2.9 2.9 2.8   Notes        reported          Date 10/15 10/24 10/31 11/8 11/15 11/22 12/7 12/18 12/27 1/15/20 1/30 2/18   Total Weekly Dose 23mg 22.5mg 23mg 23mg 23.5mg 23mg 22.5mg 23mg 22.5mg 23mg ??? 23mg    INR 3.3 3.2 2.7 2.3 2.8 2.7 3.2  2.5 2.5 3.1 2.7 2.4   Notes  reported 10/25; sick; cefdinir stop cefdinir 10/29    Reported   Inc GLV  Unable to reach pt MVI; decr Ensure     Date 3/5 3/17 4/4 4/23 5/4 5/19 6/2 6/24 7/22 7/31 8/26 9/3   Total Weekly Dose 23mg 22.5mg 22.5mg 23mg 23mg 23mg? 23mg 22.75  mg avg 22.75  mg 23mg 22.75  mg 23.5mg   INR 2.8 3.0 3.3 2.9 2.6 2.8 2.9 2.49 2.4 3.1 2.5 3.4   Notes  call recv'd  4/13 rec'vd 4/24; call call recv'd  5/20; call recv'd 6/3; call  recv'd 7/31 recv'd 7/31 recv'd 9/3 Self-adj  CoQ10; email     Date 9/11 10/8 10/13 11/24 12/3 12/22 1/4/21 2/6 2/8 2/19 3/9 4/15   Total Weekly Dose 23mg 23.5mg  avg 23.5mg   avg 23.5mg  avg 23.5mg 22.75  mg avg 22.75  mg avg 22.5mg 19mg 22.75  mg avg 22.75   mg avg 22.75   mg avg   INR 2.8 3.4 3.3 3.7 3.1 2.7 2.6 3.7 3.2 2.8 3.2 2.6   Notes  email email Email; less Ensure Self-adjust dose dec  recv'd 1/5; call email Email; Self held x1 email email      Date 5/5 5/26 6/30 7/9 7/28 8/23 9/29 10/18 11/11 12/17 1/3/22 1/25   Total WeeklyDose 22.75 mg avg 22.75  mg avg  22.75 mg avg 22.75  mg avg 22.75 mg avg 22.75 mg avg 22.75 mg avg 22.75 mg avg 22.75 mg avg 22.75 mg avg 22.75 mg avg   INR 2.9 2.8 3.5 2.8 2.5 3.0 2.6 2.9 2.7 3.7 3.0 2.9   Notes email; fall rcv'd 5/27  rec'vd 7/12; email rec'vd 8/3  email  email Unable to contact email; Self-heldx1 rec'd 2/23     Date 2/23 3/26 3/28  4/17 5/24 5/24 5/26 6/17 7/14  8/19   Total Weekly Dose 22.75 mg avg 22.75mg 19.5mg Non compliant 22.75 mg avg? 22.75 mg avg 22.75 mg avg 22.75 mg avg  23mg Non-  Compliant 22.75 mg   avg   INR 3.2 4.1 3.2  3.0 3.5 3.0 3.4 3.0 2.7  3.4   Notes Unable to contact garlic 1x hold d/c garlic  ?? ?? ?? ?? Rec/d 6/22   email     Date 9/2 9/4 9/19 10/2 10/19 11/11 12/26 1/12/23 2/1 3/12 4/19 4/16   Total WeeklyDose 22.5 19.5mg 21 mg 21 mg 21 mg  23 mg 23 mg 21 mg ? Unable to contact. ~ 22.75 mg   INR 3.8 2.8 2.9 3.1 2.9 3.3 2.7 2.7 3.2  2.6 2.1 3.5   Notes rec 9/13 1x hold rec 9/13 rec 9/20  Self-adj   recvd 11/15   Unable to contact Emailed rec'd 1/16 rec 2/2 rec 3/13 Rcv/d 4/20 Reported 5/2 in [facility] and called clinic 5/17     Date 5/18 5/25 6/6 6/26 7/10 7/27  8/4 8/30 09/17 10/16 11/2  11/17   Total WeeklyDose 23 mg ~22.75 mg  ~22.75 mg ~ 22.75 mg ~22.75 mg ~22.5 mg  22.5mg ~22.75mg ~22.75 mg 22.5 mg ~22.75 mg 24.5 mg   INR 3.4 2.7 2.8 2.4  2.6 2.8 1.9 2.0 2.5 3.3 2.5 2.0 2.5    Notes  call   Rec'd 7/11 Rec'd  7/28  clinda Rec 8/31 REC 09/20 Rec'd 10/20/23 Rec'd 11/3  Coq10 change      Date 11/22 12/01 1/6 1/12 1/25/24 2/3 2/13  2/26 3/13/24 3/28 4/11/24     Total Weekly Dose 24.5 mg 24.5 mg 24.5 mg 26 mg 24.5 mg 28 mg 28 mg  29 mg 29 mg 29 mg 29 mg     INR 2.4 3.2 1.8 2.7 1.7 2.5 2.6  2.5 2.7 2.7 2.9     Notes   Rec 1/12   Rec'd 2/5  Boost x 1 Rec 2/15 call self-adj Rec 2/28 Rec'd 3/14 Rec'd 3/29  Sent message in [employer]        Phone Interview:  Tablet Strength: 3mg and 1mg tablets  Patient Contact Info: **will message on [employer] once monthly or if INR is out of range**PLEASE  USE [employer] TO COMMUNICATE -   Preferred *[phone]* anytime after noon  Other numbers on his profile:    disconnected   - brother in law, requested that we do NOT call this number  Lab Contact Info: [employer]   Lab: [facility] phone: [phone] , Fax: [phone]     Patient Findings  Comments: Pt not contacted/messaged this encounter     Plan  1. INR is therapeutic at 2.9 (goal 2.5-3.5) Mr Turner to continue regimen of warfarin 4 mg everyday until recheck.  2. Recheck INR in 2 weeks 4/26/24.  3. Doc Turner understands the importance of calling the [facility] if he notices any s/sx of bleeding, stroke, or abnormal bruising, if any changes are made to his medications or medication doses (Rx, OTC, herbal), or if any upcoming procedures are scheduled. Doc Turner will likewise let us know if any other changes, questions, or concerns arise regarding anticoagulation therapy. he understands the importance of seeking medical attention immediately if he experiences any falls, vehicle accidents, or abnormal bleeding or bruising. Doc Turner voiced understanding of this information and confirms that he has the [facility]'s contact information. Otherwise, we will plan to contact the patient once monthly or if his INR is out of range.      Sabrina [facility]  Technician   4/12/2024 08:07 EDT    IViola, [facility], have reviewed the note in full and agree with the assessment and plan.  04/12/24  08:40 EDT

## 2024-04-29 ENCOUNTER — ANTICOAGULATION VISIT (OUTPATIENT)
Dept: PHARMACY | Facility: HOSPITAL | Age: 74
End: 2024-04-29
Payer: MEDICARE

## 2024-04-29 DIAGNOSIS — Z95.2 HX OF MITRAL VALVE REPLACEMENT WITH MECHANICAL VALVE: ICD-10-CM

## 2024-04-29 DIAGNOSIS — I48.92 PAROXYSMAL ATRIAL FLUTTER: Primary | ICD-10-CM

## 2024-04-29 LAB — INR PPP: 3.4

## 2024-04-29 NOTE — PROGRESS NOTES
Anticoagulation Clinic - Remote Progress Note  [facility]  Frequency of Monitorin-4x a month    Indication: Hx of mitral valve replacement with mechanical valve KONG Mendoza  Referring Provider: Dr. Garcia (last ravindra: 23)  Initial Warfarin Start Date:    Goal INR: 2.5-3.5  Current Drug Interactions: ensure (once daily); amitriptyline (PRN)  CHADS-VASc: 2 (age, HTN)     Diet: iceberg lettuce 1x/week, ensure 3-4x/week, V8 juice (20)  Alcohol: 12 pack of beer/week  Tobacco: None  OTC Pain Medication: None     INR History:  Date 8/25 9/11 9/29 10/19 11/5 12/2 12/11 1/4/18 1/30 2/19 3/7 4/5   Total Weekly Dose 24mg 24mg 24mg 22.75  mg? 22.75  mg? ? 22.5mg 22.5mg 22.5mg 22.5mg 22.5mg 22.5mg   INR 3.3 2.8 2.9 3.1 3.5 2.7 2.7 2.6 2.9 2.5 3.3 3.7   Notes    LVM LVM   call   LVM      Date 4/18 5/20 6/5 6/27 7/24 7/31 9/9 10/8 11/8 12/14 2/20/19 2/25   Total Weekly Dose 22.5mg 23mg 22.5mg  23mg 24mg 24mg unable   to verify unable   to verify unable   to verify unable   to verify 23mg   INR 2.6 2.8 2.9 2.4 2.9 3.3 3.4 3.0 2.7 2.6 3.8 3.4   Notes      rec'vd   recv'd 10/13;  mult calls         Date 3/11 3/25 4/9 5/1 5/15 6/11 6/27 7/10 7/26 8/9 9/11 10/3   Total Weekly Dose 23mg 23mg 22.5mg 23mg 22.5mg 23mg  22.5mg 22.5mg 22.5mg 23mg 22.5mg   INR 3.6 3.0 3.2 3.3 2.5 3.1 3.6 2.9 2.3 2.9 2.9 2.8   Notes        reported          Date 10/15 10/24 10/31 11/8 11/15 11/22 12/7 12/18 12/27 1/15/20 1/30 2/18   Total Weekly Dose 23mg 22.5mg 23mg 23mg 23.5mg 23mg 22.5mg 23mg 22.5mg 23mg ??? 23mg    INR 3.3 3.2 2.7 2.3 2.8 2.7 3.2  2.5 2.5 3.1 2.7 2.4   Notes  reported 10/25; sick; cefdinir stop cefdinir 10/29    Reported   Inc GLV  Unable to reach pt MVI; decr Ensure     Date 3/5 3/17 4/4 4/23 5/4 5/19 6/2 6/24 7/22 7/31 8/26 9/3   Total Weekly Dose 23mg 22.5mg 22.5mg 23mg 23mg 23mg? 23mg 22.75  mg avg 22.75  mg 23mg 22.75  mg 23.5mg   INR 2.8 3.0 3.3 2.9 2.6 2.8 2.9 2.49 2.4 3.1 2.5 3.4   Notes  call recv'd  4/13 rec'vd 4/24; call call recv'd  5/20; call recv'd 6/3; call  recv'd 7/31 recv'd 7/31 recv'd 9/3 Self-adj  CoQ10; email     Date 9/11 10/8 10/13 11/24 12/3 12/22 1/4/21 2/6 2/8 2/19 3/9 4/15   Total Weekly Dose 23mg 23.5mg  avg 23.5mg   avg 23.5mg  avg 23.5mg 22.75  mg avg 22.75  mg avg 22.5mg 19mg 22.75  mg avg 22.75   mg avg 22.75   mg avg   INR 2.8 3.4 3.3 3.7 3.1 2.7 2.6 3.7 3.2 2.8 3.2 2.6   Notes  email email Email; less Ensure Self-adjust dose dec  recv'd 1/5; call email Email; Self held x1 email email      Date 5/5 5/26 6/30 7/9 7/28 8/23 9/29 10/18 11/11 12/17 1/3/22 1/25   Total WeeklyDose 22.75 mg avg 22.75  mg avg  22.75 mg avg 22.75  mg avg 22.75 mg avg 22.75 mg avg 22.75 mg avg 22.75 mg avg 22.75 mg avg 22.75 mg avg 22.75 mg avg   INR 2.9 2.8 3.5 2.8 2.5 3.0 2.6 2.9 2.7 3.7 3.0 2.9   Notes email; fall rcv'd 5/27  rec'vd 7/12; email rec'vd 8/3  email  email Unable to contact email; Self-heldx1 rec'd 2/23     Date 2/23 3/26 3/28  4/17 5/24 5/24 5/26 6/17 7/14  8/19   Total Weekly Dose 22.75 mg avg 22.75mg 19.5mg Non compliant 22.75 mg avg? 22.75 mg avg 22.75 mg avg 22.75 mg avg  23mg Non-  Compliant 22.75 mg   avg   INR 3.2 4.1 3.2  3.0 3.5 3.0 3.4 3.0 2.7  3.4   Notes Unable to contact garlic 1x hold d/c garlic  ?? ?? ?? ?? Rec/d 6/22   email     Date 9/2 9/4 9/19 10/2 10/19 11/11 12/26 1/12/23 2/1 3/12 4/19 4/16   Total WeeklyDose 22.5 19.5mg 21 mg 21 mg 21 mg  23 mg 23 mg 21 mg ? Unable to contact. ~ 22.75 mg   INR 3.8 2.8 2.9 3.1 2.9 3.3 2.7 2.7 3.2  2.6 2.1 3.5   Notes rec 9/13 1x hold rec 9/13 rec 9/20  Self-adj   recvd 11/15   Unable to contact Emailed rec'd 1/16 rec 2/2 rec 3/13 Rcv/d 4/20 Reported 5/2 in [facility] and called clinic 5/17     Date 5/18 5/25 6/6 6/26 7/10 7/27  8/4 8/30 09/17 10/16 11/2  11/17   Total WeeklyDose 23 mg ~22.75 mg  ~22.75 mg ~ 22.75 mg ~22.75 mg ~22.5 mg  22.5mg ~22.75mg ~22.75 mg 22.5 mg ~22.75 mg 24.5 mg   INR 3.4 2.7 2.8 2.4  2.6 2.8 1.9 2.0 2.5 3.3 2.5 2.0 2.5    Notes  call   Rec'd 7/11 Rec'd  7/28  clinda Rec 8/31 REC 09/20 Rec'd 10/20/23 Rec'd 11/3  Coq10 change      Date 11/22 12/01 1/6 1/12 1/25/24 2/3 2/13  2/26 3/13/24 3/28 4/11/24 4/29    Total Weekly Dose 24.5 mg 24.5 mg 24.5 mg 26 mg 24.5 mg 28 mg 28 mg  29 mg 29 mg 29 mg 29 mg 30 mg    INR 2.4 3.2 1.8 2.7 1.7 2.5 2.6  2.5 2.7 2.7 2.9 3.4    Notes   Rec 1/12   Rec'd 2/5  Boost x 1 Rec 2/15 call self-adj Rec 2/28 Rec'd 3/14 Rec'd 3/29  Sent message in [employer]  Self adjusted maintenance regimen/ message rec'd in [employer]      Phone Interview:  Tablet Strength: 3mg and 1mg tablets  Patient Contact Info: **will message on [employer] once monthly or if INR is out of range**PLEASE  USE [employer] TO COMMUNICATE -   Preferred *[phone]* anytime after noon  Other numbers on his profile:    disconnected   - brother in law, requested that we do NOT call this number  Lab Contact Info: [place]   Lab: [facility] phone: [phone] , Fax: [phone]     Patient Findings    Negatives: Signs/symptoms of thrombosis, Signs/symptoms of bleeding, Laboratory test error suspected, Change in health, Change in alcohol use, Change in activity, Upcoming invasive procedure, Emergency department visit, Upcoming dental procedure, Missed doses, Extra doses, Change in medications, Change in diet/appetite, Hospital admission, Bruising, Other complaints   Comments: Patient contacted through [employer], patient response: Hi, I'm alternating days between 4 mg. and 4.5 mg. My diet habits remain unchanged. I'll test again a week from this coming Thursday, if that's okay.  Mr. Turner is self adjusting and alternating. The tracker has been updated to reflect.       Plan  1. INR is therapeutic at 3.4 (goal 2.5-3.5) Mr Turner to continue self adjusted regimen of warfarin 4 mg alternating between 4.5 mg everyday until recheck.  2. Recheck INR per patient preference on 5/9/24.  3. Doc Turner understands the importance of calling the [facility]tico[facility] if he notices any s/sx of bleeding, stroke, or abnormal bruising, if any changes are made to his medications or medication doses (Rx, OTC, herbal), or if any upcoming procedures are scheduled. Doc Turner will likewise let us know if any other changes, questions, or concerns arise regarding anticoagulation therapy. he understands the importance of seeking medical attention immediately if he experiences any falls, vehicle accidents, or abnormal bleeding or bruising. Doc Turner voiced understanding of this information and confirms that he has the [facility]'s contact information. Otherwise, we will plan to contact the patient once monthly or if his INR is out of range.      Viola Huitron, PharmD  4/30/2024  08:24 EDT

## 2024-05-06 RX ORDER — SOTALOL HYDROCHLORIDE 80 MG/1
80 TABLET ORAL 2 TIMES DAILY
Qty: 90 TABLET | Refills: 1 | Status: SHIPPED | OUTPATIENT
Start: 2024-05-06

## 2024-05-14 LAB — INR PPP: 2.5

## 2024-05-15 ENCOUNTER — ANTICOAGULATION VISIT (OUTPATIENT)
Dept: PHARMACY | Facility: HOSPITAL | Age: 74
End: 2024-05-15
Payer: MEDICARE

## 2024-05-15 DIAGNOSIS — Z95.2 HX OF MITRAL VALVE REPLACEMENT WITH MECHANICAL VALVE: ICD-10-CM

## 2024-05-15 DIAGNOSIS — I48.92 PAROXYSMAL ATRIAL FLUTTER: Primary | ICD-10-CM

## 2024-05-15 NOTE — PROGRESS NOTES
Anticoagulation Clinic - Remote Progress Note  [employer] [facility]  Frequency of Monitorin-4x a month    Indication: Hx of mitral valve replacement with mechanical valve KONG Mendoza  Referring Provider: Dr. Garcia (last ravindra: 23)  Initial Warfarin Start Date:    Goal INR: 2.5-3.5  Current Drug Interactions: ensure (once daily); amitriptyline (PRN)  CHADS-VASc: 2 (age, HTN)     Diet: iceberg lettuce 1x/week, ensure 3-4x/week, V8 juice (20)  Alcohol: 12 pack of beer/week  Tobacco: None  OTC Pain Medication: None     INR History:  Date 8/25 9/11 9/29 10/19 11/5 12/2 12/11 1/4/18 1/30 2/19 3/7 4/5   Total Weekly Dose 24mg 24mg 24mg 22.75  mg? 22.75  mg? ? 22.5mg 22.5mg 22.5mg 22.5mg 22.5mg 22.5mg   INR 3.3 2.8 2.9 3.1 3.5 2.7 2.7 2.6 2.9 2.5 3.3 3.7   Notes    LVM LVM   call   LVM      Date 4/18 5/20 6/5 6/27 7/24 7/31 9/9 10/8 11/8 12/14 2/20/19 2/25   Total Weekly Dose 22.5mg 23mg 22.5mg  23mg 24mg 24mg unable   to verify unable   to verify unable   to verify unable   to verify 23mg   INR 2.6 2.8 2.9 2.4 2.9 3.3 3.4 3.0 2.7 2.6 3.8 3.4   Notes      rec'vd   recv'd 10/13;  mult calls         Date 3/11 3/25 4/9 5/1 5/15 6/11 6/27 7/10 7/26 8/9 9/11 10/3   Total Weekly Dose 23mg 23mg 22.5mg 23mg 22.5mg 23mg  22.5mg 22.5mg 22.5mg 23mg 22.5mg   INR 3.6 3.0 3.2 3.3 2.5 3.1 3.6 2.9 2.3 2.9 2.9 2.8   Notes        reported          Date 10/15 10/24 10/31 11/8 11/15 11/22 12/7 12/18 12/27 1/15/20 1/30 2/18   Total Weekly Dose 23mg 22.5mg 23mg 23mg 23.5mg 23mg 22.5mg 23mg 22.5mg 23mg ??? 23mg    INR 3.3 3.2 2.7 2.3 2.8 2.7 3.2  2.5 2.5 3.1 2.7 2.4   Notes  reported 10/25; sick; cefdinir stop cefdinir 10/29    Reported   Inc GLV  Unable to reach pt MVI; decr Ensure     Date 3/5 3/17 4/4 4/23 5/4 5/19 6/2 6/24 7/22 7/31 8/26 9/3   Total Weekly Dose 23mg 22.5mg 22.5mg 23mg 23mg 23mg? 23mg 22.75  mg avg 22.75  mg 23mg 22.75  mg 23.5mg   INR 2.8 3.0 3.3 2.9 2.6 2.8 2.9 2.49 2.4 3.1 2.5 3.4   Notes  call recv'd  4/13 rec'vd 4/24; call call recv'd  5/20; call recv'd 6/3; call  recv'd 7/31 recv'd 7/31 recv'd 9/3 Self-adj  CoQ10; email     Date 9/11 10/8 10/13 11/24 12/3 12/22 1/4/21 2/6 2/8 2/19 3/9 4/15   Total Weekly Dose 23mg 23.5mg  avg 23.5mg   avg 23.5mg  avg 23.5mg 22.75  mg avg 22.75  mg avg 22.5mg 19mg 22.75  mg avg 22.75   mg avg 22.75   mg avg   INR 2.8 3.4 3.3 3.7 3.1 2.7 2.6 3.7 3.2 2.8 3.2 2.6   Notes  email email Email; less Ensure Self-adjust dose dec  recv'd 1/5; call email Email; Self held x1 email email      Date 5/5 5/26 6/30 7/9 7/28 8/23 9/29 10/18 11/11 12/17 1/3/22 1/25   Total WeeklyDose 22.75 mg avg 22.75  mg avg  22.75 mg avg 22.75  mg avg 22.75 mg avg 22.75 mg avg 22.75 mg avg 22.75 mg avg 22.75 mg avg 22.75 mg avg 22.75 mg avg   INR 2.9 2.8 3.5 2.8 2.5 3.0 2.6 2.9 2.7 3.7 3.0 2.9   Notes email; fall rcv'd 5/27  rec'vd 7/12; email rec'vd 8/3  email  email Unable to contact email; Self-heldx1 rec'd 2/23     Date 2/23 3/26 3/28  4/17 5/24 5/24 5/26 6/17 7/14  8/19   Total Weekly Dose 22.75 mg avg 22.75mg 19.5mg Non compliant 22.75 mg avg? 22.75 mg avg 22.75 mg avg 22.75 mg avg  23mg Non-  Compliant 22.75 mg   avg   INR 3.2 4.1 3.2  3.0 3.5 3.0 3.4 3.0 2.7  3.4   Notes Unable to contact garlic 1x hold d/c garlic  ?? ?? ?? ?? Rec/d 6/22   email     Date 9/2 9/4 9/19 10/2 10/19 11/11 12/26 1/12/23 2/1 3/12 4/19 4/16   Total WeeklyDose 22.5 19.5mg 21 mg 21 mg 21 mg  23 mg 23 mg 21 mg ? Unable to contact. ~ 22.75 mg   INR 3.8 2.8 2.9 3.1 2.9 3.3 2.7 2.7 3.2  2.6 2.1 3.5   Notes rec 9/13 1x hold rec 9/13 rec 9/20  Self-adj   recvd 11/15   Unable to contact Emailed rec'd 1/16 rec 2/2 rec 3/13 Rcv/d 4/20 Reported 5/2 in [facility] and called clinic 5/17     Date 5/18 5/25 6/6 6/26 7/10 7/27  8/4 8/30 09/17 10/16 11/2  11/17   Total WeeklyDose 23 mg ~22.75 mg  ~22.75 mg ~ 22.75 mg ~22.75 mg ~22.5 mg  22.5mg ~22.75mg ~22.75 mg 22.5 mg ~22.75 mg 24.5 mg   INR 3.4 2.7 2.8 2.4  2.6 2.8 1.9 2.0 2.5 3.3 2.5 2.0 2.5    Notes  call   Rec'd 7/11 Rec'd  7/28  clinda Rec 8/31 REC 09/20 Rec'd 10/20/23 Rec'd 11/3  Coq10 change      Date 11/22 12/01 1/6 1/12 1/25/24 2/3 2/13  2/26 3/13/24 3/28 4/11/24 4/29 5/12   Total Weekly Dose 24.5 mg 24.5 mg 24.5 mg 26 mg 24.5 mg 28 mg 28 mg  29 mg 29 mg 29 mg 29 mg 30 mg 29.5 mg   INR 2.4 3.2 1.8 2.7 1.7 2.5 2.6  2.5 2.7 2.7 2.9 3.4 2.5   Notes   Rec 1/12   Rec'd 2/5  Boost x 1 Rec 2/15 call self-adj Rec 2/28 Rec'd 3/14 Rec'd 3/29  Sent message in [employer]  Self adjusted maintenance regimen/ message rec'd in [employer] Rec'd 5/15     Phone Interview:  Tablet Strength: 3mg and 1mg tablets  Patient Contact Info: **will message on [employer] once monthly or if INR is out of range**PLEASE  USE [employer] TO COMMUNICATE -   Preferred *[phone]* anytime after noon  Other numbers on his profile:    disconnected   - brother in law, requested that we do NOT call this number  Lab Contact Info: [employer]   Lab: [facility] phone: [phone] , Fax: [phone]     Patient Findings    Comments: Patient not contacted for this encounter.     Plan  1. INR is therapeutic today at 2.5 (goal 2.5-3.5) Mr Turner to continue warfarin 4 mg alternating between 4.5 mg everyday until recheck.  2. Recheck INR in 2 weeks, 5/26/24  3. Doc Turner understands the importance of calling the [facility] if he notices any s/sx of bleeding, stroke, or abnormal bruising, if any changes are made to his medications or medication doses (Rx, OTC, herbal), or if any upcoming procedures are scheduled. Doc Turner will likewise let us know if any other changes, questions, or concerns arise regarding anticoagulation therapy. he understands the importance of seeking medical attention immediately if he experiences any falls, vehicle accidents, or abnormal bleeding or bruising. Doc Turner voiced understanding of this information and confirms that he has the [facility]'s contact information.  Otherwise, we will plan to contact the patient once monthly or if his INR is out of range.      Radha Trujillo CPhT   09:08 EDT 5/15/2024    IYady [facility], have reviewed the note in full and agree with the assessment and plan.  05/15/24  12:16 EDT

## 2024-06-05 ENCOUNTER — ANTICOAGULATION VISIT (OUTPATIENT)
Dept: PHARMACY | Facility: HOSPITAL | Age: 74
End: 2024-06-05
Payer: MEDICARE

## 2024-06-05 DIAGNOSIS — I48.92 PAROXYSMAL ATRIAL FLUTTER: Primary | ICD-10-CM

## 2024-06-05 DIAGNOSIS — Z95.2 HX OF MITRAL VALVE REPLACEMENT WITH MECHANICAL VALVE: ICD-10-CM

## 2024-06-05 LAB — INR PPP: 3.3

## 2024-06-05 NOTE — PROGRESS NOTES
Anticoagulation Clinic - Remote Progress Note  [facility]  Frequency of Monitorin-4x a month    Indication: Hx of mitral valve replacement with mechanical valve KONG Mendoza  Referring Provider: Dr. Garcia (last ravindra: 23)  Initial Warfarin Start Date:    Goal INR: 2.5-3.5  Current Drug Interactions: ensure (once daily); amitriptyline (PRN)  CHADS-VASc: 2 (age, HTN)     Diet: iceberg lettuce 1x/week, ensure 3-4x/week, V8 juice (20)  Alcohol: 12 pack of beer/week  Tobacco: None  OTC Pain Medication: None     INR History:  Date 8/25 9/11 9/29 10/19 11/5 12/2 12/11 1/4/18 1/30 2/19 3/7 4/5   Total Weekly Dose 24mg 24mg 24mg 22.75  mg? 22.75  mg? ? 22.5mg 22.5mg 22.5mg 22.5mg 22.5mg 22.5mg   INR 3.3 2.8 2.9 3.1 3.5 2.7 2.7 2.6 2.9 2.5 3.3 3.7   Notes    LVM LVM   call   LVM      Date 4/18 5/20 6/5 6/27 7/24 7/31 9/9 10/8 11/8 12/14 2/20/19 2/25   Total Weekly Dose 22.5mg 23mg 22.5mg  23mg 24mg 24mg unable   to verify unable   to verify unable   to verify unable   to verify 23mg   INR 2.6 2.8 2.9 2.4 2.9 3.3 3.4 3.0 2.7 2.6 3.8 3.4   Notes      rec'vd   recv'd 10/13;  mult calls         Date 3/11 3/25 4/9 5/1 5/15 6/11 6/27 7/10 7/26 8/9 9/11 10/3   Total Weekly Dose 23mg 23mg 22.5mg 23mg 22.5mg 23mg  22.5mg 22.5mg 22.5mg 23mg 22.5mg   INR 3.6 3.0 3.2 3.3 2.5 3.1 3.6 2.9 2.3 2.9 2.9 2.8   Notes        reported          Date 10/15 10/24 10/31 11/8 11/15 11/22 12/7 12/18 12/27 1/15/20 1/30 2/18   Total Weekly Dose 23mg 22.5mg 23mg 23mg 23.5mg 23mg 22.5mg 23mg 22.5mg 23mg ??? 23mg    INR 3.3 3.2 2.7 2.3 2.8 2.7 3.2  2.5 2.5 3.1 2.7 2.4   Notes  reported 10/25; sick; cefdinir stop cefdinir 10/29    Reported   Inc GLV  Unable to reach pt MVI; decr Ensure     Date 3/5 3/17 4/4 4/23 5/4 5/19 6/2 6/24 7/22 7/31 8/26 9/3   Total Weekly Dose 23mg 22.5mg 22.5mg 23mg 23mg 23mg? 23mg 22.75  mg avg 22.75  mg 23mg 22.75  mg 23.5mg   INR 2.8 3.0 3.3 2.9 2.6 2.8 2.9 2.49 2.4 3.1 2.5 3.4   Notes  call recv'd  4/13 rec'vd 4/24; call call recv'd  5/20; call recv'd 6/3; call  recv'd 7/31 recv'd 7/31 recv'd 9/3 Self-adj  CoQ10; email     Date 9/11 10/8 10/13 11/24 12/3 12/22 1/4/21 2/6 2/8 2/19 3/9 4/15   Total Weekly Dose 23mg 23.5mg  avg 23.5mg   avg 23.5mg  avg 23.5mg 22.75  mg avg 22.75  mg avg 22.5mg 19mg 22.75  mg avg 22.75   mg avg 22.75   mg avg   INR 2.8 3.4 3.3 3.7 3.1 2.7 2.6 3.7 3.2 2.8 3.2 2.6   Notes  email email Email; less Ensure Self-adjust dose dec  recv'd 1/5; call email Email; Self held x1 email email      Date 5/5 5/26 6/30 7/9 7/28 8/23 9/29 10/18 11/11 12/17 1/3/22 1/25   Total WeeklyDose 22.75 mg avg 22.75  mg avg  22.75 mg avg 22.75  mg avg 22.75 mg avg 22.75 mg avg 22.75 mg avg 22.75 mg avg 22.75 mg avg 22.75 mg avg 22.75 mg avg   INR 2.9 2.8 3.5 2.8 2.5 3.0 2.6 2.9 2.7 3.7 3.0 2.9   Notes email; fall rcv'd 5/27  rec'vd 7/12; email rec'vd 8/3  email  email Unable to contact email; Self-heldx1 rec'd 2/23     Date 2/23 3/26 3/28  4/17 5/24 5/24 5/26 6/17 7/14  8/19   Total Weekly Dose 22.75 mg avg 22.75mg 19.5mg Non compliant 22.75 mg avg? 22.75 mg avg 22.75 mg avg 22.75 mg avg  23mg Non-  Compliant 22.75 mg   avg   INR 3.2 4.1 3.2  3.0 3.5 3.0 3.4 3.0 2.7  3.4   Notes Unable to contact garlic 1x hold d/c garlic  ?? ?? ?? ?? Rec/d 6/22   email     Date 9/2 9/4 9/19 10/2 10/19 11/11 12/26 1/12/23 2/1 3/12 4/19 4/16   Total WeeklyDose 22.5 19.5mg 21 mg 21 mg 21 mg  23 mg 23 mg 21 mg ? Unable to contact. ~ 22.75 mg   INR 3.8 2.8 2.9 3.1 2.9 3.3 2.7 2.7 3.2  2.6 2.1 3.5   Notes rec 9/13 1x hold rec 9/13 rec 9/20  Self-adj   recvd 11/15   Unable to contact Emailed rec'd 1/16 rec 2/2 rec 3/13 Rcv/d 4/20 Reported 5/2 in [facility] and called clinic 5/17     Date 5/18 5/25 6/6 6/26 7/10 7/27  8/4 8/30 09/17 10/16 11/2  11/17   Total WeeklyDose 23 mg ~22.75 mg  ~22.75 mg ~ 22.75 mg ~22.75 mg ~22.5 mg  22.5mg ~22.75mg ~22.75 mg 22.5 mg ~22.75 mg 24.5 mg   INR 3.4 2.7 2.8 2.4  2.6 2.8 1.9 2.0 2.5 3.3 2.5 2.0 2.5    Notes  call   Rec'd 7/11 Rec'd  7/28  clinda Rec 8/31 REC 09/20 Rec'd 10/20/23 Rec'd 11/3  Coq10 change      Date 11/22 12/01 1/6 1/12 1/25/24 2/3 2/13  2/26 3/13/24 3/28 4/11/24 4/29 5/12   Total Weekly Dose 24.5 mg 24.5 mg 24.5 mg 26 mg 24.5 mg 28 mg 28 mg  29 mg 29 mg 29 mg 29 mg 30 mg 29.5 mg   INR 2.4 3.2 1.8 2.7 1.7 2.5 2.6  2.5 2.7 2.7 2.9 3.4 2.5   Notes   Rec 1/12   Rec'd 2/5  Boost x 1 Rec 2/15 call self-adj Rec 2/28 Rec'd 3/14 Rec'd 3/29  Sent message in [employer]  Self adjusted maintenance regimen/ message rec'd in [employer] Rec'd 5/15     Date 5/29/24              Total WeeklyDose 29.5 mg              INR 3.3              Notes Rec'd 6/5/24                Phone Interview:  Tablet Strength: 3mg and 1mg tablets  Patient Contact Info: **will message on [email or web address] once monthly or if INR is out of range**PLEASE  USE [employer] TO COMMUNICATE -   Preferred *[phone]* anytime after noon  Other numbers on his profile:    disconnected   - brother in law, requested that we do NOT call this number  Lab Contact Info: [employer]   Lab: [facility] in [facility] phone: [phone] , Fax: [phone]       Patient Findings  Negatives: Signs/symptoms of thrombosis, Signs/symptoms of bleeding, Laboratory test error suspected, Change in health, Change in alcohol use, Change in activity, Upcoming invasive procedure, Emergency department visit, Upcoming dental procedure, Missed doses, Extra doses, Change in medications, Change in diet/appetite, Hospital admission, Bruising, Other complaints   Comments: All findings negative per patient ( see most recent patient message from 6/5/24 )       Plan  1. INR was therapeutic on 5/29/2024 at 3.3 (goal 2.5-3.5) received results today 6/5/24. Instructed Mr Turner to continue warfarin 4 mg alternating between 4.5 mg everyday until recheck.  2. Recheck INR in two weeks, 6/13/24  3. Doc Turner understands the importance of calling the [facility] if he notices any s/sx  of bleeding, stroke, or abnormal bruising, if any changes are made to his medications or medication doses (Rx, OTC, herbal), or if any upcoming procedures are scheduled. Doc Turner will likewise let us know if any other changes, questions, or concerns arise regarding anticoagulation therapy. he understands the importance of seeking medical attention immediately if he experiences any falls, vehicle accidents, or abnormal bleeding or bruising. Doc Turner voiced understanding of this information and confirms that he has the [facility]'s contact information. Otherwise, we will plan to contact the patient once monthly or if his INR is out of range.      Brock Bishop, Pharmacy Technician  6/5/2024  08:28 EDT    I, Yady Escobedo, [facility], have reviewed the note in full and agree with the assessment and plan.  06/07/24  07:56 EDT

## 2024-06-28 ENCOUNTER — ANTICOAGULATION VISIT (OUTPATIENT)
Dept: PHARMACY | Facility: HOSPITAL | Age: 74
End: 2024-06-28
Payer: MEDICARE

## 2024-06-28 DIAGNOSIS — Z95.2 HX OF MITRAL VALVE REPLACEMENT WITH MECHANICAL VALVE: ICD-10-CM

## 2024-06-28 DIAGNOSIS — I48.92 PAROXYSMAL ATRIAL FLUTTER: Primary | ICD-10-CM

## 2024-06-28 LAB — INR PPP: 3

## 2024-06-28 NOTE — PROGRESS NOTES
Anticoagulation Clinic - Remote Progress Note  [facility]  Frequency of Monitorin-4x a month    Indication: Hx of mitral valve replacement with mechanical valve KONG Mendoza  Referring Provider: Dr. Garcia (last ravindra: 23)  Initial Warfarin Start Date:    Goal INR: 2.5-3.5  Current Drug Interactions: ensure (once daily); amitriptyline (PRN)  CHADS-VASc: 2 (age, HTN)     Diet: iceberg lettuce 1x/week, ensure 3-4x/week, V8 juice (20)  Alcohol: 12 pack of beer/week  Tobacco: None  OTC Pain Medication: None     INR History:  Date 8/25 9/11 9/29 10/19 11/5 12/2 12/11 1/4/18 1/30 2/19 3/7 4/5   Total Weekly Dose 24mg 24mg 24mg 22.75  mg? 22.75  mg? ? 22.5mg 22.5mg 22.5mg 22.5mg 22.5mg 22.5mg   INR 3.3 2.8 2.9 3.1 3.5 2.7 2.7 2.6 2.9 2.5 3.3 3.7   Notes    LVM LVM   call   LVM      Date 4/18 5/20 6/5 6/27 7/24 7/31 9/9 10/8 11/8 12/14 2/20/19 2/25   Total Weekly Dose 22.5mg 23mg 22.5mg  23mg 24mg 24mg unable   to verify unable   to verify unable   to verify unable   to verify 23mg   INR 2.6 2.8 2.9 2.4 2.9 3.3 3.4 3.0 2.7 2.6 3.8 3.4   Notes      rec'vd   recv'd 10/13;  mult calls         Date 3/11 3/25 4/9 5/1 5/15 6/11 6/27 7/10 7/26 8/9 9/11 10/3   Total Weekly Dose 23mg 23mg 22.5mg 23mg 22.5mg 23mg  22.5mg 22.5mg 22.5mg 23mg 22.5mg   INR 3.6 3.0 3.2 3.3 2.5 3.1 3.6 2.9 2.3 2.9 2.9 2.8   Notes        reported          Date 10/15 10/24 10/31 11/8 11/15 11/22 12/7 12/18 12/27 1/15/20 1/30 2/18   Total Weekly Dose 23mg 22.5mg 23mg 23mg 23.5mg 23mg 22.5mg 23mg 22.5mg 23mg ??? 23mg    INR 3.3 3.2 2.7 2.3 2.8 2.7 3.2  2.5 2.5 3.1 2.7 2.4   Notes  reported 10/25; sick; cefdinir stop cefdinir 10/29    Reported   Inc GLV  Unable to reach pt MVI; decr Ensure     Date 3/5 3/17 4/4 4/23 5/4 5/19 6/2 6/24 7/22 7/31 8/26 9/3   Total Weekly Dose 23mg 22.5mg 22.5mg 23mg 23mg 23mg? 23mg 22.75  mg avg 22.75  mg 23mg 22.75  mg 23.5mg   INR 2.8 3.0 3.3 2.9 2.6 2.8 2.9 2.49 2.4 3.1 2.5 3.4   Notes  call recv'd  4/13 rec'vd 4/24; call call recv'd  5/20; call recv'd 6/3; call  recv'd 7/31 recv'd 7/31 recv'd 9/3 Self-adj  CoQ10; email     Date 9/11 10/8 10/13 11/24 12/3 12/22 1/4/21 2/6 2/8 2/19 3/9 4/15   Total Weekly Dose 23mg 23.5mg  avg 23.5mg   avg 23.5mg  avg 23.5mg 22.75  mg avg 22.75  mg avg 22.5mg 19mg 22.75  mg avg 22.75   mg avg 22.75   mg avg   INR 2.8 3.4 3.3 3.7 3.1 2.7 2.6 3.7 3.2 2.8 3.2 2.6   Notes  email email Email; less Ensure Self-adjust dose dec  recv'd 1/5; call email Email; Self held x1 email email      Date 5/5 5/26 6/30 7/9 7/28 8/23 9/29 10/18 11/11 12/17 1/3/22 1/25   Total WeeklyDose 22.75 mg avg 22.75  mg avg  22.75 mg avg 22.75  mg avg 22.75 mg avg 22.75 mg avg 22.75 mg avg 22.75 mg avg 22.75 mg avg 22.75 mg avg 22.75 mg avg   INR 2.9 2.8 3.5 2.8 2.5 3.0 2.6 2.9 2.7 3.7 3.0 2.9   Notes email; fall rcv'd 5/27  rec'vd 7/12; email rec'vd 8/3  email  email Unable to contact email; Self-heldx1 rec'd 2/23     Date 2/23 3/26 3/28  4/17 5/24 5/24 5/26 6/17 7/14  8/19   Total Weekly Dose 22.75 mg avg 22.75mg 19.5mg Non compliant 22.75 mg avg? 22.75 mg avg 22.75 mg avg 22.75 mg avg  23mg Non-  Compliant 22.75 mg   avg   INR 3.2 4.1 3.2  3.0 3.5 3.0 3.4 3.0 2.7  3.4   Notes Unable to contact garlic 1x hold d/c garlic  ?? ?? ?? ?? Rec/d 6/22   email     Date 9/2 9/4 9/19 10/2 10/19 11/11 12/26 1/12/23 2/1 3/12 4/19 4/16   Total WeeklyDose 22.5 19.5mg 21 mg 21 mg 21 mg  23 mg 23 mg 21 mg ? Unable to contact. ~ 22.75 mg   INR 3.8 2.8 2.9 3.1 2.9 3.3 2.7 2.7 3.2  2.6 2.1 3.5   Notes rec 9/13 1x hold rec 9/13 rec 9/20  Self-adj   recvd 11/15   Unable to contact Emailed rec'd 1/16 rec 2/2 rec 3/13 Rcv/d 4/20 Reported 5/2 in [facility] and called clinic 5/17     Date 5/18 5/25 6/6 6/26 7/10 7/27  8/4 8/30 09/17 10/16 11/2  11/17   Total WeeklyDose 23 mg ~22.75 mg  ~22.75 mg ~ 22.75 mg ~22.75 mg ~22.5 mg  22.5mg ~22.75mg ~22.75 mg 22.5 mg ~22.75 mg 24.5 mg   INR 3.4 2.7 2.8 2.4  2.6 2.8 1.9 2.0 2.5 3.3 2.5 2.0 2.5    Notes  call   Rec'd 7/11 Rec'd  7/28  clinda Rec 8/31 REC 09/20 Rec'd 10/20/23 Rec'd 11/3  Coq10 change      Date 11/22 12/01 1/6 1/12 1/25/24 2/3 2/13  2/26 3/13/24 3/28 4/11/24 4/29 5/12   Total Weekly Dose 24.5 mg 24.5 mg 24.5 mg 26 mg 24.5 mg 28 mg 28 mg  29 mg 29 mg 29 mg 29 mg 30 mg 29.5 mg   INR 2.4 3.2 1.8 2.7 1.7 2.5 2.6  2.5 2.7 2.7 2.9 3.4 2.5   Notes   Rec 1/12   Rec'd 2/5  Boost x 1 Rec 2/15 call self-adj Rec 2/28 Rec'd 3/14 Rec'd 3/29  Sent message in [employer]  Self adjusted maintenance regimen/ message rec'd in [employer] Rec'd 5/15     Date 5/29/24 6/27             Total WeeklyDose 29.5 mg 30 mg             INR 3.3 3.0             Notes Rec'd 6/5/24 Rec'd 6/28               Phone Interview:  Tablet Strength: 3mg and 1mg tablets  Patient Contact Info: **will message on [email or web address] once monthly or if INR is out of range**PLEASE  USE [employer] TO COMMUNICATE -   Preferred *[phone]* anytime after noon  Other numbers on his profile:    disconnected   - brother in law, requested that we do NOT call this number  Lab Contact Info: [employer]   Lab: [facility] in [facility] phone: [phone] , Fax: [phone]     Patient Findings    Negatives: Signs/symptoms of thrombosis, Signs/symptoms of bleeding, Laboratory test error suspected, Change in health, Change in alcohol use, Change in activity, Upcoming invasive procedure, Emergency department visit, Upcoming dental procedure, Missed doses, Extra doses, Change in medications, Change in diet/appetite, Hospital admission, Bruising, Other complaints   Comments: All findings negative per patient.     Plan  1. INR was therapeutic yesterday at 3.0 (goal 2.5-3.5). Received results today. Instructed Mr. Turner to continue warfarin 4 mg alternating between 4.5 mg everyday until recheck.  2. Repeat INR in 2 weeks, 7/11/24  3. Doc Turner understands the importance of calling the [facility] if he notices any s/sx of bleeding, stroke, or abnormal  bruising, if any changes are made to his medications or medication doses (Rx, OTC, herbal), or if any upcoming procedures are scheduled. Doc Turner will likewise let us know if any other changes, questions, or concerns arise regarding anticoagulation therapy. he understands the importance of seeking medical attention immediately if he experiences any falls, vehicle accidents, or abnormal bleeding or bruising. Doc Turner voiced understanding of this information and confirms that he has the [facility]'s contact information. Otherwise, we will plan to contact the patient once monthly or if his INR is out of range.    Radha Trujillo CPhT   13:27 EDT 6/28/2024    IYady, [facility], have reviewed the note in full and agree with the assessment and plan.  06/28/24  14:59 EDT

## 2024-07-07 DIAGNOSIS — I48.92 PAROXYSMAL ATRIAL FLUTTER: ICD-10-CM

## 2024-07-09 RX ORDER — WARFARIN SODIUM 4 MG/1
TABLET ORAL
Qty: 60 TABLET | Refills: 2 | Status: SHIPPED | OUTPATIENT
Start: 2024-07-09

## 2024-07-16 LAB — INR PPP: 3.1

## 2024-07-17 ENCOUNTER — ANTICOAGULATION VISIT (OUTPATIENT)
Dept: PHARMACY | Facility: HOSPITAL | Age: 74
End: 2024-07-17
Payer: MEDICARE

## 2024-07-17 DIAGNOSIS — Z95.2 HX OF MITRAL VALVE REPLACEMENT WITH MECHANICAL VALVE: ICD-10-CM

## 2024-07-17 DIAGNOSIS — I48.92 PAROXYSMAL ATRIAL FLUTTER: Primary | ICD-10-CM

## 2024-07-17 NOTE — PROGRESS NOTES
Anticoagulation Clinic - Remote Progress Note  [facility]  Frequency of Monitorin-4x a month    Indication: Hx of mitral valve replacement with mechanical valve KONG Mendoza  Referring Provider: Dr. Garcia (last ravindra: 23)  Initial Warfarin Start Date:    Goal INR: 2.5-3.5  Current Drug Interactions: ensure (once daily); amitriptyline (PRN)  CHADS-VASc: 2 (age, HTN)     Diet: iceberg lettuce 1x/week, ensure 3-4x/week, V8 juice (20)  Alcohol: 12 pack of beer/week  Tobacco: None  OTC Pain Medication: None     INR History:  Date 8/25 9/11 9/29 10/19 11/5 12/2 12/11 1/4/18 1/30 2/19 3/7 4/5   Total Weekly Dose 24mg 24mg 24mg 22.75  mg? 22.75  mg? ? 22.5mg 22.5mg 22.5mg 22.5mg 22.5mg 22.5mg   INR 3.3 2.8 2.9 3.1 3.5 2.7 2.7 2.6 2.9 2.5 3.3 3.7   Notes    LVM LVM   call   LVM      Date 4/18 5/20 6/5 6/27 7/24 7/31 9/9 10/8 11/8 12/14 2/20/19 2/25   Total Weekly Dose 22.5mg 23mg 22.5mg  23mg 24mg 24mg unable   to verify unable   to verify unable   to verify unable   to verify 23mg   INR 2.6 2.8 2.9 2.4 2.9 3.3 3.4 3.0 2.7 2.6 3.8 3.4   Notes      rec'vd   recv'd 10/13;  mult calls         Date 3/11 3/25 4/9 5/1 5/15 6/11 6/27 7/10 7/26 8/9 9/11 10/3   Total Weekly Dose 23mg 23mg 22.5mg 23mg 22.5mg 23mg  22.5mg 22.5mg 22.5mg 23mg 22.5mg   INR 3.6 3.0 3.2 3.3 2.5 3.1 3.6 2.9 2.3 2.9 2.9 2.8   Notes        reported          Date 10/15 10/24 10/31 11/8 11/15 11/22 12/7 12/18 12/27 1/15/20 1/30 2/18   Total Weekly Dose 23mg 22.5mg 23mg 23mg 23.5mg 23mg 22.5mg 23mg 22.5mg 23mg ??? 23mg    INR 3.3 3.2 2.7 2.3 2.8 2.7 3.2  2.5 2.5 3.1 2.7 2.4   Notes  reported 10/25; sick; cefdinir stop cefdinir 10/29    Reported   Inc GLV  Unable to reach pt MVI; decr Ensure     Date 3/5 3/17 4/4 4/23 5/4 5/19 6/2 6/24 7/22 7/31 8/26 9/3   Total Weekly Dose 23mg 22.5mg 22.5mg 23mg 23mg 23mg? 23mg 22.75  mg avg 22.75  mg 23mg 22.75  mg 23.5mg   INR 2.8 3.0 3.3 2.9 2.6 2.8 2.9 2.49 2.4 3.1 2.5 3.4   Notes  call recv'd  4/13 rec'vd 4/24; call call recv'd  5/20; call recv'd 6/3; call  recv'd 7/31 recv'd 7/31 recv'd 9/3 Self-adj  CoQ10; email     Date 9/11 10/8 10/13 11/24 12/3 12/22 1/4/21 2/6 2/8 2/19 3/9 4/15   Total Weekly Dose 23mg 23.5mg  avg 23.5mg   avg 23.5mg  avg 23.5mg 22.75  mg avg 22.75  mg avg 22.5mg 19mg 22.75  mg avg 22.75   mg avg 22.75   mg avg   INR 2.8 3.4 3.3 3.7 3.1 2.7 2.6 3.7 3.2 2.8 3.2 2.6   Notes  email email Email; less Ensure Self-adjust dose dec  recv'd 1/5; call email Email; Self held x1 email email      Date 5/5 5/26 6/30 7/9 7/28 8/23 9/29 10/18 11/11 12/17 1/3/22 1/25   Total WeeklyDose 22.75 mg avg 22.75  mg avg  22.75 mg avg 22.75  mg avg 22.75 mg avg 22.75 mg avg 22.75 mg avg 22.75 mg avg 22.75 mg avg 22.75 mg avg 22.75 mg avg   INR 2.9 2.8 3.5 2.8 2.5 3.0 2.6 2.9 2.7 3.7 3.0 2.9   Notes email; fall rcv'd 5/27  rec'vd 7/12; email rec'vd 8/3  email  email Unable to contact email; Self-heldx1 rec'd 2/23     Date 2/23 3/26 3/28  4/17 5/24 5/24 5/26 6/17 7/14  8/19   Total Weekly Dose 22.75 mg avg 22.75mg 19.5mg Non compliant 22.75 mg avg? 22.75 mg avg 22.75 mg avg 22.75 mg avg  23mg Non-  Compliant 22.75 mg   avg   INR 3.2 4.1 3.2  3.0 3.5 3.0 3.4 3.0 2.7  3.4   Notes Unable to contact garlic 1x hold d/c garlic  ?? ?? ?? ?? Rec/d 6/22   email     Date 9/2 9/4 9/19 10/2 10/19 11/11 12/26 1/12/23 2/1 3/12 4/19 4/16   Total WeeklyDose 22.5 19.5mg 21 mg 21 mg 21 mg  23 mg 23 mg 21 mg ? Unable to contact. ~ 22.75 mg   INR 3.8 2.8 2.9 3.1 2.9 3.3 2.7 2.7 3.2  2.6 2.1 3.5   Notes rec 9/13 1x hold rec 9/13 rec 9/20  Self-adj   recvd 11/15   Unable to contact Emailed rec'd 1/16 rec 2/2 rec 3/13 Rcv/d 4/20 Reported 5/2 in [facility] and called clinic 5/17     Date 5/18 5/25 6/6 6/26 7/10 7/27  8/4 8/30 09/17 10/16 11/2  11/17   Total WeeklyDose 23 mg ~22.75 mg  ~22.75 mg ~ 22.75 mg ~22.75 mg ~22.5 mg  22.5mg ~22.75mg ~22.75 mg 22.5 mg ~22.75 mg 24.5 mg   INR 3.4 2.7 2.8 2.4  2.6 2.8 1.9 2.0 2.5 3.3 2.5 2.0 2.5    Notes  call   Rec'd 7/11 Rec'd  7/28  clinda Rec 8/31 REC 09/20 Rec'd 10/20/23 Rec'd 11/3  Coq10 change      Date 11/22 12/01 1/6 1/12 1/25/24 2/3 2/13  2/26 3/13/24 3/28 4/11/24 4/29 5/12   Total Weekly Dose 24.5 mg 24.5 mg 24.5 mg 26 mg 24.5 mg 28 mg 28 mg  29 mg 29 mg 29 mg 29 mg 30 mg 29.5 mg   INR 2.4 3.2 1.8 2.7 1.7 2.5 2.6  2.5 2.7 2.7 2.9 3.4 2.5   Notes   Rec 1/12   Rec'd 2/5  Boost x 1 Rec 2/15 call self-adj Rec 2/28 Rec'd 3/14 Rec'd 3/29  Sent message in [employer]  Self adjusted maintenance regimen/ message rec'd in [employer] Rec'd 5/15     Date 5/29/24 6/27 7/16            Total WeeklyDose 29.5 mg 30 mg             INR 3.3 3.0 3.1            Notes Rec'd 6/5/24 Rec'd 6/28               Phone Interview:  Tablet Strength: 3mg and 1mg tablets  Patient Contact Info: **will message on [employer] once monthly or if INR is out of range**PLEASE  USE [employer] TO COMMUNICATE -   Preferred *[phone]* anytime after noon  Other numbers on his profile:    disconnected   - brother in law, requested that we do NOT call this number  Lab Contact Info: [employer]   Lab: [facility] in [facility] phone: [phone] , Fax: [phone]       Patient Findings    Negatives: Signs/symptoms of thrombosis, Signs/symptoms of bleeding, Laboratory test error suspected, Change in health, Change in alcohol use, Change in activity, Upcoming invasive procedure, Emergency department visit, Upcoming dental procedure, Missed doses, Extra doses, Change in medications, Change in diet/appetite, Hospital admission, Bruising, Other complaints   Comments: All findings negative per patient.     Plan  1. INR was therapeutic yesterday at 3.0 (goal 2.5-3.5). Received results today. Instructed Mr. Turner to continue warfarin 4 mg alternating between 4.5 mg everyday until recheck.  2. Repeat INR in 2 weeks, 7/11/24  3. Doc Turner understands the importance of calling the [facility] if he notices any s/sx of bleeding, stroke, or  abnormal bruising, if any changes are made to his medications or medication doses (Rx, OTC, herbal), or if any upcoming procedures are scheduled. Doc Turner will likewise let us know if any other changes, questions, or concerns arise regarding anticoagulation therapy. he understands the importance of seeking medical attention immediately if he experiences any falls, vehicle accidents, or abnormal bleeding or bruising. Doc Turner voiced understanding of this information and confirms that he has the [facility]'s contact information. Otherwise, we will plan to contact the patient once monthly or if his INR is out of range.    Yady Escobedo, PharmD  07/17/24   09:22 EDT

## 2024-07-25 NOTE — PROGRESS NOTES
Anticoagulation Clinic - Remote Progress Note  [facility]  Frequency of Monitorin-4x a month    Indication: Hx of mitral valve replacement with mechanical valve KONG Mendoza  Referring Provider: Dr. Garcia (last ravindra: 23)  Initial Warfarin Start Date:    Goal INR: 2.5-3.5  Current Drug Interactions: ensure (once daily); amitriptyline (PRN)  CHADS-VASc: 2 (age, HTN)     Diet: iceberg lettuce 1x/week, ensure 3-4x/week, V8 juice (20)  Alcohol: 12 pack of beer/week  Tobacco: None  OTC Pain Medication: None     INR History:  Date 8/25 9/11 9/29 10/19 11/5 12/2 12/11 1/4/18 1/30 2/19 3/7 4/5   Total Weekly Dose 24mg 24mg 24mg 22.75  mg? 22.75  mg? ? 22.5mg 22.5mg 22.5mg 22.5mg 22.5mg 22.5mg   INR 3.3 2.8 2.9 3.1 3.5 2.7 2.7 2.6 2.9 2.5 3.3 3.7   Notes    LVM LVM   call   LVM      Date 4/18 5/20 6/5 6/27 7/24 7/31 9/9 10/8 11/8 12/14 2/20/19 2/25   Total Weekly Dose 22.5mg 23mg 22.5mg  23mg 24mg 24mg unable   to verify unable   to verify unable   to verify unable   to verify 23mg   INR 2.6 2.8 2.9 2.4 2.9 3.3 3.4 3.0 2.7 2.6 3.8 3.4   Notes      rec'vd   recv'd 10/13;  mult calls         Date 3/11 3/25 4/9 5/1 5/15 6/11 6/27 7/10 7/26 8/9 9/11 10/3   Total Weekly Dose 23mg 23mg 22.5mg 23mg 22.5mg 23mg  22.5mg 22.5mg 22.5mg 23mg 22.5mg   INR 3.6 3.0 3.2 3.3 2.5 3.1 3.6 2.9 2.3 2.9 2.9 2.8   Notes        reported          Date 10/15 10/24 10/31 11/8 11/15 11/22 12/7 12/18 12/27 1/15/20 1/30 2/18   Total Weekly Dose 23mg 22.5mg 23mg 23mg 23.5mg 23mg 22.5mg 23mg 22.5mg 23mg ??? 23mg    INR 3.3 3.2 2.7 2.3 2.8 2.7 3.2  2.5 2.5 3.1 2.7 2.4   Notes  reported 10/25; sick; cefdinir stop cefdinir 10/29    Reported   Inc GLV  Unable to reach pt MVI; decr Ensure     Date 3/5 3/17 4/4 4/23 5/4 5/19 6/2 6/24 7/22 7/31 8/26 9/3   Total Weekly Dose 23mg 22.5mg 22.5mg 23mg 23mg 23mg? 23mg 22.75  mg avg 22.75  mg 23mg 22.75  mg 23.5mg   INR 2.8 3.0 3.3 2.9 2.6 2.8 2.9 2.49 2.4 3.1 2.5 3.4   Notes  call recv'd  4/13 rec'vd 4/24; call call recv'd  5/20; call recv'd 6/3; call  recv'd 7/31 recv'd 7/31 recv'd 9/3 Self-adj  CoQ10; email     Date 9/11 10/8 10/13 11/24 12/3 12/22 1/4/21 2/6 2/8 2/19 3/9 4/15   Total Weekly Dose 23mg 23.5mg  avg 23.5mg   avg 23.5mg  avg 23.5mg 22.75  mg avg 22.75  mg avg 22.5mg 19mg 22.75  mg avg 22.75   mg avg 22.75   mg avg   INR 2.8 3.4 3.3 3.7 3.1 2.7 2.6 3.7 3.2 2.8 3.2 2.6   Notes  email email Email; less Ensure Self-adjust dose dec  recv'd 1/5; call email Email; Self held x1 email email      Date 5/5 5/26 6/30 7/9 7/28 8/23 9/29 10/18 11/11 12/17 1/3/22 1/25   Total WeeklyDose 22.75 mg avg 22.75  mg avg  22.75 mg avg 22.75  mg avg 22.75 mg avg 22.75 mg avg 22.75 mg avg 22.75 mg avg 22.75 mg avg 22.75 mg avg 22.75 mg avg   INR 2.9 2.8 3.5 2.8 2.5 3.0 2.6 2.9 2.7 3.7 3.0 2.9   Notes email; fall rcv'd 5/27  rec'vd 7/12; email rec'vd 8/3  email  email Unable to contact email; Self-heldx1 rec'd 2/23     Date 2/23 3/26 3/28  4/17 5/24 5/24 5/26 6/17 7/14  8/19   Total Weekly Dose 22.75 mg avg 22.75mg 19.5mg Non compliant 22.75 mg avg? 22.75 mg avg 22.75 mg avg 22.75 mg avg  23mg Non-  Compliant 22.75 mg   avg   INR 3.2 4.1 3.2  3.0 3.5 3.0 3.4 3.0 2.7  3.4   Notes Unable to contact garlic 1x hold d/c garlic  ?? ?? ?? ?? Rec/d 6/22   email     Date 9/2 9/4 9/19 10/2 10/19 11/11 12/26 1/12/23 2/1 3/12 4/19 4/16   Total WeeklyDose 22.5 19.5mg 21 mg 21 mg 21 mg  23 mg 23 mg 21 mg ? Unable to contact. ~ 22.75 mg   INR 3.8 2.8 2.9 3.1 2.9 3.3 2.7 2.7 3.2  2.6 2.1 3.5   Notes rec 9/13 1x hold rec 9/13 rec 9/20  Self-adj   recvd 11/15   Unable to contact Emailed rec'd 1/16 rec 2/2 rec 3/13 Rcv/d 4/20 Reported 5/2 in [facility] and called clinic 5/17     Date 5/18 5/25 6/6 6/26 7/10 7/27  8/4 8/30 09/17 10/16 11/2  11/17   Total WeeklyDose 23 mg ~22.75 mg  ~22.75 mg ~ 22.75 mg ~22.75 mg ~22.5 mg  22.5mg ~22.75mg ~22.75 mg 22.5 mg ~22.75 mg 24.5 mg   INR 3.4 2.7 2.8 2.4  2.6 2.8 1.9 2.0 2.5 3.3 2.5 2.0 2.5    Notes  call   Rec'd 7/11 Rec'd  7/28  clinda Rec 8/31 REC 09/20 Rec'd 10/20/23 Rec'd 11/3  Coq10 change      Date 11/22 12/01 1/6 1/12 1/25/24 2/3 2/13  2/26 3/13/24 3/28 4/11/24 4/29 5/12   Total Weekly Dose 24.5 mg 24.5 mg 24.5 mg 26 mg 24.5 mg 28 mg 28 mg  29 mg 29 mg 29 mg 29 mg 30 mg 29.5 mg   INR 2.4 3.2 1.8 2.7 1.7 2.5 2.6  2.5 2.7 2.7 2.9 3.4 2.5   Notes   Rec 1/12   Rec'd 2/5  Boost x 1 Rec 2/15 call self-adj Rec 2/28 Rec'd 3/14 Rec'd 3/29  Sent message in [employer]  Self adjusted maintenance regimen/ message rec'd in [employer] Rec'd 5/15     Date 5/29/24 6/27 7/16            Total WeeklyDose 29.5 mg 30 mg 30 mg            INR 3.3 3.0 3.1            Notes Rec'd 6/5/24 Rec'd 6/28 Unable to contact              Phone Interview:  Tablet Strength: 3mg and 1mg tablets  Patient Contact Info: **will message on [employer] once monthly or if INR is out of range**PLEASE  USE [employer] TO COMMUNICATE -   Preferred *[phone]* anytime after noon  Other numbers on his profile:    disconnected   - brother in law, requested that we do NOT call this number  Lab Contact Info: [employer]   Lab: [facility] phone: [phone] , Fax: [phone]     Unable to reach patient, sent [employer] message 7/17, 7/23, 8/5 - called and LVM 7/18, 7/22, 7/31, 8/5, 8/9  Sending unable to contact letter 8/14 and closing encounter.     Plan  1. INR was therapeutic yesterday at 3.0 (goal 2.5-3.5). Received results today. 2. Repeat INR in 2 weeks, 3. Doc Turner understands the importance of calling the [facility] if he notices any s/sx of bleeding, stroke, or abnormal bruising, if any changes are made to his medications or medication doses (Rx, OTC, herbal), or if any upcoming procedures are scheduled. Doc Turner will likewise let us know if any other changes, questions, or concerns arise regarding anticoagulation therapy. he understands the importance of seeking medical attention immediately if he experiences  any falls, vehicle accidents, or abnormal bleeding or bruising. Doc Turner voiced understanding of this information and confirms that he has the [facility]'s contact information. Otherwise, we will plan to contact the patient once monthly or if his INR is out of range.    Dori Rivera, PharmD   8/14/2024  10:58 EDT

## 2024-07-25 NOTE — PROGRESS NOTES
Anticoagulation Clinic - Remote Progress Note  [facility]  Frequency of Monitorin-4x a month    Indication: Hx of mitral valve replacement with mechanical valve KONG Mendoza  Referring Provider: Dr. Garcia (last ravindra: 23)  Initial Warfarin Start Date:    Goal INR: 2.5-3.5  Current Drug Interactions: ensure (once daily); amitriptyline (PRN)  CHADS-VASc: 2 (age, HTN)     Diet: iceberg lettuce 1x/week, ensure 3-4x/week, V8 juice (20)  Alcohol: 12 pack of beer/week  Tobacco: None  OTC Pain Medication: None     INR History:  Date 8/25 9/11 9/29 10/19 11/5 12/2 12/11 1/4/18 1/30 2/19 3/7 4/5   Total Weekly Dose 24mg 24mg 24mg 22.75  mg? 22.75  mg? ? 22.5mg 22.5mg 22.5mg 22.5mg 22.5mg 22.5mg   INR 3.3 2.8 2.9 3.1 3.5 2.7 2.7 2.6 2.9 2.5 3.3 3.7   Notes    LVM LVM   call   LVM      Date 4/18 5/20 6/5 6/27 7/24 7/31 9/9 10/8 11/8 12/14 2/20/19 2/25   Total Weekly Dose 22.5mg 23mg 22.5mg  23mg 24mg 24mg unable   to verify unable   to verify unable   to verify unable   to verify 23mg   INR 2.6 2.8 2.9 2.4 2.9 3.3 3.4 3.0 2.7 2.6 3.8 3.4   Notes      rec'vd   recv'd 10/13;  mult calls         Date 3/11 3/25 4/9 5/1 5/15 6/11 6/27 7/10 7/26 8/9 9/11 10/3   Total Weekly Dose 23mg 23mg 22.5mg 23mg 22.5mg 23mg  22.5mg 22.5mg 22.5mg 23mg 22.5mg   INR 3.6 3.0 3.2 3.3 2.5 3.1 3.6 2.9 2.3 2.9 2.9 2.8   Notes        reported          Date 10/15 10/24 10/31 11/8 11/15 11/22 12/7 12/18 12/27 1/15/20 1/30 2/18   Total Weekly Dose 23mg 22.5mg 23mg 23mg 23.5mg 23mg 22.5mg 23mg 22.5mg 23mg ??? 23mg    INR 3.3 3.2 2.7 2.3 2.8 2.7 3.2  2.5 2.5 3.1 2.7 2.4   Notes  reported 10/25; sick; cefdinir stop cefdinir 10/29    Reported   Inc GLV  Unable to reach pt MVI; decr Ensure     Date 3/5 3/17 4/4 4/23 5/4 5/19 6/2 6/24 7/22 7/31 8/26 9/3   Total Weekly Dose 23mg 22.5mg 22.5mg 23mg 23mg 23mg? 23mg 22.75  mg avg 22.75  mg 23mg 22.75  mg 23.5mg   INR 2.8 3.0 3.3 2.9 2.6 2.8 2.9 2.49 2.4 3.1 2.5 3.4   Notes  call recv'd  4/13 rec'vd 4/24; call call recv'd  5/20; call recv'd 6/3; call  recv'd 7/31 recv'd 7/31 recv'd 9/3 Self-adj  CoQ10; email     Date 9/11 10/8 10/13 11/24 12/3 12/22 1/4/21 2/6 2/8 2/19 3/9 4/15   Total Weekly Dose 23mg 23.5mg  avg 23.5mg   avg 23.5mg  avg 23.5mg 22.75  mg avg 22.75  mg avg 22.5mg 19mg 22.75  mg avg 22.75   mg avg 22.75   mg avg   INR 2.8 3.4 3.3 3.7 3.1 2.7 2.6 3.7 3.2 2.8 3.2 2.6   Notes  email email Email; less Ensure Self-adjust dose dec  recv'd 1/5; call email Email; Self held x1 email email      Date 5/5 5/26 6/30 7/9 7/28 8/23 9/29 10/18 11/11 12/17 1/3/22 1/25   Total WeeklyDose 22.75 mg avg 22.75  mg avg  22.75 mg avg 22.75  mg avg 22.75 mg avg 22.75 mg avg 22.75 mg avg 22.75 mg avg 22.75 mg avg 22.75 mg avg 22.75 mg avg   INR 2.9 2.8 3.5 2.8 2.5 3.0 2.6 2.9 2.7 3.7 3.0 2.9   Notes email; fall rcv'd 5/27  rec'vd 7/12; email rec'vd 8/3  email  email Unable to contact email; Self-heldx1 rec'd 2/23     Date 2/23 3/26 3/28  4/17 5/24 5/24 5/26 6/17 7/14  8/19   Total Weekly Dose 22.75 mg avg 22.75mg 19.5mg Non compliant 22.75 mg avg? 22.75 mg avg 22.75 mg avg 22.75 mg avg  23mg Non-  Compliant 22.75 mg   avg   INR 3.2 4.1 3.2  3.0 3.5 3.0 3.4 3.0 2.7  3.4   Notes Unable to contact garlic 1x hold d/c garlic  ?? ?? ?? ?? Rec/d 6/22   email     Date 9/2 9/4 9/19 10/2 10/19 11/11 12/26 1/12/23 2/1 3/12 4/19 4/16   Total WeeklyDose 22.5 19.5mg 21 mg 21 mg 21 mg  23 mg 23 mg 21 mg ? Unable to contact. ~ 22.75 mg   INR 3.8 2.8 2.9 3.1 2.9 3.3 2.7 2.7 3.2  2.6 2.1 3.5   Notes rec 9/13 1x hold rec 9/13 rec 9/20  Self-adj   recvd 11/15   Unable to contact Emailed rec'd 1/16 rec 2/2 rec 3/13 Rcv/d 4/20 Reported 5/2 in [facility] and called clinic 5/17     Date 5/18 5/25 6/6 6/26 7/10 7/27  8/4 8/30 09/17 10/16 11/2  11/17   Total WeeklyDose 23 mg ~22.75 mg  ~22.75 mg ~ 22.75 mg ~22.75 mg ~22.5 mg  22.5mg ~22.75mg ~22.75 mg 22.5 mg ~22.75 mg 24.5 mg   INR 3.4 2.7 2.8 2.4  2.6 2.8 1.9 2.0 2.5 3.3 2.5 2.0 2.5    Notes  call   Rec'd 7/11 Rec'd  7/28  clinda Rec 8/31 REC 09/20 Rec'd 10/20/23 Rec'd 11/3  Coq10 change      Date 11/22 12/01 1/6 1/12 1/25/24 2/3 2/13  2/26 3/13/24 3/28 4/11/24 4/29 5/12   Total Weekly Dose 24.5 mg 24.5 mg 24.5 mg 26 mg 24.5 mg 28 mg 28 mg  29 mg 29 mg 29 mg 29 mg 30 mg 29.5 mg   INR 2.4 3.2 1.8 2.7 1.7 2.5 2.6  2.5 2.7 2.7 2.9 3.4 2.5   Notes   Rec 1/12   Rec'd 2/5  Boost x 1 Rec 2/15 call self-adj Rec 2/28 Rec'd 3/14 Rec'd 3/29  Sent message in [employer]  Self adjusted maintenance regimen/ message rec'd in [employer] Rec'd 5/15     Date 5/29/24 6/27 7/16            Total WeeklyDose 29.5 mg 30 mg 30 mg            INR 3.3 3.0 3.1            Notes Rec'd 6/5/24 Rec'd 6/28               Phone Interview:  Tablet Strength: 3mg and 1mg tablets  Patient Contact Info: **will message on [employer] once monthly or if INR is out of range**PLEASE  USE [employer] TO COMMUNICATE -   Preferred *[phone]* anytime after noon  Other numbers on his profile:    disconnected   - brother in law, requested that we do NOT call this number  Lab Contact Info: [employer]   Lab: [facility] phone: [phone] , Fax: [phone]         Plan  1. INR was therapeutic yesterday at 3.0 (goal 2.5-3.5). Received results today. Instructed Mr. Turner to continue warfarin 4 mg alternating between 4.5 mg everyday until recheck.  2. Repeat INR in 2 weeks, 7/11/24  3. Doc Turner understands the importance of calling the [facility] if he notices any s/sx of bleeding, stroke, or abnormal bruising, if any changes are made to his medications or medication doses (Rx, OTC, herbal), or if any upcoming procedures are scheduled. Doc Turner will likewise let us know if any other changes, questions, or concerns arise regarding anticoagulation therapy. he understands the importance of seeking medical attention immediately if he experiences any falls, vehicle accidents, or abnormal bleeding or bruising. Doc Turner  voiced understanding of this information and confirms that he has the [facility]'s contact information. Otherwise, we will plan to contact the patient once monthly or if his INR is out of range.    Yady Escobedo, PharmD  07/25/24   09:22 EDT

## 2024-08-07 RX ORDER — SOTALOL HYDROCHLORIDE 80 MG/1
80 TABLET ORAL 2 TIMES DAILY
Qty: 90 TABLET | Refills: 1 | Status: SHIPPED | OUTPATIENT
Start: 2024-08-07

## 2024-08-19 LAB — INR PPP: 3.6

## 2024-08-20 ENCOUNTER — ANTICOAGULATION VISIT (OUTPATIENT)
Dept: PHARMACY | Facility: HOSPITAL | Age: 74
End: 2024-08-20
Payer: MEDICARE

## 2024-08-20 DIAGNOSIS — Z95.2 HX OF MITRAL VALVE REPLACEMENT WITH MECHANICAL VALVE: ICD-10-CM

## 2024-08-20 DIAGNOSIS — I48.92 PAROXYSMAL ATRIAL FLUTTER: Primary | ICD-10-CM

## 2024-08-20 NOTE — LETTER
August 20, 2024     Doc Turner    Patient: Doc Turner   YOB: 1950   Date of Visit: 8/20/2024     Dear oDc Turner:       Thank you for referring Doc Turner to me for evaluation. Below are the relevant portions of my assessment and plan of care.    INR on 8/19/2024 was slightly above range at 3.6 (goal 2.5-3.5).         If you have questions, please do not hesitate to call me. I look forward to following Doc along with you.         Sincerely,        Patricia Sauceda [facility]

## 2024-08-20 NOTE — PROGRESS NOTES
Anticoagulation Clinic - Remote Progress Note  [facility]  Frequency of Monitorin-4x a month    Indication: Hx of mitral valve replacement with mechanical valve KONG Mendoza  Referring Provider: Dr. Garcia (last ravindra: 23)  Initial Warfarin Start Date:    Goal INR: 2.5-3.5  Current Drug Interactions: ensure (once daily); amitriptyline (PRN)  CHADS-VASc: 2 (age, HTN)     Diet: iceberg lettuce 1x/week, ensure 3-4x/week, V8 juice (20)  Alcohol: 12 pack of beer/week  Tobacco: None  OTC Pain Medication: None     INR History:  Date 8/25 9/11 9/29 10/19 11/5 12/2 12/11 1/4/18 1/30 2/19 3/7 4/5   Total Weekly Dose 24mg 24mg 24mg 22.75  mg? 22.75  mg? ? 22.5mg 22.5mg 22.5mg 22.5mg 22.5mg 22.5mg   INR 3.3 2.8 2.9 3.1 3.5 2.7 2.7 2.6 2.9 2.5 3.3 3.7   Notes    LVM LVM   call   LVM      Date 4/18 5/20 6/5 6/27 7/24 7/31 9/9 10/8 11/8 12/14 2/20/19 2/25   Total Weekly Dose 22.5mg 23mg 22.5mg  23mg 24mg 24mg unable   to verify unable   to verify unable   to verify unable   to verify 23mg   INR 2.6 2.8 2.9 2.4 2.9 3.3 3.4 3.0 2.7 2.6 3.8 3.4   Notes      rec'vd   recv'd 10/13;  mult calls         Date 3/11 3/25 4/9 5/1 5/15 6/11 6/27 7/10 7/26 8/9 9/11 10/3   Total Weekly Dose 23mg 23mg 22.5mg 23mg 22.5mg 23mg  22.5mg 22.5mg 22.5mg 23mg 22.5mg   INR 3.6 3.0 3.2 3.3 2.5 3.1 3.6 2.9 2.3 2.9 2.9 2.8   Notes        reported          Date 10/15 10/24 10/31 11/8 11/15 11/22 12/7 12/18 12/27 1/15/20 1/30 2/18   Total Weekly Dose 23mg 22.5mg 23mg 23mg 23.5mg 23mg 22.5mg 23mg 22.5mg 23mg ??? 23mg    INR 3.3 3.2 2.7 2.3 2.8 2.7 3.2  2.5 2.5 3.1 2.7 2.4   Notes  reported 10/25; sick; cefdinir stop cefdinir 10/29    Reported   Inc GLV  Unable to reach pt MVI; decr Ensure     Date 3/5 3/17 4/4 4/23 5/4 5/19 6/2 6/24 7/22 7/31 8/26 9/3   Total Weekly Dose 23mg 22.5mg 22.5mg 23mg 23mg 23mg? 23mg 22.75  mg avg 22.75  mg 23mg 22.75  mg 23.5mg   INR 2.8 3.0 3.3 2.9 2.6 2.8 2.9 2.49 2.4 3.1 2.5 3.4   Notes  call recv'd  4/13 rec'vd 4/24; call call recv'd  5/20; call recv'd 6/3; call  recv'd 7/31 recv'd 7/31 recv'd 9/3 Self-adj  CoQ10; email     Date 9/11 10/8 10/13 11/24 12/3 12/22 1/4/21 2/6 2/8 2/19 3/9 4/15   Total Weekly Dose 23mg 23.5mg  avg 23.5mg   avg 23.5mg  avg 23.5mg 22.75  mg avg 22.75  mg avg 22.5mg 19mg 22.75  mg avg 22.75   mg avg 22.75   mg avg   INR 2.8 3.4 3.3 3.7 3.1 2.7 2.6 3.7 3.2 2.8 3.2 2.6   Notes  email email Email; less Ensure Self-adjust dose dec  recv'd 1/5; call email Email; Self held x1 email email      Date 5/5 5/26 6/30 7/9 7/28 8/23 9/29 10/18 11/11 12/17 1/3/22 1/25   Total WeeklyDose 22.75 mg avg 22.75  mg avg  22.75 mg avg 22.75  mg avg 22.75 mg avg 22.75 mg avg 22.75 mg avg 22.75 mg avg 22.75 mg avg 22.75 mg avg 22.75 mg avg   INR 2.9 2.8 3.5 2.8 2.5 3.0 2.6 2.9 2.7 3.7 3.0 2.9   Notes email; fall rcv'd 5/27  rec'vd 7/12; email rec'vd 8/3  email  email Unable to contact email; Self-heldx1 rec'd 2/23     Date 2/23 3/26 3/28  4/17 5/24 5/24 5/26 6/17 7/14  8/19   Total Weekly Dose 22.75 mg avg 22.75mg 19.5mg Non compliant 22.75 mg avg? 22.75 mg avg 22.75 mg avg 22.75 mg avg  23mg Non-  Compliant 22.75 mg   avg   INR 3.2 4.1 3.2  3.0 3.5 3.0 3.4 3.0 2.7  3.4   Notes Unable to contact garlic 1x hold d/c garlic  ?? ?? ?? ?? Rec/d 6/22   email     Date 9/2 9/4 9/19 10/2 10/19 11/11 12/26 1/12/23 2/1 3/12 4/19 4/16   Total WeeklyDose 22.5 19.5mg 21 mg 21 mg 21 mg  23 mg 23 mg 21 mg ? Unable to contact. ~ 22.75 mg   INR 3.8 2.8 2.9 3.1 2.9 3.3 2.7 2.7 3.2  2.6 2.1 3.5   Notes rec 9/13 1x hold rec 9/13 rec 9/20  Self-adj   recvd 11/15   Unable to contact Emailed rec'd 1/16 rec 2/2 rec 3/13 Rcv/d 4/20 Reported 5/2 in [facility] and called clinic 5/17     Date 5/18 5/25 6/6 6/26 7/10 7/27  8/4 8/30 09/17 10/16 11/2  11/17   Total WeeklyDose 23 mg ~22.75 mg  ~22.75 mg ~ 22.75 mg ~22.75 mg ~22.5 mg  22.5mg ~22.75mg ~22.75 mg 22.5 mg ~22.75 mg 24.5 mg   INR 3.4 2.7 2.8 2.4  2.6 2.8 1.9 2.0 2.5 3.3 2.5 2.0 2.5    Notes  call   Rec'd 7/11 Rec'd  7/28  clinda Rec 8/31 REC 09/20 Rec'd 10/20/23 Rec'd 11/3  Coq10 change      Date 11/22 12/01 1/6 1/12 1/25/24 2/3 2/13  2/26 3/13/24 3/28 4/11/24 4/29 5/12   Total Weekly Dose 24.5 mg 24.5 mg 24.5 mg 26 mg 24.5 mg 28 mg 28 mg  29 mg 29 mg 29 mg 29 mg 30 mg 29.5 mg   INR 2.4 3.2 1.8 2.7 1.7 2.5 2.6  2.5 2.7 2.7 2.9 3.4 2.5   Notes   Rec 1/12   Rec'd 2/5  Boost x 1 Rec 2/15 call self-adj Rec 2/28 Rec'd 3/14 Rec'd 3/29  Sent message in [employer]  Self adjusted maintenance regimen/ message rec'd in [employer] Rec'd 5/15     Date 5/29/24 6/27 7/16 8/19           Total WeeklyDose 29.5 mg 30 mg 30 mg Avg 29.75           INR 3.3 3.0 3.1 3.6           Notes Rec'd 6/5/24 Rec'd 6/28 Unable to contact Rec'd 8/20             Phone Interview:  Tablet Strength: 3mg and 1mg tablets  Patient Contact Info: **will message on [employer] once monthly or if INR is out of range**PLEASE  USE [employer] TO COMMUNICATE -   Preferred *[phone]* anytime after noon  Other numbers on his profile:    disconnected   - brother in law, requested that we do NOT call this number  Lab Contact Info: [employer]   Lab: [facility] phone: [phone] , Fax: [phone]     Patient Findings:    Positives: Change in diet/appetite   Negatives: Signs/symptoms of thrombosis, Signs/symptoms of bleeding, Laboratory test error suspected, Change in health, Change in alcohol use, Change in activity, Upcoming invasive procedure, Emergency department visit, Upcoming dental procedure, Missed doses, Extra doses, Change in medications, Hospital admission, Bruising, Other complaints   Comments: I had that high number and I cut back my daily essie seeds portion in half. The most recent result was okay. Essie seeds, it turns out, have Omega 3 fatty acid and I think that was my problem.    All other findings negative per patient           Plan  1. INR was slightly SUPRAtherapeutic  8/19 at 3.6 (goal 2.5-3.5). Received results t  8/20. Dale back from patient 8/28. Instructed to continue current regimen of alternating warfarin 4mg and 4.5mg every other day until recheck   2. Repeat INR in 2 weeks, 9/4  3. Doc Turner understands the importance of calling the [facility] if he notices any s/sx of bleeding, stroke, or abnormal bruising, if any changes are made to his medications or medication doses (Rx, OTC, herbal), or if any upcoming procedures are scheduled. Doc Turner will likewise let us know if any other changes, questions, or concerns arise regarding anticoagulation therapy. he understands the importance of seeking medical attention immediately if he experiences any falls, vehicle accidents, or abnormal bleeding or bruising. Doc Turner voiced understanding of this information and confirms that he has the [facility]'s contact information. Otherwise, we will plan to contact the patient once monthly or if his INR is out of range.        Patricia Sauceda, PharmD  8/28/2024  08:13 EDT

## 2024-08-27 LAB — INR PPP: 3.3

## 2024-08-28 ENCOUNTER — ANTICOAGULATION VISIT (OUTPATIENT)
Dept: PHARMACY | Facility: HOSPITAL | Age: 74
End: 2024-08-28
Payer: MEDICARE

## 2024-08-28 DIAGNOSIS — Z95.2 HX OF MITRAL VALVE REPLACEMENT WITH MECHANICAL VALVE: ICD-10-CM

## 2024-08-28 DIAGNOSIS — I48.92 PAROXYSMAL ATRIAL FLUTTER: Primary | ICD-10-CM

## 2024-08-28 NOTE — PROGRESS NOTES
Anticoagulation Clinic - Remote Progress Note  [facility]  Frequency of Monitorin-4x a month    Indication: Hx of mitral valve replacement with mechanical valve KONG Mendoza  Referring Provider: Dr. Garcia (last ravindra: 23)  Initial Warfarin Start Date:    Goal INR: 2.5-3.5  Current Drug Interactions: ensure (once daily); amitriptyline (PRN)  CHADS-VASc: 2 (age, HTN)     Diet: iceberg lettuce 1x/week, ensure 3-4x/week, V8 juice (20)  Alcohol: 12 pack of beer/week  Tobacco: None  OTC Pain Medication: None     INR History:  Date 8/25 9/11 9/29 10/19 11/5 12/2 12/11 1/4/18 1/30 2/19 3/7 4/5   Total Weekly Dose 24mg 24mg 24mg 22.75  mg? 22.75  mg? ? 22.5mg 22.5mg 22.5mg 22.5mg 22.5mg 22.5mg   INR 3.3 2.8 2.9 3.1 3.5 2.7 2.7 2.6 2.9 2.5 3.3 3.7   Notes    LVM LVM   call   LVM      Date 4/18 5/20 6/5 6/27 7/24 7/31 9/9 10/8 11/8 12/14 2/20/19 2/25   Total Weekly Dose 22.5mg 23mg 22.5mg  23mg 24mg 24mg unable   to verify unable   to verify unable   to verify unable   to verify 23mg   INR 2.6 2.8 2.9 2.4 2.9 3.3 3.4 3.0 2.7 2.6 3.8 3.4   Notes      rec'vd   recv'd 10/13;  mult calls         Date 3/11 3/25 4/9 5/1 5/15 6/11 6/27 7/10 7/26 8/9 9/11 10/3   Total Weekly Dose 23mg 23mg 22.5mg 23mg 22.5mg 23mg  22.5mg 22.5mg 22.5mg 23mg 22.5mg   INR 3.6 3.0 3.2 3.3 2.5 3.1 3.6 2.9 2.3 2.9 2.9 2.8   Notes        reported          Date 10/15 10/24 10/31 11/8 11/15 11/22 12/7 12/18 12/27 1/15/20 1/30 2/18   Total Weekly Dose 23mg 22.5mg 23mg 23mg 23.5mg 23mg 22.5mg 23mg 22.5mg 23mg ??? 23mg    INR 3.3 3.2 2.7 2.3 2.8 2.7 3.2  2.5 2.5 3.1 2.7 2.4   Notes  reported 10/25; sick; cefdinir stop cefdinir 10/29    Reported   Inc GLV  Unable to reach pt MVI; decr Ensure     Date 3/5 3/17 4/4 4/23 5/4 5/19 6/2 6/24 7/22 7/31 8/26 9/3   Total Weekly Dose 23mg 22.5mg 22.5mg 23mg 23mg 23mg? 23mg 22.75  mg avg 22.75  mg 23mg 22.75  mg 23.5mg   INR 2.8 3.0 3.3 2.9 2.6 2.8 2.9 2.49 2.4 3.1 2.5 3.4   Notes  call recv'd  4/13 rec'vd 4/24; call call recv'd  5/20; call recv'd 6/3; call  recv'd 7/31 recv'd 7/31 recv'd 9/3 Self-adj  CoQ10; email     Date 9/11 10/8 10/13 11/24 12/3 12/22 1/4/21 2/6 2/8 2/19 3/9 4/15   Total Weekly Dose 23mg 23.5mg  avg 23.5mg   avg 23.5mg  avg 23.5mg 22.75  mg avg 22.75  mg avg 22.5mg 19mg 22.75  mg avg 22.75   mg avg 22.75   mg avg   INR 2.8 3.4 3.3 3.7 3.1 2.7 2.6 3.7 3.2 2.8 3.2 2.6   Notes  email email Email; less Ensure Self-adjust dose dec  recv'd 1/5; call email Email; Self held x1 email email      Date 5/5 5/26 6/30 7/9 7/28 8/23 9/29 10/18 11/11 12/17 1/3/22 1/25   Total WeeklyDose 22.75 mg avg 22.75  mg avg  22.75 mg avg 22.75  mg avg 22.75 mg avg 22.75 mg avg 22.75 mg avg 22.75 mg avg 22.75 mg avg 22.75 mg avg 22.75 mg avg   INR 2.9 2.8 3.5 2.8 2.5 3.0 2.6 2.9 2.7 3.7 3.0 2.9   Notes email; fall rcv'd 5/27  rec'vd 7/12; email rec'vd 8/3  email  email Unable to contact email; Self-heldx1 rec'd 2/23     Date 2/23 3/26 3/28  4/17 5/24 5/24 5/26 6/17 7/14  8/19   Total Weekly Dose 22.75 mg avg 22.75mg 19.5mg Non compliant 22.75 mg avg? 22.75 mg avg 22.75 mg avg 22.75 mg avg  23mg Non-  Compliant 22.75 mg   avg   INR 3.2 4.1 3.2  3.0 3.5 3.0 3.4 3.0 2.7  3.4   Notes Unable to contact garlic 1x hold d/c garlic  ?? ?? ?? ?? Rec/d 6/22   email     Date 9/2 9/4 9/19 10/2 10/19 11/11 12/26 1/12/23 2/1 3/12 4/19 4/16   Total WeeklyDose 22.5 19.5mg 21 mg 21 mg 21 mg  23 mg 23 mg 21 mg ? Unable to contact. ~ 22.75 mg   INR 3.8 2.8 2.9 3.1 2.9 3.3 2.7 2.7 3.2  2.6 2.1 3.5   Notes rec 9/13 1x hold rec 9/13 rec 9/20  Self-adj   recvd 11/15   Unable to contact Emailed rec'd 1/16 rec 2/2 rec 3/13 Rcv/d 4/20 Reported 5/2 in [facility] and called clinic 5/17     Date 5/18 5/25 6/6 6/26 7/10 7/27  8/4 8/30 09/17 10/16 11/2  11/17   Total WeeklyDose 23 mg ~22.75 mg  ~22.75 mg ~ 22.75 mg ~22.75 mg ~22.5 mg  22.5mg ~22.75mg ~22.75 mg 22.5 mg ~22.75 mg 24.5 mg   INR 3.4 2.7 2.8 2.4  2.6 2.8 1.9 2.0 2.5 3.3 2.5 2.0 2.5    Notes  call   Rec'd 7/11 Rec'd  7/28  clinda Rec 8/31 REC 09/20 Rec'd 10/20/23 Rec'd 11/3  Coq10 change      Date 11/22 12/01 1/6 1/12 1/25/24 2/3 2/13  2/26 3/13/24 3/28 4/11/24 4/29 5/12   Total Weekly Dose 24.5 mg 24.5 mg 24.5 mg 26 mg 24.5 mg 28 mg 28 mg  29 mg 29 mg 29 mg 29 mg 30 mg 29.5 mg   INR 2.4 3.2 1.8 2.7 1.7 2.5 2.6  2.5 2.7 2.7 2.9 3.4 2.5   Notes   Rec 1/12   Rec'd 2/5  Boost x 1 Rec 2/15 call self-adj Rec 2/28 Rec'd 3/14 Rec'd 3/29  Sent message in [employer]  Self adjusted maintenance regimen/ message rec'd in [employer] Rec'd 5/15     Date 5/29/24 6/27 7/16 8/19 8/27          Total WeeklyDose 29.5 mg 30 mg 30 mg Avg 29.75           INR 3.3 3.0 3.1 3.6 3.3          Notes Rec'd 6/5/24 Rec'd 6/28 Unable to contact Rec'd 8/20 Rec'd 8/28            Phone Interview:  Tablet Strength: 3mg and 1mg tablets  Patient Contact Info: **will message on MXP4 once monthly or if INR is out of range**PLEASE  USE [email or web address] TO COMMUNICATE -   Preferred *[phone]* anytime after noon  Other numbers on his profile:    disconnected   - brother in law, requested that we do NOT call this number  Lab Contact Info: [employer]   Lab: [facility] phone: [phone] , Fax: [phone]     Patient Findings:    Negatives: Signs/symptoms of thrombosis, Signs/symptoms of bleeding, Laboratory test error suspected, Change in health, Change in alcohol use, Change in activity, Upcoming invasive procedure, Emergency department visit, Upcoming dental procedure, Missed doses, Extra doses, Change in medications, Change in diet/appetite, Hospital admission, Bruising, Other complaints   Comments: All findings negative per patient           Plan  1. INR was therapeutic yesterday 8/27 at 3.3 (goal 2.5-3.5). Received results today 8/28. Instructed to continue current regimen of alternating warfarin 4mg and 4.5mg every other day until recheck   2. Repeat INR in 2 weeks, 9/10  3. Doc Turner understands the importance of  calling the [facility] if he notices any s/sx of bleeding, stroke, or abnormal bruising, if any changes are made to his medications or medication doses (Rx, OTC, herbal), or if any upcoming procedures are scheduled. Doc Turner will likewise let us know if any other changes, questions, or concerns arise regarding anticoagulation therapy. he understands the importance of seeking medical attention immediately if he experiences any falls, vehicle accidents, or abnormal bleeding or bruising. Doc Turner voiced understanding of this information and confirms that he has the [facility]'s contact information. Otherwise, we will plan to contact the patient once monthly or if his INR is out of range.        Patricia Sauceda, PharmD  8/28/2024  08:17 EDT

## 2024-09-16 ENCOUNTER — ANTICOAGULATION VISIT (OUTPATIENT)
Dept: PHARMACY | Facility: HOSPITAL | Age: 74
End: 2024-09-16
Payer: MEDICARE

## 2024-09-16 DIAGNOSIS — I48.92 PAROXYSMAL ATRIAL FLUTTER: Primary | ICD-10-CM

## 2024-09-16 DIAGNOSIS — Z95.2 HX OF MITRAL VALVE REPLACEMENT WITH MECHANICAL VALVE: ICD-10-CM

## 2024-09-16 LAB — INR PPP: 3.5

## 2024-09-18 ENCOUNTER — OFFICE VISIT (OUTPATIENT)
Dept: CARDIOLOGY | Facility: CLINIC | Age: 74
End: 2024-09-18
Payer: MEDICARE

## 2024-09-18 ENCOUNTER — HOSPITAL ENCOUNTER (OUTPATIENT)
Dept: CARDIOLOGY | Facility: HOSPITAL | Age: 74
Discharge: HOME | End: 2024-09-18
Admitting: INTERNAL MEDICINE
Payer: MEDICARE

## 2024-09-18 VITALS — BODY MASS INDEX: 17.99 KG/M2 | WEIGHT: 125.66 LBS | HEIGHT: 70 IN

## 2024-09-18 VITALS
OXYGEN SATURATION: 98 % | BODY MASS INDEX: 18.12 KG/M2 | DIASTOLIC BLOOD PRESSURE: 54 MMHG | WEIGHT: 126.6 LBS | HEART RATE: 55 BPM | HEIGHT: 70 IN | SYSTOLIC BLOOD PRESSURE: 120 MMHG

## 2024-09-18 DIAGNOSIS — I10 PRIMARY HYPERTENSION: ICD-10-CM

## 2024-09-18 DIAGNOSIS — Z95.2 HX OF MITRAL VALVE REPLACEMENT WITH MECHANICAL VALVE: Primary | ICD-10-CM

## 2024-09-18 DIAGNOSIS — I48.92 PAROXYSMAL ATRIAL FLUTTER: ICD-10-CM

## 2024-09-18 DIAGNOSIS — Z95.2 HX OF MITRAL VALVE REPLACEMENT WITH MECHANICAL VALVE: ICD-10-CM

## 2024-09-18 LAB
ASCENDING AORTA: 3.8 CM
BH CV ECHO MEAS - AI P1/2T: 393 MSEC
BH CV ECHO MEAS - AO MAX PG: 3 MMHG
BH CV ECHO MEAS - AO MEAN PG: 2 MMHG
BH CV ECHO MEAS - AO ROOT DIAM: 4 CM
BH CV ECHO MEAS - AO V2 MAX: 87.2 CM/SEC
BH CV ECHO MEAS - AO V2 VTI: 20.6 CM
BH CV ECHO MEAS - AVA(I,D): 2.5 CM2
BH CV ECHO MEAS - EF(MOD-BP): 59.6 %
BH CV ECHO MEAS - EF(MOD-SP2): 55.7 %
BH CV ECHO MEAS - EF(MOD-SP4): 61.6 %
BH CV ECHO MEAS - IVS/LVPW: 1 CM
BH CV ECHO MEAS - IVSD: 1 CM
BH CV ECHO MEAS - LA DIMENSION: 4 CM
BH CV ECHO MEAS - LAT PEAK E' VEL: 12 CM/SEC
BH CV ECHO MEAS - LV MAX PG: 2.03 MMHG
BH CV ECHO MEAS - LV MEAN PG: 0.93 MMHG
BH CV ECHO MEAS - LV V1 MAX: 71.1 CM/SEC
BH CV ECHO MEAS - LV V1 VTI: 18.1 CM
BH CV ECHO MEAS - LVIDD: 5.3 CM
BH CV ECHO MEAS - LVIDS: 3.9 CM
BH CV ECHO MEAS - LVOT AREA: 3.1 CM2
BH CV ECHO MEAS - LVOT DIAM: 2 CM
BH CV ECHO MEAS - LVPWD: 1 CM
BH CV ECHO MEAS - MED PEAK E' VEL: 7.7 CM/SEC
BH CV ECHO MEAS - MV A MAX VEL: 99.9 CM/SEC
BH CV ECHO MEAS - MV E MAX VEL: 157.6 CM/SEC
BH CV ECHO MEAS - MV E/A: 1.58
BH CV ECHO MEAS - MV MAX PG: 6.3 MMHG
BH CV ECHO MEAS - MV MEAN PG: 2 MMHG
BH CV ECHO MEAS - MV P1/2T: 68 MSEC
BH CV ECHO MEAS - MV V2 VTI: 53.9 CM
BH CV ECHO MEAS - MVA(P1/2T): 3.2 CM2
BH CV ECHO MEAS - PA ACC TIME: 0.08 SEC
BH CV ECHO MEAS - PA V2 MAX: 109.1 CM/SEC
BH CV ECHO MEAS - PI END-D VEL: 92 CM/SEC
BH CV ECHO MEAS - TAPSE (>1.6): 1.39 CM
BH CV ECHO MEASUREMENTS AVERAGE E/E' RATIO: 16
BH CV VAS BP RIGHT ARM: NORMAL MMHG
BH CV XLRA - RV BASE: 4.3 CM
BH CV XLRA - RV LENGTH: 7.6 CM
BH CV XLRA - RV MID: 2.9 CM
BH CV XLRA - TDI S': 9 CM/SEC
LEFT ATRIUM VOLUME INDEX: 50.8 ML/M2

## 2024-09-18 PROCEDURE — 93306 TTE W/DOPPLER COMPLETE: CPT

## 2024-09-18 RX ORDER — SOTALOL HYDROCHLORIDE 80 MG/1
80 TABLET ORAL 2 TIMES DAILY
Qty: 90 TABLET | Refills: 1 | Status: SHIPPED | OUTPATIENT
Start: 2024-09-18

## 2024-10-08 ENCOUNTER — ANTICOAGULATION VISIT (OUTPATIENT)
Dept: PHARMACY | Facility: HOSPITAL | Age: 74
End: 2024-10-08
Payer: MEDICARE

## 2024-10-08 DIAGNOSIS — Z95.2 HX OF MITRAL VALVE REPLACEMENT WITH MECHANICAL VALVE: ICD-10-CM

## 2024-10-08 DIAGNOSIS — I48.92 PAROXYSMAL ATRIAL FLUTTER: Primary | ICD-10-CM

## 2024-10-08 LAB — INR PPP: 3.4

## 2024-10-08 NOTE — PROGRESS NOTES
Anticoagulation Clinic - Remote Progress Note  [facility]  Frequency of Monitorin-4x a month    Indication: Hx of mitral valve replacement with mechanical valve KONG Mendoza  Referring Provider: Dr. Garcia (last ravindra: 23)  Initial Warfarin Start Date:    Goal INR: 2.5-3.5  Current Drug Interactions: ensure (once daily); amitriptyline (PRN)  CHADS-VASc: 2 (age, HTN)     Diet: iceberg lettuce 1x/week, ensure 3-4x/week, V8 juice (20)  Alcohol: 12 pack of beer/week  Tobacco: None  OTC Pain Medication: None     INR History:  Date 8/25 9/11 9/29 10/19 11/5 12/2 12/11 1/4/18 1/30 2/19 3/7 4/5   Total Weekly Dose 24mg 24mg 24mg 22.75  mg? 22.75  mg? ? 22.5mg 22.5mg 22.5mg 22.5mg 22.5mg 22.5mg   INR 3.3 2.8 2.9 3.1 3.5 2.7 2.7 2.6 2.9 2.5 3.3 3.7   Notes    LVM LVM   call   LVM      Date 4/18 5/20 6/5 6/27 7/24 7/31 9/9 10/8 11/8 12/14 2/20/19 2/25   Total Weekly Dose 22.5mg 23mg 22.5mg  23mg 24mg 24mg unable   to verify unable   to verify unable   to verify unable   to verify 23mg   INR 2.6 2.8 2.9 2.4 2.9 3.3 3.4 3.0 2.7 2.6 3.8 3.4   Notes      rec'vd   recv'd 10/13;  mult calls         Date 3/11 3/25 4/9 5/1 5/15 6/11 6/27 7/10 7/26 8/9 9/11 10/3   Total Weekly Dose 23mg 23mg 22.5mg 23mg 22.5mg 23mg  22.5mg 22.5mg 22.5mg 23mg 22.5mg   INR 3.6 3.0 3.2 3.3 2.5 3.1 3.6 2.9 2.3 2.9 2.9 2.8   Notes        reported          Date 10/15 10/24 10/31 11/8 11/15 11/22 12/7 12/18 12/27 1/15/20 1/30 2/18   Total Weekly Dose 23mg 22.5mg 23mg 23mg 23.5mg 23mg 22.5mg 23mg 22.5mg 23mg ??? 23mg    INR 3.3 3.2 2.7 2.3 2.8 2.7 3.2  2.5 2.5 3.1 2.7 2.4   Notes  reported 10/25; sick; cefdinir stop cefdinir 10/29    Reported   Inc GLV  Unable to reach pt MVI; decr Ensure     Date 3/5 3/17 4/4 4/23 5/4 5/19 6/2 6/24 7/22 7/31 8/26 9/3   Total Weekly Dose 23mg 22.5mg 22.5mg 23mg 23mg 23mg? 23mg 22.75  mg avg 22.75  mg 23mg 22.75  mg 23.5mg   INR 2.8 3.0 3.3 2.9 2.6 2.8 2.9 2.49 2.4 3.1 2.5 3.4   Notes  call recv'd  4/13 rec'vd 4/24; call call recv'd  5/20; call recv'd 6/3; call  recv'd 7/31 recv'd 7/31 recv'd 9/3 Self-adj  CoQ10; email     Date 9/11 10/8 10/13 11/24 12/3 12/22 1/4/21 2/6 2/8 2/19 3/9 4/15   Total Weekly Dose 23mg 23.5mg  avg 23.5mg   avg 23.5mg  avg 23.5mg 22.75  mg avg 22.75  mg avg 22.5mg 19mg 22.75  mg avg 22.75   mg avg 22.75   mg avg   INR 2.8 3.4 3.3 3.7 3.1 2.7 2.6 3.7 3.2 2.8 3.2 2.6   Notes  email email Email; less Ensure Self-adjust dose dec  recv'd 1/5; call email Email; Self held x1 email email      Date 5/5 5/26 6/30 7/9 7/28 8/23 9/29 10/18 11/11 12/17 1/3/22 1/25   Total WeeklyDose 22.75 mg avg 22.75  mg avg  22.75 mg avg 22.75  mg avg 22.75 mg avg 22.75 mg avg 22.75 mg avg 22.75 mg avg 22.75 mg avg 22.75 mg avg 22.75 mg avg   INR 2.9 2.8 3.5 2.8 2.5 3.0 2.6 2.9 2.7 3.7 3.0 2.9   Notes email; fall rcv'd 5/27  rec'vd 7/12; email rec'vd 8/3  email  email Unable to contact email; Self-heldx1 rec'd 2/23     Date 2/23 3/26 3/28  4/17 5/24 5/24 5/26 6/17 7/14  8/19   Total Weekly Dose 22.75 mg avg 22.75mg 19.5mg Non compliant 22.75 mg avg? 22.75 mg avg 22.75 mg avg 22.75 mg avg  23mg Non-  Compliant 22.75 mg   avg   INR 3.2 4.1 3.2  3.0 3.5 3.0 3.4 3.0 2.7  3.4   Notes Unable to contact garlic 1x hold d/c garlic  ?? ?? ?? ?? Rec/d 6/22   email     Date 9/2 9/4 9/19 10/2 10/19 11/11 12/26 1/12/23 2/1 3/12 4/19 4/16   Total WeeklyDose 22.5 19.5mg 21 mg 21 mg 21 mg  23 mg 23 mg 21 mg ? Unable to contact. ~ 22.75 mg   INR 3.8 2.8 2.9 3.1 2.9 3.3 2.7 2.7 3.2  2.6 2.1 3.5   Notes rec 9/13 1x hold rec 9/13 rec 9/20  Self-adj   recvd 11/15   Unable to contact Emailed rec'd 1/16 rec 2/2 rec 3/13 Rcv/d 4/20 Reported 5/2 in [facility] and called clinic 5/17     Date 5/18 5/25 6/6 6/26 7/10 7/27  8/4 8/30 09/17 10/16 11/2  11/17   Total WeeklyDose 23 mg ~22.75 mg  ~22.75 mg ~ 22.75 mg ~22.75 mg ~22.5 mg  22.5mg ~22.75mg ~22.75 mg 22.5 mg ~22.75 mg 24.5 mg   INR 3.4 2.7 2.8 2.4  2.6 2.8 1.9 2.0 2.5 3.3 2.5 2.0 2.5    Notes  call   Rec'd 7/11 Rec'd  7/28  clinda Rec 8/31 REC 09/20 Rec'd 10/20/23 Rec'd 11/3  Coq10 change      Date 11/22 12/01 1/6 1/12 1/25/24 2/3 2/13  2/26 3/13/24 3/28 4/11/24 4/29 5/12   Total Weekly Dose 24.5 mg 24.5 mg 24.5 mg 26 mg 24.5 mg 28 mg 28 mg  29 mg 29 mg 29 mg 29 mg 30 mg 29.5 mg   INR 2.4 3.2 1.8 2.7 1.7 2.5 2.6  2.5 2.7 2.7 2.9 3.4 2.5   Notes   Rec 1/12   Rec'd 2/5  Boost x 1 Rec 2/15 call self-adj Rec 2/28 Rec'd 3/14 Rec'd 3/29  Sent message in [employer]  Self adjusted maintenance regimen/ message rec'd in mychart Rec'd 5/15     Date 5/29/24 6/27 7/16 8/19 8/27 9/12 10/3/24        Total WeeklyDose 29.5 mg 30 mg 30 mg Avg 29.75  30 mg         INR 3.3 3.0 3.1 3.6 3.3 3.5 3.4        Notes Rec'd 6/5/24 Rec'd 6/28 Unable to contact Rec'd 8/20 Rec'd 8/28 Rec 9/16 Rec'd 10/8/24 lvm unable to contact          Phone Interview:  Tablet Strength: 3mg and 1mg tablets  Patient Contact Info: **will message on K121t once monthly or if INR is out of range**PLEASE  USE [employer] TO COMMUNICATE -   Preferred *[phone]* - after noon  Lab Contact Info: [employer]   Lab: [facility] phone: [phone] , Fax: [phone]       [facility] 10/8, 10/9, 10/10, 10/11  UNABLE TO GET IN CONTACT WITH THE PATIENT. PLEASE DISREGARD THE FOLLOWING PLAN UNTIL ABLE TO GET IN CONTACT WITH PATIENT/ PATIENT REPRESENTATIVE.      Plan  1. INR was therapeutic 10/3/24 at 3.4 (goal 2.5-3.5). 3. Doc Turner understands the importance of calling the [facility] if he notices any s/sx of bleeding, stroke, or abnormal bruising, if any changes are made to his medications or medication doses (Rx, OTC, herbal), or if any upcoming procedures are scheduled. Doc Turner will likewise let us know if any other changes, questions, or concerns arise regarding anticoagulation therapy. he understands the importance of seeking medical attention immediately if he experiences any falls, vehicle accidents, or abnormal  bleeding or bruising. Doc Turner voiced understanding of this information and confirms that he has the [facility]'s contact information. Otherwise, we will plan to contact the patient once monthly or if his INR is out of range.    Brock Bishop, Pharmacy Technician  10/8/2024  08:36 EDT

## 2024-10-25 ENCOUNTER — ANTICOAGULATION VISIT (OUTPATIENT)
Dept: PHARMACY | Facility: HOSPITAL | Age: 74
End: 2024-10-25
Payer: MEDICARE

## 2024-10-25 DIAGNOSIS — I48.92 PAROXYSMAL ATRIAL FLUTTER: Primary | ICD-10-CM

## 2024-10-25 DIAGNOSIS — Z95.2 HX OF MITRAL VALVE REPLACEMENT WITH MECHANICAL VALVE: ICD-10-CM

## 2024-10-25 LAB — INR PPP: 2.8

## 2024-10-25 NOTE — PROGRESS NOTES
Anticoagulation Clinic - Remote Progress Note  [facility] [employer]  Frequency of Monitorin-4x a month    Indication: Hx of mitral valve replacement with mechanical valve KONG Mendoza  Referring Provider: Dr. Garcia (last ravindra: 23)  Initial Warfarin Start Date:    Goal INR: 2.5-3.5  Current Drug Interactions: ensure (once daily); amitriptyline (PRN)  CHADS-VASc: 2 (age, HTN)     Diet: iceberg lettuce 1x/week, ensure 3-4x/week, V8 juice (20)  Alcohol: 12 pack of beer/week  Tobacco: None  OTC Pain Medication: None     INR History:  Date 8/25 9/11 9/29 10/19 11/5 12/2 12/11 1/4/18 1/30 2/19 3/7 4/5   Total Weekly Dose 24mg 24mg 24mg 22.75  mg? 22.75  mg? ? 22.5mg 22.5mg 22.5mg 22.5mg 22.5mg 22.5mg   INR 3.3 2.8 2.9 3.1 3.5 2.7 2.7 2.6 2.9 2.5 3.3 3.7   Notes    LVM LVM   call   LVM      Date 4/18 5/20 6/5 6/27 7/24 7/31 9/9 10/8 11/8 12/14 2/20/19 2/25   Total Weekly Dose 22.5mg 23mg 22.5mg  23mg 24mg 24mg unable   to verify unable   to verify unable   to verify unable   to verify 23mg   INR 2.6 2.8 2.9 2.4 2.9 3.3 3.4 3.0 2.7 2.6 3.8 3.4   Notes      rec'vd   recv'd 10/13;  mult calls         Date 3/11 3/25 4/9 5/1 5/15 6/11 6/27 7/10 7/26 8/9 9/11 10/3   Total Weekly Dose 23mg 23mg 22.5mg 23mg 22.5mg 23mg  22.5mg 22.5mg 22.5mg 23mg 22.5mg   INR 3.6 3.0 3.2 3.3 2.5 3.1 3.6 2.9 2.3 2.9 2.9 2.8   Notes        reported          Date 10/15 10/24 10/31 11/8 11/15 11/22 12/7 12/18 12/27 1/15/20 1/30 2/18   Total Weekly Dose 23mg 22.5mg 23mg 23mg 23.5mg 23mg 22.5mg 23mg 22.5mg 23mg ??? 23mg    INR 3.3 3.2 2.7 2.3 2.8 2.7 3.2  2.5 2.5 3.1 2.7 2.4   Notes  reported 10/25; sick; cefdinir stop cefdinir 10/29    Reported   Inc GLV  Unable to reach pt MVI; decr Ensure     Date 3/5 3/17 4/4 4/23 5/4 5/19 6/2 6/24 7/22 7/31 8/26 9/3   Total Weekly Dose 23mg 22.5mg 22.5mg 23mg 23mg 23mg? 23mg 22.75  mg avg 22.75  mg 23mg 22.75  mg 23.5mg   INR 2.8 3.0 3.3 2.9 2.6 2.8 2.9 2.49 2.4 3.1 2.5 3.4   Notes  call recv'd  4/13 rec'vd 4/24; call call recv'd  5/20; call recv'd 6/3; call  recv'd 7/31 recv'd 7/31 recv'd 9/3 Self-adj  CoQ10; email     Date 9/11 10/8 10/13 11/24 12/3 12/22 1/4/21 2/6 2/8 2/19 3/9 4/15   Total Weekly Dose 23mg 23.5mg  avg 23.5mg   avg 23.5mg  avg 23.5mg 22.75  mg avg 22.75  mg avg 22.5mg 19mg 22.75  mg avg 22.75   mg avg 22.75   mg avg   INR 2.8 3.4 3.3 3.7 3.1 2.7 2.6 3.7 3.2 2.8 3.2 2.6   Notes  email email Email; less Ensure Self-adjust dose dec  recv'd 1/5; call email Email; Self held x1 email email      Date 5/5 5/26 6/30 7/9 7/28 8/23 9/29 10/18 11/11 12/17 1/3/22 1/25   Total WeeklyDose 22.75 mg avg 22.75  mg avg  22.75 mg avg 22.75  mg avg 22.75 mg avg 22.75 mg avg 22.75 mg avg 22.75 mg avg 22.75 mg avg 22.75 mg avg 22.75 mg avg   INR 2.9 2.8 3.5 2.8 2.5 3.0 2.6 2.9 2.7 3.7 3.0 2.9   Notes email; fall rcv'd 5/27  rec'vd 7/12; email rec'vd 8/3  email  email Unable to contact email; Self-heldx1 rec'd 2/23     Date 2/23 3/26 3/28  4/17 5/24 5/24 5/26 6/17 7/14  8/19   Total Weekly Dose 22.75 mg avg 22.75mg 19.5mg Non compliant 22.75 mg avg? 22.75 mg avg 22.75 mg avg 22.75 mg avg  23mg Non-  Compliant 22.75 mg   avg   INR 3.2 4.1 3.2  3.0 3.5 3.0 3.4 3.0 2.7  3.4   Notes Unable to contact garlic 1x hold d/c garlic  ?? ?? ?? ?? Rec/d 6/22   email     Date 9/2 9/4 9/19 10/2 10/19 11/11 12/26 1/12/23 2/1 3/12 4/19 4/16   Total WeeklyDose 22.5 19.5mg 21 mg 21 mg 21 mg  23 mg 23 mg 21 mg ? Unable to contact. ~ 22.75 mg   INR 3.8 2.8 2.9 3.1 2.9 3.3 2.7 2.7 3.2  2.6 2.1 3.5   Notes rec 9/13 1x hold rec 9/13 rec 9/20  Self-adj   recvd 11/15   Unable to contact Emailed rec'd 1/16 rec 2/2 rec 3/13 Rcv/d 4/20 Reported 5/2 in [facility] and called clinic 5/17     Date 5/18 5/25 6/6 6/26 7/10 7/27  8/4 8/30 09/17 10/16 11/2  11/17   Total WeeklyDose 23 mg ~22.75 mg  ~22.75 mg ~ 22.75 mg ~22.75 mg ~22.5 mg  22.5mg ~22.75mg ~22.75 mg 22.5 mg ~22.75 mg 24.5 mg   INR 3.4 2.7 2.8 2.4  2.6 2.8 1.9 2.0 2.5 3.3 2.5 2.0 2.5  "  Notes  call   Rec'd 7/11 Rec'd  7/28  clinda Rec 8/31 REC 09/20 Rec'd 10/20/23 Rec'd 11/3  Coq10 change      Date 11/22 12/01 1/6 1/12 1/25/24 2/3 2/13  2/26 3/13/24 3/28 4/11/24 4/29 5/12   Total Weekly Dose 24.5 mg 24.5 mg 24.5 mg 26 mg 24.5 mg 28 mg 28 mg  29 mg 29 mg 29 mg 29 mg 30 mg 29.5 mg   INR 2.4 3.2 1.8 2.7 1.7 2.5 2.6  2.5 2.7 2.7 2.9 3.4 2.5   Notes   Rec 1/12   Rec'd 2/5  Boost x 1 Rec 2/15 call self-adj Rec 2/28 Rec'd 3/14 Rec'd 3/29  Sent message in [employer]  Self adjusted maintenance regimen/ message rec'd in mychart Rec'd 5/15     Date 5/29/24 6/27 7/16 8/19 8/27 9/12 10/3/24 10/25       Total WeeklyDose 29.5 mg 30 mg 30 mg Avg 29.75  30 mg  Avg 29.75 mg       INR 3.3 3.0 3.1 3.6 3.3 3.5 3.4 2.8       Notes Rec'd 6/5/24 Rec'd 6/28 Unable to contact Rec'd 8/20 Rec'd 8/28 Rec 9/16 Rec'd 10/8/24 lvm unable to contact          Phone Interview:  Tablet Strength: 3mg and 1mg tablets  Patient Contact Info: **will message on Mychart once monthly or if INR is out of range**PLEASE  USE [employer] TO COMMUNICATE -   Preferred *[phone]* - after noon  Lab Contact Info: [employer]   Lab: [facility] phone: [phone] , Fax: [phone]           Patient Findings    Positives: Change in activity   Negatives: Signs/symptoms of thrombosis, Signs/symptoms of bleeding, Laboratory test error suspected, Change in health, Change in alcohol use, Upcoming invasive procedure, Emergency department visit, Upcoming dental procedure, Missed doses, Extra doses, Change in medications, Change in diet/appetite, Hospital admission, Bruising, Other complaints   Comments: Patient states \"a sustained episode of sciatica (self-diagnosis) has me pretty much rooted to the couch!\"    All other findings negative per patient.     Plan  1. INR was therapeutic 10/25/24 at 2.8 (goal 2.5-3.5). Spoke with Mr. Turner 11/1/24. Instructed Mr. Turner to continue alternating warfarin 4 mg and warfarin 4.5 mg QOD until recheck  2. " Recheck INR 11/8/24  3. Doc Turner understands the importance of calling the [facility] if he notices any s/sx of bleeding, stroke, or abnormal bruising, if any changes are made to his medications or medication doses (Rx, OTC, herbal), or if any upcoming procedures are scheduled. Doc Turner will likewise let us know if any other changes, questions, or concerns arise regarding anticoagulation therapy. he understands the importance of seeking medical attention immediately if he experiences any falls, vehicle accidents, or abnormal bleeding or bruising. Doc Turner voiced understanding of this information and confirms that he has the [facility]'s contact information. Otherwise, we will plan to contact the patient once monthly or if his INR is out of range.    Patricia Sauceda, PharmD  11/1/2024  09:45 EDT

## 2024-11-22 ENCOUNTER — ANTICOAGULATION VISIT (OUTPATIENT)
Dept: PHARMACY | Facility: HOSPITAL | Age: 74
End: 2024-11-22
Payer: MEDICARE

## 2024-11-22 DIAGNOSIS — Z95.2 HX OF MITRAL VALVE REPLACEMENT WITH MECHANICAL VALVE: ICD-10-CM

## 2024-11-22 DIAGNOSIS — I48.92 PAROXYSMAL ATRIAL FLUTTER: Primary | ICD-10-CM

## 2024-11-22 LAB — INR PPP: 3.3

## 2024-11-22 NOTE — PROGRESS NOTES
Anticoagulation Clinic - Remote Progress Note  [facility]  Frequency of Monitorin-4x a month    Indication: Hx of mitral valve replacement with mechanical valve KONG Mendoza  Referring Provider: Dr. Garcia (last ravindra: 23)  Initial Warfarin Start Date:    Goal INR: 2.5-3.5  Current Drug Interactions: ensure (once daily); amitriptyline (PRN)  CHADS-VASc: 2 (age, HTN)     Diet: iceberg lettuce 1x/week, ensure 3-4x/week, V8 juice (20)  Alcohol: 12 pack of beer/week  Tobacco: None  OTC Pain Medication: None     INR History:  Date 8/25 9/11 9/29 10/19 11/5 12/2 12/11 1/4/18 1/30 2/19 3/7 4/5   Total Weekly Dose 24mg 24mg 24mg 22.75  mg? 22.75  mg? ? 22.5mg 22.5mg 22.5mg 22.5mg 22.5mg 22.5mg   INR 3.3 2.8 2.9 3.1 3.5 2.7 2.7 2.6 2.9 2.5 3.3 3.7   Notes    LVM LVM   call   LVM      Date 4/18 5/20 6/5 6/27 7/24 7/31 9/9 10/8 11/8 12/14 2/20/19 2/25   Total Weekly Dose 22.5mg 23mg 22.5mg  23mg 24mg 24mg unable   to verify unable   to verify unable   to verify unable   to verify 23mg   INR 2.6 2.8 2.9 2.4 2.9 3.3 3.4 3.0 2.7 2.6 3.8 3.4   Notes      rec'vd   recv'd 10/13;  mult calls         Date 3/11 3/25 4/9 5/1 5/15 6/11 6/27 7/10 7/26 8/9 9/11 10/3   Total Weekly Dose 23mg 23mg 22.5mg 23mg 22.5mg 23mg  22.5mg 22.5mg 22.5mg 23mg 22.5mg   INR 3.6 3.0 3.2 3.3 2.5 3.1 3.6 2.9 2.3 2.9 2.9 2.8   Notes        reported          Date 10/15 10/24 10/31 11/8 11/15 11/22 12/7 12/18 12/27 1/15/20 1/30 2/18   Total Weekly Dose 23mg 22.5mg 23mg 23mg 23.5mg 23mg 22.5mg 23mg 22.5mg 23mg ??? 23mg    INR 3.3 3.2 2.7 2.3 2.8 2.7 3.2  2.5 2.5 3.1 2.7 2.4   Notes  reported 10/25; sick; cefdinir stop cefdinir 10/29    Reported   Inc GLV  Unable to reach pt MVI; decr Ensure     Date 3/5 3/17 4/4 4/23 5/4 5/19 6/2 6/24 7/22 7/31 8/26 9/3   Total Weekly Dose 23mg 22.5mg 22.5mg 23mg 23mg 23mg? 23mg 22.75  mg avg 22.75  mg 23mg 22.75  mg 23.5mg   INR 2.8 3.0 3.3 2.9 2.6 2.8 2.9 2.49 2.4 3.1 2.5 3.4   Notes  call recv'd  4/13 rec'vd 4/24; call call recv'd  5/20; call recv'd 6/3; call  recv'd 7/31 recv'd 7/31 recv'd 9/3 Self-adj  CoQ10; email     Date 9/11 10/8 10/13 11/24 12/3 12/22 1/4/21 2/6 2/8 2/19 3/9 4/15   Total Weekly Dose 23mg 23.5mg  avg 23.5mg   avg 23.5mg  avg 23.5mg 22.75  mg avg 22.75  mg avg 22.5mg 19mg 22.75  mg avg 22.75   mg avg 22.75   mg avg   INR 2.8 3.4 3.3 3.7 3.1 2.7 2.6 3.7 3.2 2.8 3.2 2.6   Notes  email email Email; less Ensure Self-adjust dose dec  recv'd 1/5; call email Email; Self held x1 email email      Date 5/5 5/26 6/30 7/9 7/28 8/23 9/29 10/18 11/11 12/17 1/3/22 1/25   Total WeeklyDose 22.75 mg avg 22.75  mg avg  22.75 mg avg 22.75  mg avg 22.75 mg avg 22.75 mg avg 22.75 mg avg 22.75 mg avg 22.75 mg avg 22.75 mg avg 22.75 mg avg   INR 2.9 2.8 3.5 2.8 2.5 3.0 2.6 2.9 2.7 3.7 3.0 2.9   Notes email; fall rcv'd 5/27  rec'vd 7/12; email rec'vd 8/3  email  email Unable to contact email; Self-heldx1 rec'd 2/23     Date 2/23 3/26 3/28  4/17 5/24 5/24 5/26 6/17 7/14  8/19   Total Weekly Dose 22.75 mg avg 22.75mg 19.5mg Non compliant 22.75 mg avg? 22.75 mg avg 22.75 mg avg 22.75 mg avg  23mg Non-  Compliant 22.75 mg   avg   INR 3.2 4.1 3.2  3.0 3.5 3.0 3.4 3.0 2.7  3.4   Notes Unable to contact garlic 1x hold d/c garlic  ?? ?? ?? ?? Rec/d 6/22   email     Date 9/2 9/4 9/19 10/2 10/19 11/11 12/26 1/12/23 2/1 3/12 4/19 4/16   Total WeeklyDose 22.5 19.5mg 21 mg 21 mg 21 mg  23 mg 23 mg 21 mg ? Unable to contact. ~ 22.75 mg   INR 3.8 2.8 2.9 3.1 2.9 3.3 2.7 2.7 3.2  2.6 2.1 3.5   Notes rec 9/13 1x hold rec 9/13 rec 9/20  Self-adj   recvd 11/15   Unable to contact Emailed rec'd 1/16 rec 2/2 rec 3/13 Rcv/d 4/20 Reported 5/2 in [facility] and called clinic 5/17     Date 5/18 5/25 6/6 6/26 7/10 7/27  8/4 8/30 09/17 10/16 11/2  11/17   Total WeeklyDose 23 mg ~22.75 mg  ~22.75 mg ~ 22.75 mg ~22.75 mg ~22.5 mg  22.5mg ~22.75mg ~22.75 mg 22.5 mg ~22.75 mg 24.5 mg   INR 3.4 2.7 2.8 2.4  2.6 2.8 1.9 2.0 2.5 3.3 2.5 2.0 2.5  "  Notes  call   Rec'd 7/11 Rec'd  7/28  clinda Rec 8/31 REC 09/20 Rec'd 10/20/23 Rec'd 11/3  Coq10 change      Date 11/22 12/01 1/6 1/12 1/25/24 2/3 2/13  2/26 3/13/24 3/28 4/11/24 4/29 5/12   Total Weekly Dose 24.5 mg 24.5 mg 24.5 mg 26 mg 24.5 mg 28 mg 28 mg  29 mg 29 mg 29 mg 29 mg 30 mg 29.5 mg   INR 2.4 3.2 1.8 2.7 1.7 2.5 2.6  2.5 2.7 2.7 2.9 3.4 2.5   Notes   Rec 1/12   Rec'd 2/5  Boost x 1 Rec 2/15 call self-adj Rec 2/28 Rec'd 3/14 Rec'd 3/29  Sent message in [employer]  Self adjusted maintenance regimen/ message rec'd in mychart Rec'd 5/15     Date 5/29/24 6/27 7/16 8/19 8/27 9/12 10/3/24 10/25 11/22/24      Total WeeklyDose 29.5 mg 30 mg 30 mg Avg 29.75  30 mg  Avg 29.75 mg 29.5 mg       INR 3.3 3.0 3.1 3.6 3.3 3.5 3.4 2.8 3.3      Notes Rec'd 6/5/24 Rec'd 6/28 Unable to contact Rec'd 8/20 Rec'd 8/28 Rec 9/16 Rec'd 10/8/24 lvm unable to contact          Phone Interview:  Tablet Strength: 3mg and 1mg tablets  Patient Contact Info: **will message on [employer] once monthly or if INR is out of range**PLEASE  USE [employer] TO COMMUNICATE -   Preferred *[phone]* - after noon  Lab Contact Info: [employer]   Lab: [facility] phone: [phone] , Fax: [phone]         Patient Findings  Positives: Change in activity   Negatives: Signs/symptoms of thrombosis, Signs/symptoms of bleeding, Laboratory test error suspected, Change in health, Change in alcohol use, Upcoming invasive procedure, Emergency department visit, Upcoming dental procedure, Missed doses, Extra doses, Change in medications, Change in diet/appetite, Hospital admission, Bruising, Other complaints   Comments: Per Patient's [employer] message response:  \" Nothing has changed I can think of. I had a three-week episode of sciataca, another wacky old man ailment, and I was fairly stationary. That's eased to where I'm just about back to my usual level of activity.     All other findings negative per patient.   Plan  1. INR was therapeutic " 11/22/24 at 3.3 (goal 2.5-3.5). Instructed Mr. Turner to continue alternating warfarin 4 mg and warfarin 4.5 mg QOD until recheck  2. Recheck INR 12/6/24  3. Doc Turner understands the importance of calling the [facility] if he notices any s/sx of bleeding, stroke, or abnormal bruising, if any changes are made to his medications or medication doses (Rx, OTC, herbal), or if any upcoming procedures are scheduled. Doc Turner will likewise let us know if any other changes, questions, or concerns arise regarding anticoagulation therapy. he understands the importance of seeking medical attention immediately if he experiences any falls, vehicle accidents, or abnormal bleeding or bruising. Doc Turner voiced understanding of this information and confirms that he has the [facility]'s contact information. Otherwise, we will plan to contact the patient once monthly or if his INR is out of range.    Brock Bishop, Pharmacy Technician  11/22/2024  16:03 EST    I, Dori Rivera, PharmD, have reviewed the note in full and agree with the assessment and plan.  11/26/24  08:38 EST

## 2024-12-12 ENCOUNTER — ANTICOAGULATION VISIT (OUTPATIENT)
Dept: PHARMACY | Facility: HOSPITAL | Age: 74
End: 2024-12-12
Payer: MEDICARE

## 2024-12-12 DIAGNOSIS — Z95.2 HX OF MITRAL VALVE REPLACEMENT WITH MECHANICAL VALVE: ICD-10-CM

## 2024-12-12 DIAGNOSIS — I48.92 PAROXYSMAL ATRIAL FLUTTER: Primary | ICD-10-CM

## 2024-12-12 LAB — INR PPP: 3.2

## 2024-12-12 NOTE — PROGRESS NOTES
Anticoagulation Clinic - Remote Progress Note  [facility]  Frequency of Monitorin-4x a month    Indication: Hx of mitral valve replacement with mechanical valve KONG Mendoza  Referring Provider: Dr. Garcia (last ravindra: 23)  Initial Warfarin Start Date:    Goal INR: 2.5-3.5  Current Drug Interactions: ensure (once daily); amitriptyline (PRN)  CHADS-VASc: 2 (age, HTN)     Diet: iceberg lettuce 1x/week, ensure 3-4x/week, V8 juice (20)  Alcohol: 12 pack of beer/week  Tobacco: None  OTC Pain Medication: None     INR History:  Date 8/25 9/11 9/29 10/19 11/5 12/2 12/11 1/4/18 1/30 2/19 3/7 4/5   Total Weekly Dose 24mg 24mg 24mg 22.75  mg? 22.75  mg? ? 22.5mg 22.5mg 22.5mg 22.5mg 22.5mg 22.5mg   INR 3.3 2.8 2.9 3.1 3.5 2.7 2.7 2.6 2.9 2.5 3.3 3.7   Notes    LVM LVM   call   LVM      Date 4/18 5/20 6/5 6/27 7/24 7/31 9/9 10/8 11/8 12/14 2/20/19 2/25   Total Weekly Dose 22.5mg 23mg 22.5mg  23mg 24mg 24mg unable   to verify unable   to verify unable   to verify unable   to verify 23mg   INR 2.6 2.8 2.9 2.4 2.9 3.3 3.4 3.0 2.7 2.6 3.8 3.4   Notes      rec'vd   recv'd 10/13;  mult calls         Date 3/11 3/25 4/9 5/1 5/15 6/11 6/27 7/10 7/26 8/9 9/11 10/3   Total Weekly Dose 23mg 23mg 22.5mg 23mg 22.5mg 23mg  22.5mg 22.5mg 22.5mg 23mg 22.5mg   INR 3.6 3.0 3.2 3.3 2.5 3.1 3.6 2.9 2.3 2.9 2.9 2.8   Notes        reported          Date 10/15 10/24 10/31 11/8 11/15 11/22 12/7 12/18 12/27 1/15/20 1/30 2/18   Total Weekly Dose 23mg 22.5mg 23mg 23mg 23.5mg 23mg 22.5mg 23mg 22.5mg 23mg ??? 23mg    INR 3.3 3.2 2.7 2.3 2.8 2.7 3.2  2.5 2.5 3.1 2.7 2.4   Notes  reported 10/25; sick; cefdinir stop cefdinir 10/29    Reported   Inc GLV  Unable to reach pt MVI; decr Ensure     Date 3/5 3/17 4/4 4/23 5/4 5/19 6/2 6/24 7/22 7/31 8/26 9/3   Total Weekly Dose 23mg 22.5mg 22.5mg 23mg 23mg 23mg? 23mg 22.75  mg avg 22.75  mg 23mg 22.75  mg 23.5mg   INR 2.8 3.0 3.3 2.9 2.6 2.8 2.9 2.49 2.4 3.1 2.5 3.4   Notes  call recv'd  4/13 rec'vd 4/24; call call recv'd  5/20; call recv'd 6/3; call  recv'd 7/31 recv'd 7/31 recv'd 9/3 Self-adj  CoQ10; email     Date 9/11 10/8 10/13 11/24 12/3 12/22 1/4/21 2/6 2/8 2/19 3/9 4/15   Total Weekly Dose 23mg 23.5mg  avg 23.5mg   avg 23.5mg  avg 23.5mg 22.75  mg avg 22.75  mg avg 22.5mg 19mg 22.75  mg avg 22.75   mg avg 22.75   mg avg   INR 2.8 3.4 3.3 3.7 3.1 2.7 2.6 3.7 3.2 2.8 3.2 2.6   Notes  email email Email; less Ensure Self-adjust dose dec  recv'd 1/5; call email Email; Self held x1 email email      Date 5/5 5/26 6/30 7/9 7/28 8/23 9/29 10/18 11/11 12/17 1/3/22 1/25   Total WeeklyDose 22.75 mg avg 22.75  mg avg  22.75 mg avg 22.75  mg avg 22.75 mg avg 22.75 mg avg 22.75 mg avg 22.75 mg avg 22.75 mg avg 22.75 mg avg 22.75 mg avg   INR 2.9 2.8 3.5 2.8 2.5 3.0 2.6 2.9 2.7 3.7 3.0 2.9   Notes email; fall rcv'd 5/27  rec'vd 7/12; email rec'vd 8/3  email  email Unable to contact email; Self-heldx1 rec'd 2/23     Date 2/23 3/26 3/28  4/17 5/24 5/24 5/26 6/17 7/14  8/19   Total Weekly Dose 22.75 mg avg 22.75mg 19.5mg Non compliant 22.75 mg avg? 22.75 mg avg 22.75 mg avg 22.75 mg avg  23mg Non-  Compliant 22.75 mg   avg   INR 3.2 4.1 3.2  3.0 3.5 3.0 3.4 3.0 2.7  3.4   Notes Unable to contact garlic 1x hold d/c garlic  ?? ?? ?? ?? Rec/d 6/22   email     Date 9/2 9/4 9/19 10/2 10/19 11/11 12/26 1/12/23 2/1 3/12 4/19 4/16   Total WeeklyDose 22.5 19.5mg 21 mg 21 mg 21 mg  23 mg 23 mg 21 mg ? Unable to contact. ~ 22.75 mg   INR 3.8 2.8 2.9 3.1 2.9 3.3 2.7 2.7 3.2  2.6 2.1 3.5   Notes rec 9/13 1x hold rec 9/13 rec 9/20  Self-adj   recvd 11/15   Unable to contact Emailed rec'd 1/16 rec 2/2 rec 3/13 Rcv/d 4/20 Reported 5/2 in [facility] and called clinic 5/17     Date 5/18 5/25 6/6 6/26 7/10 7/27  8/4 8/30 09/17 10/16 11/2  11/17   Total WeeklyDose 23 mg ~22.75 mg  ~22.75 mg ~ 22.75 mg ~22.75 mg ~22.5 mg  22.5mg ~22.75mg ~22.75 mg 22.5 mg ~22.75 mg 24.5 mg   INR 3.4 2.7 2.8 2.4  2.6 2.8 1.9 2.0 2.5 3.3 2.5 2.0 2.5    Notes  call   Rec'd 7/11 Rec'd  7/28  clinda Rec 8/31 REC 09/20 Rec'd 10/20/23 Rec'd 11/3  Coq10 change      Date 11/22 12/01 1/6 1/12 1/25/24 2/3 2/13  2/26 3/13/24 3/28 4/11/24 4/29 5/12   Total Weekly Dose 24.5 mg 24.5 mg 24.5 mg 26 mg 24.5 mg 28 mg 28 mg  29 mg 29 mg 29 mg 29 mg 30 mg 29.5 mg   INR 2.4 3.2 1.8 2.7 1.7 2.5 2.6  2.5 2.7 2.7 2.9 3.4 2.5   Notes   Rec 1/12   Rec'd 2/5  Boost x 1 Rec 2/15 call self-adj Rec 2/28 Rec'd 3/14 Rec'd 3/29  Sent message in [employer]  Self adjusted maintenance regimen/ message rec'd in [employer] Rec'd 5/15     Date 5/29/24 6/27 7/16 8/19 8/27 9/12 10/3/24 10/25 11/22/24 12/12     Total WeeklyDose 29.5 mg 30 mg 30 mg Avg 29.75  30 mg  Avg 29.75 mg 29.5 mg  29.5 mg     INR 3.3 3.0 3.1 3.6 3.3 3.5 3.4 2.8 3.3 3.2     Notes Rec'd 6/5/24 Rec'd 6/28 Unable to contact Rec'd 8/20 Rec'd 8/28 Rec 9/16 Rec'd 10/8/24 lvm unable to contact  [employer] message        Phone Interview:  Tablet Strength: 3mg and 1mg tablets  Patient Contact Info: **will message on [employer] once monthly or if INR is out of range**PLEASE  USE [employer] TO COMMUNICATE -   Preferred *[phone]* - after noon  Lab Contact Info: [employer]   Lab: [facility] phone: [phone] , Fax: [phone]         Patient Findings    Comments: Patient not contacted at this encounter     Plan  1. INR is therapeutic today at 3.2 (goal 2.5-3.5). Instructed Mr. Turner to continue alternating warfarin 4 mg and warfarin 4.5 mg QOD until recheck  2. Recheck INR 12/26/24  3. Doc DELEON Johnny understands the importance of calling the [facility] if he notices any s/sx of bleeding, stroke, or abnormal bruising, if any changes are made to his medications or medication doses (Rx, OTC, herbal), or if any upcoming procedures are scheduled. Doc Turner will likewise let us know if any other changes, questions, or concerns arise regarding anticoagulation therapy. he understands the importance of seeking medical  attention immediately if he experiences any falls, vehicle accidents, or abnormal bleeding or bruising. Doc Turner voiced understanding of this information and confirms that he has the [facility]'s contact information. Otherwise, we will plan to contact the patient once monthly or if his INR is out of range.    Patricia Sauceda, PharmD  12/12/2024  10:41 EST

## 2024-12-27 RX ORDER — SOTALOL HYDROCHLORIDE 80 MG/1
80 TABLET ORAL 2 TIMES DAILY
Qty: 90 TABLET | Refills: 0 | Status: SHIPPED | OUTPATIENT
Start: 2024-12-27

## 2025-01-03 RX ORDER — LISINOPRIL 10 MG/1
10 TABLET ORAL EVERY MORNING
Qty: 90 TABLET | Refills: 3 | Status: SHIPPED | OUTPATIENT
Start: 2025-01-03

## 2025-01-13 ENCOUNTER — ANTICOAGULATION VISIT (OUTPATIENT)
Dept: PHARMACY | Facility: HOSPITAL | Age: 75
End: 2025-01-13
Payer: MEDICARE

## 2025-01-13 DIAGNOSIS — I48.92 PAROXYSMAL ATRIAL FLUTTER: Primary | ICD-10-CM

## 2025-01-13 DIAGNOSIS — Z95.2 HX OF MITRAL VALVE REPLACEMENT WITH MECHANICAL VALVE: ICD-10-CM

## 2025-01-13 LAB — INR PPP: 3.4

## 2025-01-13 NOTE — PROGRESS NOTES
Anticoagulation Clinic - Remote Progress Note  [facility]  Frequency of Monitorin-4x a month    Indication: Hx of mitral valve replacement with mechanical valve KONG Mendoza  Referring Provider: Dr. Garcia (last ravindra: 23)  Initial Warfarin Start Date:    Goal INR: 2.5-3.5  Current Drug Interactions: ensure (once daily); amitriptyline (PRN)  CHADS-VASc: 2 (age, HTN)     Diet: iceberg lettuce 1x/week, ensure 3-4x/week, V8 juice (20)  Alcohol: 12 pack of beer/week  Tobacco: None  OTC Pain Medication: None     INR History:  Date 8/25 9/11 9/29 10/19 11/5 12/2 12/11 1/4/18 1/30 2/19 3/7 4/5   Total Weekly Dose 24mg 24mg 24mg 22.75  mg? 22.75  mg? ? 22.5mg 22.5mg 22.5mg 22.5mg 22.5mg 22.5mg   INR 3.3 2.8 2.9 3.1 3.5 2.7 2.7 2.6 2.9 2.5 3.3 3.7   Notes    LVM LVM   call   LVM      Date 4/18 5/20 6/5 6/27 7/24 7/31 9/9 10/8 11/8 12/14 2/20/19 2/25   Total Weekly Dose 22.5mg 23mg 22.5mg  23mg 24mg 24mg unable   to verify unable   to verify unable   to verify unable   to verify 23mg   INR 2.6 2.8 2.9 2.4 2.9 3.3 3.4 3.0 2.7 2.6 3.8 3.4   Notes      rec'vd   recv'd 10/13;  mult calls         Date 3/11 3/25 4/9 5/1 5/15 6/11 6/27 7/10 7/26 8/9 9/11 10/3   Total Weekly Dose 23mg 23mg 22.5mg 23mg 22.5mg 23mg  22.5mg 22.5mg 22.5mg 23mg 22.5mg   INR 3.6 3.0 3.2 3.3 2.5 3.1 3.6 2.9 2.3 2.9 2.9 2.8   Notes        reported          Date 10/15 10/24 10/31 11/8 11/15 11/22 12/7 12/18 12/27 1/15/20 1/30 2/18   Total Weekly Dose 23mg 22.5mg 23mg 23mg 23.5mg 23mg 22.5mg 23mg 22.5mg 23mg ??? 23mg    INR 3.3 3.2 2.7 2.3 2.8 2.7 3.2  2.5 2.5 3.1 2.7 2.4   Notes  reported 10/25; sick; cefdinir stop cefdinir 10/29    Reported   Inc GLV  Unable to reach pt MVI; decr Ensure     Date 3/5 3/17 4/4 4/23 5/4 5/19 6/2 6/24 7/22 7/31 8/26 9/3   Total Weekly Dose 23mg 22.5mg 22.5mg 23mg 23mg 23mg? 23mg 22.75  mg avg 22.75  mg 23mg 22.75  mg 23.5mg   INR 2.8 3.0 3.3 2.9 2.6 2.8 2.9 2.49 2.4 3.1 2.5 3.4   Notes  call recv'd  4/13 rec'vd 4/24; call call recv'd  5/20; call recv'd 6/3; call  recv'd 7/31 recv'd 7/31 recv'd 9/3 Self-adj  CoQ10; email     Date 9/11 10/8 10/13 11/24 12/3 12/22 1/4/21 2/6 2/8 2/19 3/9 4/15   Total Weekly Dose 23mg 23.5mg  avg 23.5mg   avg 23.5mg  avg 23.5mg 22.75  mg avg 22.75  mg avg 22.5mg 19mg 22.75  mg avg 22.75   mg avg 22.75   mg avg   INR 2.8 3.4 3.3 3.7 3.1 2.7 2.6 3.7 3.2 2.8 3.2 2.6   Notes  email email Email; less Ensure Self-adjust dose dec  recv'd 1/5; call email Email; Self held x1 email email      Date 5/5 5/26 6/30 7/9 7/28 8/23 9/29 10/18 11/11 12/17 1/3/22 1/25   Total WeeklyDose 22.75 mg avg 22.75  mg avg  22.75 mg avg 22.75  mg avg 22.75 mg avg 22.75 mg avg 22.75 mg avg 22.75 mg avg 22.75 mg avg 22.75 mg avg 22.75 mg avg   INR 2.9 2.8 3.5 2.8 2.5 3.0 2.6 2.9 2.7 3.7 3.0 2.9   Notes email; fall rcv'd 5/27  rec'vd 7/12; email rec'vd 8/3  email  email Unable to contact email; Self-heldx1 rec'd 2/23     Date 2/23 3/26 3/28  4/17 5/24 5/24 5/26 6/17 7/14  8/19   Total Weekly Dose 22.75 mg avg 22.75mg 19.5mg Non compliant 22.75 mg avg? 22.75 mg avg 22.75 mg avg 22.75 mg avg  23mg Non-  Compliant 22.75 mg   avg   INR 3.2 4.1 3.2  3.0 3.5 3.0 3.4 3.0 2.7  3.4   Notes Unable to contact garlic 1x hold d/c garlic  ?? ?? ?? ?? Rec/d 6/22   email     Date 9/2 9/4 9/19 10/2 10/19 11/11 12/26 1/12/23 2/1 3/12 4/19 4/16   Total WeeklyDose 22.5 19.5mg 21 mg 21 mg 21 mg  23 mg 23 mg 21 mg ? Unable to contact. ~ 22.75 mg   INR 3.8 2.8 2.9 3.1 2.9 3.3 2.7 2.7 3.2  2.6 2.1 3.5   Notes rec 9/13 1x hold rec 9/13 rec 9/20  Self-adj   recvd 11/15   Unable to contact Emailed rec'd 1/16 rec 2/2 rec 3/13 Rcv/d 4/20 Reported 5/2 in [facility] and called clinic 5/17     Date 5/18 5/25 6/6 6/26 7/10 7/27  8/4 8/30 09/17 10/16 11/2  11/17   Total WeeklyDose 23 mg ~22.75 mg  ~22.75 mg ~ 22.75 mg ~22.75 mg ~22.5 mg  22.5mg ~22.75mg ~22.75 mg 22.5 mg ~22.75 mg 24.5 mg   INR 3.4 2.7 2.8 2.4  2.6 2.8 1.9 2.0 2.5 3.3 2.5 2.0 2.5    Notes  call   Rec'd 7/11 Rec'd  7/28  clinda Rec 8/31 REC 09/20 Rec'd 10/20/23 Rec'd 11/3  Coq10 change      Date 11/22 12/01 1/6 1/12 1/25/24 2/3 2/13  2/26 3/13/24 3/28 4/11/24 4/29 5/12   Total Weekly Dose 24.5 mg 24.5 mg 24.5 mg 26 mg 24.5 mg 28 mg 28 mg  29 mg 29 mg 29 mg 29 mg 30 mg 29.5 mg   INR 2.4 3.2 1.8 2.7 1.7 2.5 2.6  2.5 2.7 2.7 2.9 3.4 2.5   Notes   Rec 1/12   Rec'd 2/5  Boost x 1 Rec 2/15 call self-adj Rec 2/28 Rec'd 3/14 Rec'd 3/29  Sent message in [employer]  Self adjusted maintenance regimen/ message rec'd in mychart Rec'd 5/15     Date 5/29/24 6/27 7/16 8/19 8/27 9/12 10/3/24 10/25 11/22/24 12/12 1/9    Total WeeklyDose 29.5 mg 30 mg 30 mg Avg 29.75  30 mg  Avg 29.75 mg 29.5 mg  29.5 mg 29.5 mg    INR 3.3 3.0 3.1 3.6 3.3 3.5 3.4 2.8 3.3 3.2 3.4    Notes Rec'd 6/5/24 Rec'd 6/28 Unable to contact Rec'd 8/20 Rec'd 8/28 Rec 9/16 Rec'd 10/8/24 lvm unable to contact  [employer] message  Rec 1/13      Phone Interview:  Tablet Strength: 3mg and 1mg tablets  Patient Contact Info: **will message on [employer] once monthly or if INR is out of range**PLEASE  USE [employer] TO COMMUNICATE -   Preferred *[phone]* - after noon  Lab Contact Info: [employer]   Lab: [facility] phone: [phone] , Fax: [phone]     Patient Findings    Negatives: Signs/symptoms of thrombosis, Signs/symptoms of bleeding, Laboratory test error suspected, Change in health, Change in alcohol use, Change in activity, Upcoming invasive procedure, Emergency department visit, Upcoming dental procedure, Missed doses, Extra doses, Change in medications, Change in diet/appetite, Hospital admission, Bruising, Other complaints   Comments: Patient reports he is taking a multivitamin capsule once 3-4x weekly. All other findings negative per patient.     Plan  1. INR was therapeutic 1/9 at 3.4 (goal 2.5-3.5). Result received today. Instructed Mr. Turner to continue alternating warfarin 4 mg and warfarin 4.5 mg QOD until  recheck  2. Repeat INR in 2 weeks, 1.23.25  3. Doc Turner understands the importance of calling the [facility] if he notices any s/sx of bleeding, stroke, or abnormal bruising, if any changes are made to his medications or medication doses (Rx, OTC, herbal), or if any upcoming procedures are scheduled. Doc Turner will likewise let us know if any other changes, questions, or concerns arise regarding anticoagulation therapy. he understands the importance of seeking medical attention immediately if he experiences any falls, vehicle accidents, or abnormal bleeding or bruising. Doc Turner voiced understanding of this information and confirms that he has the [facility]'s contact information. Otherwise, we will plan to contact the patient once monthly or if his INR is out of range.        Radha Turjillo CPhT   12:24 EST 1/13/2025    I, Dori Rivera, PharmD, have reviewed the note in full and agree with the assessment and plan.  01/13/25  13:10 EST

## 2025-02-02 DIAGNOSIS — I48.92 PAROXYSMAL ATRIAL FLUTTER: ICD-10-CM

## 2025-02-03 RX ORDER — WARFARIN SODIUM 4 MG/1
TABLET ORAL
Qty: 60 TABLET | Refills: 2 | Status: SHIPPED | OUTPATIENT
Start: 2025-02-03

## 2025-03-04 ENCOUNTER — TELEPHONE (OUTPATIENT)
Dept: CARDIOLOGY | Facility: CLINIC | Age: 75
End: 2025-03-04

## 2025-03-04 NOTE — TELEPHONE ENCOUNTER
Caller: Doc Turner    Relationship to patient: Self    Best call back number: [phone]    Chief complaint: PT CALLING TO CHECK WHEN APPT IS. NO APPT SCHEDULED    Type of visit: 1 YR FU    Requested date: AFTER 9/18/25     If rescheduling, when is the original appointment: N/A     Additional notes: PT WAS NEVER SCHEDULED FOR 1 YR FU AT LAST APPT. NP APPTS IN TIMEFRAME. PLEASE ADVISE, THANK YOU.

## 2025-03-14 LAB — INR PPP: 3.5

## 2025-03-17 ENCOUNTER — ANTICOAGULATION VISIT (OUTPATIENT)
Dept: PHARMACY | Facility: HOSPITAL | Age: 75
End: 2025-03-17
Payer: MEDICARE

## 2025-03-17 DIAGNOSIS — Z95.2 HX OF MITRAL VALVE REPLACEMENT WITH MECHANICAL VALVE: ICD-10-CM

## 2025-03-17 DIAGNOSIS — I48.92 PAROXYSMAL ATRIAL FLUTTER: Primary | ICD-10-CM

## 2025-03-17 NOTE — PROGRESS NOTES
Anticoagulation Clinic - Remote Progress Note  [facility]  Frequency of Monitorin-4x a month    Indication: Hx of mitral valve replacement with mechanical valve KONG Mendoza  Referring Provider: Dr. Garcia (last ravindra: 23)  Initial Warfarin Start Date:    Goal INR: 2.5-3.5  Current Drug Interactions: ensure (once daily); amitriptyline (PRN)  CHADS-VASc: 2 (age, HTN)     Diet: iceberg lettuce 1x/week, ensure 3-4x/week, V8 juice (20)  Alcohol: 12 pack of beer/week  Tobacco: None  OTC Pain Medication: None     INR History:  Date 8/25 9/11 9/29 10/19 11/5 12/2 12/11 1/4/18 1/30 2/19 3/7 4/5   Total Weekly Dose 24mg 24mg 24mg 22.75  mg? 22.75  mg? ? 22.5mg 22.5mg 22.5mg 22.5mg 22.5mg 22.5mg   INR 3.3 2.8 2.9 3.1 3.5 2.7 2.7 2.6 2.9 2.5 3.3 3.7   Notes    LVM LVM   call   LVM      Date 4/18 5/20 6/5 6/27 7/24 7/31 9/9 10/8 11/8 12/14 2/20/19 2/25   Total Weekly Dose 22.5mg 23mg 22.5mg  23mg 24mg 24mg unable   to verify unable   to verify unable   to verify unable   to verify 23mg   INR 2.6 2.8 2.9 2.4 2.9 3.3 3.4 3.0 2.7 2.6 3.8 3.4   Notes      rec'vd   recv'd 10/13;  mult calls         Date 3/11 3/25 4/9 5/1 5/15 6/11 6/27 7/10 7/26 8/9 9/11 10/3   Total Weekly Dose 23mg 23mg 22.5mg 23mg 22.5mg 23mg  22.5mg 22.5mg 22.5mg 23mg 22.5mg   INR 3.6 3.0 3.2 3.3 2.5 3.1 3.6 2.9 2.3 2.9 2.9 2.8   Notes        reported          Date 10/15 10/24 10/31 11/8 11/15 11/22 12/7 12/18 12/27 1/15/20 1/30 2/18   Total Weekly Dose 23mg 22.5mg 23mg 23mg 23.5mg 23mg 22.5mg 23mg 22.5mg 23mg ??? 23mg    INR 3.3 3.2 2.7 2.3 2.8 2.7 3.2  2.5 2.5 3.1 2.7 2.4   Notes  reported 10/25; sick; cefdinir stop cefdinir 10/29    Reported   Inc GLV  Unable to reach pt MVI; decr Ensure     Date 3/5 3/17 4/4 4/23 5/4 5/19 6/2 6/24 7/22 7/31 8/26 9/3   Total Weekly Dose 23mg 22.5mg 22.5mg 23mg 23mg 23mg? 23mg 22.75  mg avg 22.75  mg 23mg 22.75  mg 23.5mg   INR 2.8 3.0 3.3 2.9 2.6 2.8 2.9 2.49 2.4 3.1 2.5 3.4   Notes  call recv'd  4/13 rec'vd 4/24; call call recv'd  5/20; call recv'd 6/3; call  recv'd 7/31 recv'd 7/31 recv'd 9/3 Self-adj  CoQ10; email     Date 9/11 10/8 10/13 11/24 12/3 12/22 1/4/21 2/6 2/8 2/19 3/9 4/15   Total Weekly Dose 23mg 23.5mg  avg 23.5mg   avg 23.5mg  avg 23.5mg 22.75  mg avg 22.75  mg avg 22.5mg 19mg 22.75  mg avg 22.75   mg avg 22.75   mg avg   INR 2.8 3.4 3.3 3.7 3.1 2.7 2.6 3.7 3.2 2.8 3.2 2.6   Notes  email email Email; less Ensure Self-adjust dose dec  recv'd 1/5; call email Email; Self held x1 email email      Date 5/5 5/26 6/30 7/9 7/28 8/23 9/29 10/18 11/11 12/17 1/3/22 1/25   Total WeeklyDose 22.75 mg avg 22.75  mg avg  22.75 mg avg 22.75  mg avg 22.75 mg avg 22.75 mg avg 22.75 mg avg 22.75 mg avg 22.75 mg avg 22.75 mg avg 22.75 mg avg   INR 2.9 2.8 3.5 2.8 2.5 3.0 2.6 2.9 2.7 3.7 3.0 2.9   Notes email; fall rcv'd 5/27  rec'vd 7/12; email rec'vd 8/3  email  email Unable to contact email; Self-heldx1 rec'd 2/23     Date 2/23 3/26 3/28  4/17 5/24 5/24 5/26 6/17 7/14  8/19   Total Weekly Dose 22.75 mg avg 22.75mg 19.5mg Non compliant 22.75 mg avg? 22.75 mg avg 22.75 mg avg 22.75 mg avg  23mg Non-  Compliant 22.75 mg   avg   INR 3.2 4.1 3.2  3.0 3.5 3.0 3.4 3.0 2.7  3.4   Notes Unable to contact garlic 1x hold d/c garlic  ?? ?? ?? ?? Rec/d 6/22   email     Date 9/2 9/4 9/19 10/2 10/19 11/11 12/26 1/12/23 2/1 3/12 4/19 4/16   Total WeeklyDose 22.5 19.5mg 21 mg 21 mg 21 mg  23 mg 23 mg 21 mg ? Unable to contact. ~ 22.75 mg   INR 3.8 2.8 2.9 3.1 2.9 3.3 2.7 2.7 3.2  2.6 2.1 3.5   Notes rec 9/13 1x hold rec 9/13 rec 9/20  Self-adj   recvd 11/15   Unable to contact Emailed rec'd 1/16 rec 2/2 rec 3/13 Rcv/d 4/20 Reported 5/2 in [facility] and called clinic 5/17     Date 5/18 5/25 6/6 6/26 7/10 7/27  8/4 8/30 09/17 10/16 11/2  11/17   Total WeeklyDose 23 mg ~22.75 mg  ~22.75 mg ~ 22.75 mg ~22.75 mg ~22.5 mg  22.5mg ~22.75mg ~22.75 mg 22.5 mg ~22.75 mg 24.5 mg   INR 3.4 2.7 2.8 2.4  2.6 2.8 1.9 2.0 2.5 3.3 2.5 2.0 2.5    Notes  call   Rec'd 7/11 Rec'd  7/28  clinda Rec 8/31 REC 09/20 Rec'd 10/20/23 Rec'd 11/3  Coq10 change      Date 11/22 12/01 1/6 1/12 1/25/24 2/3 2/13  2/26 3/13/24 3/28 4/11/24 4/29 5/12   Total Weekly Dose 24.5 mg 24.5 mg 24.5 mg 26 mg 24.5 mg 28 mg 28 mg  29 mg 29 mg 29 mg 29 mg 30 mg 29.5 mg   INR 2.4 3.2 1.8 2.7 1.7 2.5 2.6  2.5 2.7 2.7 2.9 3.4 2.5   Notes   Rec 1/12   Rec'd 2/5  Boost x 1 Rec 2/15 call self-adj Rec 2/28 Rec'd 3/14 Rec'd 3/29  Sent message in [employer]  Self adjusted maintenance regimen/ message rec'd in mychart Rec'd 5/15     Date 5/29/24 6/27 7/16 8/19 8/27 9/12 10/3/24 10/25 11/22/24 12/12 1/9 3/14   Total WeeklyDose 29.5 mg 30 mg 30 mg Avg 29.75  30 mg  Avg 29.75 mg 29.5 mg  29.5 mg 29.5 mg 30mg   INR 3.3 3.0 3.1 3.6 3.3 3.5 3.4 2.8 3.3 3.2 3.4 3.5   Notes Rec'd 6/5/24 Rec'd 6/28 Unable to contact Rec'd 8/20 Rec'd 8/28 Rec 9/16 Rec'd 10/8/24 lvm unable to contact  [employer] message  Rec 1/13 Rec'd3/17     Date                 Total Weekly  Dose                INR                Notes                    Phone Interview:  Tablet Strength: 3mg and 1mg tablets  Patient Contact Info: **will message on Mychart once monthly or if INR is out of range**PLEASE  USE [employer] TO COMMUNICATE -   Preferred *[phone]* - after noon  Lab Contact Info: [employer]   Lab: [facility] phone: [phone] , Fax: [phone]       Patient Findings:  Negatives: Signs/symptoms of thrombosis, Signs/symptoms of bleeding, Laboratory test error suspected, Change in health, Change in alcohol use, Change in activity, Upcoming invasive procedure, Emergency department visit, Upcoming dental procedure, Missed doses, Extra doses, Change in medications, Change in diet/appetite, Hospital admission, Bruising, Other complaints   Comments: Patient did not mention any changes in [employer] message sent 3/17.         Plan  1. INR was therapeutic 3/14 at 3.5 (goal 2.5-3.5). Result received today 3/17. Instructed   Johnny to continue alternating warfarin 4 mg and warfarin 4.5 mg QOD until recheck  2. Repeat INR in 2 weeks, 3.28.25  3. Doc Turner understands the importance of calling the [facility] if he notices any s/sx of bleeding, stroke, or abnormal bruising, if any changes are made to his medications or medication doses (Rx, OTC, herbal), or if any upcoming procedures are scheduled. Doc Turner will likewise let us know if any other changes, questions, or concerns arise regarding anticoagulation therapy. he understands the importance of seeking medical attention immediately if he experiences any falls, vehicle accidents, or abnormal bleeding or bruising. Doc Turner voiced understanding of this information and confirms that he has the [facility]'s contact information. Otherwise, we will plan to contact the patient once monthly or if his INR is out of range.      Tej Burton CPHT  3/21/2025 08:16 EDT      I, Dori Rivera, PharmD, have reviewed the note in full and agree with the assessment and plan.  03/21/25  08:55 EDT

## 2025-03-27 RX ORDER — SOTALOL HYDROCHLORIDE 80 MG/1
80 TABLET ORAL 2 TIMES DAILY
Qty: 180 TABLET | Refills: 2 | Status: SHIPPED | OUTPATIENT
Start: 2025-03-27

## 2025-03-28 ENCOUNTER — ANTICOAGULATION VISIT (OUTPATIENT)
Dept: PHARMACY | Facility: HOSPITAL | Age: 75
End: 2025-03-28
Payer: MEDICARE

## 2025-03-28 DIAGNOSIS — Z95.2 HX OF MITRAL VALVE REPLACEMENT WITH MECHANICAL VALVE: ICD-10-CM

## 2025-03-28 DIAGNOSIS — I48.92 PAROXYSMAL ATRIAL FLUTTER: Primary | ICD-10-CM

## 2025-03-28 LAB — INR PPP: 2.5

## 2025-03-28 NOTE — PROGRESS NOTES
Anticoagulation Clinic - Remote Progress Note  [facility]  Frequency of Monitorin-4x a month    Indication: Hx of mitral valve replacement with mechanical valve KONG Mendoza  Referring Provider: Dr. Garcia (last ravindra: 23)  Initial Warfarin Start Date:    Goal INR: 2.5-3.5  Current Drug Interactions: ensure (once daily); amitriptyline (PRN)  CHADS-VASc: 2 (age, HTN)     Diet: iceberg lettuce 1x/week, ensure 3-4x/week, V8 juice (20)  Alcohol: 12 pack of beer/week  Tobacco: None  OTC Pain Medication: None     INR History:  Date 8/25 9/11 9/29 10/19 11/5 12/2 12/11 1/4/18 1/30 2/19 3/7 4/5   Total Weekly Dose 24mg 24mg 24mg 22.75  mg? 22.75  mg? ? 22.5mg 22.5mg 22.5mg 22.5mg 22.5mg 22.5mg   INR 3.3 2.8 2.9 3.1 3.5 2.7 2.7 2.6 2.9 2.5 3.3 3.7   Notes    LVM LVM   call   LVM      Date 4/18 5/20 6/5 6/27 7/24 7/31 9/9 10/8 11/8 12/14 2/20/19 2/25   Total Weekly Dose 22.5mg 23mg 22.5mg  23mg 24mg 24mg unable   to verify unable   to verify unable   to verify unable   to verify 23mg   INR 2.6 2.8 2.9 2.4 2.9 3.3 3.4 3.0 2.7 2.6 3.8 3.4   Notes      rec'vd   recv'd 10/13;  mult calls         Date 3/11 3/25 4/9 5/1 5/15 6/11 6/27 7/10 7/26 8/9 9/11 10/3   Total Weekly Dose 23mg 23mg 22.5mg 23mg 22.5mg 23mg  22.5mg 22.5mg 22.5mg 23mg 22.5mg   INR 3.6 3.0 3.2 3.3 2.5 3.1 3.6 2.9 2.3 2.9 2.9 2.8   Notes        reported          Date 10/15 10/24 10/31 11/8 11/15 11/22 12/7 12/18 12/27 1/15/20 1/30 2/18   Total Weekly Dose 23mg 22.5mg 23mg 23mg 23.5mg 23mg 22.5mg 23mg 22.5mg 23mg ??? 23mg    INR 3.3 3.2 2.7 2.3 2.8 2.7 3.2  2.5 2.5 3.1 2.7 2.4   Notes  reported 10/25; sick; cefdinir stop cefdinir 10/29    Reported   Inc GLV  Unable to reach pt MVI; decr Ensure     Date 3/5 3/17 4/4 4/23 5/4 5/19 6/2 6/24 7/22 7/31 8/26 9/3   Total Weekly Dose 23mg 22.5mg 22.5mg 23mg 23mg 23mg? 23mg 22.75  mg avg 22.75  mg 23mg 22.75  mg 23.5mg   INR 2.8 3.0 3.3 2.9 2.6 2.8 2.9 2.49 2.4 3.1 2.5 3.4   Notes  call recv'd  4/13 rec'vd 4/24; call call recv'd  5/20; call recv'd 6/3; call  recv'd 7/31 recv'd 7/31 recv'd 9/3 Self-adj  CoQ10; email     Date 9/11 10/8 10/13 11/24 12/3 12/22 1/4/21 2/6 2/8 2/19 3/9 4/15   Total Weekly Dose 23mg 23.5mg  avg 23.5mg   avg 23.5mg  avg 23.5mg 22.75  mg avg 22.75  mg avg 22.5mg 19mg 22.75  mg avg 22.75   mg avg 22.75   mg avg   INR 2.8 3.4 3.3 3.7 3.1 2.7 2.6 3.7 3.2 2.8 3.2 2.6   Notes  email email Email; less Ensure Self-adjust dose dec  recv'd 1/5; call email Email; Self held x1 email email      Date 5/5 5/26 6/30 7/9 7/28 8/23 9/29 10/18 11/11 12/17 1/3/22 1/25   Total WeeklyDose 22.75 mg avg 22.75  mg avg  22.75 mg avg 22.75  mg avg 22.75 mg avg 22.75 mg avg 22.75 mg avg 22.75 mg avg 22.75 mg avg 22.75 mg avg 22.75 mg avg   INR 2.9 2.8 3.5 2.8 2.5 3.0 2.6 2.9 2.7 3.7 3.0 2.9   Notes email; fall rcv'd 5/27  rec'vd 7/12; email rec'vd 8/3  email  email Unable to contact email; Self-heldx1 rec'd 2/23     Date 2/23 3/26 3/28  4/17 5/24 5/24 5/26 6/17 7/14  8/19   Total Weekly Dose 22.75 mg avg 22.75mg 19.5mg Non compliant 22.75 mg avg? 22.75 mg avg 22.75 mg avg 22.75 mg avg  23mg Non-  Compliant 22.75 mg   avg   INR 3.2 4.1 3.2  3.0 3.5 3.0 3.4 3.0 2.7  3.4   Notes Unable to contact garlic 1x hold d/c garlic  ?? ?? ?? ?? Rec/d 6/22   email     Date 9/2 9/4 9/19 10/2 10/19 11/11 12/26 1/12/23 2/1 3/12 4/19 4/16   Total WeeklyDose 22.5 19.5mg 21 mg 21 mg 21 mg  23 mg 23 mg 21 mg ? Unable to contact. ~ 22.75 mg   INR 3.8 2.8 2.9 3.1 2.9 3.3 2.7 2.7 3.2  2.6 2.1 3.5   Notes rec 9/13 1x hold rec 9/13 rec 9/20  Self-adj   recvd 11/15   Unable to contact Emailed rec'd 1/16 rec 2/2 rec 3/13 Rcv/d 4/20 Reported 5/2 in [facility] and called clinic 5/17     Date 5/18 5/25 6/6 6/26 7/10 7/27  8/4 8/30 09/17 10/16 11/2  11/17   Total WeeklyDose 23 mg ~22.75 mg  ~22.75 mg ~ 22.75 mg ~22.75 mg ~22.5 mg  22.5mg ~22.75mg ~22.75 mg 22.5 mg ~22.75 mg 24.5 mg   INR 3.4 2.7 2.8 2.4  2.6 2.8 1.9 2.0 2.5 3.3 2.5 2.0 2.5    Notes  call   Rec'd 7/11 Rec'd  7/28  clinda Rec 8/31 REC 09/20 Rec'd 10/20/23 Rec'd 11/3  Coq10 change      Date 11/22 12/01 1/6 1/12 1/25/24 2/3 2/13  2/26 3/13/24 3/28 4/11/24 4/29 5/12   Total Weekly Dose 24.5 mg 24.5 mg 24.5 mg 26 mg 24.5 mg 28 mg 28 mg  29 mg 29 mg 29 mg 29 mg 30 mg 29.5 mg   INR 2.4 3.2 1.8 2.7 1.7 2.5 2.6  2.5 2.7 2.7 2.9 3.4 2.5   Notes   Rec 1/12   Rec'd 2/5  Boost x 1 Rec 2/15 call self-adj Rec 2/28 Rec'd 3/14 Rec'd 3/29  Sent message in [email or web address]  Self adjusted maintenance regimen/ message rec'd in mychart Rec'd 5/15     Date 5/29/24 6/27 7/16 8/19 8/27 9/12 10/3/24 10/25 11/22/24 12/12 1/9 3/14   Total WeeklyDose 29.5 mg 30 mg 30 mg Avg 29.75  30 mg  Avg 29.75 mg 29.5 mg  29.5 mg 29.5 mg 30mg   INR 3.3 3.0 3.1 3.6 3.3 3.5 3.4 2.8 3.3 3.2 3.4 3.5   Notes Rec'd 6/5/24 Rec'd 6/28 Unable to contact Rec'd 8/20 Rec'd 8/28 Rec 9/16 Rec'd 10/8/24 lvm unable to contact  [employer] message  Rec 1/13 Rec'd3/17     Date 3/27               Total Weekly  Dose 29.75 mg               INR 2.5               Notes Rec'd 3/28                 Phone Interview:  Tablet Strength: 3mg and 1mg tablets  Patient Contact Info: **will message on Mychart once monthly or if INR is out of range**PLEASE  USE [employer] TO COMMUNICATE -   Preferred *[phone]* - after noon  Lab Contact Info: [employer]   Lab: [facility] phone: [phone] , Fax: [phone]     Patient Findings:  Positives: Change in medications   Negatives: Signs/symptoms of thrombosis, Signs/symptoms of bleeding, Laboratory test error suspected, Change in health, Change in alcohol use, Change in activity, Upcoming invasive procedure, Emergency department visit, Upcoming dental procedure, Missed doses, Extra doses, Change in diet/appetite, Hospital admission, Bruising, Other complaints   Comments: Patient reports he took a couple of ibuprofen for pain relief before bed. All other findings negative per patient.     Plan  INR was  therapeutic yesterday at 2.5 (goal 2.5-3.5). Instructed Mr. Turner to continue alternating warfarin 4 mg and warfarin 4.5 mg QOD until recheck  Repeat INR in 2 weeks, 4.10.25  Doc Turner understands the importance of calling the [facility] if he notices any s/sx of bleeding, stroke, or abnormal bruising, if any changes are made to his medications or medication doses (Rx, OTC, herbal), or if any upcoming procedures are scheduled. Doc Turner will likewise let us know if any other changes, questions, or concerns arise regarding anticoagulation therapy. he understands the importance of seeking medical attention immediately if he experiences any falls, vehicle accidents, or abnormal bleeding or bruising. Doc Turner voiced understanding of this information and confirms that he has the [facility]'s contact information. Otherwise, we will plan to contact the patient once monthly or if his INR is out of range.      Radha Trujillo CPhT, [facility]   14:35 EDT   3/28/2025    IDori, PharmD, have reviewed the note in full and agree with the assessment and plan.  03/28/25  14:53 EDT

## 2025-04-28 ENCOUNTER — ANTICOAGULATION VISIT (OUTPATIENT)
Dept: PHARMACY | Facility: HOSPITAL | Age: 75
End: 2025-04-28
Payer: MEDICARE

## 2025-04-28 DIAGNOSIS — Z95.2 HX OF MITRAL VALVE REPLACEMENT WITH MECHANICAL VALVE: ICD-10-CM

## 2025-04-28 DIAGNOSIS — I48.92 PAROXYSMAL ATRIAL FLUTTER: Primary | ICD-10-CM

## 2025-04-28 LAB — INR PPP: 2.5

## 2025-04-28 NOTE — PROGRESS NOTES
Anticoagulation Clinic - Remote Progress Note  [facility]  Frequency of Monitorin-4x a month    Indication: Hx of mitral valve replacement with mechanical valve KONG Mendoza  Referring Provider: Dr. Garcia (last ravindra: 23)  Initial Warfarin Start Date:    Goal INR: 2.5-3.5  Current Drug Interactions: ensure (once daily); amitriptyline (PRN)  CHADS-VASc: 2 (age, HTN)     Diet: iceberg lettuce 1x/week, ensure 3-4x/week, V8 juice (20)  Alcohol: 12 pack of beer/week  Tobacco: None  OTC Pain Medication: None     INR History:  Date 5/18 5/25 6/6 6/26 7/10 7/27  8/4 8/30 09/17 10/16 11/2  11/17   Total WeeklyDose 23 mg ~22.75 mg  ~22.75 mg ~ 22.75 mg ~22.75 mg ~22.5 mg  22.5mg ~22.75mg ~22.75 mg 22.5 mg ~22.75 mg 24.5 mg   INR 3.4 2.7 2.8 2.4  2.6 2.8 1.9 2.0 2.5 3.3 2.5 2.0 2.5   Notes  call   Rec'd  Rec'd    clinda Rec  REC  Rec'd 10/20/23 Rec'd 11/3  Coq10 change      Date 11/22 12/01 1/6 1/12 1/25/24 2/3 2/13  2/26 3/13/24 3/28 4/11/24 4/29 5/12   Total Weekly Dose 24.5 mg 24.5 mg 24.5 mg 26 mg 24.5 mg 28 mg 28 mg  29 mg 29 mg 29 mg 29 mg 30 mg 29.5 mg   INR 2.4 3.2 1.8 2.7 1.7 2.5 2.6  2.5 2.7 2.7 2.9 3.4 2.5   Notes   Rec    Rec'd   Boost x 1 Rec 2/15 call self-adj Rec  Rec'd 3/14 Rec'd 3/29  Sent message in [employer]  Self adjusted maintenance regimen/ message rec'd in [employer] Rec'd 5/15     Date 5/29/24 6/27 7/16 8/19 8/27 9/12 10/3/24 10/25 11/22/24 12/12 1/9 3/14   Total WeeklyDose 29.5 mg 30 mg 30 mg Avg 29.75  30 mg  Avg 29.75 mg 29.5 mg  29.5 mg 29.5 mg 30mg   INR 3.3 3.0 3.1 3.6 3.3 3.5 3.4 2.8 3.3 3.2 3.4 3.5   Notes Rec'd 24 Rec'd  Unable to contact Rec'd  Rec'd  Rec  Rec'd 10/8/24 lvm unable to contact  [employer] message  Rec  Rec'     Date 3/27 4/28              Total Weekly  Dose 29.75 mg 30 mg              INR 2.5 2.5              Notes Rec'd 3/28                 Phone Interview:  Tablet Strength: 3mg and 1mg tablets  Patient Contact  Info: **will message on [employer] once monthly or if INR is out of range**PLEASE  USE [employer] TO COMMUNICATE -   Preferred *[phone]* - after noon  Lab Contact Info: [employer]   Lab: [facility] phone: [phone] , Fax: [phone]     No response to patient message as of 5/1    Plan  INR was therapeutic at 2.5 (goal 2.5-3.5). Instructed Mr. Turner to continue alternating warfarin 4 mg and warfarin 4.5 mg QOD until recheck  Repeat INR in 2 weeks, 5.12.25  Doc DELEON Johnny understands the importance of calling the [facility] if he notices any s/sx of bleeding, stroke, or abnormal bruising, if any changes are made to his medications or medication doses (Rx, OTC, herbal), or if any upcoming procedures are scheduled. Doc DELEON Johnny will likewise let us know if any other changes, questions, or concerns arise regarding anticoagulation therapy. he understands the importance of seeking medical attention immediately if he experiences any falls, vehicle accidents, or abnormal bleeding or bruising. Doc Turner voiced understanding of this information and confirms that he has the [facility]'s contact information. Otherwise, we will plan to contact the patient once monthly or if his INR is out of range.    Tej Burton CPHT  5/1/2025 10:28 EDT    REID, Dori Rivera, PharmD, have reviewed the note in full and agree with the assessment and plan.  05/01/25  11:35 EDT

## 2025-05-15 LAB — INR PPP: 2.8

## 2025-05-16 ENCOUNTER — ANTICOAGULATION VISIT (OUTPATIENT)
Dept: PHARMACY | Facility: HOSPITAL | Age: 75
End: 2025-05-16
Payer: MEDICARE

## 2025-05-16 DIAGNOSIS — I48.92 PAROXYSMAL ATRIAL FLUTTER: Primary | ICD-10-CM

## 2025-05-16 DIAGNOSIS — Z95.2 HX OF MITRAL VALVE REPLACEMENT WITH MECHANICAL VALVE: ICD-10-CM

## 2025-05-16 NOTE — PROGRESS NOTES
Anticoagulation Clinic - Remote Progress Note  [facility]  Frequency of Monitorin-4x a month    Indication: Hx of mitral valve replacement with mechanical valve KONG Mendoza  Referring Provider: Dr. Garcia (last ravindra: 23)  Initial Warfarin Start Date:    Goal INR: 2.5-3.5  Current Drug Interactions: ensure (once daily); amitriptyline (PRN)  CHADS-VASc: 2 (age, HTN)     Diet: iceberg lettuce 1x/week, ensure 3-4x/week, V8 juice (20)  Alcohol: 12 pack of beer/week  Tobacco: None  OTC Pain Medication: None     INR History:  Date 5/18 5/25 6/6 6/26 7/10 7/27  8/4 8/30 09/17 10/16 11/2  11/17   Total WeeklyDose 23 mg ~22.75 mg  ~22.75 mg ~ 22.75 mg ~22.75 mg ~22.5 mg  22.5mg ~22.75mg ~22.75 mg 22.5 mg ~22.75 mg 24.5 mg   INR 3.4 2.7 2.8 2.4  2.6 2.8 1.9 2.0 2.5 3.3 2.5 2.0 2.5   Notes  call   Rec'd  Rec'd    clinda Rec  REC  Rec'd 10/20/23 Rec'd 11/3  Coq10 change      Date 11/22 12/01 1/6 1/12 1/25/24 2/3 2/13  2/26 3/13/24 3/28 4/11/24 4/29 5/12   Total Weekly Dose 24.5 mg 24.5 mg 24.5 mg 26 mg 24.5 mg 28 mg 28 mg  29 mg 29 mg 29 mg 29 mg 30 mg 29.5 mg   INR 2.4 3.2 1.8 2.7 1.7 2.5 2.6  2.5 2.7 2.7 2.9 3.4 2.5   Notes   Rec    Rec'd   Boost x 1 Rec 2/15 call self-adj Rec  Rec'd 3/14 Rec'd 3/29  Sent message in [employer]  Self adjusted maintenance regimen/ message rec'd in [employer] Rec'd 5/15     Date 5/29/24 6/27 7/16 8/19 8/27 9/12 10/3/24 10/25 11/22/24 12/12 1/9 3/14   Total WeeklyDose 29.5 mg 30 mg 30 mg Avg 29.75  30 mg  Avg 29.75 mg 29.5 mg  29.5 mg 29.5 mg 30mg   INR 3.3 3.0 3.1 3.6 3.3 3.5 3.4 2.8 3.3 3.2 3.4 3.5   Notes Rec'd 24 Rec'd  Unable to contact Rec'd  Rec'd  Rec  Rec'd 10/8/24 lvm unable to contact  [employer] message  Rec  Rec'     Date 3/27 4/28 5/15             Total Weekly  Dose 29.75 mg 30 mg 30 mg             INR 2.5 2.5 2.8             Notes Rec'd 3/28  Rec'd                Phone Interview:  Tablet Strength: 3mg and 1mg  tablets  Patient Contact Info: **will message on [employer] once monthly or if INR is out of range**PLEASE  USE [employer] TO COMMUNICATE -   Preferred *[phone]* - after noon  Lab Contact Info: [employer]   Lab: [facility] in [facility] phone: [phone] , Fax: [phone]     No response to [employer] message regarding any changes.     Plan  INR was therapeutic at 2.8 (goal 2.5-3.5). Instructed Mr. Turner to continue alternating warfarin 4 mg and warfarin 4.5 mg QOD until recheck  Repeat INR in 2 weeks, 5.29.25  Doc Turenr understands the importance of calling the [facility] if he notices any s/sx of bleeding, stroke, or abnormal bruising, if any changes are made to his medications or medication doses (Rx, OTC, herbal), or if any upcoming procedures are scheduled. Doc DELEON Johnny will likewise let us know if any other changes, questions, or concerns arise regarding anticoagulation therapy. he understands the importance of seeking medical attention immediately if he experiences any falls, vehicle accidents, or abnormal bleeding or bruising. Doc Turner voiced understanding of this information and confirms that he has the [facility]'s contact information. Otherwise, we will plan to contact the patient once monthly or if his INR is out of range.      Tej Burton CPHT  5/19/2025 09:33 EDT    REID, Dori Rivera, ToriD, have reviewed the note in full and agree with the assessment and plan.  05/19/25  09:43 EDT

## 2025-06-03 LAB — INR PPP: 2.9

## 2025-06-04 ENCOUNTER — ANTICOAGULATION VISIT (OUTPATIENT)
Dept: PHARMACY | Facility: HOSPITAL | Age: 75
End: 2025-06-04
Payer: MEDICARE

## 2025-06-04 DIAGNOSIS — Z95.2 HX OF MITRAL VALVE REPLACEMENT WITH MECHANICAL VALVE: ICD-10-CM

## 2025-06-04 DIAGNOSIS — I48.92 PAROXYSMAL ATRIAL FLUTTER: Primary | ICD-10-CM

## 2025-06-04 NOTE — PROGRESS NOTES
Anticoagulation Clinic - Remote Progress Note  [facility]  Frequency of Monitorin-4x a month    Indication: Hx of mitral valve replacement with mechanical valve KONG Mendoza  Referring Provider: Dr. Garcia (last ravindra: 23)  Initial Warfarin Start Date:    Goal INR: 2.5-3.5  Current Drug Interactions: ensure (once daily); amitriptyline (PRN)  CHADS-VASc: 2 (age, HTN)     Diet: iceberg lettuce 1x/week, ensure 3-4x/week, V8 juice (20)  Alcohol: 12 pack of beer/week  Tobacco: None  OTC Pain Medication: None     INR History:  Date 5/18 5/25 6/6 6/26 7/10 7/27  8/4 8/30 09/17 10/16 11/2  11/17   Total WeeklyDose 23 mg ~22.75 mg  ~22.75 mg ~ 22.75 mg ~22.75 mg ~22.5 mg  22.5mg ~22.75mg ~22.75 mg 22.5 mg ~22.75 mg 24.5 mg   INR 3.4 2.7 2.8 2.4  2.6 2.8 1.9 2.0 2.5 3.3 2.5 2.0 2.5   Notes  call   Rec'd  Rec'd    clinda Rec  REC  Rec'd 10/20/23 Rec'd 11/3  Coq10 change      Date 11/22 12/01 1/6 1/12 1/25/24 2/3 2/13  2/26 3/13/24 3/28 4/11/24 4/29 5/12   Total Weekly Dose 24.5 mg 24.5 mg 24.5 mg 26 mg 24.5 mg 28 mg 28 mg  29 mg 29 mg 29 mg 29 mg 30 mg 29.5 mg   INR 2.4 3.2 1.8 2.7 1.7 2.5 2.6  2.5 2.7 2.7 2.9 3.4 2.5   Notes   Rec    Rec'd   Boost x 1 Rec 2/15 call self-adj Rec  Rec'd 3/14 Rec'd 3/29  Sent message in [employer]  Self adjusted maintenance regimen/ message rec'd in [employer] Rec'd 5/15     Date 5/29/24 6/27 7/16 8/19 8/27 9/12 10/3/24 10/25 11/22/24 12/12 1/9 3/14   Total WeeklyDose 29.5 mg 30 mg 30 mg Avg 29.75  30 mg  Avg 29.75 mg 29.5 mg  29.5 mg 29.5 mg 30mg   INR 3.3 3.0 3.1 3.6 3.3 3.5 3.4 2.8 3.3 3.2 3.4 3.5   Notes Rec'd 24 Rec'd  Unable to contact Rec'd  Rec'd  Rec  Rec'd 10/8/24 lvm unable to contact  [employer] message  Rec  Rec'     Date 3/27 4/28 5/15 6/3            Total Weekly  Dose 29.75 mg 30 mg 30 mg 29.5 mg            INR 2.5 2.5 2.8 2.9            Notes Rec'd 3/28  Rec'd  Rec'd               Phone Interview:  Tablet  Strength: 3mg and 1mg tablets  Patient Contact Info: **will message on [employer] once monthly or if INR is out of range**PLEASE  USE [employer] TO COMMUNICATE -   Preferred *[phone]* - after noon  Lab Contact Info: [employer]   Lab: [facility] phone: [phone] , Fax: [phone]     Sent [employer] message 6/4/25.    Plan  INR was therapeutic at 2.9 (goal 2.5-3.5). Instructed Mr. Turner to continue alternating warfarin 4 mg and warfarin 4.5 mg QOD until recheck  Repeat INR in 2 weeks, 6.17.25  Doc PANCHO Turner understands the importance of calling the [facility] if he notices any s/sx of bleeding, stroke, or abnormal bruising, if any changes are made to his medications or medication doses (Rx, OTC, herbal), or if any upcoming procedures are scheduled. Doc PANCHO Turner will likewise let us know if any other changes, questions, or concerns arise regarding anticoagulation therapy. he understands the importance of seeking medical attention immediately if he experiences any falls, vehicle accidents, or abnormal bleeding or bruising. Doc Turner voiced understanding of this information and confirms that he has the [facility]'s contact information. Otherwise, we will plan to contact the patient once monthly or if his INR is out of range.

## 2025-06-09 DIAGNOSIS — Z95.2 HX OF MITRAL VALVE REPLACEMENT WITH MECHANICAL VALVE: ICD-10-CM

## 2025-06-09 RX ORDER — WARFARIN SODIUM 1 MG/1
TABLET ORAL
Qty: 60 TABLET | Refills: 1 | Status: SHIPPED | OUTPATIENT
Start: 2025-06-09

## 2025-06-09 NOTE — TELEPHONE ENCOUNTER
Refill for warfarin 1 mg sent to pharmacy on file.     Patricia Sauceda, PharmD  6/9/2025  13:38 EDT

## 2025-06-09 NOTE — PROGRESS NOTES
Anticoagulation Clinic - Remote Progress Note  [facility] [employer] [facility]  Frequency of Monitorin-4x a month     Indication: Hx of mitral valve replacement with mechanical valve KONG Mendoza  Referring Provider: Dr. Garcia (last ravindra: 23)  Initial Warfarin Start Date:    Goal INR: 2.5-3.5  Current Drug Interactions: ensure (once daily); amitriptyline (PRN)  CHADS-VASc: 2 (age, HTN)     Diet: iceberg lettuce 1x/week, ensure 3-4x/week, V8 juice (20)  Alcohol: 12 pack of beer/week  Tobacco: None  OTC Pain Medication: None     INR History:  Date 5/18 5/25 6/6 6/26 7/10 7/27   8/4 8/30 09/17 10/16 11/2  11/17   Total WeeklyDose 23 mg ~22.75 mg  ~22.75 mg ~ 22.75 mg ~22.75 mg ~22.5 mg   22.5mg ~22.75mg ~22.75 mg 22.5 mg ~22.75 mg 24.5 mg   INR 3.4 2.7 2.8 2.4  2.6 2.8 1.9 2.0 2.5 3.3 2.5 2.0 2.5   Notes   call     Rec'd  Rec'd     clinda Rec  REC  Rec'd 10/20/23 Rec'd 11/3  Coq10 change        Date 11/22 12/01 1/6 1/12 1/25/24 2/3 2/13  2/26 3/13/24 3/28 4/11/24 4/29 5/12   Total Weekly Dose 24.5 mg 24.5 mg 24.5 mg 26 mg 24.5 mg 28 mg 28 mg  29 mg 29 mg 29 mg 29 mg 30 mg 29.5 mg   INR 2.4 3.2 1.8 2.7 1.7 2.5 2.6  2.5 2.7 2.7 2.9 3.4 2.5   Notes     Rec      Rec'd   Boost x 1 Rec 2/15 call self-adj Rec  Rec'd 3/14 Rec'd 3/29  Sent message in [employer]   Self adjusted maintenance regimen/ message rec'd in [employer] Rec'd 5/15      Date 5/29/24 6/27 7/16 8/19 8/27 9/12 10/3/24 10/25 11/22/24 12/12 1/9 3/14   Total WeeklyDose 29.5 mg 30 mg 30 mg Avg 29.75   30 mg   Avg 29.75 mg 29.5 mg  29.5 mg 29.5 mg 30mg   INR 3.3 3.0 3.1 3.6 3.3 3.5 3.4 2.8 3.3 3.2 3.4 3.5   Notes Rec'd 24 Rec'd  Unable to contact Rec'd  Rec'd  Rec  Rec'd 10/8/24 lvm unable to contact   [facility] message   Rec  Rec'      Date 3/27 4/28 5/15 6/3                     Total Weekly  Dose 29.75 mg 30 mg 30 mg 29.5 mg                     INR 2.5 2.5 2.8 2.9                     Notes Rec'd 3/28   Rec'd   Rec'd 6/4                        Phone Interview:  Tablet Strength: 3mg and 1mg tablets  Patient Contact Info: **will message on [employer] once monthly or if INR is out of range**PLEASE  USE [employer] TO COMMUNICATE -   Preferred *[phone]* - after noon  Lab Contact Info: [employer]   Lab: [facility] in [facility] phone: [phone] , Fax: [phone]      No response to [employer] message sent 6/4     Plan  INR was therapeutic at 2.9 (goal 2.5-3.5). Instructed Mr. Turner to continue alternating warfarin 4 mg and warfarin 4.5 mg QOD until recheck  Repeat INR in 2 weeks, 6.17.25  Doc DELEON Johnny understands the importance of calling the [facility] if he notices any s/sx of bleeding, stroke, or abnormal bruising, if any changes are made to his medications or medication doses (Rx, OTC, herbal), or if any upcoming procedures are scheduled. Doc DELEON Johnny will likewise let us know if any other changes, questions, or concerns arise regarding anticoagulation therapy. he understands the importance of seeking medical attention immediately if he experiences any falls, vehicle accidents, or abnormal bleeding or bruising. Doc DELEON Johnny voiced understanding of this information and confirms that he has the [facility]'s contact information. Otherwise, we will plan to contact the patient once monthly or if his INR is out of range.      Tej Burton CPHT  6/9/2025 09:05 EDT    I, Patricia Sauceda, ToriD, have reviewed the note in full and agree with the assessment and plan.  06/09/25  09:24 EDT

## 2025-06-19 ENCOUNTER — ANTICOAGULATION VISIT (OUTPATIENT)
Dept: PHARMACY | Facility: HOSPITAL | Age: 75
End: 2025-06-19
Payer: MEDICARE

## 2025-06-19 DIAGNOSIS — Z95.2 HX OF MITRAL VALVE REPLACEMENT WITH MECHANICAL VALVE: ICD-10-CM

## 2025-06-19 DIAGNOSIS — I48.92 PAROXYSMAL ATRIAL FLUTTER: Primary | ICD-10-CM

## 2025-06-19 LAB — INR PPP: 2.6

## 2025-06-19 NOTE — PROGRESS NOTES
Anticoagulation Clinic - Remote Progress Note  [employer]  Frequency of Monitorin-4x a month     Indication: Hx of mitral valve replacement with mechanical valve KONG Mendoza  Referring Provider: Dr. Garcia (last ravindra: 23)  Initial Warfarin Start Date:    Goal INR: 2.5-3.5  Current Drug Interactions: ensure (once daily); amitriptyline (PRN)  CHADS-VASc: 2 (age, HTN)     Diet: iceberg lettuce 1x/week, ensure 3-4x/week, V8 juice (20)  Alcohol: 12 pack of beer/week  Tobacco: None  OTC Pain Medication: None     INR History:  Date 5/18 5/25 6/6 6/26 7/10 7/27   8/4 8/30 09/17 10/16 11/2  11/17   Total WeeklyDose 23 mg ~22.75 mg  ~22.75 mg ~ 22.75 mg ~22.75 mg ~22.5 mg   22.5mg ~22.75mg ~22.75 mg 22.5 mg ~22.75 mg 24.5 mg   INR 3.4 2.7 2.8 2.4  2.6 2.8 1.9 2.0 2.5 3.3 2.5 2.0 2.5   Notes   call     Rec'd  Rec'd     clinda Rec  REC  Rec'd 10/20/23 Rec'd 11/3  Coq10 change        Date 11/22 12/01 1/6 1/12 1/25/24 2/3 2/13  2/26 3/13/24 3/28 4/11/24 4/29 5/12   Total Weekly Dose 24.5 mg 24.5 mg 24.5 mg 26 mg 24.5 mg 28 mg 28 mg  29 mg 29 mg 29 mg 29 mg 30 mg 29.5 mg   INR 2.4 3.2 1.8 2.7 1.7 2.5 2.6  2.5 2.7 2.7 2.9 3.4 2.5   Notes     Rec      Rec'd   Boost x 1 Rec 2/15 call self-adj Rec  Rec'd 3/14 Rec'd 3/29  Sent message in [email or web address]   Self adjusted maintenance regimen/ message rec'd in [employer] Rec'd 5/15      Date 5/29/24 6/27 7/16 8/19 8/27 9/12 10/3/24 10/25 11/22/24 12/12 1/9 3/14   Total WeeklyDose 29.5 mg 30 mg 30 mg Avg 29.75   30 mg   Avg 29.75 mg 29.5 mg  29.5 mg 29.5 mg 30mg   INR 3.3 3.0 3.1 3.6 3.3 3.5 3.4 2.8 3.3 3.2 3.4 3.5   Notes Rec'd 24 Rec'd  Unable to contact Rec'd  Rec'd  Rec  Rec'd 10/8/24 lvm unable to contact   [facility] message   Rec  Rec'      Date 3/27 4/28 5/15 6/3 6/17                   Total Weekly  Dose 29.75 mg 30 mg 30 mg 29.5 mg 28 mg                   INR 2.5 2.5 2.8 2.9 2.6                   Notes Rec'd 3/28    Rec'd 5/16 Rec'd 6/4 Rec'd 6/19                     Phone Interview:  Tablet Strength: 3mg and 1mg tablets  Patient Contact Info: **will message on [employer] once monthly or if INR is out of range**PLEASE  USE [employer] TO COMMUNICATE -   Preferred *[phone]* - after noon  Lab Contact Info: [employer]   Lab: [facility] in [facility] phone: [phone] , Fax: [phone]     Patient Findings    Comments: Patient not contacted for this encounter.     Plan:  INR was therapeutic 6/17 at 2.6 (goal 2.5-3.5). Patient reported via [employer] message that he has been taking 4 mg daily and will take 4.5 mg if his reading is low for consecutive tests. Patient will continue warfarin 4 mg daily until recheck  Repeat INR in 2 weeks, 7.01.25  Doc Turner understands the importance of calling the [facility] if he notices any s/sx of bleeding, stroke, or abnormal bruising, if any changes are made to his medications or medication doses (Rx, OTC, herbal), or if any upcoming procedures are scheduled. Doc Turner will likewise let us know if any other changes, questions, or concerns arise regarding anticoagulation therapy. he understands the importance of seeking medical attention immediately if he experiences any falls, vehicle accidents, or abnormal bleeding or bruising. Doc Turner voiced understanding of this information and confirms that he has the [facility]'s contact information. Otherwise, we will plan to contact the patient once monthly or if his INR is out of range.       Radha Trujillo CPhT, [facility]   10:27 EDT   6/19/2025    Patricia MATHEWS, PharmD, have reviewed the note in full and agree with the assessment and plan.  06/19/25  10:44 EDT

## 2025-07-12 LAB — INR PPP: 3.3

## 2025-07-14 ENCOUNTER — ANTICOAGULATION VISIT (OUTPATIENT)
Dept: PHARMACY | Facility: HOSPITAL | Age: 75
End: 2025-07-14
Payer: MEDICARE

## 2025-07-14 DIAGNOSIS — Z95.2 HX OF MITRAL VALVE REPLACEMENT WITH MECHANICAL VALVE: ICD-10-CM

## 2025-07-14 DIAGNOSIS — I48.92 PAROXYSMAL ATRIAL FLUTTER: Primary | ICD-10-CM

## 2025-07-14 NOTE — PROGRESS NOTES
Anticoagulation Clinic - Remote Progress Note  [employer] [employer]  Frequency of Monitorin-4x a month     Indication: Hx of mitral valve replacement with mechanical valve KONG Mendoza  Referring Provider: Dr. Garcia (last ravindra: 23)  Initial Warfarin Start Date:    Goal INR: 2.5-3.5  Current Drug Interactions: ensure (once daily); amitriptyline (PRN)  CHADS-VASc: 2 (age, HTN)     Diet: iceberg lettuce 1x/week, ensure 3-4x/week, V8 juice (20)  Alcohol: 12 pack of beer/week  Tobacco: None  OTC Pain Medication: None     INR History:  Date 5/18 5/25 6/6 6/26 7/10 7/27   8/4 8/30 09/17 10/16 11/2  11/17   Total WeeklyDose 23 mg ~22.75 mg  ~22.75 mg ~ 22.75 mg ~22.75 mg ~22.5 mg   22.5mg ~22.75mg ~22.75 mg 22.5 mg ~22.75 mg 24.5 mg   INR 3.4 2.7 2.8 2.4  2.6 2.8 1.9 2.0 2.5 3.3 2.5 2.0 2.5   Notes   call     Rec'd  Rec'd     clinda Rec  REC  Rec'd 10/20/23 Rec'd 11/3  Coq10 change        Date 11/22 12/01 1/6 1/12 1/25/24 2/3 2/13  2/26 3/13/24 3/28 4/11/24 4/29 5/12   Total Weekly Dose 24.5 mg 24.5 mg 24.5 mg 26 mg 24.5 mg 28 mg 28 mg  29 mg 29 mg 29 mg 29 mg 30 mg 29.5 mg   INR 2.4 3.2 1.8 2.7 1.7 2.5 2.6  2.5 2.7 2.7 2.9 3.4 2.5   Notes     Rec      Rec'd   Boost x 1 Rec 2/15 call self-adj Rec  Rec'd 3/14 Rec'd 3/29  Sent message in [employer]   Self adjusted maintenance regimen/ message rec'd in [employer] Rec'd 5/15      Date 5/29/24 6/27 7/16 8/19 8/27 9/12 10/3/24 10/25 11/22/24 12/12 1/9 3/14   Total WeeklyDose 29.5 mg 30 mg 30 mg Avg 29.75   30 mg   Avg 29.75 mg 29.5 mg  29.5 mg 29.5 mg 30mg   INR 3.3 3.0 3.1 3.6 3.3 3.5 3.4 2.8 3.3 3.2 3.4 3.5   Notes Rec'd 24 Rec'd  Unable to contact Rec'd  Rec'd  Rec  Rec'd 10/8/24 lvm unable to contact   [facility] message   Rec  Rec'      Date 3/27 4/28 5/15 6/3 6/17  7/12                 Total Weekly  Dose 29.75 mg 30 mg 30 mg 29.5 mg 28 mg  28 mg                 INR 2.5 2.5 2.8 2.9 2.6  3.3                 Notes Rec'd  3/28   Rec'd 5/16 Rec'd 6/4 Rec'd 6/19  Rec'd 7/14  [employer] Message x 2 no response                    Phone Interview:  Tablet Strength: 3mg and 1mg tablets  Patient Contact Info: **will message on [employer] once monthly or if INR is out of range**PLEASE  USE [email or web address] TO COMMUNICATE -   Preferred *[phone]* - after noon  Lab Contact Info: [employer]   Lab: [facility] phone: [phone] , Fax: [phone]     [employer] Message     Plan:  INR was therapeutic at 3.3 (goal 2.5-3.5). Patient reported via [employer] message that he has been taking 4 mg daily and will take 4.5 mg if his reading is low for consecutive tests. Patient will continue warfarin 4 mg daily until recheck  Repeat INR in 2 weeks, 7.25.25  Doc Turner understands the importance of calling the [facility] if he notices any s/sx of bleeding, stroke, or abnormal bruising, if any changes are made to his medications or medication doses (Rx, OTC, herbal), or if any upcoming procedures are scheduled. Doc Turner will likewise let us know if any other changes, questions, or concerns arise regarding anticoagulation therapy. he understands the importance of seeking medical attention immediately if he experiences any falls, vehicle accidents, or abnormal bleeding or bruising. Doc Turner voiced understanding of this information and confirms that he has the [facility]'s contact information. Otherwise, we will plan to contact the patient once monthly or if his INR is out of range.

## 2025-08-11 ENCOUNTER — ANTICOAGULATION VISIT (OUTPATIENT)
Dept: PHARMACY | Facility: HOSPITAL | Age: 75
End: 2025-08-11
Payer: MEDICARE

## 2025-08-11 DIAGNOSIS — Z95.2 HX OF MITRAL VALVE REPLACEMENT WITH MECHANICAL VALVE: ICD-10-CM

## 2025-08-11 DIAGNOSIS — I48.92 PAROXYSMAL ATRIAL FLUTTER: Primary | ICD-10-CM

## 2025-08-11 LAB — INR PPP: 3

## 2025-08-27 ENCOUNTER — ANTICOAGULATION VISIT (OUTPATIENT)
Dept: PHARMACY | Facility: HOSPITAL | Age: 75
End: 2025-08-27
Payer: MEDICARE

## 2025-08-27 DIAGNOSIS — Z95.2 HX OF MITRAL VALVE REPLACEMENT WITH MECHANICAL VALVE: ICD-10-CM

## 2025-08-27 DIAGNOSIS — I48.92 PAROXYSMAL ATRIAL FLUTTER: Primary | ICD-10-CM

## 2025-08-27 LAB — INR PPP: 2.4
